# Patient Record
Sex: MALE | Race: BLACK OR AFRICAN AMERICAN | NOT HISPANIC OR LATINO | Employment: OTHER | URBAN - METROPOLITAN AREA
[De-identification: names, ages, dates, MRNs, and addresses within clinical notes are randomized per-mention and may not be internally consistent; named-entity substitution may affect disease eponyms.]

---

## 2023-10-04 ENCOUNTER — TREATMENT (OUTPATIENT)
Dept: PHYSICAL THERAPY | Facility: CLINIC | Age: 53
End: 2023-10-04
Payer: COMMERCIAL

## 2023-10-04 DIAGNOSIS — R26.2 DIFFICULTY WALKING: ICD-10-CM

## 2023-10-04 DIAGNOSIS — M48.062 NEUROGENIC CLAUDICATION DUE TO LUMBAR SPINAL STENOSIS: ICD-10-CM

## 2023-10-04 DIAGNOSIS — D49.2 LUMBAR SPINE TUMOR: Primary | ICD-10-CM

## 2023-10-04 DIAGNOSIS — R53.1 WEAKNESS: ICD-10-CM

## 2023-10-04 PROCEDURE — 97112 NEUROMUSCULAR REEDUCATION: CPT | Mod: GP | Performed by: PHYSICAL THERAPIST

## 2023-10-04 PROCEDURE — 97110 THERAPEUTIC EXERCISES: CPT | Mod: GP | Performed by: PHYSICAL THERAPIST

## 2023-10-04 NOTE — PROGRESS NOTES
Physical Therapy Daily Treatment Note    Patient Name: Altaf Parsons  MRN Number: [id number]  Initial PT Examination Date: 8/10/23  Referring Clinician: Dr. Rodriguez    Today's Date: 10/4/2023       Insurance  Visit Number: 7  Approved Number of Visits: 30/ year  Certification Period: N/A    Precautions  History of spinal cancer  Moderate fall risk    Problem List  1. Lumbar spine tumor        2. Neurogenic claudication due to lumbar spinal stenosis        3. Weakness        4. Difficulty walking          Subjective  He has been having less shocks in his left leg since he started therapy. He felt ok after his last PT session.    Objective  Therapeutic Exercise    - bike warm up, 5 minutes, L3   - hamstring curls, 24 pounds, 3x10   - leg press, 65 pounds, 3x10  Neuromuscular Reeducation    - modified tandem 2x1 min each side   - romberg eyes closed, weight shifting/ turning 2x1 min each side   - slow march x1 min    Assessment  Ability to increase repetitions on leg press and hamstring curls without increase in pain shows continued improvements in lower body strength and endurance.     Plan  Continue progressing standing balance. Update home exercise program.    Home Program  Access Code: [email or web address]  URL: [email or web address]  Date: 10/04/2023  Prepared by: Geovani Dominguez    Exercises  - Hooklying Single Knee to Chest Stretch  - 1 x daily - 7 x weekly - 10 reps  - Supine Lower Trunk Rotation with PLB  - 1 x daily - 7 x weekly - 10 reps  - Supine Piriformis Stretch with Foot on Ground  - 1 x daily - 7 x weekly - 3 reps - 30 sec hold  - Seated Hamstring Stretch  - 1 x daily - 7 x weekly - 3 reps - 20 sec hold  - Sit to Stand Without Arm Support  - 1 x daily - 7 x weekly - 2 sets - 10 reps  - Standing Near Stance in Corner with Eyes Closed  - 1 x daily - 7 x weekly - 3 reps - 1 min hold  - Prone Quadriceps Stretch with Strap  - 1 x daily - 7 x weekly - 3 reps - 30 sec hold    Care Plan  Encounter  Problems       Encounter Problems (Active)       PT Problem       PT Goal 1       Expected End:  11/29/23       1) Modified return to prior level of function to allow continued home independent capability.  2) Reduce back pain levels 4-5/10 maximum in the last week for improvements in quality of life and ability to sleep.   3) Increase bilateral hip strength testing greater than or equal to 4/5 for improvements in low back/ pelvic stability when standing and walking for extended periods of time.   4) Patient to demonstrates capability of walking for 10 minutes with assistive device for integration into the community.  5) Increase LEFS outcome measure to greater than or equal to 60 for meaningful and clinical improvements in patients condition.

## 2023-10-06 PROBLEM — R40.0 DAYTIME SOMNOLENCE: Status: ACTIVE | Noted: 2023-10-06

## 2023-10-06 PROBLEM — M48.062 LUMBAR STENOSIS WITH NEUROGENIC CLAUDICATION: Status: ACTIVE | Noted: 2023-10-06

## 2023-10-06 PROBLEM — E66.01 MORBID OBESITY (MULTI): Status: ACTIVE | Noted: 2023-10-06

## 2023-10-06 PROBLEM — M53.2X9 SPINAL INSTABILITY, ACQUIRED: Status: ACTIVE | Noted: 2023-10-06

## 2023-10-06 PROBLEM — R91.1 LUNG NODULE: Status: ACTIVE | Noted: 2023-10-06

## 2023-10-06 PROBLEM — Z98.1 STATUS POST LAMINECTOMY WITH SPINAL FUSION: Status: ACTIVE | Noted: 2023-10-06

## 2023-10-06 PROBLEM — E03.9 HYPOTHYROIDISM: Status: ACTIVE | Noted: 2023-10-06

## 2023-10-06 PROBLEM — M62.838 MUSCLE SPASM: Status: ACTIVE | Noted: 2023-10-06

## 2023-10-06 PROBLEM — J98.4 RESTRICTIVE LUNG DISEASE: Status: ACTIVE | Noted: 2023-10-06

## 2023-10-06 PROBLEM — D49.2 THORACIC SPINE TUMOR: Status: ACTIVE | Noted: 2023-10-06

## 2023-10-06 PROBLEM — R59.1 LYMPHADENOPATHY: Status: ACTIVE | Noted: 2023-10-06

## 2023-10-06 PROBLEM — S89.90XA KNEE INJURY: Status: ACTIVE | Noted: 2023-10-06

## 2023-10-06 PROBLEM — D49.89: Status: ACTIVE | Noted: 2023-10-06

## 2023-10-06 PROBLEM — J32.9 CHRONIC SINUSITIS: Status: ACTIVE | Noted: 2023-10-06

## 2023-10-06 PROBLEM — R22.2 LOCALIZED SWELLING, MASS AND LUMP, TRUNK: Status: ACTIVE | Noted: 2023-10-06

## 2023-10-06 PROBLEM — M48.061 SPINAL STENOSIS OF LUMBAR REGION: Status: ACTIVE | Noted: 2023-10-06

## 2023-10-06 PROBLEM — T66.XXXA RADIATION EFFECT: Status: ACTIVE | Noted: 2023-10-06

## 2023-10-06 PROBLEM — R63.4 WEIGHT LOSS: Status: ACTIVE | Noted: 2023-10-06

## 2023-10-06 PROBLEM — R22.2 PARASPINAL MASS: Status: ACTIVE | Noted: 2018-05-16

## 2023-10-06 PROBLEM — G95.20 SPINAL CORD COMPRESSION (MULTI): Status: ACTIVE | Noted: 2023-10-06

## 2023-10-06 PROBLEM — N52.9 ED (ERECTILE DYSFUNCTION): Status: ACTIVE | Noted: 2023-10-06

## 2023-10-06 PROBLEM — J98.59 OTHER DISEASES OF MEDIASTINUM, NOT ELSEWHERE CLASSIFIED: Status: ACTIVE | Noted: 2023-10-06

## 2023-10-06 PROBLEM — M89.9 BONE LESION: Status: ACTIVE | Noted: 2023-10-06

## 2023-10-06 PROBLEM — R43.2 DYSGEUSIA: Status: ACTIVE | Noted: 2023-10-06

## 2023-10-06 PROBLEM — C37: Status: ACTIVE | Noted: 2023-10-06

## 2023-10-06 PROBLEM — D49.2 NEOPLASM OF SPINE: Status: ACTIVE | Noted: 2023-10-06

## 2023-10-06 PROBLEM — G99.2 MYELOPATHY CONCURRENT WITH AND DUE TO SPINAL STENOSIS OF THORACIC REGION (MULTI): Status: ACTIVE | Noted: 2023-10-06

## 2023-10-06 PROBLEM — I10 BENIGN ESSENTIAL HYPERTENSION: Status: ACTIVE | Noted: 2023-10-06

## 2023-10-06 PROBLEM — M48.04 MYELOPATHY CONCURRENT WITH AND DUE TO SPINAL STENOSIS OF THORACIC REGION (MULTI): Status: ACTIVE | Noted: 2023-10-06

## 2023-10-06 PROBLEM — E78.5 HYPERLIPIDEMIA: Status: ACTIVE | Noted: 2023-10-06

## 2023-10-06 PROBLEM — M79.89 SOFT TISSUE MASS: Status: ACTIVE | Noted: 2023-10-06

## 2023-10-06 PROBLEM — G47.33 OBSTRUCTIVE SLEEP APNEA OF ADULT: Status: ACTIVE | Noted: 2023-10-06

## 2023-10-06 PROBLEM — M79.2 NEUROPATHIC PAIN: Status: ACTIVE | Noted: 2023-10-06

## 2023-10-06 PROBLEM — R06.83 SNORING: Status: ACTIVE | Noted: 2023-10-06

## 2023-10-06 PROBLEM — R07.9 CHEST PAIN: Status: ACTIVE | Noted: 2023-10-06

## 2023-10-06 PROBLEM — E29.1 HYPOGONADISM MALE: Status: ACTIVE | Noted: 2023-10-06

## 2023-10-06 PROBLEM — G47.33 OSA ON CPAP: Status: ACTIVE | Noted: 2023-10-06

## 2023-10-06 PROBLEM — C79.51 METASTATIC CANCER TO SPINE (MULTI): Status: ACTIVE | Noted: 2023-10-06

## 2023-10-06 PROBLEM — J98.4 PNEUMATOCELE OF LUNG: Status: ACTIVE | Noted: 2023-10-06

## 2023-10-06 RX ORDER — FOLIC ACID 1 MG/1
1 TABLET ORAL DAILY
COMMUNITY
Start: 2022-01-04 | End: 2023-11-22 | Stop reason: WASHOUT

## 2023-10-06 RX ORDER — KETOROLAC TROMETHAMINE 10 MG/1
1 TABLET, FILM COATED ORAL EVERY 6 HOURS PRN
COMMUNITY
Start: 2023-09-18 | End: 2023-11-28 | Stop reason: SDUPTHER

## 2023-10-06 RX ORDER — NALOXONE HYDROCHLORIDE 4 MG/.1ML
SPRAY NASAL
COMMUNITY
Start: 2023-09-18 | End: 2024-05-12 | Stop reason: WASHOUT

## 2023-10-06 RX ORDER — GABAPENTIN 300 MG/1
300 CAPSULE ORAL 3 TIMES DAILY
COMMUNITY
Start: 2023-09-25 | End: 2024-02-29

## 2023-10-06 RX ORDER — OMEPRAZOLE 20 MG/1
20 CAPSULE, DELAYED RELEASE ORAL DAILY
COMMUNITY
Start: 2022-12-24 | End: 2024-04-27 | Stop reason: WASHOUT

## 2023-10-06 RX ORDER — GABAPENTIN 600 MG/1
600 TABLET ORAL 3 TIMES DAILY
COMMUNITY
Start: 2023-05-17 | End: 2024-01-16 | Stop reason: SDUPTHER

## 2023-10-06 RX ORDER — TOPIRAMATE 100 MG/1
100 TABLET, FILM COATED ORAL 2 TIMES DAILY
COMMUNITY
End: 2024-04-27 | Stop reason: ALTCHOICE

## 2023-10-06 RX ORDER — IBUPROFEN 600 MG/1
600 TABLET ORAL 3 TIMES DAILY
COMMUNITY
End: 2024-01-16 | Stop reason: ALTCHOICE

## 2023-10-06 RX ORDER — OXYCODONE HYDROCHLORIDE 5 MG/1
5 TABLET ORAL
COMMUNITY
Start: 2023-09-18 | End: 2023-10-20 | Stop reason: SDUPTHER

## 2023-10-06 RX ORDER — MELOXICAM 15 MG/1
15 TABLET ORAL DAILY
COMMUNITY
Start: 2023-09-25 | End: 2024-04-28 | Stop reason: HOSPADM

## 2023-10-06 RX ORDER — GABAPENTIN 300 MG/1
3 CAPSULE ORAL 3 TIMES DAILY
COMMUNITY
Start: 2021-04-15 | End: 2024-01-16 | Stop reason: SDUPTHER

## 2023-10-06 RX ORDER — IBUPROFEN 600 MG/1
600 TABLET ORAL EVERY 6 HOURS PRN
COMMUNITY
Start: 2023-03-06 | End: 2024-01-16 | Stop reason: ALTCHOICE

## 2023-10-06 RX ORDER — CAPECITABINE 500 MG/1
TABLET, FILM COATED ORAL
COMMUNITY
Start: 2023-05-02 | End: 2023-11-22 | Stop reason: WASHOUT

## 2023-10-06 RX ORDER — TRAMADOL HYDROCHLORIDE 50 MG/1
50 TABLET ORAL 4 TIMES DAILY PRN
COMMUNITY
Start: 2022-11-29 | End: 2024-04-27 | Stop reason: ALTCHOICE

## 2023-10-06 RX ORDER — LISINOPRIL AND HYDROCHLOROTHIAZIDE 20; 25 MG/1; MG/1
1 TABLET ORAL DAILY
COMMUNITY
End: 2024-01-04

## 2023-10-09 DIAGNOSIS — D49.89 THYMOMA: Primary | ICD-10-CM

## 2023-10-11 ENCOUNTER — APPOINTMENT (OUTPATIENT)
Dept: LAB | Facility: HOSPITAL | Age: 53
End: 2023-10-11
Payer: COMMERCIAL

## 2023-10-11 ENCOUNTER — LAB (OUTPATIENT)
Dept: LAB | Facility: HOSPITAL | Age: 53
End: 2023-10-11
Payer: COMMERCIAL

## 2023-10-11 ENCOUNTER — OFFICE VISIT (OUTPATIENT)
Dept: HEMATOLOGY/ONCOLOGY | Facility: HOSPITAL | Age: 53
End: 2023-10-11
Payer: COMMERCIAL

## 2023-10-11 VITALS
OXYGEN SATURATION: 100 % | WEIGHT: 214.73 LBS | HEART RATE: 92 BPM | SYSTOLIC BLOOD PRESSURE: 110 MMHG | HEIGHT: 69 IN | TEMPERATURE: 98.6 F | DIASTOLIC BLOOD PRESSURE: 74 MMHG | BODY MASS INDEX: 31.8 KG/M2 | RESPIRATION RATE: 18 BRPM

## 2023-10-11 DIAGNOSIS — D49.89 THYMOMA: ICD-10-CM

## 2023-10-11 DIAGNOSIS — C37 THYMUS CANCER (MULTI): Primary | ICD-10-CM

## 2023-10-11 LAB
ALBUMIN SERPL BCP-MCNC: 4 G/DL (ref 3.4–5)
ALP SERPL-CCNC: 52 U/L (ref 33–120)
ALT SERPL W P-5'-P-CCNC: 13 U/L (ref 10–52)
ANION GAP SERPL CALC-SCNC: 12 MMOL/L (ref 10–20)
AST SERPL W P-5'-P-CCNC: 15 U/L (ref 9–39)
BASOPHILS # BLD AUTO: 0.02 X10*3/UL (ref 0–0.1)
BASOPHILS NFR BLD AUTO: 0.6 %
BILIRUB SERPL-MCNC: 0.5 MG/DL (ref 0–1.2)
BUN SERPL-MCNC: 17 MG/DL (ref 6–23)
CALCIUM SERPL-MCNC: 9.5 MG/DL (ref 8.6–10.3)
CHLORIDE SERPL-SCNC: 99 MMOL/L (ref 98–107)
CO2 SERPL-SCNC: 29 MMOL/L (ref 21–32)
CREAT SERPL-MCNC: 0.96 MG/DL (ref 0.5–1.3)
EOSINOPHIL # BLD AUTO: 0.52 X10*3/UL (ref 0–0.7)
EOSINOPHIL NFR BLD AUTO: 16.6 %
ERYTHROCYTE [DISTWIDTH] IN BLOOD BY AUTOMATED COUNT: 15.4 % (ref 11.5–14.5)
GFR SERPL CREATININE-BSD FRML MDRD: >90 ML/MIN/1.73M*2
GLUCOSE SERPL-MCNC: 76 MG/DL (ref 74–99)
HCT VFR BLD AUTO: 38.6 % (ref 41–52)
HGB BLD-MCNC: 12.7 G/DL (ref 13.5–17.5)
IMM GRANULOCYTES # BLD AUTO: 0 X10*3/UL (ref 0–0.7)
IMM GRANULOCYTES NFR BLD AUTO: 0 % (ref 0–0.9)
LYMPHOCYTES # BLD AUTO: 0.88 X10*3/UL (ref 1.2–4.8)
LYMPHOCYTES NFR BLD AUTO: 28.1 %
MCH RBC QN AUTO: 27 PG (ref 26–34)
MCHC RBC AUTO-ENTMCNC: 32.9 G/DL (ref 32–36)
MCV RBC AUTO: 82 FL (ref 80–100)
MONOCYTES # BLD AUTO: 0.35 X10*3/UL (ref 0.1–1)
MONOCYTES NFR BLD AUTO: 11.2 %
NEUTROPHILS # BLD AUTO: 1.36 X10*3/UL (ref 1.2–7.7)
NEUTROPHILS NFR BLD AUTO: 43.5 %
NRBC BLD-RTO: 0 /100 WBCS (ref 0–0)
PLATELET # BLD AUTO: 225 X10*3/UL (ref 150–450)
PMV BLD AUTO: 8.8 FL (ref 7.5–11.5)
POTASSIUM SERPL-SCNC: 3.8 MMOL/L (ref 3.5–5.3)
PROT SERPL-MCNC: 7.6 G/DL (ref 6.4–8.2)
RBC # BLD AUTO: 4.71 X10*6/UL (ref 4.5–5.9)
SODIUM SERPL-SCNC: 136 MMOL/L (ref 136–145)
WBC # BLD AUTO: 3.1 X10*3/UL (ref 4.4–11.3)

## 2023-10-11 PROCEDURE — 99214 OFFICE O/P EST MOD 30 MIN: CPT | Performed by: CLINICAL NURSE SPECIALIST

## 2023-10-11 PROCEDURE — 84075 ASSAY ALKALINE PHOSPHATASE: CPT

## 2023-10-11 PROCEDURE — 1036F TOBACCO NON-USER: CPT | Performed by: CLINICAL NURSE SPECIALIST

## 2023-10-11 PROCEDURE — 85025 COMPLETE CBC W/AUTO DIFF WBC: CPT

## 2023-10-11 PROCEDURE — 3078F DIAST BP <80 MM HG: CPT | Performed by: CLINICAL NURSE SPECIALIST

## 2023-10-11 PROCEDURE — 3074F SYST BP LT 130 MM HG: CPT | Performed by: CLINICAL NURSE SPECIALIST

## 2023-10-11 PROCEDURE — 36415 COLL VENOUS BLD VENIPUNCTURE: CPT

## 2023-10-11 ASSESSMENT — PATIENT HEALTH QUESTIONNAIRE - PHQ9
SUM OF ALL RESPONSES TO PHQ9 QUESTIONS 1 AND 2: 0
2. FEELING DOWN, DEPRESSED OR HOPELESS: NOT AT ALL
1. LITTLE INTEREST OR PLEASURE IN DOING THINGS: NOT AT ALL

## 2023-10-11 ASSESSMENT — ENCOUNTER SYMPTOMS
DEPRESSION: 0
OCCASIONAL FEELINGS OF UNSTEADINESS: 0
LOSS OF SENSATION IN FEET: 0

## 2023-10-11 ASSESSMENT — PAIN SCALES - GENERAL: PAINLEVEL: 4

## 2023-10-12 ENCOUNTER — TREATMENT (OUTPATIENT)
Dept: PHYSICAL THERAPY | Facility: CLINIC | Age: 53
End: 2023-10-12
Payer: COMMERCIAL

## 2023-10-12 DIAGNOSIS — D49.2 LUMBAR SPINE TUMOR: ICD-10-CM

## 2023-10-12 DIAGNOSIS — M48.062 NEUROGENIC CLAUDICATION DUE TO LUMBAR SPINAL STENOSIS: ICD-10-CM

## 2023-10-12 DIAGNOSIS — R53.1 WEAKNESS: ICD-10-CM

## 2023-10-12 DIAGNOSIS — R26.2 DIFFICULTY WALKING: Primary | ICD-10-CM

## 2023-10-12 PROCEDURE — 97110 THERAPEUTIC EXERCISES: CPT | Mod: GP | Performed by: PHYSICAL THERAPIST

## 2023-10-12 PROCEDURE — 97112 NEUROMUSCULAR REEDUCATION: CPT | Mod: GP | Performed by: PHYSICAL THERAPIST

## 2023-10-12 NOTE — PROGRESS NOTES
Physical Therapy Daily Treatment Note    Patient Name: Altaf Parsons  MRN Number: [id number]  Initial PT Examination Date: 8/10/23  Referring Clinician: Dr. Rodriguez    Today's Date: 10/12/2023  Time Calculation  Start Time: 0932    Insurance  Visit Number: 8  Approved Number of Visits: 30/ year  Certification Period: N/A    Precautions  History of spinal cancer  Moderate fall risk    Problem List  1. Difficulty walking        2. Lumbar spine tumor        3. Neurogenic claudication due to lumbar spinal stenosis        4. Weakness            Subjective  Did 15 minutes on his bike recently. He has added standing bicep curls into his home program. He has been doing some of the balance exercises done in therapy.     Objective  Therapeutic Exercise    - bike warm up, 5 minutes, L3   - leg press, 70 pounds, 3x10  Neuromuscular Reeducation    - modified tandem 2x1 min each side   - romberg eyes closed, weight shifting/ turning 2x1 min each side   - slow march 2x1 min   - forward/ backward stepping over obstacle x1 min on each side   - updated/ printed HEP   - discussed appropriate frequency of balance exercises and safety set up at home    Assessment  Continue improvements in balance, slower march and participating in higher level balance. Unsteadiness and weakness in the left leg when stepping over hurdles.    Plan  Continue balance exercises.    Home Program  Access Code: [email or web address]  URL: [email or web address]  Date: 10/04/2023  Prepared by: Geovani Dominguez    Exercises  - Hooklying Single Knee to Chest Stretch  - 1 x daily - 7 x weekly - 10 reps  - Supine Lower Trunk Rotation with PLB  - 1 x daily - 7 x weekly - 10 reps  - Supine Piriformis Stretch with Foot on Ground  - 1 x daily - 7 x weekly - 3 reps - 30 sec hold  - Seated Hamstring Stretch  - 1 x daily - 7 x weekly - 3 reps - 20 sec hold  - Sit to Stand Without Arm Support  - 1 x daily - 7 x weekly - 2 sets - 10 reps  - Standing Near Stance in  Corner with Eyes Closed  - 1 x daily - 7 x weekly - 3 reps - 1 min hold  - Prone Quadriceps Stretch with Strap  - 1 x daily - 7 x weekly - 3 reps - 30 sec hold    Access Code: [email or web address]  URL: [email or web address]  Date: 10/12/2023  Prepared by: Geovani Dominguez    Exercises  - Hooklying Single Knee to Chest Stretch  - 1 x daily - 7 x weekly - 10 reps  - Supine Lower Trunk Rotation with PLB  - 1 x daily - 7 x weekly - 10 reps  - Supine Piriformis Stretch with Foot on Ground  - 1 x daily - 7 x weekly - 3 reps - 30 sec hold  - Seated Hamstring Stretch  - 1 x daily - 7 x weekly - 3 reps - 20 sec hold  - Prone Quadriceps Stretch with Strap  - 2 x weekly - 3 reps - 30 sec hold  - Sit to Stand Without Arm Support  - 2 x weekly - 2 sets - 10 reps  - Standing Near Stance in Corner with Eyes Closed  - 2 x weekly - 2 reps - 1 min hold  - Tandem Stance in Corner  - 2 x weekly - 2 sets - 1 min hold  - Corner Balance Feet Together: Eyes Closed With Head Turns  - 2 x weekly - 2 sets - 1 min hold  - Standing March with Counter Support  - 2 x weekly - 2 sets - 1 min hold    Care Plan  Active       PT Problem       PT Goal 1       Expected End:  11/29/23       1) Modified return to prior level of function to allow continued home independent capability.  2) Reduce back pain levels 4-5/10 maximum in the last week for improvements in quality of life and ability to sleep.   3) Increase bilateral hip strength testing greater than or equal to 4/5 for improvements in low back/ pelvic stability when standing and walking for extended periods of time.   4) Patient to demonstrates capability of walking for 10 minutes with assistive device for integration into the community.  5) Increase LEFS outcome measure to greater than or equal to 60 for meaningful and clinical improvements in patients condition.

## 2023-10-19 ENCOUNTER — PHARMACY VISIT (OUTPATIENT)
Dept: PHARMACY | Facility: CLINIC | Age: 53
End: 2023-10-19
Payer: COMMERCIAL

## 2023-10-19 ENCOUNTER — SPECIALTY PHARMACY (OUTPATIENT)
Dept: PHARMACY | Facility: CLINIC | Age: 53
End: 2023-10-19

## 2023-10-19 PROCEDURE — RXMED WILLOW AMBULATORY MEDICATION CHARGE

## 2023-10-20 ENCOUNTER — TELEPHONE (OUTPATIENT)
Dept: ADMISSION | Facility: HOSPITAL | Age: 53
End: 2023-10-20

## 2023-10-20 ENCOUNTER — LAB (OUTPATIENT)
Dept: LAB | Facility: LAB | Age: 53
End: 2023-10-20
Payer: COMMERCIAL

## 2023-10-20 DIAGNOSIS — C37 THYMUS CANCER (MULTI): Primary | ICD-10-CM

## 2023-10-20 DIAGNOSIS — C37 THYMUS CANCER (MULTI): ICD-10-CM

## 2023-10-20 LAB
BASOPHILS # BLD AUTO: 0.03 X10*3/UL (ref 0–0.1)
BASOPHILS NFR BLD AUTO: 0.8 %
EOSINOPHIL # BLD AUTO: 0.75 X10*3/UL (ref 0–0.7)
EOSINOPHIL NFR BLD AUTO: 21.2 %
ERYTHROCYTE [DISTWIDTH] IN BLOOD BY AUTOMATED COUNT: 15.5 % (ref 11.5–14.5)
HCT VFR BLD AUTO: 39.2 % (ref 41–52)
HGB BLD-MCNC: 12.2 G/DL (ref 13.5–17.5)
IMM GRANULOCYTES # BLD AUTO: 0.01 X10*3/UL (ref 0–0.7)
IMM GRANULOCYTES NFR BLD AUTO: 0.3 % (ref 0–0.9)
LYMPHOCYTES # BLD AUTO: 0.71 X10*3/UL (ref 1.2–4.8)
LYMPHOCYTES NFR BLD AUTO: 20.1 %
MCH RBC QN AUTO: 26.5 PG (ref 26–34)
MCHC RBC AUTO-ENTMCNC: 31.1 G/DL (ref 32–36)
MCV RBC AUTO: 85 FL (ref 80–100)
MONOCYTES # BLD AUTO: 0.41 X10*3/UL (ref 0.1–1)
MONOCYTES NFR BLD AUTO: 11.6 %
NEUTROPHILS # BLD AUTO: 1.62 X10*3/UL (ref 1.2–7.7)
NEUTROPHILS NFR BLD AUTO: 46 %
NRBC BLD-RTO: 0 /100 WBCS (ref 0–0)
PLATELET # BLD AUTO: 317 X10*3/UL (ref 150–450)
PMV BLD AUTO: 9.6 FL (ref 7.5–11.5)
RBC # BLD AUTO: 4.6 X10*6/UL (ref 4.5–5.9)
WBC # BLD AUTO: 3.5 X10*3/UL (ref 4.4–11.3)

## 2023-10-20 PROCEDURE — 85025 COMPLETE CBC W/AUTO DIFF WBC: CPT

## 2023-10-20 PROCEDURE — 36415 COLL VENOUS BLD VENIPUNCTURE: CPT

## 2023-10-20 RX ORDER — OXYCODONE HYDROCHLORIDE 5 MG/1
5 TABLET ORAL EVERY 4 HOURS PRN
Qty: 60 TABLET | Refills: 0 | Status: SHIPPED | OUTPATIENT
Start: 2023-10-20 | End: 2023-12-22 | Stop reason: SDUPTHER

## 2023-10-20 NOTE — TELEPHONE ENCOUNTER
"Per Felicia Meza the patient notified the medication for the Oxycodone has been sent to the patient's \"Preferred Pharmacy\", still awaiting results from the CBC/D drawn today in [place].  100% understanding.        Patient called to informed this nurse he had his CBC/Diff drawn this AM at [place], told the patient results are still pending, however, this nurse will inform Felicia Meza, also the patient is requesting refill for his Oxycodone 5 mg PO , last sent on 9/18/23, patient stated \" felicia may have forgotten.  [facility]    Secure Chat message sent to Felicia Meza CNP.  "

## 2023-10-23 ENCOUNTER — SPECIALTY PHARMACY (OUTPATIENT)
Dept: PHARMACY | Facility: CLINIC | Age: 53
End: 2023-10-23

## 2023-10-25 ENCOUNTER — OFFICE VISIT (OUTPATIENT)
Dept: HEMATOLOGY/ONCOLOGY | Facility: HOSPITAL | Age: 53
End: 2023-10-25
Payer: COMMERCIAL

## 2023-10-25 ENCOUNTER — LAB (OUTPATIENT)
Dept: LAB | Facility: HOSPITAL | Age: 53
End: 2023-10-25
Payer: COMMERCIAL

## 2023-10-25 ENCOUNTER — APPOINTMENT (OUTPATIENT)
Dept: PHYSICAL THERAPY | Facility: CLINIC | Age: 53
End: 2023-10-25
Payer: COMMERCIAL

## 2023-10-25 VITALS
OXYGEN SATURATION: 98 % | TEMPERATURE: 97.7 F | RESPIRATION RATE: 18 BRPM | SYSTOLIC BLOOD PRESSURE: 111 MMHG | DIASTOLIC BLOOD PRESSURE: 74 MMHG | WEIGHT: 223.99 LBS | HEART RATE: 92 BPM | BODY MASS INDEX: 32.99 KG/M2

## 2023-10-25 DIAGNOSIS — C37 THYMUS CANCER (MULTI): ICD-10-CM

## 2023-10-25 LAB
ALBUMIN SERPL BCP-MCNC: 4.1 G/DL (ref 3.4–5)
ALP SERPL-CCNC: 57 U/L (ref 33–120)
ALT SERPL W P-5'-P-CCNC: 13 U/L (ref 10–52)
ANION GAP SERPL CALC-SCNC: 12 MMOL/L (ref 10–20)
AST SERPL W P-5'-P-CCNC: 16 U/L (ref 9–39)
BASOPHILS # BLD AUTO: 0.03 X10*3/UL (ref 0–0.1)
BASOPHILS NFR BLD AUTO: 0.9 %
BILIRUB SERPL-MCNC: 0.5 MG/DL (ref 0–1.2)
BUN SERPL-MCNC: 14 MG/DL (ref 6–23)
CALCIUM SERPL-MCNC: 9.4 MG/DL (ref 8.6–10.3)
CHLORIDE SERPL-SCNC: 99 MMOL/L (ref 98–107)
CO2 SERPL-SCNC: 30 MMOL/L (ref 21–32)
CREAT SERPL-MCNC: 1.03 MG/DL (ref 0.5–1.3)
EOSINOPHIL # BLD AUTO: 0.59 X10*3/UL (ref 0–0.7)
EOSINOPHIL NFR BLD AUTO: 17.2 %
ERYTHROCYTE [DISTWIDTH] IN BLOOD BY AUTOMATED COUNT: 15.4 % (ref 11.5–14.5)
GFR SERPL CREATININE-BSD FRML MDRD: 86 ML/MIN/1.73M*2
GLUCOSE SERPL-MCNC: 87 MG/DL (ref 74–99)
HCT VFR BLD AUTO: 35.8 % (ref 41–52)
HGB BLD-MCNC: 11.7 G/DL (ref 13.5–17.5)
IMM GRANULOCYTES # BLD AUTO: 0.01 X10*3/UL (ref 0–0.7)
IMM GRANULOCYTES NFR BLD AUTO: 0.3 % (ref 0–0.9)
LYMPHOCYTES # BLD AUTO: 0.71 X10*3/UL (ref 1.2–4.8)
LYMPHOCYTES NFR BLD AUTO: 20.7 %
MCH RBC QN AUTO: 27 PG (ref 26–34)
MCHC RBC AUTO-ENTMCNC: 32.7 G/DL (ref 32–36)
MCV RBC AUTO: 83 FL (ref 80–100)
MONOCYTES # BLD AUTO: 0.49 X10*3/UL (ref 0.1–1)
MONOCYTES NFR BLD AUTO: 14.3 %
NEUTROPHILS # BLD AUTO: 1.6 X10*3/UL (ref 1.2–7.7)
NEUTROPHILS NFR BLD AUTO: 46.6 %
NRBC BLD-RTO: 0 /100 WBCS (ref 0–0)
PLATELET # BLD AUTO: 249 X10*3/UL (ref 150–450)
PMV BLD AUTO: 8.6 FL (ref 7.5–11.5)
POTASSIUM SERPL-SCNC: 3.9 MMOL/L (ref 3.5–5.3)
PROT SERPL-MCNC: 7.3 G/DL (ref 6.4–8.2)
RBC # BLD AUTO: 4.33 X10*6/UL (ref 4.5–5.9)
SODIUM SERPL-SCNC: 137 MMOL/L (ref 136–145)
WBC # BLD AUTO: 3.4 X10*3/UL (ref 4.4–11.3)

## 2023-10-25 PROCEDURE — 1036F TOBACCO NON-USER: CPT | Performed by: CLINICAL NURSE SPECIALIST

## 2023-10-25 PROCEDURE — 3078F DIAST BP <80 MM HG: CPT | Performed by: CLINICAL NURSE SPECIALIST

## 2023-10-25 PROCEDURE — 3074F SYST BP LT 130 MM HG: CPT | Performed by: CLINICAL NURSE SPECIALIST

## 2023-10-25 PROCEDURE — 85025 COMPLETE CBC W/AUTO DIFF WBC: CPT

## 2023-10-25 PROCEDURE — 36415 COLL VENOUS BLD VENIPUNCTURE: CPT

## 2023-10-25 PROCEDURE — 99214 OFFICE O/P EST MOD 30 MIN: CPT | Performed by: CLINICAL NURSE SPECIALIST

## 2023-10-25 PROCEDURE — 84075 ASSAY ALKALINE PHOSPHATASE: CPT

## 2023-10-25 ASSESSMENT — PAIN SCALES - GENERAL: PAINLEVEL: 0-NO PAIN

## 2023-10-25 NOTE — PROGRESS NOTES
Patient ID: Altaf Parsons is a 53 y.o. male.  Visit Type: Follow Up Visit      Cancer History:   Treatment Synopsis:          DIAGNOSIS     Malignant thymoma. Type B2 (predominant lymphocytic population with scattered epithelial cells). Date of diagnosis is March 4, 2016 from a left lower lobe of the lung wedge resection and mediastinal fat biopsy.A left pleural nodule was biopsied in May  2017 showing thymoma. A left serratus anterior biopsy in November 2019 showed thymoma. A biopsy from a T12-L1 paraspinal mass on August 19, 2020 shows again thymoma. A spine tumor biopsy in January 2021 again shows thymoma.        STAGING     T2 N0 M1 (metastases to the lungs bilaterally)        CURRENT SITES OF DISEASE     Mediastinum, lung, parapinal region T, L, S spine, left pleura, T12-L1 paraspinal region, left lateral aspect of the spinal canal spanning from at least T11-S1. Findings likely  represent epidural tumoral extension as seen on MRI thoracic and lumbar spine 06/26/2021. Interval worsening/development of widespread osseous metastasis noted throughout the thoracic and lumbar spine with ventral epidural tumoral extension from the T6  level through the L4 level. Canal stenosis secondary to epidural tumor appears to be more severe at the T8 level through the T10 level. At the T8-9 level there appears to be mild flattening of the contour of the cord. Recent MRI T/L spine (11/11/22) demonstrates  increased/new tumor extension across multiple spinal levels, particularly at T9-T11 and L1-L2 with marked stenosis at L1-L2.        MOLECULAR GENOMICS     PD-L1 22C3 FDA (KEYTRUDA) for Gastric/GEA: CPS>/=1 (PD-L1 EXPRESSION) Combined Positive Score: 80   TEST: Solid Focus Tumor DNA Panel SPECIMEN: FFPE, Left Serratus Anterior, Biopsy, [id number] A DISEASE DIAGNOSIS: Thyoma Estimated Tumor Content: 30% COLLECTION DATE: 11/13/2019 RECEIVED DATE: 08/11/2020 REPORT DATE: 08/14/2020 MICROSATELLITE STATUS: Microsatellite  Stable (CHERYL)  DISEASE ASSOCIATED GENOMIC FINDINGS: None         PRIOR THERAPY     1-left video-assisted thoracic surgery extensive lysis of adhesions and total pulmonary decortication, lower lobe wedge, exploration of the mediastinum converted to  thoracotomy, resection of anterior mediastinal mass and thymus, upper lobe wedge. Per surgery there is residual disease.  2-external beam radiation therapy August 1, 2016- Sept 17, 2016  5940 CGyE using 180 CGyE daily to surgical site   3- cyclophosphamide, adriamycin, cisplatin on 8/14/17 x 3 cycles      4- 3/19/18-4/17/18: recurrent left paraspinal T12-L1, 50 CGE/20 fx PROTONS Had progression but declined systemic therapy.      5- 12/10/19- 12/31/19: SINDI mass and left serratus anterior mass, 45 CGE/15 fx, PROTONS      6- Stenosis from T11-L1 secondary to large intraspinal extradural mass at the T12-L1 level/neoplasm. OPERATION/PROCEDURE: 1. Posterior spinal fusion T9-L1 with use of bilateral  pedicle screw instrumentation, allograft, and demineralized bone matrix fiber. 2. T12 laminectomy with laminotomy of L1 and T11 to decompress T11-L1 levels and remove the intraspinal extradural neoplasm at the T12-L1 segment.      7- On 8/4/21, he saw ortho spine surgery (Dr. Delgado) who did not recommend further surgery.      8- palliative radiation to the T-L/S1 spine: 30 Gy in 10 daily fractions, completed 9/27/21.     9- Single agents alimta per NCCN guideliens on January 10, 2022.   Received 2 cycles in JanuaryJanuary 2022.  Progressive disease based on MRI in February 2022     10- Radiation therapy to 30 Gy in 10 fractions, from T4-T6 and L1-L3 completed in mid April 2022 11- 1- Dec 2022 - single agent capecitabine 500 mg tab, 4 tabs BID. Cycle - 21 days (two weeks on with one week holiday).  Patient initially underdosed himself with cycle #1 despite clear instructions.  He took cycle 2 at full dose on January 26, 2023.   Delay in C3 due to insurance issues with plan to resume again in  April 2023.  Cycle 3 4/4/23 - 4/17 with one week off ending 4/24.  Cycle 4 starts 4/25/23.   He  started (C4) on 4/25/23.  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 He underwent LEFT VATS DRAINAGE OF MASSIVE HEMOTHORAX, PLEURAL BIOPSY, AND CORE NEEDLE  BIOPSY OF LINGULAR MASS AND DECORTICATION on July 18, 2007. Biopsies were all negative. He was also seen on CT scan to have an approximately 7 cm lesion within the mediastinum with a calcified rim. He was followed serially with CT scans of the chest through  2009. He was seen later in follow-up in 2016. MRI of the chest in Feb 2016 showed a new enhancing soft tissue mass immediately superior to the previously described calcified cystic structure. Also a new enhancing left pericardial lymph node and left basilar  pulmonary nodule were seen. CT scan of the chest also showed a right lower lobe nodule. He was taken to the operating room on March 4, 2016 where the necrotic calcified mass was removed showing only necrotic debris. Pathology favored a necrotic tumor  although no viable cancer cells were seen. The separate lesion within the mediastinum was shown to be a malignant thymoma. Wedge resection of the left sided pulmonary lesion also showed malignant thymoma. Underwent gross  resection of the anterior mediastinal  mass at that time, Proton beam radiation to his surgical bed.  He had recurrence in 2017 and received 3 cycles of systemic therapy complicated by nausea and vomiting.He was last seen by medical oncology in early 2018 and since then has not wanted any  systemic therapy and is followed with radiation oncology only. During this time he has had recurrent bronchospasm, left anterior serratus muscle, multiple recurrences of his T12-L1 region requiring 2 separate courses of radiation therapy as well as a  surgical decompression laminectomy and fusion and most recently has had further progression and is agreed to come back to the medical oncology.         PAST MEDICAL HISTORY     1-hypertension  2-spontaneous hemothorax on the left side in 2007  3-GERD  4-Achilles tendon repair  5-chronic back pain from work related back injury        SOCIAL HISTORY      Patient lives in [place]. Has 2 children age 6 and 13. 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 He has never smoked. Does not drink alcohol. He has been on disability now for a couple of months.  He has been off work since August 27, 2021.        CURRENT MEDS     per list        ALLERGIES     Denies any drug allergies        FAMILY HISTORY     No family history of cancer.            Subjective    HPI:    Today Altaf is here for a safety check on his evrolimus.  I saw him 2 weeks ago, when he had just received and started the drug. Today he reports good tolerance of the drug, with no known side effects, and safety labs show no toxicity.  He reports that he is feeling good overall, and has gained 7 lbs back since he started the drug- recall he had an acute musculoskeletal exacerbation of his pain about a month ago, and we started oxycodone and toradol.  He has been able to wean himself off the oxycodone and takes medication for pain very infrequently- pain seems under better control.  PT is helping, and he feels he has made good progress  on mobility- unfortunately he is getting to the end of the allotted sessions by insurance.  He started his second monthly script of evorlimus yesterday.  He is optimistic and pleased with how things are going on this treatment.             Objective    BSA: 2.23 M2 Weight 102 kg.      Physical Exam  Vitals reviewed.   Constitutional:       Appearance: Normal appearance.   HENT:      Head: Normocephalic.   Eyes:      Pupils: Pupils are equal, round, and reactive to light.   Cardiovascular:      Rate and Rhythm: Normal rate.   Pulmonary:      Effort: Pulmonary effort is normal.      Breath sounds: Normal breath sounds.   Musculoskeletal:      Cervical back: Normal range of motion.   Skin:     General: Skin is warm.   Neurological:      Mental Status: He is alert and oriented to person, place, and time.      Motor: Weakness present.      Gait: Gait abnormal.   Psychiatric:         Mood and Affect: Mood normal.         Performance Status:  Symptomatic; fully ambulatory      Assessment/Plan :    Mr. Parsons is doing well on the evrolimus.  He feels better and feels optimistic that it is working to treat his cancer.  Safety labs are good- show no toxicity.  He has started his second monthly cycle yesterday.  He has gained weight, and pain seems to be under better control.     He will return in 1 month with an MRI of his spine and visit.  We home to learn at that time if this is working to treat his cancer.                           RAFI Hernández-CNS

## 2023-11-01 ENCOUNTER — APPOINTMENT (OUTPATIENT)
Dept: PHYSICAL THERAPY | Facility: CLINIC | Age: 53
End: 2023-11-01
Payer: COMMERCIAL

## 2023-11-09 ENCOUNTER — TREATMENT (OUTPATIENT)
Dept: PHYSICAL THERAPY | Facility: CLINIC | Age: 53
End: 2023-11-09
Payer: COMMERCIAL

## 2023-11-09 DIAGNOSIS — R26.2 DIFFICULTY WALKING: Primary | ICD-10-CM

## 2023-11-09 DIAGNOSIS — M48.062 NEUROGENIC CLAUDICATION DUE TO LUMBAR SPINAL STENOSIS: ICD-10-CM

## 2023-11-09 DIAGNOSIS — R53.1 WEAKNESS: ICD-10-CM

## 2023-11-09 DIAGNOSIS — D49.2 LUMBAR SPINE TUMOR: ICD-10-CM

## 2023-11-09 PROCEDURE — 97110 THERAPEUTIC EXERCISES: CPT | Mod: GP | Performed by: PHYSICAL THERAPIST

## 2023-11-09 NOTE — PROGRESS NOTES
Physical Therapy Daily Treatment Note    Patient Name: Altaf Parsons  MRN Number: [id number]  Initial PT Examination Date: 8/10/23  Referring Clinician: Dr. Rodriguez    Today's Date: 11/9/2023  Time Calculation  Start Time: 1153  Stop Time: 1235  Time Calculation (min): 42 min    Insurance  Visit Number: 9  Approved Number of Visits: 30/ year  Certification Period: N/A    Precautions  History of spinal cancer  Moderate fall risk    Problem List  1. Difficulty walking        2. Lumbar spine tumor        3. Neurogenic claudication due to lumbar spinal stenosis        4. Weakness            Subjective  Has not had the shocking down his left leg in a while. Only one time that had significant back pain, overall it has been pretty good though. He has been doing the exercises that were issued at home. His machines are used not that much. Has not access to a leg press at home either, so has not been doing that. He has not fallen recently. He does not feel unstable with every day activity. His leg still feel weak. He can get through the grocery store, does not do the walking through the store.     Objective  Therapeutic Exercise    - bike warm up, 5 minutes, L6   - discussed increasing the resistance on bike at home to provide more challenge to build strength   - leg press, 70 pounds, x20   - single leg press, 40 pounds 2x8 each side   - seated hamstring curls 36 pounds x20   - single seated hamstring curl 2x10 each LE, 16 pounds   - supine bridges x10   - single leg bridge, opposite LE straight on mat 2x10 each side   - clamshells x20 each side   - seated forward trunk flexion    Assessment  Significant discrepancy in left lower extremity strength when compared to the right lower extremity.     Plan  Update home exercise program with more strengthening exercises. Add forward trunk flexion.     Home Program  Access Code: [email or web address]  URL: [email or web address]  Date: 10/04/2023  Prepared by: Geovani  Alberto    Exercises  - Hooklying Single Knee to Chest Stretch  - 1 x daily - 7 x weekly - 10 reps  - Supine Lower Trunk Rotation with PLB  - 1 x daily - 7 x weekly - 10 reps  - Supine Piriformis Stretch with Foot on Ground  - 1 x daily - 7 x weekly - 3 reps - 30 sec hold  - Seated Hamstring Stretch  - 1 x daily - 7 x weekly - 3 reps - 20 sec hold  - Sit to Stand Without Arm Support  - 1 x daily - 7 x weekly - 2 sets - 10 reps  - Standing Near Stance in Corner with Eyes Closed  - 1 x daily - 7 x weekly - 3 reps - 1 min hold  - Prone Quadriceps Stretch with Strap  - 1 x daily - 7 x weekly - 3 reps - 30 sec hold    Access Code: [email or web address]  URL: [email or web address]  Date: 10/12/2023  Prepared by: Geovani Dominguez    Exercises  - Hooklying Single Knee to Chest Stretch  - 1 x daily - 7 x weekly - 10 reps  - Supine Lower Trunk Rotation with PLB  - 1 x daily - 7 x weekly - 10 reps  - Supine Piriformis Stretch with Foot on Ground  - 1 x daily - 7 x weekly - 3 reps - 30 sec hold  - Seated Hamstring Stretch  - 1 x daily - 7 x weekly - 3 reps - 20 sec hold  - Prone Quadriceps Stretch with Strap  - 2 x weekly - 3 reps - 30 sec hold  - Sit to Stand Without Arm Support  - 2 x weekly - 2 sets - 10 reps  - Standing Near Stance in Corner with Eyes Closed  - 2 x weekly - 2 reps - 1 min hold  - Tandem Stance in Corner  - 2 x weekly - 2 sets - 1 min hold  - Corner Balance Feet Together: Eyes Closed With Head Turns  - 2 x weekly - 2 sets - 1 min hold  - Standing March with Counter Support  - 2 x weekly - 2 sets - 1 min hold    Care Plan  Active       PT Problem       PT Goal 1       Expected End:  11/29/23       1) Modified return to prior level of function to allow continued home independent capability.  2) Reduce back pain levels 4-5/10 maximum in the last week for improvements in quality of life and ability to sleep.   3) Increase bilateral hip strength testing greater than or equal to 4/5 for improvements in low  back/ pelvic stability when standing and walking for extended periods of time.   4) Patient to demonstrates capability of walking for 10 minutes with assistive device for integration into the community.  5) Increase LEFS outcome measure to greater than or equal to 60 for meaningful and clinical improvements in patients condition.

## 2023-11-15 ENCOUNTER — ANCILLARY PROCEDURE (OUTPATIENT)
Dept: RADIOLOGY | Facility: CLINIC | Age: 53
End: 2023-11-15
Payer: COMMERCIAL

## 2023-11-15 DIAGNOSIS — C37 THYMUS CANCER (MULTI): ICD-10-CM

## 2023-11-15 PROCEDURE — 2550000001 HC RX 255 CONTRASTS: Performed by: CLINICAL NURSE SPECIALIST

## 2023-11-15 PROCEDURE — A9575 INJ GADOTERATE MEGLUMI 0.1ML: HCPCS | Performed by: CLINICAL NURSE SPECIALIST

## 2023-11-15 PROCEDURE — 72158 MRI LUMBAR SPINE W/O & W/DYE: CPT

## 2023-11-15 PROCEDURE — 72158 MRI LUMBAR SPINE W/O & W/DYE: CPT | Performed by: STUDENT IN AN ORGANIZED HEALTH CARE EDUCATION/TRAINING PROGRAM

## 2023-11-15 RX ORDER — GADOTERATE MEGLUMINE 376.9 MG/ML
20 INJECTION INTRAVENOUS
Status: COMPLETED | OUTPATIENT
Start: 2023-11-15 | End: 2023-11-15

## 2023-11-15 RX ADMIN — GADOTERATE MEGLUMINE 20 ML: 376.9 INJECTION INTRAVENOUS at 13:01

## 2023-11-16 ENCOUNTER — PHARMACY VISIT (OUTPATIENT)
Dept: PHARMACY | Facility: CLINIC | Age: 53
End: 2023-11-16
Payer: COMMERCIAL

## 2023-11-16 ENCOUNTER — ANCILLARY PROCEDURE (OUTPATIENT)
Dept: RADIOLOGY | Facility: CLINIC | Age: 53
End: 2023-11-16
Payer: COMMERCIAL

## 2023-11-16 DIAGNOSIS — C37 THYMUS CANCER (MULTI): Primary | ICD-10-CM

## 2023-11-16 PROCEDURE — A9575 INJ GADOTERATE MEGLUMI 0.1ML: HCPCS | Performed by: CLINICAL NURSE SPECIALIST

## 2023-11-16 PROCEDURE — 72157 MRI CHEST SPINE W/O & W/DYE: CPT | Performed by: RADIOLOGY

## 2023-11-16 PROCEDURE — RXMED WILLOW AMBULATORY MEDICATION CHARGE

## 2023-11-16 PROCEDURE — 72157 MRI CHEST SPINE W/O & W/DYE: CPT

## 2023-11-16 PROCEDURE — 2550000001 HC RX 255 CONTRASTS: Performed by: CLINICAL NURSE SPECIALIST

## 2023-11-16 RX ORDER — GADOTERATE MEGLUMINE 376.9 MG/ML
20 INJECTION INTRAVENOUS
Status: COMPLETED | OUTPATIENT
Start: 2023-11-16 | End: 2023-11-16

## 2023-11-16 RX ADMIN — GADOTERATE MEGLUMINE 20 ML: 376.9 INJECTION INTRAVENOUS at 11:37

## 2023-11-22 ENCOUNTER — OFFICE VISIT (OUTPATIENT)
Dept: HEMATOLOGY/ONCOLOGY | Facility: HOSPITAL | Age: 53
End: 2023-11-22
Payer: COMMERCIAL

## 2023-11-22 ENCOUNTER — LAB (OUTPATIENT)
Dept: LAB | Facility: HOSPITAL | Age: 53
End: 2023-11-22
Payer: COMMERCIAL

## 2023-11-22 VITALS
WEIGHT: 222.3 LBS | HEART RATE: 103 BPM | RESPIRATION RATE: 18 BRPM | DIASTOLIC BLOOD PRESSURE: 71 MMHG | TEMPERATURE: 97.9 F | HEIGHT: 69 IN | BODY MASS INDEX: 32.92 KG/M2 | SYSTOLIC BLOOD PRESSURE: 116 MMHG | OXYGEN SATURATION: 96 %

## 2023-11-22 DIAGNOSIS — C37 THYMUS CANCER (MULTI): ICD-10-CM

## 2023-11-22 LAB
ALBUMIN SERPL BCP-MCNC: 4.4 G/DL (ref 3.4–5)
ALP SERPL-CCNC: 50 U/L (ref 33–120)
ALT SERPL W P-5'-P-CCNC: 14 U/L (ref 10–52)
ANION GAP SERPL CALC-SCNC: 13 MMOL/L (ref 10–20)
AST SERPL W P-5'-P-CCNC: 18 U/L (ref 9–39)
BASOPHILS # BLD AUTO: 0.03 X10*3/UL (ref 0–0.1)
BASOPHILS NFR BLD AUTO: 0.8 %
BILIRUB SERPL-MCNC: 0.5 MG/DL (ref 0–1.2)
BUN SERPL-MCNC: 19 MG/DL (ref 6–23)
CALCIUM SERPL-MCNC: 9.6 MG/DL (ref 8.6–10.3)
CHLORIDE SERPL-SCNC: 100 MMOL/L (ref 98–107)
CO2 SERPL-SCNC: 27 MMOL/L (ref 21–32)
CREAT SERPL-MCNC: 1.1 MG/DL (ref 0.5–1.3)
EOSINOPHIL # BLD AUTO: 0.9 X10*3/UL (ref 0–0.7)
EOSINOPHIL NFR BLD AUTO: 22.6 %
ERYTHROCYTE [DISTWIDTH] IN BLOOD BY AUTOMATED COUNT: 15 % (ref 11.5–14.5)
GFR SERPL CREATININE-BSD FRML MDRD: 79 ML/MIN/1.73M*2
GLUCOSE SERPL-MCNC: 95 MG/DL (ref 74–99)
HCT VFR BLD AUTO: 37.8 % (ref 41–52)
HGB BLD-MCNC: 12.4 G/DL (ref 13.5–17.5)
IMM GRANULOCYTES # BLD AUTO: 0.01 X10*3/UL (ref 0–0.7)
IMM GRANULOCYTES NFR BLD AUTO: 0.3 % (ref 0–0.9)
LYMPHOCYTES # BLD AUTO: 0.74 X10*3/UL (ref 1.2–4.8)
LYMPHOCYTES NFR BLD AUTO: 18.6 %
MCH RBC QN AUTO: 26.5 PG (ref 26–34)
MCHC RBC AUTO-ENTMCNC: 32.8 G/DL (ref 32–36)
MCV RBC AUTO: 81 FL (ref 80–100)
MONOCYTES # BLD AUTO: 0.43 X10*3/UL (ref 0.1–1)
MONOCYTES NFR BLD AUTO: 10.8 %
NEUTROPHILS # BLD AUTO: 1.87 X10*3/UL (ref 1.2–7.7)
NEUTROPHILS NFR BLD AUTO: 46.9 %
NRBC BLD-RTO: 0 /100 WBCS (ref 0–0)
PLATELET # BLD AUTO: 305 X10*3/UL (ref 150–450)
POTASSIUM SERPL-SCNC: 3.8 MMOL/L (ref 3.5–5.3)
PROT SERPL-MCNC: 7.3 G/DL (ref 6.4–8.2)
RBC # BLD AUTO: 4.68 X10*6/UL (ref 4.5–5.9)
SODIUM SERPL-SCNC: 136 MMOL/L (ref 136–145)
WBC # BLD AUTO: 4 X10*3/UL (ref 4.4–11.3)

## 2023-11-22 PROCEDURE — 82247 BILIRUBIN TOTAL: CPT

## 2023-11-22 PROCEDURE — 3078F DIAST BP <80 MM HG: CPT | Performed by: INTERNAL MEDICINE

## 2023-11-22 PROCEDURE — 1036F TOBACCO NON-USER: CPT | Performed by: INTERNAL MEDICINE

## 2023-11-22 PROCEDURE — 3074F SYST BP LT 130 MM HG: CPT | Performed by: INTERNAL MEDICINE

## 2023-11-22 PROCEDURE — 84075 ASSAY ALKALINE PHOSPHATASE: CPT

## 2023-11-22 PROCEDURE — 36415 COLL VENOUS BLD VENIPUNCTURE: CPT

## 2023-11-22 PROCEDURE — 80053 COMPREHEN METABOLIC PANEL: CPT

## 2023-11-22 PROCEDURE — 85025 COMPLETE CBC W/AUTO DIFF WBC: CPT

## 2023-11-22 PROCEDURE — 99215 OFFICE O/P EST HI 40 MIN: CPT | Performed by: INTERNAL MEDICINE

## 2023-11-22 ASSESSMENT — ENCOUNTER SYMPTOMS
MYALGIAS: 0
NECK PAIN: 0
OCCASIONAL FEELINGS OF UNSTEADINESS: 0
ARTHRALGIAS: 0
VISUAL CHANGE: 0
DIAPHORESIS: 0
ANOREXIA: 0
CHANGE IN BOWEL HABIT: 0
ABDOMINAL PAIN: 0
COUGH: 0
HEADACHES: 0
NUMBNESS: 0
SWOLLEN GLANDS: 0
SORE THROAT: 0
VERTIGO: 0
LOSS OF SENSATION IN FEET: 0
DEPRESSION: 0
FATIGUE: 0
NAUSEA: 0
WEAKNESS: 0
CHILLS: 0
JOINT SWELLING: 0
VOMITING: 0
FEVER: 0

## 2023-11-22 ASSESSMENT — PATIENT HEALTH QUESTIONNAIRE - PHQ9
SUM OF ALL RESPONSES TO PHQ9 QUESTIONS 1 AND 2: 0
1. LITTLE INTEREST OR PLEASURE IN DOING THINGS: NOT AT ALL
2. FEELING DOWN, DEPRESSED OR HOPELESS: NOT AT ALL

## 2023-11-22 ASSESSMENT — COLUMBIA-SUICIDE SEVERITY RATING SCALE - C-SSRS
6. HAVE YOU EVER DONE ANYTHING, STARTED TO DO ANYTHING, OR PREPARED TO DO ANYTHING TO END YOUR LIFE?: NO
1. IN THE PAST MONTH, HAVE YOU WISHED YOU WERE DEAD OR WISHED YOU COULD GO TO SLEEP AND NOT WAKE UP?: NO
2. HAVE YOU ACTUALLY HAD ANY THOUGHTS OF KILLING YOURSELF?: NO

## 2023-11-22 ASSESSMENT — PAIN SCALES - GENERAL: PAINLEVEL: 3

## 2023-11-22 NOTE — PROGRESS NOTES
Patient ID: Altaf Parsons is a 53 y.o. male.    DIAGNOSIS     Malignant thymoma. Type B2 (predominant lymphocytic population with scattered epithelial cells). Date of diagnosis is March 4, 2016 from a left lower lobe of the lung wedge resection and mediastinal fat biopsy.A left pleural nodule was biopsied in May  2017 showing thymoma. A left serratus anterior biopsy in November 2019 showed thymoma. A biopsy from a T12-L1 paraspinal mass on August 19, 2020 shows again thymoma. A spine tumor biopsy in January 2021 again shows thymoma.        STAGING     T2 N0 M1 (metastases to the lungs bilaterally)        CURRENT SITES OF DISEASE     Mediastinum, lung, parapinal region T, L, S spine, left pleura, T12-L1 paraspinal region, left lateral aspect of the spinal canal spanning from at least T11-S1. Findings likely  represent epidural tumoral extension as seen on MRI thoracic and lumbar spine 06/26/2021. Interval worsening/development of widespread osseous metastasis noted throughout the thoracic and lumbar spine with ventral epidural tumoral extension from the T6  level through the L4 level. Canal stenosis secondary to epidural tumor appears to be more severe at the T8 level through the T10 level. At the T8-9 level there appears to be mild flattening of the contour of the   cord. Recent MRI T/L spine (11/11/22) demonstrates  increased/new tumor extension across multiple spinal levels, particularly at T9-T11 and L1-L2 with marked stenosis at L1-L2.        MOLECULAR GENOMICS     PD-L1 22C3 FDA (KEYTRUDA) for Gastric/GEA: CPS>/=1 (PD-L1 EXPRESSION) Combined Positive Score: 80   TEST: Solid Focus Tumor DNA Panel SPECIMEN: FFPE, Left Serratus Anterior, Biopsy, [id number] A DISEASE DIAGNOSIS: Thyoma Estimated Tumor Content: 30% COLLECTION DATE: 11/13/2019 RECEIVED DATE: 08/11/2020 REPORT DATE: 08/14/2020 MICROSATELLITE STATUS: Microsatellite  Stable (CHERYL) DISEASE ASSOCIATED GENOMIC FINDINGS: None         PRIOR THERAPY     1-left  video-assisted thoracic surgery extensive lysis of adhesions and total pulmonary decortication, lower lobe wedge, exploration of the mediastinum converted to  thoracotomy, resection of anterior mediastinal mass and thymus, upper lobe wedge. Per surgery there is residual disease.  2-external beam radiation therapy August 1, 2016- Sept 17, 2016  5940 CGyE using 180 CGyE daily to surgical site   3- cyclophosphamide, adriamycin, cisplatin on 8/14/17 x 3 cycles      4- 3/19/18-4/17/18: recurrent left paraspinal T12-L1, 50 CGE/20 fx PROTONS Had progression but declined systemic therapy.      5- 12/10/19- 12/31/19: SINDI mass and left serratus anterior mass, 45 CGE/15 fx, PROTONS      6- Stenosis from T11-L1 secondary to large intraspinal extradural mass at the T12-L1 level/neoplasm. OPERATION/PROCEDURE: 1. Posterior spinal fusion T9-L1 with use of bilateral  pedicle screw instrumentation, allograft, and demineralized bone matrix fiber. 2. T12 laminectomy with laminotomy of L1 and T11 to decompress T11-L1 levels and remove the intraspinal extradural neoplasm at the T12-L1 segment.      7- On 8/4/21, he saw ortho spine surgery (Dr. Delgado) who did not recommend further surgery.      8- palliative radiation to the T-L/S1 spine: 30 Gy in 10 daily fractions, completed 9/27/21.     9- Single agents alimta per NCCN guideliens on January 10, 2022.   Received 2 cycles in JanuaryJanuary 2022.  Progressive disease based on MRI in February 2022     10- Radiation therapy to 30 Gy in 10 fractions, from T4-T6 and L1-L3 completed in mid April 2022 11- 1- Dec 2022 - single agent capecitabine 500 mg tab, 4 tabs BID. Cycle - 21 days (two weeks on with one week holiday).  Patient initially underdosed himself with cycle #1 despite clear instructions.  He took cycle 2 at full dose on January 26, 2023.   Delay in C3 due to insurance issues with plan to resume again in April 2023.  Cycle 3 4/4/23 - 4/17 with one week off ending 4/24.  Cycle 4  starts 4/25/23.   He  started (C4) on 4/25/23.  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 He underwent LEFT VATS DRAINAGE OF MASSIVE HEMOTHORAX, PLEURAL BIOPSY, AND CORE NEEDLE  BIOPSY OF LINGULAR MASS AND DECORTICATION on July 18, 2007. Biopsies were all negative. He was also seen on CT scan to have an approximately 7 cm lesion within the mediastinum with a calcified rim. He was followed serially with CT scans of the chest through  2009. He was seen later in follow-up in 2016. MRI of the chest in Feb 2016 showed a new enhancing soft tissue mass immediately superior to the previously described calcified cystic structure. Also a new enhancing left pericardial lymph node and left basilar  pulmonary nodule were seen. CT scan of the chest also showed a right lower lobe nodule. He was taken to the operating room on March 4, 2016 where the necrotic calcified mass was removed showing only necrotic debris. Pathology favored a necrotic tumor  although no viable cancer cells were seen. The separate lesion within the mediastinum was shown to be a malignant thymoma. Wedge resection of the left sided pulmonary lesion also showed malignant thymoma. Underwent gross resection of the anterior mediastinal  mass at that time, Proton beam radiation to his surgical bed.  He had recurrence in 2017 and received 3  cycles of systemic therapy complicated by nausea and vomiting.He was last seen by medical oncology in early 2018 and since then has not wanted any  systemic therapy and is followed with radiation oncology only. During this time he has had recurrent bronchospasm, left anterior serratus muscle, multiple recurrences of his T12-L1 region requiring 2 separate courses of radiation therapy as well as a  surgical decompression laminectomy and fusion and most recently has had further progression and is agreed to come back to the medical oncology.         PAST MEDICAL HISTORY     1-hypertension  2-spontaneous hemothorax on the left side in 2007  3-GERD  4-Achilles tendon repair  5-chronic back pain from work related back injury        SOCIAL HISTORY      Patient lives in [place]. Has 2 children age 6 and 13. 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 He has never smoked. Does not drink alcohol. He has been on disability now for a couple of months.  He has been off work since August 27, 2021.        CURRENT MEDS     per list        ALLERGIES     Denies any drug allergies        FAMILY HISTORY     No family history of cancer.      Subjective    Cancer  Pertinent negatives include no abdominal pain, anorexia, arthralgias, change in bowel habit, chest pain, chills, congestion, coughing, diaphoresis, fatigue, fever, headaches, joint swelling, myalgias, nausea, neck pain, numbness, rash, sore throat, swollen glands, urinary symptoms, vertigo, visual change, vomiting or weakness.       Patient notes no treatment related side effects.  No mucositis or diarrhea, no nausea no vomiting.  No shortness of breath cough or hemoptysis, no rash.  His back pain is overall slightly better.  He is getting physical therapy.      Objective    BSA: There is no height or weight on file to calculate BSA.  There were no vitals taken for this visit.     Physical Exam  Constitutional:       Appearance: Normal appearance. He is normal weight.   HENT:       Mouth/Throat:      Mouth: Mucous membranes are moist.      Pharynx: Oropharynx is clear. No oropharyngeal exudate or posterior oropharyngeal erythema.   Eyes:      General: No scleral icterus.     Conjunctiva/sclera: Conjunctivae normal.      Pupils: Pupils are equal, round, and reactive to light.   Cardiovascular:      Rate and Rhythm: Normal rate and regular rhythm.      Pulses: Normal pulses.      Heart sounds: Normal heart sounds. No murmur heard.     No friction rub. No gallop.   Pulmonary:      Effort: Pulmonary effort is normal. No respiratory distress.      Breath sounds: Normal breath sounds. No wheezing, rhonchi or rales.   Abdominal:      General: Abdomen is flat. Bowel sounds are normal. There is no distension.      Palpations: Abdomen is soft. There is no mass.      Tenderness: There is no abdominal tenderness. There is no guarding or rebound.   Musculoskeletal:         General: Deformity present.      Cervical back: Normal range of motion. No rigidity.      Comments: Left lower extremity internal rotation, weakness, stable baseline   Lymphadenopathy:      Cervical: No cervical adenopathy.   Skin:     General: Skin is warm.      Coloration: Skin is not jaundiced or pale.      Findings: No bruising or erythema.   Neurological:      Mental Status: He is alert and oriented to person, place, and time. Mental status is at baseline.      Motor: Weakness present.      Comments: Left lower extremity, stable, baseline   Psychiatric:         Mood and Affect: Mood normal.         Behavior: Behavior normal.       Performance Status:  Symptomatic; fully ambulatory       Latest Reference Range & Units 11/22/23 09:22   GLUCOSE 74 - 99 mg/dL 95   SODIUM 136 - 145 mmol/L 136   POTASSIUM 3.5 - 5.3 mmol/L 3.8   CHLORIDE 98 - 107 mmol/L 100   Bicarbonate 21 - 32 mmol/L 27   Anion Gap 10 - 20 mmol/L 13   Blood Urea Nitrogen 6 - 23 mg/dL 19   Creatinine 0.50 - 1.30 mg/dL 1.10   EGFR >60 mL/min/1.73m*2 79   Calcium 8.6 - 10.3  mg/dL 9.6   Albumin 3.4 - 5.0 g/dL 4.4   Alkaline Phosphatase 33 - 120 U/L 50   ALT 10 - 52 U/L 14   AST 9 - 39 U/L 18   Bilirubin Total 0.0 - 1.2 mg/dL 0.5   Total Protein 6.4 - 8.2 g/dL 7.3   WBC 4.4 - 11.3 x10*3/uL 4.0 (L)   nRBC 0.0 - 0.0 /100 WBCs 0.0   RBC 4.50 - 5.90 x10*6/uL 4.68   HEMOGLOBIN 13.5 - 17.5 g/dL 12.4 (L)   HEMATOCRIT 41.0 - 52.0 % 37.8 (L)   MCV 80 - 100 fL 81   MCH 26.0 - 34.0 pg 26.5   MCHC 32.0 - 36.0 g/dL 32.8   RED CELL DISTRIBUTION WIDTH 11.5 - 14.5 % 15.0 (H)   Platelets 150 - 450 x10*3/uL 305   Neutrophils % 40.0 - 80.0 % 46.9   Immature Granulocytes %, Automated 0.0 - 0.9 % 0.3   Lymphocytes % 13.0 - 44.0 % 18.6   Monocytes % 2.0 - 10.0 % 10.8   Eosinophils % 0.0 - 6.0 % 22.6   Basophils % 0.0 - 2.0 % 0.8   Neutrophils Absolute 1.20 - 7.70 x10*3/uL 1.87   Immature Granulocytes Absolute, Automated 0.00 - 0.70 x10*3/uL 0.01   Lymphocytes Absolute 1.20 - 4.80 x10*3/uL 0.74 (L)   Monocytes Absolute 0.10 - 1.00 x10*3/uL 0.43   Eosinophils Absolute 0.00 - 0.70 x10*3/uL 0.90 (H)   Basophils Absolute 0.00 - 0.10 x10*3/uL 0.03   (L): Data is abnormally low  (H): Data is abnormally high    MRI 11/16/2023  IMPRESSION:  No significant interval change compared to the prior exam 09/01/2023.  Bone marrow replacing lesions throughout the thoracic spine and tumor  within the epidural space and neural foramina is not significantly  changed.      Assessment/Plan     This is a gentleman with malignant thymoma diagnosed in March 2016 now approximately 7-1/2 years out since his diagnosis.  He has received multiple lines of therapy, the course is described above.  His most recent treatment has been with single agent everolimus 10 mg daily.  He has now been on this for about 2 months.  He had repeat imaging studies that show no evidence of disease progression, actually in an area there is some suggestion of improvement where there had been some involvement of the epidural space.  Neurologically he is not any  worse.  Furthermore he is tolerated treatment quite well with no adverse events at this time.  Based on this we will continue him on this current agent, see him back in January 2023 with blood work and lipid panel as well.    Cancer Staging   No matching staging information was found for the patient.    Oncology History    No history exists.                 Justo Rodriguez MD

## 2023-11-24 ENCOUNTER — TELEPHONE (OUTPATIENT)
Dept: HEMATOLOGY/ONCOLOGY | Facility: HOSPITAL | Age: 53
End: 2023-11-24

## 2023-11-28 DIAGNOSIS — C37 THYMUS CANCER (MULTI): Primary | ICD-10-CM

## 2023-11-28 RX ORDER — KETOROLAC TROMETHAMINE 10 MG/1
10 TABLET, FILM COATED ORAL EVERY 6 HOURS PRN
Qty: 20 TABLET | Refills: 3 | Status: SHIPPED | OUTPATIENT
Start: 2023-11-28 | End: 2024-04-27 | Stop reason: WASHOUT

## 2023-11-28 NOTE — PROGRESS NOTES
Visit Type: Follow Up Visit      Cancer History:   Treatment Synopsis:          DIAGNOSIS     Malignant thymoma. Type B2 (predominant lymphocytic population with scattered epithelial cells). Date of diagnosis is March 4, 2016 from a left lower lobe of the lung wedge resection and mediastinal fat biopsy.A left pleural nodule was biopsied in May  2017 showing thymoma. A left serratus anterior biopsy in November 2019 showed thymoma. A biopsy from a T12-L1 paraspinal mass on August 19, 2020 shows again thymoma. A spine tumor biopsy in January 2021 again shows thymoma.        STAGING     T2 N0 M1 (metastases to the lungs bilaterally)        CURRENT SITES OF DISEASE     Mediastinum, lung, parapinal region T, L, S spine, left pleura, T12-L1 paraspinal region, left lateral aspect of the spinal canal spanning from at least T11-S1. Findings likely  represent epidural tumoral extension as seen on MRI thoracic and lumbar spine 06/26/2021. Interval worsening/development of widespread osseous metastasis noted throughout the thoracic and lumbar spine with ventral epidural tumoral extension from the T6  level through the L4 level. Canal stenosis secondary to epidural tumor appears to be more severe at the T8 level through the T10 level. At the T8-9 level there appears to be mild flattening of the contour of the cord. Recent MRI T/L spine (11/11/22) demonstrates  increased/new tumor extension across multiple spinal levels, particularly at T9-T11 and L1-L2 with marked stenosis at L1-L2.        MOLECULAR GENOMICS     PD-L1 22C3 FDA (KEYTRUDA) for Gastric/GEA: CPS>/=1 (PD-L1 EXPRESSION) Combined Positive Score: 80   TEST: Solid Focus Tumor DNA Panel SPECIMEN: FFPE, Left Serratus Anterior, Biopsy, [id number] A DISEASE DIAGNOSIS: Thyoma Estimated Tumor Content: 30% COLLECTION DATE: 11/13/2019 RECEIVED DATE: 08/11/2020 REPORT DATE: 08/14/2020 MICROSATELLITE STATUS: Microsatellite  Stable (CHERYL) DISEASE ASSOCIATED GENOMIC FINDINGS: None          PRIOR THERAPY     1-left video-assisted thoracic surgery extensive lysis of adhesions and total pulmonary decortication, lower lobe wedge, exploration of the mediastinum converted to  thoracotomy, resection of anterior mediastinal mass and thymus, upper lobe wedge. Per surgery there is residual disease.  2-external beam radiation therapy August 1, 2016- Sept 17, 2016  5940 CGyE using 180 CGyE daily to surgical site   3- cyclophosphamide, adriamycin, cisplatin on 8/14/17 x 3 cycles      4- 3/19/18-4/17/18: recurrent left paraspinal T12-L1, 50 CGE/20 fx PROTONS Had progression but declined systemic therapy.      5- 12/10/19- 12/31/19: SINDI mass and left serratus anterior mass, 45 CGE/15 fx, PROTONS      6- Stenosis from T11-L1 secondary to large intraspinal extradural mass at the T12-L1 level/neoplasm. OPERATION/PROCEDURE: 1. Posterior spinal fusion T9-L1 with use of bilateral  pedicle screw instrumentation, allograft, and demineralized bone matrix fiber. 2. T12 laminectomy with laminotomy of L1 and T11 to decompress T11-L1 levels and remove the intraspinal extradural neoplasm at the T12-L1 segment.      7- On 8/4/21, he saw ortho spine surgery (Dr. Delgado) who did not recommend further surgery.      8- palliative radiation to the T-L/S1 spine: 30 Gy in 10 daily fractions, completed 9/27/21.     9- Single agents alimta per NCCN guideliens on January 10, 2022.   Received 2 cycles in JanuaryJanuary 2022.  Progressive disease based on MRI in February 2022     10- Radiation therapy to 30 Gy in 10 fractions, from T4-T6 and L1-L3 completed in mid April 2022 11- 1- Dec 2022 - single agent capecitabine 500 mg tab, 4 tabs BID. Cycle - 21 days (two weeks on with one week holiday).  Patient initially underdosed himself with cycle #1 despite clear instructions.  He took cycle 2 at full dose on January 26, 2023.   Delay in C3 due to insurance issues with plan to resume again in April 2023.  Cycle 3 4/4/23 - 4/17 with one week  off ending 4/24.  Cycle 4 starts 4/25/23.   He  started (C4) on 4/25/23.  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 He underwent LEFT VATS DRAINAGE OF MASSIVE HEMOTHORAX, PLEURAL BIOPSY, AND CORE NEEDLE  BIOPSY OF LINGULAR MASS AND DECORTICATION on July 18, 2007. Biopsies were all negative. He was also seen on CT scan to have an approximately 7 cm lesion within the mediastinum with a calcified rim. He was followed serially with CT scans of the chest through  2009. He was seen later in follow-up in 2016. MRI of the chest in Feb 2016 showed a new enhancing soft tissue mass immediately superior to the previously described calcified cystic structure. Also a new enhancing left pericardial lymph node and left basilar  pulmonary nodule were seen. CT scan of the chest also showed a right lower lobe nodule. He was taken to the operating room on March 4, 2016 where the necrotic calcified mass was removed showing only necrotic debris. Pathology favored a necrotic tumor  although no viable cancer cells were seen. The separate lesion within the mediastinum was shown to be a malignant thymoma. Wedge resection of the left sided pulmonary lesion also showed malignant thymoma. Underwent gross resection of the anterior mediastinal  mass at that time, Proton beam radiation to his surgical bed.  He had recurrence in 2017 and received 3 cycles of  systemic therapy complicated by nausea and vomiting.He was last seen by medical oncology in early 2018 and since then has not wanted any  systemic therapy and is followed with radiation oncology only. During this time he has had recurrent bronchospasm, left anterior serratus muscle, multiple recurrences of his T12-L1 region requiring 2 separate courses of radiation therapy as well as a  surgical decompression laminectomy and fusion and most recently has had further progression and is agreed to come back to the medical oncology.         PAST MEDICAL HISTORY     1-hypertension  2-spontaneous hemothorax on the left side in 2007  3-GERD  4-Achilles tendon repair  5-chronic back pain from work related back injury        SOCIAL HISTORY      Patient lives in [place]. Has 2 children age 6 and 13. 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 He has never smoked. Does not drink alcohol. He has been on disability now for a couple of months.  He has been off work since August 27, 2021.        CURRENT MEDS     per list        ALLERGIES     Denies any drug allergies        FAMILY HISTORY     No family history of cancer.        History of Present Illness:      ID Statement:    mariposa is a () day old null        Interval History:       Today I am seeing Altaf Parsons after starting evorlimus  for his progressive thymus cancer seen on his last scan.  He has had some radiation to his spine, and has, on occasion, struggled with pain- today his pain is under good control.  He started the evorlimus on 9/28, and he reports no side effects for the first 2 weeks of the treatment and blood work is good.  He is pleased with how things are going overall.       Review of Systems:   · Constitutional NEGATIVE: Fever, Chills, Anorexia, Weight Loss, Malaise      · Eyes NEGATIVE: Blurry Vision, Drainage, Diploplia, Redness, Vision Loss/ Change      · ENMT NEGATIVE: Nasal Discharge, Nasal Congestion, Ear Pain, Mouth Pain, Throat Pain      · Respiratory  NEGATIVE: Dry Cough, Productive Cough, Hemoptysis, Wheezing, Shortness of Breath      · Cardiology NEGATIVE: Chest Pain, Dyspnea on Exertion, Orthopnea, Palpitations, Syncope      · Gastrointestinal NEGATIVE: Abdominal Pain, Constipation, Diarrhea, Nausea, Vomiting      · Genitourinary NEGATIVE: Discharge, Dysuria, Flank Pain, Frequency, Hematuria      ·  Musculoskeletal POSITIVE: Pain, Weakness     NEGATIVE: Decreased ROM, Swelling, Stiffness      · Neurological NEGATIVE: Dizziness, Confusion, Headache, Seizures, Syncope      · Psychiatric NEGATIVE: Mood Changes, Anxiety, Hallucinations, Sleep Changes, Suicidal Ideas      · Skin NEGATIVE: Mass, Pain, Pruritus, Rash, Ulcer      · Hematologic/Lymph NEGATIVE: Anemia, Bruising, Easy Bleeding, Night Sweats, Petechiae      · Allergic/Immunologic NEGATIVE: Anaphylaxis, Itchy/ Teary Eyes, Itching, Sneezing, Swelling            Allergies and Intolerances:       Allergies:         No Known Allergies: Active     Outpatient Medication Profile:  * Patient Currently Takes Medications as of 06-Sep-2023 10:17 documented in Structured Notes         traMADol 50 mg oral tablet : Last Dose Taken:  , 1 tab(s) orally 4 times a day, As Needed for pain                  DX: G89.3, Start Date: 29-Nov-2022         Multiple Vitamins oral tablet: Last Dose Taken:  , 1 tab(s) oral once  a day         Fish Oil: Last Dose Taken:  , 600 mg oral 2 times a day         tumeric: Last Dose Taken:  , 1 dose(s) orally 2 times a day         Vitamin D3: Last Dose Taken:  , 1  orally once a day     Social History:   Social Substance History:  ·  Smoking Status never smoker (1)   ·  Additional History     Lives with wife and young daughter.  Wife is a nurse anesthesia student.     Patient lives in [place]. Has 2 children age 6 and 13. 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 He has never smoked. Does not drink alcohol.  He has been on disability now for a couple of months.  He has been off work since August  "27, 2021.     FAMILY HISTORY  No family history of cancer.  (1)           Performance:   ECOG Performance Status: 1- Restricted from physically  stenuous work          Vitals and Measurements:   Last 3 Weights & Heights: Date:                           Weight/Scale Type:                    Height:   06-Sep-2023 10:13                100.6  kg                     177  cm  05-Jul-2023 13:05                106  kg                     177  cm  02-Jun-2023 10:20                111.4  kg                     177  cm         Physical Exam:      Constitutional: Well developed, awake/alert/oriented  x3, no distress, alert and cooperative   Eyes: PERRL, EOMI, clear sclera   ENMT: mucous membranes moist, no apparent injury,  no lesions seen   Head/Neck: Neck supple, no apparent injury, thyroid  without mass or tenderness, No JVD, trachea midline, no bruits   Respiratory/Thorax: Patent airways, CTAB, normal  breath sounds with good chest expansion, thorax symmetric   Cardiovascular: Regular, rate and rhythm, no murmurs,  2+ equal pulses of the extremities, normal S 1and S 2   Gastrointestinal: Nondistended, soft, non-tender,  no rebound tenderness or guarding, no masses palpable, no organomegaly, +BS, no bruits   Musculoskeletal: ROM intact, no joint swelling, normal  strength   Extremities: normal extremities, no cyanosis edema,  contusions or wounds, no clubbing   Neurological: Decreased movements in left leg   Lymphatic: No significant lymphadenopathy   Psychological: Appropriate mood and behavior      Staging   Cancer Staging   Neoplasm of thymus  Staging form: Thymus, AJCC 8th Edition  - Clinical stage from 3/4/2016: Stage SHONDA (cT2, cN0, cM1a) - Signed by Justo Rodriguez MD on 11/22/2023          BSA: 2.18 meters squared  /74 (BP Location: Left arm, Patient Position: Sitting, BP Cuff Size: Large adult)   Pulse 92   Temp 37 °C (98.6 °F) (Temporal)   Resp 18   Ht (S) 1.755 m (5' 9.09\")   Wt 97.4 kg (214 lb 11.7 oz)  "  SpO2 100%   BMI 31.62 kg/m²          Performance Status:  Symptomatic; fully ambulatory      Assessment/Plan    We will continue the evorlimus and see him back at end of first month, with the plan to check safety labs, and plan a scan of his spine after completing 2 cycles of treatment.  He is in agreement- we did review how he is taking the evorlimus.      Cancer Staging   Neoplasm of thymus  Staging form: Thymus, AJCC 8th Edition  - Clinical stage from 3/4/2016: Stage SHONDA (cT2, cN0, cM1a) - Signed by Justo Rodriguez MD on 11/22/2023      Oncology History   Neoplasm of thymus   3/4/2016 Cancer Staged    Staging form: Thymus, AJCC 8th Edition, Clinical stage from 3/4/2016: Stage SHONDA (cT2, cN0, cM1a) - Signed by Justo Rodriguez MD on 11/22/2023     10/6/2023 Initial Diagnosis    Neoplasm of thymus               RAFI Hernández-CNS

## 2023-11-30 ENCOUNTER — TREATMENT (OUTPATIENT)
Dept: PHYSICAL THERAPY | Facility: CLINIC | Age: 53
End: 2023-11-30
Payer: COMMERCIAL

## 2023-11-30 DIAGNOSIS — R26.2 DIFFICULTY WALKING: Primary | ICD-10-CM

## 2023-11-30 DIAGNOSIS — D49.2 LUMBAR SPINE TUMOR: ICD-10-CM

## 2023-11-30 DIAGNOSIS — M48.062 NEUROGENIC CLAUDICATION DUE TO LUMBAR SPINAL STENOSIS: ICD-10-CM

## 2023-11-30 DIAGNOSIS — R53.1 WEAKNESS: ICD-10-CM

## 2023-11-30 PROCEDURE — 97110 THERAPEUTIC EXERCISES: CPT | Mod: GP | Performed by: PHYSICAL THERAPIST

## 2023-11-30 NOTE — PROGRESS NOTES
Physical Therapy Daily Treatment Note    Patient Name: Altaf Parsons  MRN Number: [id number]  Initial PT Examination Date: 8/10/23  Referring Clinician: Dr. Rodriguez    Today's Date: 11/30/2023  Time Calculation  Start Time: 0922    Insurance  Visit Number: 10  Approved Number of Visits: 30/ year  Certification Period: N/A    Precautions  History of spinal cancer  Moderate fall risk    Problem List  1. Difficulty walking        2. Lumbar spine tumor        3. Neurogenic claudication due to lumbar spinal stenosis        4. Weakness          Subjective  He continues to feel like he is getting better. Pain in his back has been pretty good. He has not had the shocking feeling in his leg easier. He does feel stronger and more stable. He still has nerve pain but nothing like it used to be. He maintains that therapy has helped him tremendously. He was seen by his ocologist last week and some of the cancer spots are gone on his spine and some of the tumors have also shrunk. Therapy is really helping to keep him going. He needs to continue to strengthen his legs.    Objective  Therapeutic Exercise    - bike warm up, 10 minutes, L6   - leg press, 70 pounds, x20   - single leg press, 45 pounds x10 each side   - single leg press, 65 pounds 2x8 each side   - single seated hamstring curl 2x10 each LE, 16 pounds   - single knee extension 2x10 each side, 12 pounds   - clamshells 3x10, red   - single leg bridge 2x10 each side   - bird dog x4 each side   - updated/ printed HEP and verbally reviewed with patient   - issued red band    Assessment  Continued improvements in lower body strength, evident by increase in weight during single leg press from 45 pounds last session, 65 pounds on this session.     Plan  Update home exercise program with more strengthening exercises. Add forward trunk flexion.     Home Program  Access Code: [email or web address]  URL: [email or web address]  Date: 10/04/2023  Prepared by: Geovani  Alberto    Exercises  - Hooklying Single Knee to Chest Stretch  - 1 x daily - 7 x weekly - 10 reps  - Supine Lower Trunk Rotation with PLB  - 1 x daily - 7 x weekly - 10 reps  - Supine Piriformis Stretch with Foot on Ground  - 1 x daily - 7 x weekly - 3 reps - 30 sec hold  - Seated Hamstring Stretch  - 1 x daily - 7 x weekly - 3 reps - 20 sec hold  - Sit to Stand Without Arm Support  - 1 x daily - 7 x weekly - 2 sets - 10 reps  - Standing Near Stance in Corner with Eyes Closed  - 1 x daily - 7 x weekly - 3 reps - 1 min hold  - Prone Quadriceps Stretch with Strap  - 1 x daily - 7 x weekly - 3 reps - 30 sec hold  -----------------------------------------------------------------------------------------------------------------  Access Code: [email or web address]  URL: [email or web address]  Date: 10/12/2023  Prepared by: Geovani Dominguez    Exercises  - Hooklying Single Knee to Chest Stretch  - 1 x daily - 7 x weekly - 10 reps  - Supine Lower Trunk Rotation with PLB  - 1 x daily - 7 x weekly - 10 reps  - Supine Piriformis Stretch with Foot on Ground  - 1 x daily - 7 x weekly - 3 reps - 30 sec hold  - Seated Hamstring Stretch  - 1 x daily - 7 x weekly - 3 reps - 20 sec hold  - Prone Quadriceps Stretch with Strap  - 2 x weekly - 3 reps - 30 sec hold  - Sit to Stand Without Arm Support  - 2 x weekly - 2 sets - 10 reps  - Standing Near Stance in Corner with Eyes Closed  - 2 x weekly - 2 reps - 1 min hold  - Tandem Stance in Corner  - 2 x weekly - 2 sets - 1 min hold  - Corner Balance Feet Together: Eyes Closed With Head Turns  - 2 x weekly - 2 sets - 1 min hold  - Standing March with Counter Support  - 2 x weekly - 2 sets - 1 min hold  ------------------------------------------------------------------------------------------------------------------  Access Code: [email or web address]  URL: [email or web address]  Date: 11/30/2023  Prepared by: Geovani Dominguez    Exercises  - Forward Fold with Feet Apart and  Bent Legs  - 1 x daily - 3 x weekly - 10 reps  - Hooklying Single Knee to Chest Stretch  - 1 x daily - 7 x weekly - 10 reps  - Supine Lower Trunk Rotation with PLB  - 1 x daily - 7 x weekly - 10 reps  - Supine Piriformis Stretch with Foot on Ground  - 1 x daily - 7 x weekly - 3 reps - 30 sec hold  - Seated Hamstring Stretch  - 1 x daily - 7 x weekly - 3 reps - 20 sec hold  - Prone Quadriceps Stretch with Strap  - 7 x weekly - 3 reps - 30 sec hold  - Clamshell with Resistance  - 3 x weekly - 2-3 sets - 10 reps  - Single Leg Bridge  - 3 x weekly - 2-3 sets - 10 reps  - Bird Dog  - 3 x weekly - 2-3 sets - 10 reps    Care Plan  Active       PT Problem       PT Goal 1       Expected End:  11/29/23       1) Modified return to prior level of function to allow continued home independent capability.  2) Reduce back pain levels 4-5/10 maximum in the last week for improvements in quality of life and ability to sleep.   3) Increase bilateral hip strength testing greater than or equal to 4/5 for improvements in low back/ pelvic stability when standing and walking for extended periods of time.   4) Patient to demonstrates capability of walking for 10 minutes with assistive device for integration into the community.  5) Increase LEFS outcome measure to greater than or equal to 60 for meaningful and clinical improvements in patients condition.

## 2023-12-15 ENCOUNTER — SPECIALTY PHARMACY (OUTPATIENT)
Dept: PHARMACY | Facility: CLINIC | Age: 53
End: 2023-12-15

## 2023-12-15 ENCOUNTER — TREATMENT (OUTPATIENT)
Dept: PHYSICAL THERAPY | Facility: CLINIC | Age: 53
End: 2023-12-15
Payer: COMMERCIAL

## 2023-12-15 DIAGNOSIS — R26.2 DIFFICULTY WALKING: ICD-10-CM

## 2023-12-15 DIAGNOSIS — D49.2 LUMBAR SPINE TUMOR: Primary | ICD-10-CM

## 2023-12-15 DIAGNOSIS — R53.1 WEAKNESS: ICD-10-CM

## 2023-12-15 DIAGNOSIS — M48.062 NEUROGENIC CLAUDICATION DUE TO LUMBAR SPINAL STENOSIS: ICD-10-CM

## 2023-12-15 DIAGNOSIS — C37 MALIGNANT THYMOMA (MULTI): Primary | ICD-10-CM

## 2023-12-15 PROCEDURE — 97110 THERAPEUTIC EXERCISES: CPT | Mod: GP | Performed by: PHYSICAL THERAPIST

## 2023-12-15 RX ORDER — EVEROLIMUS 10 MG/1
10 TABLET ORAL DAILY
Qty: 28 TABLET | Refills: 2 | Status: ON HOLD | OUTPATIENT
Start: 2023-12-15 | End: 2024-04-28 | Stop reason: WASHOUT

## 2023-12-15 RX ORDER — EVEROLIMUS 10 MG/1
10 TABLET ORAL DAILY
Qty: 28 TABLET | Refills: 2 | Status: CANCELLED | OUTPATIENT
Start: 2023-12-15 | End: 2024-12-14

## 2023-12-15 NOTE — PROGRESS NOTES
Physical Therapy Daily Treatment Note    Patient Name: Altaf Parsons  MRN Number: [id number]  Initial PT Examination Date: 8/10/23  Referring Clinician: Dr. Rodriguez    Today's Date: 12/15/2023  Time Calculation  Start Time: 0921  Stop Time: 1003  Time Calculation (min): 42 min    Insurance  Visit Number: 11  Approved Number of Visits: 30/ year  Certification Period: N/A    Precautions  History of spinal cancer  Moderate fall risk    Problem List  1. Lumbar spine tumor        2. Neurogenic claudication due to lumbar spinal stenosis        3. Weakness        4. Difficulty walking            Subjective  Was able to join the gym, he was working on some of the strengthening exercises on his own, does get quite a bit of fatigue when doing them. Having expected soreness in his back from exercises. He does have muscular soreness in his left leg too. Fatigue from exercises when doing them, does not last though.    Objective  Therapeutic Exercise    - bike warm up, 5 minutes, L6   - clamshells 3x10, red   - side stepping 2x,15 each direction   - single leg bridge 2x10 each side   - bird dog 2x4 each side   - single leg stance 2x1 min, balance pole    Assessment  Single leg stability deficits, evident during exercise.    Plan  Update home exercise program with more strengthening exercises. Add forward trunk flexion.     Home Program  Access Code: [email or web address]  URL: [email or web address]  Date: 10/04/2023  Prepared by: Geovani Dominguez    Exercises  - Hooklying Single Knee to Chest Stretch  - 1 x daily - 7 x weekly - 10 reps  - Supine Lower Trunk Rotation with PLB  - 1 x daily - 7 x weekly - 10 reps  - Supine Piriformis Stretch with Foot on Ground  - 1 x daily - 7 x weekly - 3 reps - 30 sec hold  - Seated Hamstring Stretch  - 1 x daily - 7 x weekly - 3 reps - 20 sec hold  - Sit to Stand Without Arm Support  - 1 x daily - 7 x weekly - 2 sets - 10 reps  - Standing Near Stance in Corner with Eyes Closed  - 1 x daily  - 7 x weekly - 3 reps - 1 min hold  - Prone Quadriceps Stretch with Strap  - 1 x daily - 7 x weekly - 3 reps - 30 sec hold  -----------------------------------------------------------------------------------------------------------------  Access Code: [email or web address]  URL: [email or web address]  Date: 10/12/2023  Prepared by: Geovani Dominguez    Exercises  - Hooklying Single Knee to Chest Stretch  - 1 x daily - 7 x weekly - 10 reps  - Supine Lower Trunk Rotation with PLB  - 1 x daily - 7 x weekly - 10 reps  - Supine Piriformis Stretch with Foot on Ground  - 1 x daily - 7 x weekly - 3 reps - 30 sec hold  - Seated Hamstring Stretch  - 1 x daily - 7 x weekly - 3 reps - 20 sec hold  - Prone Quadriceps Stretch with Strap  - 2 x weekly - 3 reps - 30 sec hold  - Sit to Stand Without Arm Support  - 2 x weekly - 2 sets - 10 reps  - Standing Near Stance in Corner with Eyes Closed  - 2 x weekly - 2 reps - 1 min hold  - Tandem Stance in Corner  - 2 x weekly - 2 sets - 1 min hold  - Corner Balance Feet Together: Eyes Closed With Head Turns  - 2 x weekly - 2 sets - 1 min hold  - Standing March with Counter Support  - 2 x weekly - 2 sets - 1 min hold  ------------------------------------------------------------------------------------------------------------------  Access Code: [email or web address]  URL: [email or web address]  Date: 11/30/2023  Prepared by: Geovani Dominguez    Exercises  - Forward Fold with Feet Apart and Bent Legs  - 1 x daily - 3 x weekly - 10 reps  - Hooklying Single Knee to Chest Stretch  - 1 x daily - 7 x weekly - 10 reps  - Supine Lower Trunk Rotation with PLB  - 1 x daily - 7 x weekly - 10 reps  - Supine Piriformis Stretch with Foot on Ground  - 1 x daily - 7 x weekly - 3 reps - 30 sec hold  - Seated Hamstring Stretch  - 1 x daily - 7 x weekly - 3 reps - 20 sec hold  - Prone Quadriceps Stretch with Strap  - 7 x weekly - 3 reps - 30 sec hold  - Clamshell with Resistance  - 3 x weekly -  2-3 sets - 10 reps  - Single Leg Bridge  - 3 x weekly - 2-3 sets - 10 reps  - Bird Dog  - 3 x weekly - 2-3 sets - 10 reps    Care Plan  Active       PT Problem       PT Goal 1       Expected End:  11/29/23       1) Modified return to prior level of function to allow continued home independent capability.  2) Reduce back pain levels 4-5/10 maximum in the last week for improvements in quality of life and ability to sleep.   3) Increase bilateral hip strength testing greater than or equal to 4/5 for improvements in low back/ pelvic stability when standing and walking for extended periods of time.   4) Patient to demonstrates capability of walking for 10 minutes with assistive device for integration into the community.  5) Increase LEFS outcome measure to greater than or equal to 60 for meaningful and clinical improvements in patients condition.

## 2023-12-18 ENCOUNTER — PHARMACY VISIT (OUTPATIENT)
Dept: PHARMACY | Facility: CLINIC | Age: 53
End: 2023-12-18
Payer: COMMERCIAL

## 2023-12-18 PROCEDURE — RXMED WILLOW AMBULATORY MEDICATION CHARGE

## 2023-12-19 ENCOUNTER — APPOINTMENT (OUTPATIENT)
Dept: PHYSICAL THERAPY | Facility: CLINIC | Age: 53
End: 2023-12-19
Payer: COMMERCIAL

## 2023-12-21 ENCOUNTER — APPOINTMENT (OUTPATIENT)
Dept: PHYSICAL THERAPY | Facility: CLINIC | Age: 53
End: 2023-12-21
Payer: COMMERCIAL

## 2023-12-22 ENCOUNTER — SPECIALTY PHARMACY (OUTPATIENT)
Dept: HEMATOLOGY/ONCOLOGY | Facility: HOSPITAL | Age: 53
End: 2023-12-22
Payer: COMMERCIAL

## 2023-12-22 DIAGNOSIS — C37 THYMUS CANCER (MULTI): ICD-10-CM

## 2023-12-22 RX ORDER — OXYCODONE HYDROCHLORIDE 5 MG/1
5 TABLET ORAL EVERY 4 HOURS PRN
Qty: 60 TABLET | Refills: 0 | Status: SHIPPED | OUTPATIENT
Start: 2023-12-22 | End: 2024-01-19 | Stop reason: SDUPTHER

## 2023-12-22 NOTE — PROGRESS NOTES
[facility] Clinical Note    Altaf Parsons is a 53 y.o. male, who is on the specialty pharmacy service for management of: Oncology Core with status of: (Enrolled)     Altaf was contacted on 12/22/2023.    Refer to the encounter summary report for documentation details about patient counseling and education.      Medication Adherence  The importance of adherence was discussed with the patient and they were advised to take the medication as prescribed by their provider. Altaf was encouraged to call his physician's office if they have a question regarding a missed dose.     Overall tolerating medication well, denies side effects. Endorses one missed dose when waiting for refill delivery, otherwise adherent.      Patient advised to contact the pharmacy if there are any changes to her medication list, including prescriptions, OTC medications, herbal products, or supplements. Patient was advised of [facility]’s dispensing process, refill timeline, contact information ([phone]), and patient management follow up. Patient confirmed understanding of education conducted during assessment. All patient questions and concerns were addressed to the best of my ability. Patient was encouraged to contact the specialty pharmacy with any questions or concerns.    Confirmed follow-up outreaches are properly scheduled. Reviewed goals of therapy in the program targets.    Consuelo Long RPh

## 2023-12-26 ENCOUNTER — APPOINTMENT (OUTPATIENT)
Dept: RADIOLOGY | Facility: CLINIC | Age: 53
End: 2023-12-26
Payer: COMMERCIAL

## 2023-12-27 RX ORDER — OXYCODONE HYDROCHLORIDE 5 MG/1
5 TABLET ORAL EVERY 4 HOURS PRN
Qty: 60 TABLET | Refills: 0 | OUTPATIENT
Start: 2023-12-27

## 2024-01-02 DIAGNOSIS — I10 HYPERTENSION, UNSPECIFIED TYPE: Primary | ICD-10-CM

## 2024-01-03 ENCOUNTER — OFFICE VISIT (OUTPATIENT)
Dept: HEMATOLOGY/ONCOLOGY | Facility: HOSPITAL | Age: 54
End: 2024-01-03
Payer: COMMERCIAL

## 2024-01-03 ENCOUNTER — LAB (OUTPATIENT)
Dept: LAB | Facility: HOSPITAL | Age: 54
End: 2024-01-03
Payer: COMMERCIAL

## 2024-01-03 VITALS
WEIGHT: 223.77 LBS | TEMPERATURE: 97.7 F | HEART RATE: 102 BPM | BODY MASS INDEX: 32.95 KG/M2 | RESPIRATION RATE: 18 BRPM | DIASTOLIC BLOOD PRESSURE: 84 MMHG | OXYGEN SATURATION: 100 % | SYSTOLIC BLOOD PRESSURE: 126 MMHG

## 2024-01-03 DIAGNOSIS — C37 THYMUS CANCER (MULTI): ICD-10-CM

## 2024-01-03 LAB
ALBUMIN SERPL BCP-MCNC: 4.2 G/DL (ref 3.4–5)
ALP SERPL-CCNC: 45 U/L (ref 33–120)
ALT SERPL W P-5'-P-CCNC: 17 U/L (ref 10–52)
ANION GAP SERPL CALC-SCNC: 12 MMOL/L (ref 10–20)
AST SERPL W P-5'-P-CCNC: 22 U/L (ref 9–39)
BASOPHILS # BLD AUTO: 0.02 X10*3/UL (ref 0–0.1)
BASOPHILS NFR BLD AUTO: 0.6 %
BILIRUB SERPL-MCNC: 0.5 MG/DL (ref 0–1.2)
BUN SERPL-MCNC: 15 MG/DL (ref 6–23)
CALCIUM SERPL-MCNC: 9.3 MG/DL (ref 8.6–10.3)
CHLORIDE SERPL-SCNC: 97 MMOL/L (ref 98–107)
CHOLEST SERPL-MCNC: 227 MG/DL (ref 0–199)
CHOLESTEROL/HDL RATIO: 5.2
CO2 SERPL-SCNC: 30 MMOL/L (ref 21–32)
CREAT SERPL-MCNC: 1.14 MG/DL (ref 0.5–1.3)
EOSINOPHIL # BLD AUTO: 0.61 X10*3/UL (ref 0–0.7)
EOSINOPHIL NFR BLD AUTO: 16.9 %
ERYTHROCYTE [DISTWIDTH] IN BLOOD BY AUTOMATED COUNT: 15 % (ref 11.5–14.5)
GFR SERPL CREATININE-BSD FRML MDRD: 76 ML/MIN/1.73M*2
GLUCOSE SERPL-MCNC: 92 MG/DL (ref 74–99)
HCT VFR BLD AUTO: 38.2 % (ref 41–52)
HDLC SERPL-MCNC: 44 MG/DL
HGB BLD-MCNC: 12.6 G/DL (ref 13.5–17.5)
IMM GRANULOCYTES # BLD AUTO: 0.01 X10*3/UL (ref 0–0.7)
IMM GRANULOCYTES NFR BLD AUTO: 0.3 % (ref 0–0.9)
LYMPHOCYTES # BLD AUTO: 0.92 X10*3/UL (ref 1.2–4.8)
LYMPHOCYTES NFR BLD AUTO: 25.4 %
MCH RBC QN AUTO: 26.2 PG (ref 26–34)
MCHC RBC AUTO-ENTMCNC: 33 G/DL (ref 32–36)
MCV RBC AUTO: 79 FL (ref 80–100)
MONOCYTES # BLD AUTO: 0.45 X10*3/UL (ref 0.1–1)
MONOCYTES NFR BLD AUTO: 12.4 %
NEUTROPHILS # BLD AUTO: 1.61 X10*3/UL (ref 1.2–7.7)
NEUTROPHILS NFR BLD AUTO: 44.4 %
NON-HDL CHOLESTEROL: 183 MG/DL (ref 0–149)
NRBC BLD-RTO: 0 /100 WBCS (ref 0–0)
PLATELET # BLD AUTO: 293 X10*3/UL (ref 150–450)
POTASSIUM SERPL-SCNC: 4 MMOL/L (ref 3.5–5.3)
PROT SERPL-MCNC: 7.5 G/DL (ref 6.4–8.2)
RBC # BLD AUTO: 4.81 X10*6/UL (ref 4.5–5.9)
SODIUM SERPL-SCNC: 135 MMOL/L (ref 136–145)
WBC # BLD AUTO: 3.6 X10*3/UL (ref 4.4–11.3)

## 2024-01-03 PROCEDURE — 36415 COLL VENOUS BLD VENIPUNCTURE: CPT

## 2024-01-03 PROCEDURE — 3074F SYST BP LT 130 MM HG: CPT | Performed by: INTERNAL MEDICINE

## 2024-01-03 PROCEDURE — 1036F TOBACCO NON-USER: CPT | Performed by: INTERNAL MEDICINE

## 2024-01-03 PROCEDURE — 80053 COMPREHEN METABOLIC PANEL: CPT

## 2024-01-03 PROCEDURE — 85025 COMPLETE CBC W/AUTO DIFF WBC: CPT

## 2024-01-03 PROCEDURE — 3079F DIAST BP 80-89 MM HG: CPT | Performed by: INTERNAL MEDICINE

## 2024-01-03 PROCEDURE — 99215 OFFICE O/P EST HI 40 MIN: CPT | Performed by: INTERNAL MEDICINE

## 2024-01-03 PROCEDURE — 83718 ASSAY OF LIPOPROTEIN: CPT

## 2024-01-03 ASSESSMENT — ENCOUNTER SYMPTOMS
ABDOMINAL PAIN: 0
FATIGUE: 0
CHANGE IN BOWEL HABIT: 0
OCCASIONAL FEELINGS OF UNSTEADINESS: 0
MYALGIAS: 0
ANOREXIA: 0
ARTHRALGIAS: 0
DIAPHORESIS: 0
NECK PAIN: 0
FEVER: 0
DEPRESSION: 0
NUMBNESS: 0
CHILLS: 0
VISUAL CHANGE: 0
VERTIGO: 0
HEADACHES: 0
LOSS OF SENSATION IN FEET: 0
SORE THROAT: 0
NAUSEA: 0
COUGH: 0
VOMITING: 0
JOINT SWELLING: 0
SWOLLEN GLANDS: 0
WEAKNESS: 0

## 2024-01-03 ASSESSMENT — PAIN SCALES - GENERAL: PAINLEVEL: 4

## 2024-01-03 NOTE — TELEPHONE ENCOUNTER
Lisinopril-Hydrochlorothiazide 25mg 1 tablet taken once a day. Sent to [facility] in [place] on [address]

## 2024-01-03 NOTE — PROGRESS NOTES
Patient ID: Altaf Parsons is a 53 y.o. male.    DIAGNOSIS     Malignant thymoma. Type B2 (predominant lymphocytic population with scattered epithelial cells). Date of diagnosis is March 4, 2016 from a left lower lobe of the lung wedge resection and mediastinal fat biopsy.A left pleural nodule was biopsied in May  2017 showing thymoma. A left serratus anterior biopsy in November 2019 showed thymoma. A biopsy from a T12-L1 paraspinal mass on August 19, 2020 shows again thymoma. A spine tumor biopsy in January 2021 again shows thymoma.        STAGING     T2 N0 M1 (metastases to the lungs bilaterally)        CURRENT SITES OF DISEASE     Mediastinum, lung, parapinal region T, L, S spine, left pleura, T12-L1 paraspinal region, left lateral aspect of the spinal canal spanning from at least T11-S1. Findings likely  represent epidural tumoral extension as seen on MRI thoracic and lumbar spine 06/26/2021. Interval worsening/development of widespread osseous metastasis noted throughout the thoracic and lumbar spine with ventral epidural tumoral extension from the T6  level through the L4 level. Canal stenosis secondary to epidural tumor appears to be more severe at the T8 level through the T10 level. At the T8-9 level there appears to be mild flattening of the contour of the   cord. Recent MRI T/L spine (11/11/22) demonstrates  increased/new tumor extension across multiple spinal levels, particularly at T9-T11 and L1-L2 with marked stenosis at L1-L2.        MOLECULAR GENOMICS     PD-L1 22C3 FDA (KEYTRUDA) for Gastric/GEA: CPS>/=1 (PD-L1 EXPRESSION) Combined Positive Score: 80   TEST: Solid Focus Tumor DNA Panel SPECIMEN: FFPE, Left Serratus Anterior, Biopsy, [id number] DISEASE DIAGNOSIS: Thyoma Estimated Tumor Content: 30% COLLECTION DATE: 11/13/2019 RECEIVED DATE: 08/11/2020 REPORT DATE: 08/14/2020 MICROSATELLITE STATUS: Microsatellite  Stable (CHERYL) DISEASE ASSOCIATED GENOMIC FINDINGS: None         PRIOR THERAPY     1-left  video-assisted thoracic surgery extensive lysis of adhesions and total pulmonary decortication, lower lobe wedge, exploration of the mediastinum converted to  thoracotomy, resection of anterior mediastinal mass and thymus, upper lobe wedge. Per surgery there is residual disease.  2-external beam radiation therapy August 1, 2016- Sept 17, 2016  5940 CGyE using 180 CGyE daily to surgical site   3- cyclophosphamide, adriamycin, cisplatin on 8/14/17 x 3 cycles      4- 3/19/18-4/17/18: recurrent left paraspinal T12-L1, 50 CGE/20 fx PROTONS Had progression but declined systemic therapy.      5- 12/10/19- 12/31/19: SINDI mass and left serratus anterior mass, 45 CGE/15 fx, PROTONS      6- Stenosis from T11-L1 secondary to large intraspinal extradural mass at the T12-L1 level/neoplasm. OPERATION/PROCEDURE: 1. Posterior spinal fusion T9-L1 with use of bilateral  pedicle screw instrumentation, allograft, and demineralized bone matrix fiber. 2. T12 laminectomy with laminotomy of L1 and T11 to decompress T11-L1 levels and remove the intraspinal extradural neoplasm at the T12-L1 segment.      7- On 8/4/21, he saw ortho spine surgery (Dr. Delgado) who did not recommend further surgery.      8- palliative radiation to the T-L/S1 spine: 30 Gy in 10 daily fractions, completed 9/27/21.     9- Single agents alimta per NCCN guideliens on January 10, 2022.   Received 2 cycles in JanuaryJanuary 2022.  Progressive disease based on MRI in February 2022     10- Radiation therapy to 30 Gy in 10 fractions, from T4-T6 and L1-L3 completed in mid April 2022 11- 1- Dec 2022 - single agent capecitabine 500 mg tab, 4 tabs BID. Cycle - 21 days (two weeks on with one week holiday).  Patient initially underdosed himself with cycle #1 despite clear instructions.  He took cycle 2 at full dose on January 26, 2023.   Delay in C3 due to insurance issues with plan to resume again in April 2023.  Cycle 3 4/4/23 - 4/17 with one week off ending 4/24.  Cycle 4  starts 4/25/23.   He  started (C4) on 4/25/23.  Stable disease as best response        CURRENT THERAPY     1-Single agent Afinitor per NCCN guidelines, started 9/28/2023, documented stable disease on imaging end of November 2023     2- supportive oncology/pain management     3- PET/NET scan to assess for somatostatin receptor status and consideration of octreotide based therapy in the future. 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 He underwent LEFT VATS DRAINAGE OF MASSIVE HEMOTHORAX, PLEURAL BIOPSY, AND CORE NEEDLE  BIOPSY OF LINGULAR MASS AND DECORTICATION on July 18, 2007. Biopsies were all negative. He was also seen on CT scan to have an approximately 7 cm lesion within the mediastinum with a calcified rim. He was followed serially with CT scans of the chest through  2009. He was seen later in follow-up in 2016. MRI of the chest in Feb 2016 showed a new enhancing soft tissue mass immediately superior to the previously described calcified cystic structure. Also a new enhancing left pericardial lymph node and left basilar  pulmonary nodule were seen. CT scan of the chest also showed a right lower lobe nodule. He was taken to the operating room on March 4, 2016 where the necrotic calcified mass was removed showing only necrotic debris. Pathology favored a necrotic tumor  although no viable cancer cells were seen. The separate lesion within the mediastinum was shown to be a malignant thymoma. Wedge resection of the left sided pulmonary lesion also showed malignant thymoma. Underwent gross resection of the anterior mediastinal  mass at that time, Proton beam radiation to his surgical  bed.  He had recurrence in 2017 and received 3 cycles of systemic therapy complicated by nausea and vomiting.He was last seen by medical oncology in early 2018 and since then has not wanted any  systemic therapy and is followed with radiation oncology only. During this time he has had recurrent bronchospasm, left anterior serratus muscle, multiple recurrences of his T12-L1 region requiring 2 separate courses of radiation therapy as well as a  surgical decompression laminectomy and fusion and most recently has had further progression and is agreed to come back to the medical oncology.         PAST MEDICAL HISTORY     1-hypertension  2-spontaneous hemothorax on the left side in 2007  3-GERD  4-Achilles tendon repair  5-chronic back pain from work related back injury        SOCIAL HISTORY      Patient lives in [place]. Has 2 children age 6 and 13. 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 He has never smoked. Does not drink alcohol. He has been on disability now for a couple of months.  He has been off work since August 27, 2021.        CURRENT MEDS     per list        ALLERGIES     Denies any drug allergies        FAMILY HISTORY     No family history of cancer.    Subjective    Cancer  Pertinent negatives include no abdominal pain, anorexia, arthralgias, change in bowel habit, chest pain, chills, congestion, coughing, diaphoresis, fatigue, fever, headaches, joint swelling, myalgias, nausea, neck pain, numbness, rash, sore throat, swollen glands, urinary symptoms, vertigo, visual change, vomiting or weakness.     Patient is doing well tolerating his Afinitorwithout any side effects    Objective    BSA: 2.22 meters squared  /84 (BP Location: Left arm, Patient Position: Sitting, BP Cuff Size: Large adult)   Pulse 102   Temp 36.5 °C (97.7 °F) (Temporal)   Resp 18   Wt 101 kg (223 lb 12.3 oz)   SpO2 100%   BMI 32.95 kg/m²      Physical Exam  Constitutional:       General: He is not in acute distress.      Appearance: He is not toxic-appearing.   HENT:      Mouth/Throat:      Mouth: Mucous membranes are moist.      Pharynx: Oropharynx is clear.   Eyes:      General: No scleral icterus.     Conjunctiva/sclera: Conjunctivae normal.   Cardiovascular:      Rate and Rhythm: Normal rate and regular rhythm.      Pulses: Normal pulses.      Heart sounds: Normal heart sounds. No murmur heard.     No friction rub. No gallop.   Pulmonary:      Effort: Pulmonary effort is normal. No respiratory distress.      Breath sounds: Normal breath sounds. No stridor. No wheezing, rhonchi or rales.   Abdominal:      General: Abdomen is flat. Bowel sounds are normal. There is no distension.      Palpations: There is no mass.      Tenderness: There is no abdominal tenderness. There is no guarding or rebound.   Musculoskeletal:         General: Deformity present.      Cervical back: No rigidity or tenderness.      Right lower leg: No edema.      Left lower leg: Edema present.      Comments: Rotation of left foot   Skin:     General: Skin is warm.      Coloration: Skin is not jaundiced or pale.      Findings: No bruising or erythema.   Neurological:      Mental Status: Mental status is at baseline.   Psychiatric:         Mood and Affect: Mood normal.         Behavior: Behavior normal.         Performance Status:  Symptomatic; fully ambulatory     Latest Reference Range & Units 01/03/24 09:49   GLUCOSE 74 - 99 mg/dL 92   SODIUM 136 - 145 mmol/L 135 (L)   POTASSIUM 3.5 - 5.3 mmol/L 4.0   CHLORIDE 98 - 107 mmol/L 97 (L)   Bicarbonate 21 - 32 mmol/L 30   Anion Gap 10 - 20 mmol/L 12   Blood Urea Nitrogen 6 - 23 mg/dL 15   Creatinine 0.50 - 1.30 mg/dL 1.14   EGFR >60 mL/min/1.73m*2 76   Calcium 8.6 - 10.3 mg/dL 9.3   Albumin 3.4 - 5.0 g/dL 4.2   Alkaline Phosphatase 33 - 120 U/L 45   ALT 10 - 52 U/L 17   AST 9 - 39 U/L 22   Bilirubin Total 0.0 - 1.2 mg/dL 0.5   Total Protein 6.4 - 8.2 g/dL 7.5   WBC 4.4 - 11.3 x10*3/uL 3.6 (L)   nRBC 0.0 - 0.0 /100  WBCs 0.0   RBC 4.50 - 5.90 x10*6/uL 4.81   HEMOGLOBIN 13.5 - 17.5 g/dL 12.6 (L)   HEMATOCRIT 41.0 - 52.0 % 38.2 (L)   MCV 80 - 100 fL 79 (L)   MCH 26.0 - 34.0 pg 26.2   MCHC 32.0 - 36.0 g/dL 33.0   RED CELL DISTRIBUTION WIDTH 11.5 - 14.5 % 15.0 (H)   Platelets 150 - 450 x10*3/uL 293   Neutrophils % 40.0 - 80.0 % 44.4   Immature Granulocytes %, Automated 0.0 - 0.9 % 0.3   Lymphocytes % 13.0 - 44.0 % 25.4   Monocytes % 2.0 - 10.0 % 12.4   Eosinophils % 0.0 - 6.0 % 16.9   Basophils % 0.0 - 2.0 % 0.6   Neutrophils Absolute 1.20 - 7.70 x10*3/uL 1.61   Immature Granulocytes Absolute, Automated 0.00 - 0.70 x10*3/uL 0.01   Lymphocytes Absolute 1.20 - 4.80 x10*3/uL 0.92 (L)   Monocytes Absolute 0.10 - 1.00 x10*3/uL 0.45   Eosinophils Absolute 0.00 - 0.70 x10*3/uL 0.61   Basophils Absolute 0.00 - 0.10 x10*3/uL 0.02   (L): Data is abnormally low  (H): Data is abnormally high    PET NET: 9/18/2023  IMPRESSION:  Postoperative changes status post T9 through T11 posterior  instrumented fusion with patchy areas of increased somatostatin  receptor expression most notably involving T9 vertebral body and T12  is consistent with residual somatostatin receptor expressing disease.  There is no evidence for Cu-64 dotatate avid disease elsewhere.    Assessment/Plan     This is a 53-year-old gentleman with malignant thymoma type B2 metastatic to the lumbar and thoracic spine that was started on single agent everolimus as treatment for his metastatic cancer.  He has had stable disease to a minor response based on assessment after 2 months.  He started treatment on September 28, 2023.  He has had excellent tolerance to this treatment.  Tells me that he is taking his treatment daily.  We will see him back in 2 months time, check a lipid panel on him but also repeat MRIs of the thoracic and lumbar spine for disease response evaluation.    Cancer Staging   Neoplasm of thymus  Staging form: Thymus, AJCC 8th Edition  - Clinical stage from  3/4/2016: Stage SHONDA (cT2, cN0, cM1a) - Signed by Justo Rodriguez MD on 11/22/2023      Oncology History   Neoplasm of thymus   3/4/2016 Cancer Staged    Staging form: Thymus, AJCC 8th Edition, Clinical stage from 3/4/2016: Stage SHONDA (cT2, cN0, cM1a) - Signed by Justo Rodriguez MD on 11/22/2023     10/6/2023 Initial Diagnosis    Neoplasm of thymus     Malignant thymoma (CMS/HCC)   9/28/2023 -  Chemotherapy    Everolimus, 28 Day Cycles      10/6/2023 Initial Diagnosis    Malignant thymoma (CMS/HCC)                   Justo Rodriguez MD

## 2024-01-04 RX ORDER — LISINOPRIL AND HYDROCHLOROTHIAZIDE 20; 25 MG/1; MG/1
1 TABLET ORAL DAILY
Qty: 90 TABLET | Refills: 3 | Status: SHIPPED | OUTPATIENT
Start: 2024-01-04 | End: 2024-05-09 | Stop reason: DRUGHIGH

## 2024-01-08 ENCOUNTER — APPOINTMENT (OUTPATIENT)
Dept: RADIATION ONCOLOGY | Facility: HOSPITAL | Age: 54
End: 2024-01-08
Payer: COMMERCIAL

## 2024-01-09 ENCOUNTER — PHARMACY VISIT (OUTPATIENT)
Dept: PHARMACY | Facility: CLINIC | Age: 54
End: 2024-01-09
Payer: COMMERCIAL

## 2024-01-09 PROCEDURE — RXMED WILLOW AMBULATORY MEDICATION CHARGE

## 2024-01-16 ENCOUNTER — OFFICE VISIT (OUTPATIENT)
Dept: PRIMARY CARE | Facility: CLINIC | Age: 54
End: 2024-01-16
Payer: COMMERCIAL

## 2024-01-16 ENCOUNTER — SPECIALTY PHARMACY (OUTPATIENT)
Dept: PHARMACY | Facility: CLINIC | Age: 54
End: 2024-01-16

## 2024-01-16 VITALS
OXYGEN SATURATION: 98 % | WEIGHT: 221 LBS | HEART RATE: 107 BPM | DIASTOLIC BLOOD PRESSURE: 85 MMHG | SYSTOLIC BLOOD PRESSURE: 122 MMHG | BODY MASS INDEX: 32.55 KG/M2 | TEMPERATURE: 98.1 F

## 2024-01-16 DIAGNOSIS — Z00.00 HEALTHCARE MAINTENANCE: ICD-10-CM

## 2024-01-16 DIAGNOSIS — Z12.11 SCREENING FOR MALIGNANT NEOPLASM OF COLON: ICD-10-CM

## 2024-01-16 DIAGNOSIS — R53.83 FATIGUE, UNSPECIFIED TYPE: ICD-10-CM

## 2024-01-16 DIAGNOSIS — R26.2 DIFFICULTY IN WALKING: ICD-10-CM

## 2024-01-16 DIAGNOSIS — Z12.5 SCREENING PSA (PROSTATE SPECIFIC ANTIGEN): Primary | ICD-10-CM

## 2024-01-16 PROCEDURE — 3074F SYST BP LT 130 MM HG: CPT | Performed by: INTERNAL MEDICINE

## 2024-01-16 PROCEDURE — 1036F TOBACCO NON-USER: CPT | Performed by: INTERNAL MEDICINE

## 2024-01-16 PROCEDURE — 3079F DIAST BP 80-89 MM HG: CPT | Performed by: INTERNAL MEDICINE

## 2024-01-16 PROCEDURE — 84153 ASSAY OF PSA TOTAL: CPT | Performed by: INTERNAL MEDICINE

## 2024-01-16 PROCEDURE — 99396 PREV VISIT EST AGE 40-64: CPT | Performed by: INTERNAL MEDICINE

## 2024-01-16 PROCEDURE — 84443 ASSAY THYROID STIM HORMONE: CPT | Performed by: INTERNAL MEDICINE

## 2024-01-16 ASSESSMENT — ENCOUNTER SYMPTOMS
OCCASIONAL FEELINGS OF UNSTEADINESS: 0
DEPRESSION: 0
LOSS OF SENSATION IN FEET: 0

## 2024-01-16 ASSESSMENT — PATIENT HEALTH QUESTIONNAIRE - PHQ9
2. FEELING DOWN, DEPRESSED OR HOPELESS: NOT AT ALL
1. LITTLE INTEREST OR PLEASURE IN DOING THINGS: NOT AT ALL
SUM OF ALL RESPONSES TO PHQ9 QUESTIONS 1 AND 2: 0

## 2024-01-16 NOTE — PROGRESS NOTES
Subjective   Patient ID: Altaf Parsons is a 53 y.o. male who presents for Follow-up.    HPI   53 years old male comes in to see me today for an age related wellness exam.  Has a history of tyloma with metastases to the spine.  He is feeling well and he thinks he is mourning the gomez.  He is due for repeat CAT scan on his spine on 2024.  He is under the care of Dr. Rodriguez.  Here today because he needs the disability placard since he is having trouble problem with ambulation.  Tired and weak if he does for long distance and he also need his referral for a GI colonoscopy at [facility].  Disability was done for 5 years and referral to GI also printed.  He lives in [place] with his wife and has 1 son and 1 daughter.  His son is going in the [employer].  No known allergies.  Never smoker and no alcohol intake.  Retired on disability because of his medical condition.  Father  at age 80 he had COPD smoking and diabetes mellitus type 2.  Mother is alive and doing well.  8 half-brothers and 12 half-sisters  Medications reviewed and reconciled he is on vitamin D, Neurontin 300 mg to take it 3 times a day, Toradol 10 mg tablets every 6 hours if needed, lisinopril hydrochlorothiazide 20/25 mg a day, oxycodone provided or prescribed by hematology oncology.  Topamax 100 mg twice a day and tramadol 50 mg 4 times a day if needed.  Review of Systems  12 system reviewed 12 systems are negative.  He is feeling well but he tires and got weak with pain in his lower extremities when he stands or walks for long distance.  Objective   /85 (BP Location: Right arm, Patient Position: Sitting, BP Cuff Size: Large adult)   Pulse 107   Temp 36.7 °C (98.1 °F) (Temporal)   Wt 100 kg (221 lb)   SpO2 98%   BMI 32.55 kg/m²     Physical Exam  Alert oriented in no acute distress pleasant cooperative.  Nonicteric sclera no jaundice.  Face symmetrical cranial nerves intact.  Couple pounds his weight is down  to 221.  Blood pressure is very well-controlled.  Neck supple no masses no lymph node thyromegaly or jugular venous distention.  Lungs clear no rales wheezing or crackles but diminished.  Heart normal S1 and S2 regular rhythm.  Abdomen benign nontender no masses no organomegaly.  Neurologically intact  Moving his upper and lower extremities and equal strength with normal speech.    Disability placard provided.  Referral to see GI also provided.  Alternatives would be considered Cologuard  Assessment/Plan   Diagnoses and all orders for this visit:  Screening PSA (prostate specific antigen)  Fatigue, unspecified type  Healthcare maintenance  -     Thyroid Stimulating Hormone  -     Prostate Specific Antigen, Screen  Difficulty in walking  -     Disability Placard  Screening for malignant neoplasm of colon  -     Referral to Gastroenterology; Future

## 2024-01-18 ENCOUNTER — TELEPHONE (OUTPATIENT)
Dept: HEMATOLOGY/ONCOLOGY | Facility: HOSPITAL | Age: 54
End: 2024-01-18
Payer: COMMERCIAL

## 2024-01-19 ENCOUNTER — TELEPHONE (OUTPATIENT)
Dept: HEMATOLOGY/ONCOLOGY | Facility: HOSPITAL | Age: 54
End: 2024-01-19
Payer: COMMERCIAL

## 2024-01-19 DIAGNOSIS — C37 THYMUS CANCER (MULTI): ICD-10-CM

## 2024-01-19 RX ORDER — OXYCODONE HYDROCHLORIDE 5 MG/1
5 TABLET ORAL EVERY 4 HOURS PRN
Qty: 60 TABLET | Refills: 0 | Status: SHIPPED | OUTPATIENT
Start: 2024-01-19 | End: 2024-02-15 | Stop reason: SDUPTHER

## 2024-01-19 NOTE — TELEPHONE ENCOUNTER
Refill request received for Oxycodone 5mg.  [facility] [address] [facility] in [place].  Message sent to team.

## 2024-01-22 ENCOUNTER — TELEPHONE (OUTPATIENT)
Dept: PRIMARY CARE | Facility: CLINIC | Age: 54
End: 2024-01-22
Payer: COMMERCIAL

## 2024-01-22 NOTE — TELEPHONE ENCOUNTER
----- Message from Darius Meza MD sent at 1/20/2024  3:19 PM EST -----  Regarding: R  NORMAL PSA AND THYROID TEST

## 2024-01-24 ENCOUNTER — TELEPHONE (OUTPATIENT)
Dept: HEMATOLOGY/ONCOLOGY | Facility: HOSPITAL | Age: 54
End: 2024-01-24
Payer: COMMERCIAL

## 2024-01-24 NOTE — TELEPHONE ENCOUNTER
RN spoke with patient about disability paperwork. Patient confirmed that message from spouse was relayed to him per RN request. Patient confirmed that paperwork was received by [employer].  Dr. Rodriguez's office will retain paperwork in case changes need to be made. Patient verbalized understanding of this and expressed his appreciation. No new questions or issues at this time.

## 2024-01-24 NOTE — TELEPHONE ENCOUNTER
RN called patient about status of disability forms given in December 2023 to RN for Dr. Rodriguez to complete. RN called patient to inform him that this paperwork has been completed and faxed to the company per his request. Patient was unaviable but RN spoke with the patient's wife about these forms. Spouse verbalized an understanding of this and will relay message to her spouse. RN to call this afternoon to confirm that the patient himself has received this message. No other issues at this time.

## 2024-02-02 ENCOUNTER — PHARMACY VISIT (OUTPATIENT)
Dept: PHARMACY | Facility: CLINIC | Age: 54
End: 2024-02-02
Payer: COMMERCIAL

## 2024-02-02 ENCOUNTER — SPECIALTY PHARMACY (OUTPATIENT)
Dept: PHARMACY | Facility: CLINIC | Age: 54
End: 2024-02-02

## 2024-02-02 PROCEDURE — RXMED WILLOW AMBULATORY MEDICATION CHARGE

## 2024-02-14 ENCOUNTER — TELEPHONE (OUTPATIENT)
Dept: ADMISSION | Facility: HOSPITAL | Age: 54
End: 2024-02-14
Payer: COMMERCIAL

## 2024-02-14 NOTE — TELEPHONE ENCOUNTER
Pt called requesting a refill on his Oxycodone 5mg to be sent to [facility] on file. He only has 1 tablet left. Message sent to the team.

## 2024-02-15 DIAGNOSIS — C37 THYMUS CANCER (MULTI): ICD-10-CM

## 2024-02-15 RX ORDER — OXYCODONE HYDROCHLORIDE 5 MG/1
5 TABLET ORAL EVERY 4 HOURS PRN
Qty: 60 TABLET | Refills: 0 | Status: SHIPPED | OUTPATIENT
Start: 2024-02-15 | End: 2024-03-06 | Stop reason: SDUPTHER

## 2024-02-15 NOTE — TELEPHONE ENCOUNTER
Per MD, he will send it soon.   Problem: Safety - Adult  Goal: Free from fall injury  6/23/2022 1945 by Monet Bob RN  Outcome: Progressing     Problem: ABCDS Injury Assessment  Goal: Absence of physical injury  6/23/2022 1945 by Monet Bob RN  Outcome: Progressing

## 2024-02-16 ENCOUNTER — TREATMENT (OUTPATIENT)
Dept: PHYSICAL THERAPY | Facility: CLINIC | Age: 54
End: 2024-02-16
Payer: COMMERCIAL

## 2024-02-16 DIAGNOSIS — R53.1 WEAKNESS: ICD-10-CM

## 2024-02-16 DIAGNOSIS — M48.062 NEUROGENIC CLAUDICATION DUE TO LUMBAR SPINAL STENOSIS: ICD-10-CM

## 2024-02-16 DIAGNOSIS — D49.2 ODONTOGENIC TUMOR: ICD-10-CM

## 2024-02-16 DIAGNOSIS — R26.2 DIFFICULTY IN WALKING INVOLVING LOWER LEG JOINT: ICD-10-CM

## 2024-02-16 DIAGNOSIS — D49.2 LUMBAR SPINE TUMOR: Primary | ICD-10-CM

## 2024-02-16 DIAGNOSIS — R26.2 DIFFICULTY WALKING: ICD-10-CM

## 2024-02-16 PROCEDURE — 97110 THERAPEUTIC EXERCISES: CPT | Mod: GP | Performed by: PHYSICAL THERAPIST

## 2024-02-16 NOTE — PROGRESS NOTES
Physical Therapy Daily Treatment Note    Patient Name: Altaf Parsons  MRN Number: [id number]  Initial PT Examination Date: 8/10/23  Referring Clinician: Dr. Rodriguez    Today's Date: 2/27/2024  Time Calculation  Start Time: 0910  Stop Time: 0955  Time Calculation (min): 45 min    Insurance  Visit Number: 12  Approved Number of Visits: 30/ year  Certification Period: N/A    Precautions  History of spinal cancer  Moderate fall risk    Problem List  1. Lumbar spine tumor        2. Neurogenic claudication due to lumbar spinal stenosis  Follow Up In Physical Therapy      3. Weakness        4. Difficulty walking        5. Difficulty in walking involving lower leg joint  Follow Up In Physical Therapy      6. Odontogenic tumor  Follow Up In Physical Therapy        Subjective  Has been doing some exercising on his own. His back still bothers him at times but his leg has not bothered him at all. He is getting better. Balance and stability has been much better. Its a matter of building muscles now. Concentrates on the laying down therapy exercises at home. Gets another scan on 2/29/24.    Objective  Hip Strength Testing  Left  Right  Comments  Flexion   3+/5  4-/5  Abduction  3+/5  4-/5    Treatment  Therapeutic Exercise    - bike warm up, 5 minutes, L6   - measured bilateral hip strength   - CKC hip abduction, yellow loop 3x10   - side stepping 3x10 each direction, yellow loop   - walking around clinic for     Assessment  Significant weakness is the gluteals captured with hip abduction strengthening.     Plan  Visit patient requested HIIT exercises.     Update home exercise program with more strengthening exercises. Add forward trunk flexion.     Home Program  Access Code: [email or web address]  URL: [email or web address]  Date: 10/04/2023  Prepared by: Geovani Dominguez    Exercises  - Hooklying Single Knee to Chest Stretch  - 1 x daily - 7 x weekly - 10 reps  - Supine Lower Trunk Rotation with PLB  - 1 x daily - 7 x  weekly - 10 reps  - Supine Piriformis Stretch with Foot on Ground  - 1 x daily - 7 x weekly - 3 reps - 30 sec hold  - Seated Hamstring Stretch  - 1 x daily - 7 x weekly - 3 reps - 20 sec hold  - Sit to Stand Without Arm Support  - 1 x daily - 7 x weekly - 2 sets - 10 reps  - Standing Near Stance in Corner with Eyes Closed  - 1 x daily - 7 x weekly - 3 reps - 1 min hold  - Prone Quadriceps Stretch with Strap  - 1 x daily - 7 x weekly - 3 reps - 30 sec hold  -----------------------------------------------------------------------------------------------------------------  Access Code: [email or web address]  URL: [email or web address]  Date: 10/12/2023  Prepared by: Geovani Dominguez    Exercises  - Hooklying Single Knee to Chest Stretch  - 1 x daily - 7 x weekly - 10 reps  - Supine Lower Trunk Rotation with PLB  - 1 x daily - 7 x weekly - 10 reps  - Supine Piriformis Stretch with Foot on Ground  - 1 x daily - 7 x weekly - 3 reps - 30 sec hold  - Seated Hamstring Stretch  - 1 x daily - 7 x weekly - 3 reps - 20 sec hold  - Prone Quadriceps Stretch with Strap  - 2 x weekly - 3 reps - 30 sec hold  - Sit to Stand Without Arm Support  - 2 x weekly - 2 sets - 10 reps  - Standing Near Stance in Corner with Eyes Closed  - 2 x weekly - 2 reps - 1 min hold  - Tandem Stance in Corner  - 2 x weekly - 2 sets - 1 min hold  - Corner Balance Feet Together: Eyes Closed With Head Turns  - 2 x weekly - 2 sets - 1 min hold  - Standing March with Counter Support  - 2 x weekly - 2 sets - 1 min hold  ------------------------------------------------------------------------------------------------------------------  Access Code: [email or web address]  URL: [email or web address]  Date: 11/30/2023  Prepared by: Geovani Dominguez    Exercises  - Forward Fold with Feet Apart and Bent Legs  - 1 x daily - 3 x weekly - 10 reps  - Hooklying Single Knee to Chest Stretch  - 1 x daily - 7 x weekly - 10 reps  - Supine Lower Trunk Rotation with  PLB  - 1 x daily - 7 x weekly - 10 reps  - Supine Piriformis Stretch with Foot on Ground  - 1 x daily - 7 x weekly - 3 reps - 30 sec hold  - Seated Hamstring Stretch  - 1 x daily - 7 x weekly - 3 reps - 20 sec hold  - Prone Quadriceps Stretch with Strap  - 7 x weekly - 3 reps - 30 sec hold  - Clamshell with Resistance  - 3 x weekly - 2-3 sets - 10 reps  - Single Leg Bridge  - 3 x weekly - 2-3 sets - 10 reps  - Bird Dog  - 3 x weekly - 2-3 sets - 10 reps  -------------------------------------------------------------------------------------------------------------------------  Access Code: [email or web address]  URL: [email or web address]  Date: 02/16/2024  Prepared by: Geovani Dominguez    Exercises  - Forward Fold with Feet Apart and Bent Legs  - 1 x daily - 3 x weekly - 10 reps  - Hooklying Single Knee to Chest Stretch  - 1 x daily - 7 x weekly - 10 reps  - Supine Lower Trunk Rotation with PLB  - 1 x daily - 7 x weekly - 10 reps  - Supine Piriformis Stretch with Foot on Ground  - 1 x daily - 7 x weekly - 3 reps - 30 sec hold  - Seated Hamstring Stretch  - 1 x daily - 7 x weekly - 3 reps - 20 sec hold  - Prone Quadriceps Stretch with Strap  - 7 x weekly - 3 reps - 30 sec hold  - Clamshell with Resistance  - 3 x weekly - 2-3 sets - 10 reps  - Single Leg Bridge  - 3 x weekly - 2-3 sets - 10 reps  - Bird Dog  - 3 x weekly - 2-3 sets - 10 reps  - Side Stepping with Resistance at Thighs  - 3 x weekly - 3 sets - 10 reps  - Standing Hip Abduction with Resistance at Thighs  - 3 x weekly - 3 sets - 10 reps    Care Plan  Active       PT Problem       PT Goal 1       Expected End:  02/09/24       1) Modified return to prior level of function to allow continued home independent capability.  2) Reduce back pain levels 4-5/10 maximum in the last week for improvements in quality of life and ability to sleep.   3) Increase bilateral hip strength testing greater than or equal to 4/5 for improvements in low back/ pelvic  stability when standing and walking for extended periods of time.   4) Patient to demonstrates capability of walking for 10 minutes with assistive device for integration into the community.  5) Increase LEFS outcome measure to greater than or equal to 60 for meaningful and clinical improvements in patients condition.

## 2024-02-23 ENCOUNTER — APPOINTMENT (OUTPATIENT)
Dept: PHYSICAL THERAPY | Facility: CLINIC | Age: 54
End: 2024-02-23
Payer: COMMERCIAL

## 2024-02-28 ENCOUNTER — TREATMENT (OUTPATIENT)
Dept: PHYSICAL THERAPY | Facility: CLINIC | Age: 54
End: 2024-02-28
Payer: COMMERCIAL

## 2024-02-28 DIAGNOSIS — D49.2 ODONTOGENIC TUMOR: ICD-10-CM

## 2024-02-28 DIAGNOSIS — R26.2 DIFFICULTY IN WALKING INVOLVING LOWER LEG JOINT: ICD-10-CM

## 2024-02-28 DIAGNOSIS — M48.062 NEUROGENIC CLAUDICATION DUE TO LUMBAR SPINAL STENOSIS: ICD-10-CM

## 2024-02-28 DIAGNOSIS — Z12.11 COLON CANCER SCREENING: ICD-10-CM

## 2024-02-28 PROCEDURE — 97110 THERAPEUTIC EXERCISES: CPT | Mod: GP | Performed by: PHYSICAL THERAPIST

## 2024-02-28 RX ORDER — POLYETHYLENE GLYCOL 3350, SODIUM SULFATE ANHYDROUS, SODIUM BICARBONATE, SODIUM CHLORIDE, POTASSIUM CHLORIDE 236; 22.74; 6.74; 5.86; 2.97 G/4L; G/4L; G/4L; G/4L; G/4L
4000 POWDER, FOR SOLUTION ORAL ONCE
Qty: 4000 ML | Refills: 0 | Status: SHIPPED | OUTPATIENT
Start: 2024-02-28 | End: 2024-02-28

## 2024-02-28 NOTE — PROGRESS NOTES
Physical Therapy Daily Treatment Note    Patient Name: Altaf Parsons  MRN Number: [id number]  Initial PT Examination Date: 8/10/23  Referring Clinician: Dr. Rodriguez    Today's Date: 2/28/2024  Time Calculation  Start Time: 0914  Stop Time: 1003  Time Calculation (min): 49 min    Insurance  Visit Number: 13  Approved Number of Visits: 30/ year  Certification Period: N/A    Precautions  History of spinal cancer  Moderate fall risk    Problem List  1. Difficulty in walking involving lower leg joint  Follow Up In Physical Therapy      2. Odontogenic tumor  Follow Up In Physical Therapy      3. Neurogenic claudication due to lumbar spinal stenosis  Follow Up In Physical Therapy        Subjective  Was sick last week, got COVID. He did not go anywhere in the last week. After getting over his sickness. He had some socking feeling back into his leg since he was sick. His balance is a little bit off to since he got sick. He did get a change to use the yellow loop for his exercises. Shocking feeling in his left leg gets better as he exercises more in therapy. He would like to do the machines today, has not been back to do them in the gym since his set back.    Treatment  Therapeutic Exercise    - bike warm up, 8 minutes, L6   - hamstring curls, 35 pounds x12   - hamstring curls, single leg, 12 pounds, x12 each side   - leg press, 90 pounds, x12   - leg press, single leg, 40 pounds, 2x12 each side   - walking around clinic for assessment of stability    - more unstable than previously noted   - CKC hip abduction, yellow loop 2x10   - side stepping 2x10 each direction, yellow loop      Assessment  Set back occurred since the last 2 weeks, results of getting sick, slight reduction in standing stability, however increase in activity does help to reduce pain.    Plan  Visit patient requested HIIT exercises. Adjust program based on response/ recover to set back.    Update home exercise program with more strengthening exercises. Add  forward trunk flexion.     Home Program  Access Code: [email or web address]  URL: [email or web address]/  Date: 10/04/2023  Prepared by: Geovani Dominguez    Exercises  - Hooklying Single Knee to Chest Stretch  - 1 x daily - 7 x weekly - 10 reps  - Supine Lower Trunk Rotation with PLB  - 1 x daily - 7 x weekly - 10 reps  - Supine Piriformis Stretch with Foot on Ground  - 1 x daily - 7 x weekly - 3 reps - 30 sec hold  - Seated Hamstring Stretch  - 1 x daily - 7 x weekly - 3 reps - 20 sec hold  - Sit to Stand Without Arm Support  - 1 x daily - 7 x weekly - 2 sets - 10 reps  - Standing Near Stance in Corner with Eyes Closed  - 1 x daily - 7 x weekly - 3 reps - 1 min hold  - Prone Quadriceps Stretch with Strap  - 1 x daily - 7 x weekly - 3 reps - 30 sec hold  -----------------------------------------------------------------------------------------------------------------  Access Code: [email or web address]  URL: [email or web address]/  Date: 10/12/2023  Prepared by: Geovani Dominguez    Exercises  - Hooklying Single Knee to Chest Stretch  - 1 x daily - 7 x weekly - 10 reps  - Supine Lower Trunk Rotation with PLB  - 1 x daily - 7 x weekly - 10 reps  - Supine Piriformis Stretch with Foot on Ground  - 1 x daily - 7 x weekly - 3 reps - 30 sec hold  - Seated Hamstring Stretch  - 1 x daily - 7 x weekly - 3 reps - 20 sec hold  - Prone Quadriceps Stretch with Strap  - 2 x weekly - 3 reps - 30 sec hold  - Sit to Stand Without Arm Support  - 2 x weekly - 2 sets - 10 reps  - Standing Near Stance in Corner with Eyes Closed  - 2 x weekly - 2 reps - 1 min hold  - Tandem Stance in Corner  - 2 x weekly - 2 sets - 1 min hold  - Corner Balance Feet Together: Eyes Closed With Head Turns  - 2 x weekly - 2 sets - 1 min hold  - Standing March with Counter Support  - 2 x weekly - 2 sets - 1 min hold  ------------------------------------------------------------------------------------------------------------------  Access Code:  [email or web address]  URL: [email or web address]/  Date: 11/30/2023  Prepared by: Geovani Dominguez    Exercises  - Forward Fold with Feet Apart and Bent Legs  - 1 x daily - 3 x weekly - 10 reps  - Hooklying Single Knee to Chest Stretch  - 1 x daily - 7 x weekly - 10 reps  - Supine Lower Trunk Rotation with PLB  - 1 x daily - 7 x weekly - 10 reps  - Supine Piriformis Stretch with Foot on Ground  - 1 x daily - 7 x weekly - 3 reps - 30 sec hold  - Seated Hamstring Stretch  - 1 x daily - 7 x weekly - 3 reps - 20 sec hold  - Prone Quadriceps Stretch with Strap  - 7 x weekly - 3 reps - 30 sec hold  - Clamshell with Resistance  - 3 x weekly - 2-3 sets - 10 reps  - Single Leg Bridge  - 3 x weekly - 2-3 sets - 10 reps  - Bird Dog  - 3 x weekly - 2-3 sets - 10 reps  -------------------------------------------------------------------------------------------------------------------------  Access Code: [email or web address]  URL: [email or web address]  Date: 02/16/2024  Prepared by: Geovani Dominguez    Exercises  - Forward Fold with Feet Apart and Bent Legs  - 1 x daily - 3 x weekly - 10 reps  - Hooklying Single Knee to Chest Stretch  - 1 x daily - 7 x weekly - 10 reps  - Supine Lower Trunk Rotation with PLB  - 1 x daily - 7 x weekly - 10 reps  - Supine Piriformis Stretch with Foot on Ground  - 1 x daily - 7 x weekly - 3 reps - 30 sec hold  - Seated Hamstring Stretch  - 1 x daily - 7 x weekly - 3 reps - 20 sec hold  - Prone Quadriceps Stretch with Strap  - 7 x weekly - 3 reps - 30 sec hold  - Clamshell with Resistance  - 3 x weekly - 2-3 sets - 10 reps  - Single Leg Bridge  - 3 x weekly - 2-3 sets - 10 reps  - Bird Dog  - 3 x weekly - 2-3 sets - 10 reps  - Side Stepping with Resistance at Thighs  - 3 x weekly - 3 sets - 10 reps  - Standing Hip Abduction with Resistance at Thighs  - 3 x weekly - 3 sets - 10 reps    Care Plan  Active       PT Problem       PT Goal 1       Expected End:  02/09/24       1) Modified  return to prior level of function to allow continued home independent capability.  2) Reduce back pain levels 4-5/10 maximum in the last week for improvements in quality of life and ability to sleep.   3) Increase bilateral hip strength testing greater than or equal to 4/5 for improvements in low back/ pelvic stability when standing and walking for extended periods of time.   4) Patient to demonstrates capability of walking for 10 minutes with assistive device for integration into the community.  5) Increase LEFS outcome measure to greater than or equal to 60 for meaningful and clinical improvements in patients condition.

## 2024-02-29 ENCOUNTER — HOSPITAL ENCOUNTER (OUTPATIENT)
Dept: RADIOLOGY | Facility: CLINIC | Age: 54
Discharge: HOME | End: 2024-02-29
Payer: COMMERCIAL

## 2024-02-29 DIAGNOSIS — M48.062 NEUROGENIC CLAUDICATION DUE TO LUMBAR SPINAL STENOSIS: ICD-10-CM

## 2024-02-29 DIAGNOSIS — C37 THYMUS CANCER (MULTI): ICD-10-CM

## 2024-02-29 PROCEDURE — A9575 INJ GADOTERATE MEGLUMI 0.1ML: HCPCS | Performed by: INTERNAL MEDICINE

## 2024-02-29 PROCEDURE — 72158 MRI LUMBAR SPINE W/O & W/DYE: CPT

## 2024-02-29 PROCEDURE — 72148 MRI LUMBAR SPINE W/O DYE: CPT | Performed by: RADIOLOGY

## 2024-02-29 PROCEDURE — 2550000001 HC RX 255 CONTRASTS: Performed by: INTERNAL MEDICINE

## 2024-02-29 PROCEDURE — 72157 MRI CHEST SPINE W/O & W/DYE: CPT

## 2024-02-29 PROCEDURE — 72157 MRI CHEST SPINE W/O & W/DYE: CPT | Performed by: RADIOLOGY

## 2024-02-29 RX ORDER — GADOTERATE MEGLUMINE 376.9 MG/ML
20 INJECTION INTRAVENOUS
Status: COMPLETED | OUTPATIENT
Start: 2024-02-29 | End: 2024-02-29

## 2024-02-29 RX ORDER — GABAPENTIN 300 MG/1
300 CAPSULE ORAL 3 TIMES DAILY
Qty: 90 CAPSULE | Refills: 0 | Status: SHIPPED | OUTPATIENT
Start: 2024-02-29 | End: 2024-04-02 | Stop reason: SDUPTHER

## 2024-02-29 RX ADMIN — GADOTERATE MEGLUMINE 20 ML: 376.9 INJECTION INTRAVENOUS at 11:26

## 2024-02-29 ASSESSMENT — ENCOUNTER SYMPTOMS
DEPRESSION: 0
OCCASIONAL FEELINGS OF UNSTEADINESS: 0
LOSS OF SENSATION IN FEET: 0

## 2024-03-04 ENCOUNTER — ALLIED HEALTH (OUTPATIENT)
Dept: INTEGRATIVE MEDICINE | Facility: CLINIC | Age: 54
End: 2024-03-04

## 2024-03-04 PROCEDURE — CYTAX SALES TAX CUYAHOGA COUNT

## 2024-03-04 PROCEDURE — MASSG60 MASSAGE 60 MINUTES

## 2024-03-04 NOTE — PROGRESS NOTES
FULL BODY MASSAGE WITH FOCUS ON BACK, L HIP, HAMSTRING STIFFNESS . PATIENT WAS PRONE.  SEVERE HYPERTONICITY PARASPINALS R>L, SACRAL ATTACHMENTS, R HAMSTRING SUPERIOR ATTACHMENT. MODERATE TENDERNESS NOTED . DO PRESSURE SCALE  3-5 USED. TREATMENT INCLUDED, EFFLEURAGE, KNEADING, PETRISSAGE, CROSS FIBER FRICTION, PIN AND STRETCH, SILICONE CUPPING TO THORACIC AND LUMBAR 4/5.  TISSUE RESPONSE WAS SLOW  BUT FAVORABLE.  BEFORE MASSAGE THERAPY BEGAN, I EXPLAINED TO THE PATIENT TO COMMUNICATE, AT ANY TIME DURING THE MASSAGE, IF THE PRESSURE WAS CAUSING DISCOMFORT. DURING THE TREATMENT, THE PATIENT STATED THAT THE PRESSURE WAS GOOD AND THE TENDERNESS THEY WERE EXPERIENCING WAS ACCEPTABLE.     KRISTI IS HERE TODAY ON THE ADVICE OF HIS CURRENT PT.  HE ARRIVED WITH ASSISTANCE OF HIS CANE. HE HAS A HISTORY OF SPINAL SURGERY ALONG WITH L ACHILLES SURGERY. HIS COMPLAINT IS MORE STIFFNESS THAN PAIN. HE IS WORKING WITH PT TO GAIN STRENGTH.  DISCUSSED SCHEDULING 3 MORE APPOINTMENTS AND EVALUATE IMPROVEMENT.      Discussed with patient the after effects of massage/body work. Instructed the patient to increase water consumption to reduce soreness.Explained that there is a possibility of aches and soreness afterwards, but should feel relief within 1-2 days.  Asked patient to call with any questions or concerns

## 2024-03-06 ENCOUNTER — LAB (OUTPATIENT)
Dept: LAB | Facility: HOSPITAL | Age: 54
End: 2024-03-06
Payer: COMMERCIAL

## 2024-03-06 ENCOUNTER — OFFICE VISIT (OUTPATIENT)
Dept: HEMATOLOGY/ONCOLOGY | Facility: HOSPITAL | Age: 54
End: 2024-03-06
Payer: COMMERCIAL

## 2024-03-06 ENCOUNTER — SPECIALTY PHARMACY (OUTPATIENT)
Dept: PHARMACY | Facility: CLINIC | Age: 54
End: 2024-03-06

## 2024-03-06 VITALS
BODY MASS INDEX: 30.56 KG/M2 | SYSTOLIC BLOOD PRESSURE: 131 MMHG | DIASTOLIC BLOOD PRESSURE: 89 MMHG | WEIGHT: 225.3 LBS | HEART RATE: 105 BPM | OXYGEN SATURATION: 100 % | RESPIRATION RATE: 17 BRPM | TEMPERATURE: 97.9 F

## 2024-03-06 DIAGNOSIS — C37 MALIGNANT THYMOMA (MULTI): ICD-10-CM

## 2024-03-06 DIAGNOSIS — C37 THYMUS CANCER (MULTI): ICD-10-CM

## 2024-03-06 DIAGNOSIS — C37 MALIGNANT THYMOMA (MULTI): Primary | ICD-10-CM

## 2024-03-06 LAB
ALBUMIN SERPL BCP-MCNC: 4.4 G/DL (ref 3.4–5)
ALP SERPL-CCNC: 46 U/L (ref 33–120)
ALT SERPL W P-5'-P-CCNC: 38 U/L (ref 10–52)
ANION GAP SERPL CALC-SCNC: 13 MMOL/L (ref 10–20)
AST SERPL W P-5'-P-CCNC: 76 U/L (ref 9–39)
BASOPHILS # BLD AUTO: 0.02 X10*3/UL (ref 0–0.1)
BASOPHILS NFR BLD AUTO: 0.6 %
BILIRUB SERPL-MCNC: 0.5 MG/DL (ref 0–1.2)
BUN SERPL-MCNC: 12 MG/DL (ref 6–23)
CALCIUM SERPL-MCNC: 9.2 MG/DL (ref 8.6–10.3)
CHLORIDE SERPL-SCNC: 98 MMOL/L (ref 98–107)
CHOLEST SERPL-MCNC: 230 MG/DL (ref 0–199)
CHOLESTEROL/HDL RATIO: 5.1
CO2 SERPL-SCNC: 30 MMOL/L (ref 21–32)
CREAT SERPL-MCNC: 0.89 MG/DL (ref 0.5–1.3)
EGFRCR SERPLBLD CKD-EPI 2021: >90 ML/MIN/1.73M*2
EOSINOPHIL # BLD AUTO: 0.52 X10*3/UL (ref 0–0.7)
EOSINOPHIL NFR BLD AUTO: 16.7 %
ERYTHROCYTE [DISTWIDTH] IN BLOOD BY AUTOMATED COUNT: 16.5 % (ref 11.5–14.5)
GLUCOSE SERPL-MCNC: 88 MG/DL (ref 74–99)
HCT VFR BLD AUTO: 40.7 % (ref 41–52)
HDLC SERPL-MCNC: 45.4 MG/DL
HGB BLD-MCNC: 13.2 G/DL (ref 13.5–17.5)
IMM GRANULOCYTES # BLD AUTO: 0.01 X10*3/UL (ref 0–0.7)
IMM GRANULOCYTES NFR BLD AUTO: 0.3 % (ref 0–0.9)
LDH SERPL L TO P-CCNC: 237 U/L (ref 84–246)
LDLC SERPL CALC-MCNC: 159 MG/DL
LYMPHOCYTES # BLD AUTO: 0.83 X10*3/UL (ref 1.2–4.8)
LYMPHOCYTES NFR BLD AUTO: 26.6 %
MCH RBC QN AUTO: 25.4 PG (ref 26–34)
MCHC RBC AUTO-ENTMCNC: 32.4 G/DL (ref 32–36)
MCV RBC AUTO: 78 FL (ref 80–100)
MONOCYTES # BLD AUTO: 0.26 X10*3/UL (ref 0.1–1)
MONOCYTES NFR BLD AUTO: 8.3 %
NEUTROPHILS # BLD AUTO: 1.48 X10*3/UL (ref 1.2–7.7)
NEUTROPHILS NFR BLD AUTO: 47.5 %
NON HDL CHOLESTEROL: 185 MG/DL (ref 0–149)
NRBC BLD-RTO: 0 /100 WBCS (ref 0–0)
PLATELET # BLD AUTO: 389 X10*3/UL (ref 150–450)
POTASSIUM SERPL-SCNC: 3.9 MMOL/L (ref 3.5–5.3)
PROT SERPL-MCNC: 7.8 G/DL (ref 6.4–8.2)
RBC # BLD AUTO: 5.2 X10*6/UL (ref 4.5–5.9)
SODIUM SERPL-SCNC: 137 MMOL/L (ref 136–145)
TRIGL SERPL-MCNC: 127 MG/DL (ref 0–149)
VLDL: 25 MG/DL (ref 0–40)
WBC # BLD AUTO: 3.1 X10*3/UL (ref 4.4–11.3)

## 2024-03-06 PROCEDURE — 99215 OFFICE O/P EST HI 40 MIN: CPT | Performed by: INTERNAL MEDICINE

## 2024-03-06 PROCEDURE — 36415 COLL VENOUS BLD VENIPUNCTURE: CPT

## 2024-03-06 PROCEDURE — 3079F DIAST BP 80-89 MM HG: CPT | Performed by: INTERNAL MEDICINE

## 2024-03-06 PROCEDURE — 80053 COMPREHEN METABOLIC PANEL: CPT

## 2024-03-06 PROCEDURE — 3075F SYST BP GE 130 - 139MM HG: CPT | Performed by: INTERNAL MEDICINE

## 2024-03-06 PROCEDURE — 83615 LACTATE (LD) (LDH) ENZYME: CPT

## 2024-03-06 PROCEDURE — 80061 LIPID PANEL: CPT

## 2024-03-06 PROCEDURE — 85025 COMPLETE CBC W/AUTO DIFF WBC: CPT

## 2024-03-06 PROCEDURE — 1036F TOBACCO NON-USER: CPT | Performed by: INTERNAL MEDICINE

## 2024-03-06 RX ORDER — OXYCODONE HYDROCHLORIDE 5 MG/1
5 TABLET ORAL EVERY 4 HOURS PRN
Qty: 60 TABLET | Refills: 0 | Status: CANCELLED | OUTPATIENT
Start: 2024-03-06

## 2024-03-06 RX ORDER — EVEROLIMUS 10 MG/1
10 TABLET ORAL DAILY
Qty: 28 TABLET | Refills: 3 | Status: SHIPPED | OUTPATIENT
Start: 2024-03-06 | End: 2024-06-04 | Stop reason: HOSPADM

## 2024-03-06 RX ORDER — OXYCODONE HYDROCHLORIDE 5 MG/1
5 TABLET ORAL EVERY 4 HOURS PRN
Qty: 60 TABLET | Refills: 0 | Status: SHIPPED | OUTPATIENT
Start: 2024-03-06 | End: 2024-04-02 | Stop reason: SDUPTHER

## 2024-03-06 RX ORDER — EVEROLIMUS 10 MG/1
10 TABLET ORAL DAILY
Qty: 28 TABLET | Refills: 2 | Status: CANCELLED | OUTPATIENT
Start: 2024-03-06

## 2024-03-06 ASSESSMENT — ENCOUNTER SYMPTOMS
VISUAL CHANGE: 0
NECK PAIN: 0
JOINT SWELLING: 0
HEADACHES: 0
SWOLLEN GLANDS: 0
MYALGIAS: 0
FATIGUE: 0
NUMBNESS: 0
CHANGE IN BOWEL HABIT: 0
VOMITING: 0
CHILLS: 0
WEAKNESS: 1
DIAPHORESIS: 0
VERTIGO: 0
ABDOMINAL PAIN: 0
ARTHRALGIAS: 0
SORE THROAT: 0
FEVER: 0
COUGH: 0
ANOREXIA: 0
NAUSEA: 0

## 2024-03-06 ASSESSMENT — PAIN SCALES - GENERAL: PAINLEVEL: 2

## 2024-03-06 NOTE — PROGRESS NOTES
Patient ID: Altaf Parsons is a 53 y.o. male.    DIAGNOSIS     Malignant thymoma. Type B2 (predominant lymphocytic population with scattered epithelial cells). Date of diagnosis is March 4, 2016 from a left lower lobe of the lung wedge resection and mediastinal fat biopsy.A left pleural nodule was biopsied in May  2017 showing thymoma. A left serratus anterior biopsy in November 2019 showed thymoma. A biopsy from a T12-L1 paraspinal mass on August 19, 2020 shows again thymoma. A spine tumor biopsy in January 2021 again shows thymoma.        STAGING     T2 N0 M1 (metastases to the lungs bilaterally)        CURRENT SITES OF DISEASE     Mediastinum, lung, parapinal region T, L, S spine, left pleura, T12-L1 paraspinal region, left lateral aspect of the spinal canal spanning from at least T11-S1. Findings likely  represent epidural tumoral extension as seen on MRI thoracic and lumbar spine 06/26/2021. Interval worsening/development of widespread osseous metastasis noted throughout the thoracic and lumbar spine with ventral epidural tumoral extension from the T6  level through the L4 level. Canal stenosis secondary to epidural tumor appears to be more severe at the T8 level through the T10 level. At the T8-9 level there appears to be mild flattening of the contour of the   cord. Recent MRI T/L spine (11/11/22) demonstrates  increased/new tumor extension across multiple spinal levels, particularly at T9-T11 and L1-L2 with marked stenosis at L1-L2.        MOLECULAR GENOMICS     PD-L1 22C3 FDA (KEYTRUDA) for Gastric/GEA: CPS>/=1 (PD-L1 EXPRESSION) Combined Positive Score: 80   TEST: Solid Focus Tumor DNA Panel SPECIMEN: FFPE, Left Serratus Anterior, Biopsy, [id number] A DISEASE DIAGNOSIS: Thyoma Estimated Tumor Content: 30% COLLECTION DATE: 11/13/2019 RECEIVED DATE: 08/11/2020 REPORT DATE: 08/14/2020 MICROSATELLITE STATUS: Microsatellite  Stable (CHERYL) DISEASE ASSOCIATED GENOMIC FINDINGS: None         PRIOR THERAPY     1-left  video-assisted thoracic surgery extensive lysis of adhesions and total pulmonary decortication, lower lobe wedge, exploration of the mediastinum converted to  thoracotomy, resection of anterior mediastinal mass and thymus, upper lobe wedge. Per surgery there is residual disease.  2-external beam radiation therapy August 1, 2016- Sept 17, 2016  5940 CGyE using 180 CGyE daily to surgical site   3- cyclophosphamide, adriamycin, cisplatin on 8/14/17 x 3 cycles      4- 3/19/18-4/17/18: recurrent left paraspinal T12-L1, 50 CGE/20 fx PROTONS Had progression but declined systemic therapy.      5- 12/10/19- 12/31/19: SINDI mass and left serratus anterior mass, 45 CGE/15 fx, PROTONS      6- Stenosis from T11-L1 secondary to large intraspinal extradural mass at the T12-L1 level/neoplasm. OPERATION/PROCEDURE: 1. Posterior spinal fusion T9-L1 with use of bilateral  pedicle screw instrumentation, allograft, and demineralized bone matrix fiber. 2. T12 laminectomy with laminotomy of L1 and T11 to decompress T11-L1 levels and remove the intraspinal extradural neoplasm at the T12-L1 segment.      7- On 8/4/21, he saw ortho spine surgery (Dr. Delgado) who did not recommend further surgery.      8- palliative radiation to the T-L/S1 spine: 30 Gy in 10 daily fractions, completed 9/27/21.     9- Single agents alimta per NCCN guideliens on January 10, 2022.   Received 2 cycles in JanuaryJanuary 2022.  Progressive disease based on MRI in February 2022     10- Radiation therapy to 30 Gy in 10 fractions, from T4-T6 and L1-L3 completed in mid April 2022 11- 1- Dec 2022 - single agent capecitabine 500 mg tab, 4 tabs BID. Cycle - 21 days (two weeks on with one week holiday).  Patient initially underdosed himself with cycle #1 despite clear instructions.  He took cycle 2 at full dose on January 26, 2023.   Delay in C3 due to insurance issues with plan to resume again in April 2023.  Cycle 3 4/4/23 - 4/17 with one week off ending 4/24.  Cycle 4  starts 4/25/23.   He  started (C4) on 4/25/23.  Stable disease as best response        CURRENT THERAPY     1-Single agent Afinitor per NCCN guidelines, started 9/28/2023, documented stable disease on imaging end of November 2023     2- supportive oncology/pain management     3- PET/NET scan to assess for somatostatin receptor status and consideration of octreotide based therapy in the future. 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 He underwent LEFT VATS DRAINAGE OF MASSIVE HEMOTHORAX, PLEURAL BIOPSY, AND CORE NEEDLE  BIOPSY OF LINGULAR MASS AND DECORTICATION on July 18, 2007. Biopsies were all negative. He was also seen on CT scan to have an approximately 7 cm lesion within the mediastinum with a calcified rim. He was followed serially with CT scans of the chest through  2009. He was seen later in follow-up in 2016. MRI of the chest in Feb 2016 showed a new enhancing soft tissue mass immediately superior to the previously described calcified cystic structure. Also a new enhancing left pericardial lymph node and left basilar  pulmonary nodule were seen. CT scan of the chest also showed a right lower lobe nodule. He was taken to the operating room on March 4, 2016 where the necrotic calcified mass was removed showing only necrotic debris. Pathology favored a necrotic tumor  although no viable cancer cells were seen. The separate lesion within the mediastinum was shown to be a malignant thymoma. Wedge resection of the left sided pulmonary lesion also showed malignant thymoma. Underwent gross resection of the anterior mediastinal  mass at that time, Proton beam radiation to his surgical  bed.  He had recurrence in 2017 and received 3 cycles of systemic therapy complicated by nausea and vomiting.He was last seen by medical oncology in early 2018 and since then has not wanted any  systemic therapy and is followed with radiation oncology only. During this time he has had recurrent bronchospasm, left anterior serratus muscle, multiple recurrences of his T12-L1 region requiring 2 separate courses of radiation therapy as well as a  surgical decompression laminectomy and fusion and most recently has had further progression and is agreed to come back to the medical oncology.         PAST MEDICAL HISTORY     1-hypertension  2-spontaneous hemothorax on the left side in 2007  3-GERD  4-Achilles tendon repair  5-chronic back pain from work related back injury        SOCIAL HISTORY      Patient lives in [place]. Has 2 children age 6 and 13. 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 He has never smoked. Does not drink alcohol. He has been on disability now for a couple of months.  He has been off work since August 27, 2021.        CURRENT MEDS     per list        ALLERGIES     Denies any drug allergies        FAMILY HISTORY     No family history of cancer.        Subjective    Cancer  Associated symptoms include weakness. Pertinent negatives include no abdominal pain, anorexia, arthralgias, change in bowel habit, chest pain, chills, congestion, coughing, diaphoresis, fatigue, fever, headaches, joint swelling, myalgias, nausea, neck pain, numbness, rash, sore throat, swollen glands, urinary symptoms, vertigo, visual change or vomiting.       Patient has no new changes over the past few months.  He still does his physical therapy has a gym membership and goes to , still has his walking difficulties and uses a cane related to his left lower extremity spasticity and weakness.  No side effects of drug.      Objective    BSA: 2.28 meters squared  /89   Pulse 105   Temp 36.6 °C (97.9 °F) (Skin)   Resp 17   Wt  102 kg (225 lb 4.8 oz)   SpO2 100%   BMI 30.56 kg/m²      Physical Exam  Constitutional:       General: He is not in acute distress.     Appearance: He is not ill-appearing, toxic-appearing or diaphoretic.   HENT:      Mouth/Throat:      Pharynx: Oropharynx is clear. No oropharyngeal exudate or posterior oropharyngeal erythema.   Eyes:      General: No scleral icterus.     Conjunctiva/sclera: Conjunctivae normal.   Cardiovascular:      Rate and Rhythm: Normal rate and regular rhythm.      Pulses: Normal pulses.      Heart sounds: Normal heart sounds. No murmur heard.     No friction rub. No gallop.   Pulmonary:      Effort: Pulmonary effort is normal. No respiratory distress.      Breath sounds: Normal breath sounds. No stridor. No wheezing, rhonchi or rales.   Chest:      Chest wall: No tenderness.   Abdominal:      General: Abdomen is flat. Bowel sounds are normal. There is no distension.      Palpations: Abdomen is soft. There is no mass.      Tenderness: There is no abdominal tenderness. There is no guarding or rebound.   Musculoskeletal:         General: Deformity present. No swelling, tenderness or signs of injury.      Cervical back: No tenderness.      Right lower leg: No edema.      Left lower leg: No edema.   Lymphadenopathy:      Cervical: No cervical adenopathy.   Skin:     General: Skin is warm.      Coloration: Skin is not jaundiced or pale.      Findings: No bruising or erythema.   Neurological:      Mental Status: Mental status is at baseline.      Motor: Weakness present.      Comments: Left lower extremity weakness   Psychiatric:         Mood and Affect: Mood normal.         Behavior: Behavior normal.         Performance Status:  Symptomatic; fully ambulatory     Latest Reference Range & Units 03/06/24 09:39   GLUCOSE 74 - 99 mg/dL 88   SODIUM 136 - 145 mmol/L 137   POTASSIUM 3.5 - 5.3 mmol/L 3.9   CHLORIDE 98 - 107 mmol/L 98   Bicarbonate 21 - 32 mmol/L 30   Anion Gap 10 - 20 mmol/L 13   Blood  Urea Nitrogen 6 - 23 mg/dL 12   Creatinine 0.50 - 1.30 mg/dL 0.89   EGFR >60 mL/min/1.73m*2 >90   Calcium 8.6 - 10.3 mg/dL 9.2   Albumin 3.4 - 5.0 g/dL 4.4   Alkaline Phosphatase 33 - 120 U/L 46   ALT 10 - 52 U/L 38   AST 9 - 39 U/L 76 (H)   Bilirubin Total 0.0 - 1.2 mg/dL 0.5   Total Protein 6.4 - 8.2 g/dL 7.8   LDH 84 - 246 U/L 237   WBC 4.4 - 11.3 x10*3/uL 3.1 (L)   nRBC 0.0 - 0.0 /100 WBCs 0.0   RBC 4.50 - 5.90 x10*6/uL 5.20   HEMOGLOBIN 13.5 - 17.5 g/dL 13.2 (L)   HEMATOCRIT 41.0 - 52.0 % 40.7 (L)   MCV 80 - 100 fL 78 (L)   MCH 26.0 - 34.0 pg 25.4 (L)   MCHC 32.0 - 36.0 g/dL 32.4   RED CELL DISTRIBUTION WIDTH 11.5 - 14.5 % 16.5 (H)   Platelets 150 - 450 x10*3/uL 389   Neutrophils % 40.0 - 80.0 % 47.5   Immature Granulocytes %, Automated 0.0 - 0.9 % 0.3   Lymphocytes % 13.0 - 44.0 % 26.6   Monocytes % 2.0 - 10.0 % 8.3   Eosinophils % 0.0 - 6.0 % 16.7   Basophils % 0.0 - 2.0 % 0.6   Neutrophils Absolute 1.20 - 7.70 x10*3/uL 1.48   Immature Granulocytes Absolute, Automated 0.00 - 0.70 x10*3/uL 0.01   Lymphocytes Absolute 1.20 - 4.80 x10*3/uL 0.83 (L)   Monocytes Absolute 0.10 - 1.00 x10*3/uL 0.26   Eosinophils Absolute 0.00 - 0.70 x10*3/uL 0.52   Basophils Absolute 0.00 - 0.10 x10*3/uL 0.02   (H): Data is abnormally high  (L): Data is abnormally low    MRI Thoracic/lumbar spine 2/29/2024  IMPRESSION:  New loss of vertebral body height centrally at T8 with 25-50% loss of  vertebral body height and no osseous retropulsion into the spinal  canal. Otherwise no significant interval change from the prior exam  11/16/2023. Bone marrow replacing lesions throughout the thoracic  spine and tumor within the epidural space and neural foramen are not  significantly changed.  IMPRESSION:  Epidural tumor within the right neural foramen at L1-L2 appears  slightly increased compared to the prior exam 11/15/2023. Otherwise,  no significant interval change in the marrow replacing lesions and  epidural tumor compared to the prior  exam.      Assessment/Plan     This is a very nice 53-year-old gentleman with malignant thymoma type B2 diagnosed now 8 years ago in March 2016, metastatic to the thoracic lumbar and sacral spine and epidural tumor extension with chronic left lower extremity weakness.  He has been on single agent Afinitor for since September 2023 with stable disease.  He has now been on this agent for about 6 months, there is no treatment related adverse events.  From a response standpoint he has had stable disease.  Indeed his recent MRI of the thoracic and lumbar spine that I personally reviewed shows no evidence of disease progression.  Given his good tolerance and stability of disease we will continue him on this same agent.  Will see him back in 2 months for a clinical and laboratory check.  Plan for repeat MRIs in 4 months.      Cancer Staging   Neoplasm of thymus  Staging form: Thymus, AJCC 8th Edition  - Clinical stage from 3/4/2016: Stage SHONDA (cT2, cN0, cM1a) - Signed by Justo Rodriguez MD on 11/22/2023      Oncology History   Neoplasm of thymus   3/4/2016 Cancer Staged    Staging form: Thymus, AJCC 8th Edition, Clinical stage from 3/4/2016: Stage SHONDA (cT2, cN0, cM1a) - Signed by Justo Rodriguez MD on 11/22/2023     10/6/2023 Initial Diagnosis    Neoplasm of thymus     Malignant thymoma (CMS/HCC)   9/28/2023 -  Chemotherapy    Everolimus, 28 Day Cycles      10/6/2023 Initial Diagnosis    Malignant thymoma (CMS/HCC)         Justo Rodriguez MD  Professor of Medicine and Oncology  [facility]  Vandana Hewitt Chair in Lung Cancer  Thoracic Oncology  [facility]

## 2024-03-07 ENCOUNTER — PHARMACY VISIT (OUTPATIENT)
Dept: PHARMACY | Facility: CLINIC | Age: 54
End: 2024-03-07
Payer: COMMERCIAL

## 2024-03-07 ENCOUNTER — SPECIALTY PHARMACY (OUTPATIENT)
Dept: PHARMACY | Facility: CLINIC | Age: 54
End: 2024-03-07

## 2024-03-07 PROCEDURE — RXMED WILLOW AMBULATORY MEDICATION CHARGE

## 2024-03-18 ENCOUNTER — APPOINTMENT (OUTPATIENT)
Dept: INTEGRATIVE MEDICINE | Facility: CLINIC | Age: 54
End: 2024-03-18
Payer: COMMERCIAL

## 2024-03-21 ENCOUNTER — TREATMENT (OUTPATIENT)
Dept: PHYSICAL THERAPY | Facility: CLINIC | Age: 54
End: 2024-03-21
Payer: COMMERCIAL

## 2024-03-21 DIAGNOSIS — R53.1 WEAKNESS: ICD-10-CM

## 2024-03-21 DIAGNOSIS — D49.2 ODONTOGENIC TUMOR: ICD-10-CM

## 2024-03-21 DIAGNOSIS — D49.2 LUMBAR SPINE TUMOR: Primary | ICD-10-CM

## 2024-03-21 DIAGNOSIS — M48.062 NEUROGENIC CLAUDICATION DUE TO LUMBAR SPINAL STENOSIS: ICD-10-CM

## 2024-03-21 DIAGNOSIS — R26.2 DIFFICULTY IN WALKING INVOLVING LOWER LEG JOINT: ICD-10-CM

## 2024-03-21 DIAGNOSIS — R26.2 DIFFICULTY WALKING: ICD-10-CM

## 2024-03-21 PROCEDURE — 97110 THERAPEUTIC EXERCISES: CPT | Mod: GP | Performed by: PHYSICAL THERAPIST

## 2024-03-21 PROCEDURE — 97112 NEUROMUSCULAR REEDUCATION: CPT | Mod: GP | Performed by: PHYSICAL THERAPIST

## 2024-03-21 NOTE — PROGRESS NOTES
Physical Therapy Daily Treatment Note    Patient Name: Altaf Parsons  MRN Number: [id number]  Initial PT Examination Date: 8/10/23  Referring Clinician: Dr. Rodriguez    Today's Date: 3/21/2024  Time Calculation  Start Time: 0949  Stop Time: 1032  Time Calculation (min): 43 min    Insurance  Visit Number: [id number]  Approved Number of Visits: 30/ year  Certification Period: N/A    Precautions  History of spinal cancer  Moderate fall risk    Problem List  1. Lumbar spine tumor        2. Difficulty in walking involving lower leg joint  Follow Up In Physical Therapy      3. Odontogenic tumor  Follow Up In Physical Therapy      4. Neurogenic claudication due to lumbar spinal stenosis  Follow Up In Physical Therapy      5. Weakness        6. Difficulty walking          Subjective  A few days ago he was squatting down to lift something, his left leg gave way and he fell to the floor. He would like to work on his ability to squat down to get things. He does still feel weaker since he had the set back with COVID. A couple days ago his left leg also collapsed at the top step.    Treatment  Therapeutic Exercise    - bike warm up, 8 minutes, L6.5   - adjusted cane height   - single leg stance    - instructed for HEP   - mini squats    - Instructed for HEP    Assessment  Significant instability with single leg stance, left knee locked into extension shows instability and weakness of the LLE.     Plan  Add mini squats and single leg stance.     Home Program  Access Code: [email or web address]  URL: [email or web address]  Date: 10/04/2023  Prepared by: Geovani Dominguez    Exercises  - Hooklying Single Knee to Chest Stretch  - 1 x daily - 7 x weekly - 10 reps  - Supine Lower Trunk Rotation with PLB  - 1 x daily - 7 x weekly - 10 reps  - Supine Piriformis Stretch with Foot on Ground  - 1 x daily - 7 x weekly - 3 reps - 30 sec hold  - Seated Hamstring Stretch  - 1 x daily - 7 x weekly - 3 reps - 20 sec hold  - Sit to Stand Without Arm  Support  - 1 x daily - 7 x weekly - 2 sets - 10 reps  - Standing Near Stance in Corner with Eyes Closed  - 1 x daily - 7 x weekly - 3 reps - 1 min hold  - Prone Quadriceps Stretch with Strap  - 1 x daily - 7 x weekly - 3 reps - 30 sec hold  -----------------------------------------------------------------------------------------------------------------  Access Code: [email or web address]  URL: [email or web address]  Date: 10/12/2023  Prepared by: Geovani Dominguez    Exercises  - Hooklying Single Knee to Chest Stretch  - 1 x daily - 7 x weekly - 10 reps  - Supine Lower Trunk Rotation with PLB  - 1 x daily - 7 x weekly - 10 reps  - Supine Piriformis Stretch with Foot on Ground  - 1 x daily - 7 x weekly - 3 reps - 30 sec hold  - Seated Hamstring Stretch  - 1 x daily - 7 x weekly - 3 reps - 20 sec hold  - Prone Quadriceps Stretch with Strap  - 2 x weekly - 3 reps - 30 sec hold  - Sit to Stand Without Arm Support  - 2 x weekly - 2 sets - 10 reps  - Standing Near Stance in Corner with Eyes Closed  - 2 x weekly - 2 reps - 1 min hold  - Tandem Stance in Corner  - 2 x weekly - 2 sets - 1 min hold  - Corner Balance Feet Together: Eyes Closed With Head Turns  - 2 x weekly - 2 sets - 1 min hold  - Standing March with Counter Support  - 2 x weekly - 2 sets - 1 min hold  ------------------------------------------------------------------------------------------------------------------  Access Code: [email or web address]  URL: [email or web address]  Date: 11/30/2023  Prepared by: Geovani Dominguez    Exercises  - Forward Fold with Feet Apart and Bent Legs  - 1 x daily - 3 x weekly - 10 reps  - Hooklying Single Knee to Chest Stretch  - 1 x daily - 7 x weekly - 10 reps  - Supine Lower Trunk Rotation with PLB  - 1 x daily - 7 x weekly - 10 reps  - Supine Piriformis Stretch with Foot on Ground  - 1 x daily - 7 x weekly - 3 reps - 30 sec hold  - Seated Hamstring Stretch  - 1 x daily - 7 x weekly - 3 reps - 20 sec hold  -  Prone Quadriceps Stretch with Strap  - 7 x weekly - 3 reps - 30 sec hold  - Clamshell with Resistance  - 3 x weekly - 2-3 sets - 10 reps  - Single Leg Bridge  - 3 x weekly - 2-3 sets - 10 reps  - Bird Dog  - 3 x weekly - 2-3 sets - 10 reps  -------------------------------------------------------------------------------------------------------------------------  Access Code: [email or web address]  URL: [email or web address]  Date: 02/16/2024  Prepared by: Geovani Dominguez    Exercises  - Forward Fold with Feet Apart and Bent Legs  - 1 x daily - 3 x weekly - 10 reps  - Hooklying Single Knee to Chest Stretch  - 1 x daily - 7 x weekly - 10 reps  - Supine Lower Trunk Rotation with PLB  - 1 x daily - 7 x weekly - 10 reps  - Supine Piriformis Stretch with Foot on Ground  - 1 x daily - 7 x weekly - 3 reps - 30 sec hold  - Seated Hamstring Stretch  - 1 x daily - 7 x weekly - 3 reps - 20 sec hold  - Prone Quadriceps Stretch with Strap  - 7 x weekly - 3 reps - 30 sec hold  - Clamshell with Resistance  - 3 x weekly - 2-3 sets - 10 reps  - Single Leg Bridge  - 3 x weekly - 2-3 sets - 10 reps  - Bird Dog  - 3 x weekly - 2-3 sets - 10 reps  - Side Stepping with Resistance at Thighs  - 3 x weekly - 3 sets - 10 reps  - Standing Hip Abduction with Resistance at Thighs  - 3 x weekly - 3 sets - 10 reps    Care Plan  Active       PT Problem       PT Goal 1       Expected End:  02/09/24       1) Modified return to prior level of function to allow continued home independent capability.  2) Reduce back pain levels 4-5/10 maximum in the last week for improvements in quality of life and ability to sleep.   3) Increase bilateral hip strength testing greater than or equal to 4/5 for improvements in low back/ pelvic stability when standing and walking for extended periods of time.   4) Patient to demonstrates capability of walking for 10 minutes with assistive device for integration into the community.  5) Increase LEFS outcome measure  to greater than or equal to 60 for meaningful and clinical improvements in patients condition.

## 2024-03-27 ENCOUNTER — APPOINTMENT (OUTPATIENT)
Dept: PHYSICAL THERAPY | Facility: CLINIC | Age: 54
End: 2024-03-27
Payer: COMMERCIAL

## 2024-03-29 ENCOUNTER — APPOINTMENT (OUTPATIENT)
Dept: PHYSICAL THERAPY | Facility: CLINIC | Age: 54
End: 2024-03-29
Payer: COMMERCIAL

## 2024-04-01 ENCOUNTER — TELEPHONE (OUTPATIENT)
Dept: ADMISSION | Facility: HOSPITAL | Age: 54
End: 2024-04-01
Payer: COMMERCIAL

## 2024-04-01 NOTE — TELEPHONE ENCOUNTER
Patient requesting medication refills via My Chart. Patient is requesting oxycodone 5 mg and gabapentin 300 mg.   Next MD FUV is 05/08/24.

## 2024-04-02 ENCOUNTER — TELEPHONE (OUTPATIENT)
Dept: ADMISSION | Facility: HOSPITAL | Age: 54
End: 2024-04-02

## 2024-04-02 ENCOUNTER — APPOINTMENT (OUTPATIENT)
Dept: INTEGRATIVE MEDICINE | Facility: CLINIC | Age: 54
End: 2024-04-02
Payer: COMMERCIAL

## 2024-04-02 DIAGNOSIS — C37 THYMUS CANCER (MULTI): ICD-10-CM

## 2024-04-02 DIAGNOSIS — M48.062 NEUROGENIC CLAUDICATION DUE TO LUMBAR SPINAL STENOSIS: ICD-10-CM

## 2024-04-02 RX ORDER — OXYCODONE HYDROCHLORIDE 5 MG/1
5 TABLET ORAL EVERY 4 HOURS PRN
Qty: 60 TABLET | Refills: 0 | Status: SHIPPED | OUTPATIENT
Start: 2024-04-02 | End: 2024-04-26 | Stop reason: SDUPTHER

## 2024-04-02 RX ORDER — GABAPENTIN 300 MG/1
300 CAPSULE ORAL 3 TIMES DAILY
Qty: 90 CAPSULE | Refills: 0 | Status: SHIPPED | OUTPATIENT
Start: 2024-04-02 | End: 2024-05-08 | Stop reason: SDUPTHER

## 2024-04-02 NOTE — TELEPHONE ENCOUNTER
Pt called back regarding refills on his Oxycodone 5mg and Gabapentin 300mg to be sent to [facility] on file. He said he is out of both meds. Message sent to the team.

## 2024-04-03 ENCOUNTER — SPECIALTY PHARMACY (OUTPATIENT)
Dept: PHARMACY | Facility: CLINIC | Age: 54
End: 2024-04-03

## 2024-04-03 ENCOUNTER — TREATMENT (OUTPATIENT)
Dept: PHYSICAL THERAPY | Facility: CLINIC | Age: 54
End: 2024-04-03
Payer: COMMERCIAL

## 2024-04-03 DIAGNOSIS — D49.2 LUMBAR SPINE TUMOR: Primary | ICD-10-CM

## 2024-04-03 DIAGNOSIS — R26.2 DIFFICULTY WALKING: ICD-10-CM

## 2024-04-03 DIAGNOSIS — D49.2 ODONTOGENIC TUMOR: ICD-10-CM

## 2024-04-03 DIAGNOSIS — R53.1 WEAKNESS: ICD-10-CM

## 2024-04-03 DIAGNOSIS — R26.2 DIFFICULTY IN WALKING INVOLVING LOWER LEG JOINT: ICD-10-CM

## 2024-04-03 DIAGNOSIS — M48.062 NEUROGENIC CLAUDICATION DUE TO LUMBAR SPINAL STENOSIS: ICD-10-CM

## 2024-04-03 PROCEDURE — 97110 THERAPEUTIC EXERCISES: CPT | Mod: GP | Performed by: PHYSICAL THERAPIST

## 2024-04-03 PROCEDURE — RXMED WILLOW AMBULATORY MEDICATION CHARGE

## 2024-04-03 NOTE — PROGRESS NOTES
Physical Therapy Daily Treatment Note    Patient Name: Altaf Parsons  MRN Number: [id number]  Initial PT Examination Date: 8/10/23  Referring Clinician: Dr. Rodriguez    Today's Date: 4/3/2024  Time Calculation  Start Time: 1002  Stop Time: 1044  Time Calculation (min): 42 min    Insurance  Visit Number: 15  Approved Number of Visits: 30/ year  Certification Period: N/A    Precautions  History of spinal cancer  Moderate fall risk    Problem List  1. Lumbar spine tumor        2. Difficulty in walking involving lower leg joint  Follow Up In Physical Therapy      3. Odontogenic tumor  Follow Up In Physical Therapy      4. Neurogenic claudication due to lumbar spinal stenosis  Follow Up In Physical Therapy      5. Weakness        6. Difficulty walking          Subjective  Last Friday and then Saturday he fell. The first time he fell going up the stairs, went backwards, legs underneath him, he was carrying some food. He slipped on the second fall, his legs went underneath him again. Right knee is doing pretty good, pretty well healed. His     Treatment  Therapeutic Exercise    - knee inspection    - palpation, left lateral hamstring tenderness    - denies tenderness with palpation to the medial and lateral joint space bilaterally    - active left hamstring stretch causes concordant posterior knee/ thigh soreness   - hamstring set 5x30 sec, 30 second rest   - bike warm up, 8 minutes, L6.5   - single leg stance    - education on slight knee bend    - mini squats   - standing hip flexor stretch    - some back soreness/ pain   - supine hip flexor stretch off edge of table 3x1 min   - utilize brace until knee feels better   - avoid wearing sandals at home    Assessment  Patient sustained a slight setback as a result of several falls over the last week, which seem to be preventable based on patient decisions and environment.  Significant fatigue noted during single-leg stance, especially with knee bent.    Plan  Adjust home  Speech therapy at bedside.   program in next few sessions.    Home Program  Access Code: [email or web address]  URL: [email or web address]  Date: 10/04/2023  Prepared by: Geovani Dominguez    Exercises  - Hooklying Single Knee to Chest Stretch  - 1 x daily - 7 x weekly - 10 reps  - Supine Lower Trunk Rotation with PLB  - 1 x daily - 7 x weekly - 10 reps  - Supine Piriformis Stretch with Foot on Ground  - 1 x daily - 7 x weekly - 3 reps - 30 sec hold  - Seated Hamstring Stretch  - 1 x daily - 7 x weekly - 3 reps - 20 sec hold  - Sit to Stand Without Arm Support  - 1 x daily - 7 x weekly - 2 sets - 10 reps  - Standing Near Stance in Corner with Eyes Closed  - 1 x daily - 7 x weekly - 3 reps - 1 min hold  - Prone Quadriceps Stretch with Strap  - 1 x daily - 7 x weekly - 3 reps - 30 sec hold  -----------------------------------------------------------------------------------------------------------------  Access Code: [email or web address]  URL: [email or web address]  Date: 10/12/2023  Prepared by: Geovani Dominguez    Exercises  - Hooklying Single Knee to Chest Stretch  - 1 x daily - 7 x weekly - 10 reps  - Supine Lower Trunk Rotation with PLB  - 1 x daily - 7 x weekly - 10 reps  - Supine Piriformis Stretch with Foot on Ground  - 1 x daily - 7 x weekly - 3 reps - 30 sec hold  - Seated Hamstring Stretch  - 1 x daily - 7 x weekly - 3 reps - 20 sec hold  - Prone Quadriceps Stretch with Strap  - 2 x weekly - 3 reps - 30 sec hold  - Sit to Stand Without Arm Support  - 2 x weekly - 2 sets - 10 reps  - Standing Near Stance in Corner with Eyes Closed  - 2 x weekly - 2 reps - 1 min hold  - Tandem Stance in Corner  - 2 x weekly - 2 sets - 1 min hold  - Corner Balance Feet Together: Eyes Closed With Head Turns  - 2 x weekly - 2 sets - 1 min hold  - Standing March with Counter Support  - 2 x weekly - 2 sets - 1 min hold  ------------------------------------------------------------------------------------------------------------------  Access Code:  [email or web address]  URL: [email or web address]  Date: 11/30/2023  Prepared by: Geovani Dominguez    Exercises  - Forward Fold with Feet Apart and Bent Legs  - 1 x daily - 3 x weekly - 10 reps  - Hooklying Single Knee to Chest Stretch  - 1 x daily - 7 x weekly - 10 reps  - Supine Lower Trunk Rotation with PLB  - 1 x daily - 7 x weekly - 10 reps  - Supine Piriformis Stretch with Foot on Ground  - 1 x daily - 7 x weekly - 3 reps - 30 sec hold  - Seated Hamstring Stretch  - 1 x daily - 7 x weekly - 3 reps - 20 sec hold  - Prone Quadriceps Stretch with Strap  - 7 x weekly - 3 reps - 30 sec hold  - Clamshell with Resistance  - 3 x weekly - 2-3 sets - 10 reps  - Single Leg Bridge  - 3 x weekly - 2-3 sets - 10 reps  - Bird Dog  - 3 x weekly - 2-3 sets - 10 reps  -------------------------------------------------------------------------------------------------------------------------  Access Code: [email or web address]  URL: [email or web address]  Date: 02/16/2024  Prepared by: Geovani Dominguez    Exercises  - Forward Fold with Feet Apart and Bent Legs  - 1 x daily - 3 x weekly - 10 reps  - Hooklying Single Knee to Chest Stretch  - 1 x daily - 7 x weekly - 10 reps  - Supine Lower Trunk Rotation with PLB  - 1 x daily - 7 x weekly - 10 reps  - Supine Piriformis Stretch with Foot on Ground  - 1 x daily - 7 x weekly - 3 reps - 30 sec hold  - Seated Hamstring Stretch  - 1 x daily - 7 x weekly - 3 reps - 20 sec hold  - Prone Quadriceps Stretch with Strap  - 7 x weekly - 3 reps - 30 sec hold  - Clamshell with Resistance  - 3 x weekly - 2-3 sets - 10 reps  - Single Leg Bridge  - 3 x weekly - 2-3 sets - 10 reps  - Bird Dog  - 3 x weekly - 2-3 sets - 10 reps  - Side Stepping with Resistance at Thighs  - 3 x weekly - 3 sets - 10 reps  - Standing Hip Abduction with Resistance at Thighs  - 3 x weekly - 3 sets - 10 reps    Care Plan  Active       PT Problem       PT Goal 1       Expected End:  02/09/24       1) Modified  return to prior level of function to allow continued home independent capability.  2) Reduce back pain levels 4-5/10 maximum in the last week for improvements in quality of life and ability to sleep.   3) Increase bilateral hip strength testing greater than or equal to 4/5 for improvements in low back/ pelvic stability when standing and walking for extended periods of time.   4) Patient to demonstrates capability of walking for 10 minutes with assistive device for integration into the community.  5) Increase LEFS outcome measure to greater than or equal to 60 for meaningful and clinical improvements in patients condition.

## 2024-04-04 ENCOUNTER — PHARMACY VISIT (OUTPATIENT)
Dept: PHARMACY | Facility: CLINIC | Age: 54
End: 2024-04-04
Payer: COMMERCIAL

## 2024-04-10 ENCOUNTER — TREATMENT (OUTPATIENT)
Dept: PHYSICAL THERAPY | Facility: CLINIC | Age: 54
End: 2024-04-10
Payer: COMMERCIAL

## 2024-04-10 DIAGNOSIS — R53.1 WEAKNESS: ICD-10-CM

## 2024-04-10 DIAGNOSIS — R26.2 DIFFICULTY IN WALKING INVOLVING LOWER LEG JOINT: ICD-10-CM

## 2024-04-10 DIAGNOSIS — R26.2 DIFFICULTY WALKING: ICD-10-CM

## 2024-04-10 DIAGNOSIS — D49.2 LUMBAR SPINE TUMOR: Primary | ICD-10-CM

## 2024-04-10 DIAGNOSIS — M48.062 NEUROGENIC CLAUDICATION DUE TO LUMBAR SPINAL STENOSIS: ICD-10-CM

## 2024-04-10 DIAGNOSIS — D49.2 ODONTOGENIC TUMOR: ICD-10-CM

## 2024-04-10 PROCEDURE — 97110 THERAPEUTIC EXERCISES: CPT | Mod: GP | Performed by: PHYSICAL THERAPIST

## 2024-04-10 NOTE — PROGRESS NOTES
Physical Therapy Daily Treatment Note    Patient Name: Altaf Parsons  MRN Number: [id number]  Initial PT Examination Date: 8/10/23  Referring Clinician: Dr. Rodriguez    Today's Date: 4/10/2024  Time Calculation  Start Time: 0902  Stop Time: 0949  Time Calculation (min): 47 min    Insurance  Visit Number: 15  Approved Number of Visits: 30/ year  Certification Period: N/A    Precautions  History of spinal cancer  Moderate fall risk    Problem List  1. Lumbar spine tumor        2. Difficulty in walking involving lower leg joint  Follow Up In Physical Therapy      3. Odontogenic tumor  Follow Up In Physical Therapy      4. Neurogenic claudication due to lumbar spinal stenosis  Follow Up In Physical Therapy      5. Weakness        6. Difficulty walking          Subjective  Knee still feels like it is giving out on him. He left knee almost gave out on him when getting off the elevator. It is the back of his leg that gives out on him. He is not really having pain anymore, it is mostly the weakness. All of a sudden he lost strength in his left leg. Feels like the posterior part of his left leg is better, not really having pain. Exercises at home are going well. Doing the bike at home 15-20 minutes. Did stop doing the leg machines in the gym recently, after the falls that he had.     Treatment  Therapeutic Exercise    - bike, 6 min   - hamstring curls, single LE 12 pounds, 3x10   - knee extension, 12 pounds 3x10 on RLE, 3x (7,7,8) on LLE   - checked hip flexor strength, >3/5   - supine hip flexor stretch off edge of table 3x1 min   - physioball single knee to chest, support from PT for leg stability    - 2x7   - verbally discussed HEP    Assessment  Slow deliberate walk and slight unsteadiness after significant fatigue on knee extension machine. Severe hip flexor weakness evident during double knee to chest.     Plan  Adjust home program in next few sessions.    Home Program  Access Code: [email or web address]  URL:  [email or web address]  Date: 10/04/2023  Prepared by: Geovani Dominguez    Exercises  - Hooklying Single Knee to Chest Stretch  - 1 x daily - 7 x weekly - 10 reps  - Supine Lower Trunk Rotation with PLB  - 1 x daily - 7 x weekly - 10 reps  - Supine Piriformis Stretch with Foot on Ground  - 1 x daily - 7 x weekly - 3 reps - 30 sec hold  - Seated Hamstring Stretch  - 1 x daily - 7 x weekly - 3 reps - 20 sec hold  - Sit to Stand Without Arm Support  - 1 x daily - 7 x weekly - 2 sets - 10 reps  - Standing Near Stance in Corner with Eyes Closed  - 1 x daily - 7 x weekly - 3 reps - 1 min hold  - Prone Quadriceps Stretch with Strap  - 1 x daily - 7 x weekly - 3 reps - 30 sec hold  -----------------------------------------------------------------------------------------------------------------  Access Code: [email or web address]  URL: [email or web address]  Date: 10/12/2023  Prepared by: Geovani Dominguez    Exercises  - Hooklying Single Knee to Chest Stretch  - 1 x daily - 7 x weekly - 10 reps  - Supine Lower Trunk Rotation with PLB  - 1 x daily - 7 x weekly - 10 reps  - Supine Piriformis Stretch with Foot on Ground  - 1 x daily - 7 x weekly - 3 reps - 30 sec hold  - Seated Hamstring Stretch  - 1 x daily - 7 x weekly - 3 reps - 20 sec hold  - Prone Quadriceps Stretch with Strap  - 2 x weekly - 3 reps - 30 sec hold  - Sit to Stand Without Arm Support  - 2 x weekly - 2 sets - 10 reps  - Standing Near Stance in Corner with Eyes Closed  - 2 x weekly - 2 reps - 1 min hold  - Tandem Stance in Corner  - 2 x weekly - 2 sets - 1 min hold  - Corner Balance Feet Together: Eyes Closed With Head Turns  - 2 x weekly - 2 sets - 1 min hold  - Standing March with Counter Support  - 2 x weekly - 2 sets - 1 min hold  ------------------------------------------------------------------------------------------------------------------  Access Code: [email or web address]  URL: [email or web address]  Date:  11/30/2023  Prepared by: Geovani Dominguez    Exercises  - Forward Fold with Feet Apart and Bent Legs  - 1 x daily - 3 x weekly - 10 reps  - Hooklying Single Knee to Chest Stretch  - 1 x daily - 7 x weekly - 10 reps  - Supine Lower Trunk Rotation with PLB  - 1 x daily - 7 x weekly - 10 reps  - Supine Piriformis Stretch with Foot on Ground  - 1 x daily - 7 x weekly - 3 reps - 30 sec hold  - Seated Hamstring Stretch  - 1 x daily - 7 x weekly - 3 reps - 20 sec hold  - Prone Quadriceps Stretch with Strap  - 7 x weekly - 3 reps - 30 sec hold  - Clamshell with Resistance  - 3 x weekly - 2-3 sets - 10 reps  - Single Leg Bridge  - 3 x weekly - 2-3 sets - 10 reps  - Bird Dog  - 3 x weekly - 2-3 sets - 10 reps  -------------------------------------------------------------------------------------------------------------------------  Access Code: [email or web address]  URL: [email or web address]  Date: 02/16/2024  Prepared by: Geovani Dominguez    Exercises  - Forward Fold with Feet Apart and Bent Legs  - 1 x daily - 3 x weekly - 10 reps  - Hooklying Single Knee to Chest Stretch  - 1 x daily - 7 x weekly - 10 reps  - Supine Lower Trunk Rotation with PLB  - 1 x daily - 7 x weekly - 10 reps  - Supine Piriformis Stretch with Foot on Ground  - 1 x daily - 7 x weekly - 3 reps - 30 sec hold  - Seated Hamstring Stretch  - 1 x daily - 7 x weekly - 3 reps - 20 sec hold  - Prone Quadriceps Stretch with Strap  - 7 x weekly - 3 reps - 30 sec hold  - Clamshell with Resistance  - 3 x weekly - 2-3 sets - 10 reps  - Single Leg Bridge  - 3 x weekly - 2-3 sets - 10 reps  - Bird Dog  - 3 x weekly - 2-3 sets - 10 reps  - Side Stepping with Resistance at Thighs  - 3 x weekly - 3 sets - 10 reps  - Standing Hip Abduction with Resistance at Thighs  - 3 x weekly - 3 sets - 10 reps    Care Plan  Active       PT Problem       PT Goal 1       Expected End:  02/09/24       1) Modified return to prior level of function to allow continued home independent  capability.  2) Reduce back pain levels 4-5/10 maximum in the last week for improvements in quality of life and ability to sleep.   3) Increase bilateral hip strength testing greater than or equal to 4/5 for improvements in low back/ pelvic stability when standing and walking for extended periods of time.   4) Patient to demonstrates capability of walking for 10 minutes with assistive device for integration into the community.  5) Increase LEFS outcome measure to greater than or equal to 60 for meaningful and clinical improvements in patients condition.

## 2024-04-18 ENCOUNTER — OFFICE VISIT (OUTPATIENT)
Dept: GASTROENTEROLOGY | Facility: EXTERNAL LOCATION | Age: 54
End: 2024-04-18
Payer: COMMERCIAL

## 2024-04-18 DIAGNOSIS — Z12.11 ENCOUNTER FOR SCREENING FOR MALIGNANT NEOPLASM OF COLON: ICD-10-CM

## 2024-04-18 DIAGNOSIS — K64.8 INTERNAL HEMORRHOIDS: Primary | ICD-10-CM

## 2024-04-18 DIAGNOSIS — K57.30 DIVERTICULOSIS OF LARGE INTESTINE WITHOUT DIVERTICULITIS: ICD-10-CM

## 2024-04-18 PROCEDURE — 45378 DIAGNOSTIC COLONOSCOPY: CPT | Performed by: INTERNAL MEDICINE

## 2024-04-25 ENCOUNTER — TELEPHONE (OUTPATIENT)
Dept: ADMISSION | Facility: HOSPITAL | Age: 54
End: 2024-04-25
Payer: COMMERCIAL

## 2024-04-25 NOTE — TELEPHONE ENCOUNTER
Patient is calling in to request a refill of oxycodone 5 mg to be sent in to the [facility] pharmacy on file.   Next MD FUV is 05/08/24.

## 2024-04-26 ENCOUNTER — HOSPITAL ENCOUNTER (OUTPATIENT)
Facility: HOSPITAL | Age: 54
Setting detail: OBSERVATION
Discharge: HOME | DRG: 544 | End: 2024-04-28
Attending: STUDENT IN AN ORGANIZED HEALTH CARE EDUCATION/TRAINING PROGRAM | Admitting: STUDENT IN AN ORGANIZED HEALTH CARE EDUCATION/TRAINING PROGRAM
Payer: COMMERCIAL

## 2024-04-26 ENCOUNTER — APPOINTMENT (OUTPATIENT)
Dept: RADIOLOGY | Facility: HOSPITAL | Age: 54
DRG: 544 | End: 2024-04-26
Payer: COMMERCIAL

## 2024-04-26 DIAGNOSIS — M48.062 LUMBAR STENOSIS WITH NEUROGENIC CLAUDICATION: ICD-10-CM

## 2024-04-26 DIAGNOSIS — C37 THYMOMA, MALIGNANT (MULTI): Primary | ICD-10-CM

## 2024-04-26 DIAGNOSIS — G99.2 MYELOPATHY CONCURRENT WITH AND DUE TO SPINAL STENOSIS OF THORACIC REGION (MULTI): ICD-10-CM

## 2024-04-26 DIAGNOSIS — M48.04 MYELOPATHY CONCURRENT WITH AND DUE TO SPINAL STENOSIS OF THORACIC REGION (MULTI): ICD-10-CM

## 2024-04-26 DIAGNOSIS — C37 THYMUS CANCER (MULTI): ICD-10-CM

## 2024-04-26 DIAGNOSIS — C37 MALIGNANT THYMOMA (MULTI): ICD-10-CM

## 2024-04-26 DIAGNOSIS — D49.2 THORACIC SPINE TUMOR: ICD-10-CM

## 2024-04-26 LAB
ABO GROUP (TYPE) IN BLOOD: NORMAL
ALBUMIN SERPL BCP-MCNC: 4.6 G/DL (ref 3.4–5)
ALP SERPL-CCNC: 54 U/L (ref 33–120)
ALT SERPL W P-5'-P-CCNC: 23 U/L (ref 10–52)
ANION GAP SERPL CALC-SCNC: 14 MMOL/L (ref 10–20)
ANTIBODY SCREEN: NORMAL
APTT PPP: 56 SECONDS (ref 27–38)
AST SERPL W P-5'-P-CCNC: 33 U/L (ref 9–39)
BASOPHILS # BLD AUTO: 0.03 X10*3/UL (ref 0–0.1)
BASOPHILS NFR BLD AUTO: 0.7 %
BILIRUB SERPL-MCNC: 0.4 MG/DL (ref 0–1.2)
BUN SERPL-MCNC: 12 MG/DL (ref 6–23)
CALCIUM SERPL-MCNC: 9.9 MG/DL (ref 8.6–10.6)
CHLORIDE SERPL-SCNC: 100 MMOL/L (ref 98–107)
CO2 SERPL-SCNC: 27 MMOL/L (ref 21–32)
CREAT SERPL-MCNC: 0.93 MG/DL (ref 0.5–1.3)
EGFRCR SERPLBLD CKD-EPI 2021: >90 ML/MIN/1.73M*2
EOSINOPHIL # BLD AUTO: 0.65 X10*3/UL (ref 0–0.7)
EOSINOPHIL NFR BLD AUTO: 15 %
ERYTHROCYTE [DISTWIDTH] IN BLOOD BY AUTOMATED COUNT: 15.8 % (ref 11.5–14.5)
GLUCOSE SERPL-MCNC: 71 MG/DL (ref 74–99)
HCT VFR BLD AUTO: 42.4 % (ref 41–52)
HGB BLD-MCNC: 14.2 G/DL (ref 13.5–17.5)
IMM GRANULOCYTES # BLD AUTO: 0.02 X10*3/UL (ref 0–0.7)
IMM GRANULOCYTES NFR BLD AUTO: 0.5 % (ref 0–0.9)
INR PPP: 1 (ref 0.9–1.1)
LYMPHOCYTES # BLD AUTO: 1.17 X10*3/UL (ref 1.2–4.8)
LYMPHOCYTES NFR BLD AUTO: 27 %
MCH RBC QN AUTO: 25.1 PG (ref 26–34)
MCHC RBC AUTO-ENTMCNC: 33.5 G/DL (ref 32–36)
MCV RBC AUTO: 75 FL (ref 80–100)
MONOCYTES # BLD AUTO: 0.49 X10*3/UL (ref 0.1–1)
MONOCYTES NFR BLD AUTO: 11.3 %
NEUTROPHILS # BLD AUTO: 1.97 X10*3/UL (ref 1.2–7.7)
NEUTROPHILS NFR BLD AUTO: 45.5 %
NRBC BLD-RTO: 0 /100 WBCS (ref 0–0)
PLATELET # BLD AUTO: 320 X10*3/UL (ref 150–450)
POTASSIUM SERPL-SCNC: 3.6 MMOL/L (ref 3.5–5.3)
PROT SERPL-MCNC: 8.3 G/DL (ref 6.4–8.2)
PROTHROMBIN TIME: 11.6 SECONDS (ref 9.8–12.8)
RBC # BLD AUTO: 5.65 X10*6/UL (ref 4.5–5.9)
RH FACTOR (ANTIGEN D): NORMAL
SODIUM SERPL-SCNC: 137 MMOL/L (ref 136–145)
WBC # BLD AUTO: 4.3 X10*3/UL (ref 4.4–11.3)

## 2024-04-26 PROCEDURE — 72158 MRI LUMBAR SPINE W/O & W/DYE: CPT | Mod: 52

## 2024-04-26 PROCEDURE — 85610 PROTHROMBIN TIME: CPT | Performed by: STUDENT IN AN ORGANIZED HEALTH CARE EDUCATION/TRAINING PROGRAM

## 2024-04-26 PROCEDURE — 36415 COLL VENOUS BLD VENIPUNCTURE: CPT | Performed by: STUDENT IN AN ORGANIZED HEALTH CARE EDUCATION/TRAINING PROGRAM

## 2024-04-26 PROCEDURE — 85025 COMPLETE CBC W/AUTO DIFF WBC: CPT | Performed by: STUDENT IN AN ORGANIZED HEALTH CARE EDUCATION/TRAINING PROGRAM

## 2024-04-26 PROCEDURE — 72157 MRI CHEST SPINE W/O & W/DYE: CPT

## 2024-04-26 PROCEDURE — 96375 TX/PRO/DX INJ NEW DRUG ADDON: CPT

## 2024-04-26 PROCEDURE — 96374 THER/PROPH/DIAG INJ IV PUSH: CPT

## 2024-04-26 PROCEDURE — 99285 EMERGENCY DEPT VISIT HI MDM: CPT | Performed by: STUDENT IN AN ORGANIZED HEALTH CARE EDUCATION/TRAINING PROGRAM

## 2024-04-26 PROCEDURE — 72156 MRI NECK SPINE W/O & W/DYE: CPT

## 2024-04-26 PROCEDURE — 86901 BLOOD TYPING SEROLOGIC RH(D): CPT | Performed by: STUDENT IN AN ORGANIZED HEALTH CARE EDUCATION/TRAINING PROGRAM

## 2024-04-26 PROCEDURE — 2550000001 HC RX 255 CONTRASTS: Performed by: STUDENT IN AN ORGANIZED HEALTH CARE EDUCATION/TRAINING PROGRAM

## 2024-04-26 PROCEDURE — 99285 EMERGENCY DEPT VISIT HI MDM: CPT | Mod: 25

## 2024-04-26 PROCEDURE — 80053 COMPREHEN METABOLIC PANEL: CPT | Performed by: STUDENT IN AN ORGANIZED HEALTH CARE EDUCATION/TRAINING PROGRAM

## 2024-04-26 PROCEDURE — 2500000004 HC RX 250 GENERAL PHARMACY W/ HCPCS (ALT 636 FOR OP/ED)

## 2024-04-26 PROCEDURE — A9575 INJ GADOTERATE MEGLUMI 0.1ML: HCPCS | Performed by: STUDENT IN AN ORGANIZED HEALTH CARE EDUCATION/TRAINING PROGRAM

## 2024-04-26 RX ORDER — HYDROMORPHONE HYDROCHLORIDE 1 MG/ML
0.5 INJECTION, SOLUTION INTRAMUSCULAR; INTRAVENOUS; SUBCUTANEOUS ONCE
Status: COMPLETED | OUTPATIENT
Start: 2024-04-26 | End: 2024-04-26

## 2024-04-26 RX ORDER — OXYCODONE HYDROCHLORIDE 5 MG/1
5 TABLET ORAL EVERY 4 HOURS PRN
Qty: 90 TABLET | Refills: 0 | Status: SHIPPED | OUTPATIENT
Start: 2024-04-26 | End: 2024-04-28 | Stop reason: HOSPADM

## 2024-04-26 RX ORDER — MIDAZOLAM HYDROCHLORIDE 1 MG/ML
2 INJECTION INTRAMUSCULAR; INTRAVENOUS AS NEEDED
Status: COMPLETED | OUTPATIENT
Start: 2024-04-26 | End: 2024-04-26

## 2024-04-26 RX ORDER — GADOTERATE MEGLUMINE 376.9 MG/ML
20 INJECTION INTRAVENOUS
Status: COMPLETED | OUTPATIENT
Start: 2024-04-26 | End: 2024-04-26

## 2024-04-26 RX ADMIN — MIDAZOLAM HYDROCHLORIDE 2 MG: 1 INJECTION, SOLUTION INTRAMUSCULAR; INTRAVENOUS at 21:55

## 2024-04-26 RX ADMIN — HYDROMORPHONE HYDROCHLORIDE 0.5 MG: 1 INJECTION, SOLUTION INTRAMUSCULAR; INTRAVENOUS; SUBCUTANEOUS at 22:50

## 2024-04-26 RX ADMIN — GADOTERATE MEGLUMINE 20 ML: 376.9 INJECTION INTRAVENOUS at 23:56

## 2024-04-26 ASSESSMENT — PAIN - FUNCTIONAL ASSESSMENT
PAIN_FUNCTIONAL_ASSESSMENT: 0-10
PAIN_FUNCTIONAL_ASSESSMENT: 0-10

## 2024-04-26 ASSESSMENT — COLUMBIA-SUICIDE SEVERITY RATING SCALE - C-SSRS
1. IN THE PAST MONTH, HAVE YOU WISHED YOU WERE DEAD OR WISHED YOU COULD GO TO SLEEP AND NOT WAKE UP?: NO
6. HAVE YOU EVER DONE ANYTHING, STARTED TO DO ANYTHING, OR PREPARED TO DO ANYTHING TO END YOUR LIFE?: NO
2. HAVE YOU ACTUALLY HAD ANY THOUGHTS OF KILLING YOURSELF?: NO

## 2024-04-26 ASSESSMENT — PAIN SCALES - GENERAL
PAINLEVEL_OUTOF10: 5 - MODERATE PAIN
PAINLEVEL_OUTOF10: 5 - MODERATE PAIN

## 2024-04-27 PROBLEM — C37: Status: ACTIVE | Noted: 2024-04-27

## 2024-04-27 LAB
GLUCOSE BLD MANUAL STRIP-MCNC: 85 MG/DL (ref 74–99)
SARS-COV-2 RNA RESP QL NAA+PROBE: NOT DETECTED

## 2024-04-27 PROCEDURE — G0378 HOSPITAL OBSERVATION PER HR: HCPCS

## 2024-04-27 PROCEDURE — 72158 MRI LUMBAR SPINE W/O & W/DYE: CPT | Mod: REDUCED SERVICES | Performed by: RADIOLOGY

## 2024-04-27 PROCEDURE — 72157 MRI CHEST SPINE W/O & W/DYE: CPT | Performed by: RADIOLOGY

## 2024-04-27 PROCEDURE — 2500000006 HC RX 250 W HCPCS SELF ADMINISTERED DRUGS (ALT 637 FOR ALL PAYERS)

## 2024-04-27 PROCEDURE — 72156 MRI NECK SPINE W/O & W/DYE: CPT | Performed by: RADIOLOGY

## 2024-04-27 PROCEDURE — 2500000001 HC RX 250 WO HCPCS SELF ADMINISTERED DRUGS (ALT 637 FOR MEDICARE OP)

## 2024-04-27 PROCEDURE — 1170000001 HC PRIVATE ONCOLOGY ROOM DAILY

## 2024-04-27 PROCEDURE — 87635 SARS-COV-2 COVID-19 AMP PRB: CPT

## 2024-04-27 PROCEDURE — 82947 ASSAY GLUCOSE BLOOD QUANT: CPT

## 2024-04-27 PROCEDURE — 2500000004 HC RX 250 GENERAL PHARMACY W/ HCPCS (ALT 636 FOR OP/ED)

## 2024-04-27 PROCEDURE — 99222 1ST HOSP IP/OBS MODERATE 55: CPT

## 2024-04-27 RX ORDER — OXYCODONE HYDROCHLORIDE 5 MG/1
5 TABLET ORAL EVERY 4 HOURS PRN
Status: DISCONTINUED | OUTPATIENT
Start: 2024-04-27 | End: 2024-04-28 | Stop reason: HOSPADM

## 2024-04-27 RX ORDER — POLYETHYLENE GLYCOL 3350 17 G/17G
17 POWDER, FOR SOLUTION ORAL DAILY PRN
Status: DISCONTINUED | OUTPATIENT
Start: 2024-04-27 | End: 2024-04-28 | Stop reason: HOSPADM

## 2024-04-27 RX ORDER — GABAPENTIN 300 MG/1
300 CAPSULE ORAL 3 TIMES DAILY
Status: DISCONTINUED | OUTPATIENT
Start: 2024-04-27 | End: 2024-04-28 | Stop reason: HOSPADM

## 2024-04-27 RX ORDER — TALC
3 POWDER (GRAM) TOPICAL NIGHTLY PRN
Status: DISCONTINUED | OUTPATIENT
Start: 2024-04-27 | End: 2024-04-28 | Stop reason: HOSPADM

## 2024-04-27 RX ORDER — EVEROLIMUS 5 MG/1
10 TABLET ORAL DAILY
Status: DISCONTINUED | OUTPATIENT
Start: 2024-04-27 | End: 2024-04-28 | Stop reason: HOSPADM

## 2024-04-27 RX ORDER — CHOLECALCIFEROL (VITAMIN D3) 25 MCG
1000 TABLET ORAL DAILY
Status: DISCONTINUED | OUTPATIENT
Start: 2024-04-27 | End: 2024-04-28 | Stop reason: HOSPADM

## 2024-04-27 RX ORDER — METHOCARBAMOL 500 MG/1
500 TABLET, FILM COATED ORAL EVERY 6 HOURS SCHEDULED
Status: DISCONTINUED | OUTPATIENT
Start: 2024-04-27 | End: 2024-04-28 | Stop reason: HOSPADM

## 2024-04-27 RX ORDER — MULTIVIT-MIN/IRON FUM/FOLIC AC 7.5 MG-4
1 TABLET ORAL DAILY
Status: DISCONTINUED | OUTPATIENT
Start: 2024-04-27 | End: 2024-04-28 | Stop reason: HOSPADM

## 2024-04-27 RX ORDER — DIPHENHYDRAMINE HCL 25 MG
25 CAPSULE ORAL ONCE
Status: COMPLETED | OUTPATIENT
Start: 2024-04-27 | End: 2024-04-27

## 2024-04-27 RX ORDER — POTASSIUM CHLORIDE 20 MEQ/1
20 TABLET, EXTENDED RELEASE ORAL ONCE
Status: COMPLETED | OUTPATIENT
Start: 2024-04-27 | End: 2024-04-27

## 2024-04-27 RX ORDER — ENOXAPARIN SODIUM 100 MG/ML
40 INJECTION SUBCUTANEOUS EVERY 24 HOURS
Status: DISCONTINUED | OUTPATIENT
Start: 2024-04-27 | End: 2024-04-28 | Stop reason: HOSPADM

## 2024-04-27 RX ORDER — DEXAMETHASONE 4 MG/1
4 TABLET ORAL DAILY
Status: DISCONTINUED | OUTPATIENT
Start: 2024-04-27 | End: 2024-04-28 | Stop reason: HOSPADM

## 2024-04-27 RX ADMIN — METHOCARBAMOL 500 MG: 500 TABLET ORAL at 23:33

## 2024-04-27 RX ADMIN — GABAPENTIN 300 MG: 300 CAPSULE ORAL at 09:42

## 2024-04-27 RX ADMIN — GABAPENTIN 300 MG: 300 CAPSULE ORAL at 14:56

## 2024-04-27 RX ADMIN — POTASSIUM CHLORIDE 20 MEQ: 1500 TABLET, EXTENDED RELEASE ORAL at 04:29

## 2024-04-27 RX ADMIN — GABAPENTIN 300 MG: 300 CAPSULE ORAL at 21:23

## 2024-04-27 RX ADMIN — Medication 1000 UNITS: at 09:42

## 2024-04-27 RX ADMIN — EVEROLIMUS 10 MG: 5 TABLET ORAL at 09:42

## 2024-04-27 RX ADMIN — DEXAMETHASONE 4 MG: 4 TABLET ORAL at 14:56

## 2024-04-27 RX ADMIN — Medication 1 TABLET: at 09:42

## 2024-04-27 RX ADMIN — METHOCARBAMOL 500 MG: 500 TABLET ORAL at 11:38

## 2024-04-27 RX ADMIN — OXYCODONE HYDROCHLORIDE 5 MG: 5 TABLET ORAL at 22:02

## 2024-04-27 RX ADMIN — METHOCARBAMOL 500 MG: 500 TABLET ORAL at 18:02

## 2024-04-27 RX ADMIN — HYDROCHLOROTHIAZIDE: 25 TABLET ORAL at 09:47

## 2024-04-27 RX ADMIN — DIPHENHYDRAMINE HYDROCHLORIDE 25 MG: 25 CAPSULE ORAL at 22:19

## 2024-04-27 RX ADMIN — ENOXAPARIN SODIUM 40 MG: 100 INJECTION SUBCUTANEOUS at 09:42

## 2024-04-27 SDOH — SOCIAL STABILITY: SOCIAL INSECURITY: DOES ANYONE TRY TO KEEP YOU FROM HAVING/CONTACTING OTHER FRIENDS OR DOING THINGS OUTSIDE YOUR HOME?: NO

## 2024-04-27 SDOH — SOCIAL STABILITY: SOCIAL INSECURITY: HAVE YOU HAD ANY THOUGHTS OF HARMING ANYONE ELSE?: NO

## 2024-04-27 SDOH — SOCIAL STABILITY: SOCIAL INSECURITY: DO YOU FEEL ANYONE HAS EXPLOITED OR TAKEN ADVANTAGE OF YOU FINANCIALLY OR OF YOUR PERSONAL PROPERTY?: NO

## 2024-04-27 SDOH — SOCIAL STABILITY: SOCIAL INSECURITY: ABUSE: ADULT

## 2024-04-27 SDOH — SOCIAL STABILITY: SOCIAL INSECURITY: HAVE YOU HAD THOUGHTS OF HARMING ANYONE ELSE?: NO

## 2024-04-27 SDOH — SOCIAL STABILITY: SOCIAL INSECURITY: WERE YOU ABLE TO COMPLETE ALL THE BEHAVIORAL HEALTH SCREENINGS?: YES

## 2024-04-27 SDOH — SOCIAL STABILITY: SOCIAL INSECURITY: HAS ANYONE EVER THREATENED TO HURT YOUR FAMILY OR YOUR PETS?: NO

## 2024-04-27 SDOH — SOCIAL STABILITY: SOCIAL INSECURITY: DO YOU FEEL UNSAFE GOING BACK TO THE PLACE WHERE YOU ARE LIVING?: NO

## 2024-04-27 SDOH — SOCIAL STABILITY: SOCIAL INSECURITY: ARE THERE ANY APPARENT SIGNS OF INJURIES/BEHAVIORS THAT COULD BE RELATED TO ABUSE/NEGLECT?: NO

## 2024-04-27 SDOH — SOCIAL STABILITY: SOCIAL INSECURITY: ARE YOU OR HAVE YOU BEEN THREATENED OR ABUSED PHYSICALLY, EMOTIONALLY, OR SEXUALLY BY ANYONE?: NO

## 2024-04-27 ASSESSMENT — ACTIVITIES OF DAILY LIVING (ADL)
JUDGMENT_ADEQUATE_SAFELY_COMPLETE_DAILY_ACTIVITIES: YES
DRESSING YOURSELF: NEEDS ASSISTANCE
FEEDING YOURSELF: INDEPENDENT
JUDGMENT_ADEQUATE_SAFELY_COMPLETE_DAILY_ACTIVITIES: YES
TOILETING: NEEDS ASSISTANCE
GROOMING: NEEDS ASSISTANCE
ASSISTIVE_DEVICE: CANE;WALKER
ASSISTIVE_DEVICE: CANE;COMMODE;WALKER
ADEQUATE_TO_COMPLETE_ADL: YES
HEARING - RIGHT EAR: FUNCTIONAL
BATHING: NEEDS ASSISTANCE
WALKS IN HOME: NEEDS ASSISTANCE
WALKS IN HOME: NEEDS ASSISTANCE
HEARING - LEFT EAR: FUNCTIONAL
GROOMING: INDEPENDENT
BATHING: NEEDS ASSISTANCE
PATIENT'S MEMORY ADEQUATE TO SAFELY COMPLETE DAILY ACTIVITIES?: YES
FEEDING YOURSELF: INDEPENDENT
PATIENT'S MEMORY ADEQUATE TO SAFELY COMPLETE DAILY ACTIVITIES?: YES
DRESSING YOURSELF: INDEPENDENT
LACK_OF_TRANSPORTATION: PATIENT DECLINED
ADEQUATE_TO_COMPLETE_ADL: YES
HEARING - RIGHT EAR: FUNCTIONAL
TOILETING: NEEDS ASSISTANCE
HEARING - LEFT EAR: FUNCTIONAL

## 2024-04-27 ASSESSMENT — PAIN DESCRIPTION - ORIENTATION: ORIENTATION: LEFT

## 2024-04-27 ASSESSMENT — PAIN DESCRIPTION - LOCATION: LOCATION: LEG

## 2024-04-27 ASSESSMENT — COGNITIVE AND FUNCTIONAL STATUS - GENERAL
CLIMB 3 TO 5 STEPS WITH RAILING: A LITTLE
STANDING UP FROM CHAIR USING ARMS: A LITTLE
PATIENT BASELINE BEDBOUND: YES
WALKING IN HOSPITAL ROOM: A LITTLE
WALKING IN HOSPITAL ROOM: A LITTLE
TURNING FROM BACK TO SIDE WHILE IN FLAT BAD: A LITTLE
MOBILITY SCORE: 19
CLIMB 3 TO 5 STEPS WITH RAILING: A LITTLE
DAILY ACTIVITIY SCORE: 23
STANDING UP FROM CHAIR USING ARMS: A LITTLE
TOILETING: A LITTLE
MOBILITY SCORE: 19
TOILETING: A LITTLE
TURNING FROM BACK TO SIDE WHILE IN FLAT BAD: A LITTLE
MOVING TO AND FROM BED TO CHAIR: A LITTLE
DAILY ACTIVITIY SCORE: 23
MOVING TO AND FROM BED TO CHAIR: A LITTLE

## 2024-04-27 ASSESSMENT — LIFESTYLE VARIABLES
HOW OFTEN DO YOU HAVE 6 OR MORE DRINKS ON ONE OCCASION: NEVER
EVER HAD A DRINK FIRST THING IN THE MORNING TO STEADY YOUR NERVES TO GET RID OF A HANGOVER: NO
AUDIT-C TOTAL SCORE: 0
HAVE YOU EVER FELT YOU SHOULD CUT DOWN ON YOUR DRINKING: NO
SKIP TO QUESTIONS 9-10: 1
HOW OFTEN DO YOU HAVE A DRINK CONTAINING ALCOHOL: NEVER
AUDIT-C TOTAL SCORE: 0
TOTAL SCORE: 0
HOW MANY STANDARD DRINKS CONTAINING ALCOHOL DO YOU HAVE ON A TYPICAL DAY: PATIENT DOES NOT DRINK
HAVE PEOPLE ANNOYED YOU BY CRITICIZING YOUR DRINKING: NO
EVER FELT BAD OR GUILTY ABOUT YOUR DRINKING: NO

## 2024-04-27 ASSESSMENT — PAIN SCALES - GENERAL
PAINLEVEL_OUTOF10: 5 - MODERATE PAIN
PAINLEVEL_OUTOF10: 4

## 2024-04-27 ASSESSMENT — PATIENT HEALTH QUESTIONNAIRE - PHQ9
2. FEELING DOWN, DEPRESSED OR HOPELESS: NOT AT ALL
SUM OF ALL RESPONSES TO PHQ9 QUESTIONS 1 & 2: 0
1. LITTLE INTEREST OR PLEASURE IN DOING THINGS: NOT AT ALL

## 2024-04-27 ASSESSMENT — PAIN DESCRIPTION - DESCRIPTORS: DESCRIPTORS: SHARP

## 2024-04-27 ASSESSMENT — PAIN DESCRIPTION - ONSET: ONSET: ONGOING

## 2024-04-27 ASSESSMENT — PAIN DESCRIPTION - PAIN TYPE: TYPE: ACUTE PAIN

## 2024-04-27 ASSESSMENT — PAIN - FUNCTIONAL ASSESSMENT: PAIN_FUNCTIONAL_ASSESSMENT: 0-10

## 2024-04-27 NOTE — CARE PLAN
The patient's goals for the shift include      The clinical goals for the shift include tolerable pain    Problem: Skin  Goal: Decreased wound size/increased tissue granulation at next dressing change  Outcome: Progressing  Goal: Participates in plan/prevention/treatment measures  Outcome: Progressing  Goal: Prevent/manage excess moisture  Outcome: Progressing  Goal: Prevent/minimize sheer/friction injuries  Outcome: Progressing  Goal: Promote/optimize nutrition  Outcome: Progressing  Goal: Promote skin healing  Outcome: Progressing     Problem: Fall/Injury  Goal: Not fall by end of shift  Outcome: Progressing  Goal: Be free from injury by end of the shift  Outcome: Progressing  Goal: Verbalize understanding of personal risk factors for fall in the hospital  Outcome: Progressing  Goal: Verbalize understanding of risk factor reduction measures to prevent injury from fall in the home  Outcome: Progressing  Goal: Use assistive devices by end of the shift  Outcome: Progressing  Goal: Pace activities to prevent fatigue by end of the shift  Outcome: Progressing     Problem: Pain  Goal: Takes deep breaths with improved pain control throughout the shift  Outcome: Progressing  Goal: Turns in bed with improved pain control throughout the shift  Outcome: Progressing  Goal: Walks with improved pain control throughout the shift  Outcome: Progressing  Goal: Performs ADL's with improved pain control throughout shift  Outcome: Progressing  Goal: Participates in PT with improved pain control throughout the shift  Outcome: Progressing  Goal: Free from opioid side effects throughout the shift  Outcome: Progressing  Goal: Free from acute confusion related to pain meds throughout the shift  Outcome: Progressing     Problem: Pain - Adult  Goal: Verbalizes/displays adequate comfort level or baseline comfort level  Outcome: Progressing     Problem: Safety - Adult  Goal: Free from fall injury  Outcome: Progressing     Problem: Discharge  Planning  Goal: Discharge to home or other facility with appropriate resources  Outcome: Progressing     Problem: Chronic Conditions and Co-morbidities  Goal: Patient's chronic conditions and co-morbidity symptoms are monitored and maintained or improved  Outcome: Progressing

## 2024-04-27 NOTE — CONSULTS
Inpatient consult to Neurosurgery  Consult performed by: Iman Bragg MD  Consult ordered by: Edouard Veras MD        Reason For Consult  L hip flexion weakness, h/o spinal mets    History Of Present Illness  Altaf Parsons is a 53 y.o. male with h/o HTN, HLD, hypoT, restrictive lung disease, DALLIN on CPAP, GERD, malignant thymoma (dx 2016) with mets to the lungs s/p total pulmonary decortication, resection of anterior metastatic mass in the thymus, LLL & SINDI wedge resections, chemoradiation ([id number]) mets to the serratus anterior muscle (2019, s/p proton therapy), T12-L1 spine mets (s/p T9-L1 lami & fusion for decompression in 2021 and also s/p proton therapy, with progression, pt declined systemic therapy), s/p T-L spine palliative radiation 09/2021, s/p radiation to T4-6 & L1-L3 in 03/2022, currently on chemo, 4/26 p/w L hip flexion weakness and inability to walk of 3 wks duration, MRI CTL with some new cauda equina enhancement c/f leptomeningeal spread, slight interval increase in L1-L2 lesion, o/w no significant change to last MRI    MRI TL reviewed: Patient  2/8/22 interval worsening of T6, L4-5 mets  6/17/22 stable mets  11/11/22 marked spinal canal stenosis at T9-11 and L1-L2 from tumor extension with new spine mets  3/14/23  interval progression with  severe canal stenosis and T9-10 ventral epidural tumor,   5/3/23 stable extensive mets.  9/1/23 Interval progression  11/15/23 Mixed response to therapy with slightly worsened abnormal marrow signal but mild improvement in the ventral epidural extension.  2/29/23 Epidural tumor within the right neural foramen at L1-L2 appears slightly increased    Last seen by ortho spine 5/10/23 (Al Price), discussed the need for surgery vs continuing with chemotherapy.   Has appointment with Dr Melendez in May 2024.    Takes Toradol and meloxicam as needed for pain.  Otherwise not on any other AC/AP.    Denies change in bowel/bladder dysfunction, radiculopathy, saddle  anesthesia, fever, chills, nausea, vomiting, headache, or change in vision.    Past Medical History  He has a past medical history of Abnormal weight gain (12/08/2014), Hemothorax, Personal history of other diseases of the digestive system (09/10/2013), Personal history of other diseases of the digestive system (03/19/2015), Personal history of other diseases of the nervous system and sense organs (11/24/2020), Personal history of other endocrine, nutritional and metabolic disease, Personal history of other endocrine, nutritional and metabolic disease, Personal history of other specified conditions (03/14/2014), and Strain of unspecified muscle, fascia and tendon at shoulder and upper arm level, right arm, initial encounter (05/20/2014).    Surgical History  He has a past surgical history that includes Knee surgery (09/10/2013); Other surgical history (09/10/2013); MR angio chest w IV contrast (2/9/2016); CT guided percutaneous biopsy muscle (11/13/2019); CT guided percutaneous biopsy muscle (8/13/2020); and CT guided pleura percutaneous biopsy (5/10/2017).     Social History  He reports that he has never smoked. He has never used smokeless tobacco. He reports that he does not currently use alcohol. He reports that he does not currently use drugs.    Family History  No family history on file.     Allergies  Patient has no known allergies.    Review of Systems     Review of systems was reviewed and otherwise negative other than what was listed in the HPI.    Physical Exam  Aox3  RUE D5 / B5 / T5 / HG 5/ IO 5  LUE D5 / B5 / T5 / HG 5/ IO 5  Negative pope    RLE HF5 / KE 5/ DF 5/ PF 5  LLE HF4 / KE 5/ DF 5/ PF 5  L clonus    Last Recorded Vitals  Blood pressure 114/79, pulse 74, temperature 36.8 °C (98.2 °F), temperature source Temporal, resp. rate 16, height 1.829 m (6'), weight 102 kg (225 lb), SpO2 100%.    Relevant Results  Results for orders placed or performed during the hospital encounter of 04/26/24 (from  the past 24 hour(s))   CBC and Auto Differential   Result Value Ref Range    WBC 4.3 (L) 4.4 - 11.3 x10*3/uL    nRBC 0.0 0.0 - 0.0 /100 WBCs    RBC 5.65 4.50 - 5.90 x10*6/uL    Hemoglobin 14.2 13.5 - 17.5 g/dL    Hematocrit 42.4 41.0 - 52.0 %    MCV 75 (L) 80 - 100 fL    MCH 25.1 (L) 26.0 - 34.0 pg    MCHC 33.5 32.0 - 36.0 g/dL    RDW 15.8 (H) 11.5 - 14.5 %    Platelets 320 150 - 450 x10*3/uL    Neutrophils % 45.5 40.0 - 80.0 %    Immature Granulocytes %, Automated 0.5 0.0 - 0.9 %    Lymphocytes % 27.0 13.0 - 44.0 %    Monocytes % 11.3 2.0 - 10.0 %    Eosinophils % 15.0 0.0 - 6.0 %    Basophils % 0.7 0.0 - 2.0 %    Neutrophils Absolute 1.97 1.20 - 7.70 x10*3/uL    Immature Granulocytes Absolute, Automated 0.02 0.00 - 0.70 x10*3/uL    Lymphocytes Absolute 1.17 (L) 1.20 - 4.80 x10*3/uL    Monocytes Absolute 0.49 0.10 - 1.00 x10*3/uL    Eosinophils Absolute 0.65 0.00 - 0.70 x10*3/uL    Basophils Absolute 0.03 0.00 - 0.10 x10*3/uL   Comprehensive metabolic panel   Result Value Ref Range    Glucose 71 (L) 74 - 99 mg/dL    Sodium 137 136 - 145 mmol/L    Potassium 3.6 3.5 - 5.3 mmol/L    Chloride 100 98 - 107 mmol/L    Bicarbonate 27 21 - 32 mmol/L    Anion Gap 14 10 - 20 mmol/L    Urea Nitrogen 12 6 - 23 mg/dL    Creatinine 0.93 0.50 - 1.30 mg/dL    eGFR >90 >60 mL/min/1.73m*2    Calcium 9.9 8.6 - 10.6 mg/dL    Albumin 4.6 3.4 - 5.0 g/dL    Alkaline Phosphatase 54 33 - 120 U/L    Total Protein 8.3 (H) 6.4 - 8.2 g/dL    AST 33 9 - 39 U/L    Bilirubin, Total 0.4 0.0 - 1.2 mg/dL    ALT 23 10 - 52 U/L   Type and Screen   Result Value Ref Range    ABO TYPE O     Rh TYPE POS     ANTIBODY SCREEN NEG    Coagulation Screen   Result Value Ref Range    Protime 11.6 9.8 - 12.8 seconds    INR 1.0 0.9 - 1.1    aPTT 56 (H) 27 - 38 seconds          Assessment/Plan     h/o HTN, HLD, hypoT, restrictive lung disease, DALLIN on CPAP, GERD, malignant thymoma (dx 2016) with mets to the lungs s/p total pulmonary decortication, resection of  anterior metastatic mass in the thymus, LLL & SINDI wedge resections, chemoradiation ([id number]) mets to the serratus anterior muscle (2019, s/p proton therapy), T12-L1 spine mets (s/p T9-L1 lami & fusion for decompression in 2021 and also s/p proton therapy, with progression, pt declined systemic therapy), seen by ortho spine (Dr. Delgado) 8/4/21 and surgery was not recommended; s/p T-L spine palliative radiation 09/2021, s/p radiation to T4-6 & L1-L3 in 03/2022, currently on chemo, 4/26 p/w L hip flexion weakness and inability to walk of 3 wks duration, MRI CTL with some new cauda equina enhancement c/f leptomeningeal spread, slight interval increase in L1-L2 lesion, o/w no significant change to last MRI    Spine exam is only notable for left hip flexion weakness otherwise no features of myelopathy or acute cord compression.  MRI C/T/L from today shows fairly stable epidural mets with stable canal/foraminal stenosis.  Patient was with known to orthopedics spine surgery service as they did his last posterior spinal decompression and fusion in 2021.  Last saw Dr. Jara in May 2023 and has appointment coming up.    Patient's presentation is not acute in nature, therefore, no acute/urgent neurosurgical intervention is needed.      Recs  - No acute/urgent neurosurgical interventions needed. No severe epidural compression. MRI with progression.  - Patient primarily follows with ortho spine, has follow up with Dr. Melendez in May. Can consider reaching out to ortho spine.   - Recommend radiation oncology c/s   - Patient can follow up with ortho spine.    - Thank you for allowing us to participate in the care of this patient.   - Will sign off at this time. Please page [phone] with any questions or concerns.    Note is not final until signed by attending physician.    Iman Bragg MD

## 2024-04-27 NOTE — H&P
History Of Present Illness  Altaf Parsons is a 53 y.o. male presenting with LE weakness.    53-year-old male with malignant thymoma with known spinal mets who presented with worsening left lower extremity weakness. Patient is unable to flex at hip to gravity which is a new finding over the last 3 weeks. This weakness came on somewhat suddenly, has been worsening, and leading him to have falls; he ambulates by dragging his leg. His pain has worsened as well, he also has change in sensation on his anterior and lateral thigh along with muscle spasms. Patient is an orthopedic surgery patient (follows with Dr. Chaves, they did his surgery in 2021, plans to follow up in end of May).    Denies changes in bowel/bladder function, saddle anesthesia, radiculopathy, fever, chills, nausea, vomiting, headache, changes in vision    In the ED/Upon arrival to the floor/unit:  MRI cord compression protocol of whole spine was ordered prior to my shift. Neurosurgery consulted, evaluated patient at bedside, reviewed MRI and state disease is stable at this time, no acute intervention by their team.   Elected for med onc admission as patient has progression of weakness + difficulty with ambulation. Patient follows with Dr. Rodriguez of oncology.     - Vitals:   T 36.8, HR 74, /79, RR 16, SpO2 100 on RA  - Labs:   CBC: WBC 4.3, Hgb 14.2, plt 320   BMP: unremarkable   LFT: unremarkable     - Imaging:  MRI 4/27/2024  1.  Compared to prior MRI on 02/29/2024, there is new enhancement of   the cauda equina nerve roots and the conus, concerning for   leptomeningeal spread of the malignancy.   2. Slight interval increase in the epidural expression of the tumor   at L1-2. Otherwise, no significant interval change in the marrow   replacing lesions and epidural tumor compared to the prior exam.      Recent MRIs:  11/15/23 Mixed response to therapy with slightly worsened abnormal marrow signal but mild improvement in the ventral epidural  extension.  2/29/23 Epidural tumor within the right neural foramen at L1-L2 appears slightly increased      Infectious/Oncologic Timeline:  Diagnosis:  -Diagnosed 3/2016 from lung resection, multiple lung/pleural nodules  -Spinal mass biopsied 8/2020 consistent with thymoma, again spinal bx in Jan 2021    Staging:  T2N0M1    Current Mets + procedures:  Mediastinum, lung, paraspinal region T, L, S spine, left pleura, T12-L1 paraspinal region, left lateral aspect of the spinal canal spanning from at least T11-S1.   Mets to the lungs s/p total pulmonary decortication, resection of anterior metastatic mass in the thymus, LLL & SINDI wedge resections, chemoradiation ([id number]), mets to the serratus anterior muscle (2019, s/p proton therapy), T12-L1 spine mets (s/p T9-L1 lami & fusion for decompression in 2021 and also s/p proton therapy, with progression, pt declined systemic therapy), s/p T-L spine palliative radiation 09/2021, s/p radiation to T4-6 & L1-L3 in 03/2022, currently on chemo     Prior Therapy:  Radiation Aug 2016 with cyclophosphamide, adriamycin, cisplatin on 8/14/17 x 3 cycles. Charged particle therapy 3/2018, 12/2019  Laminectomy + spinal fusion by ortho  Palliative radiation to spine completed 9/2021  Single agents alimta per NCCN guideliens on January 10, 2022.   Received 2 cycles in JanuaryJanuary 2022.  Progressive disease based on MRI in February 2022  10- Radiation therapy to 30 Gy in 10 fractions, from T4-T6 and L1-L3 completed in mid April 2022  11- 1- Dec 2022 - single agent capecitabine 500 mg tab, 4 tabs BID. Cycle - 21 days (two weeks on with one week holiday). He  started (C4) on 4/25/23.    Current therapy (Dr. Rodriguez 3/6/2024)  Single agent Afinitor per NCCN guidelines, started 9/28/2023, documented stable disease on imaging end of November 2023    - Echocardiography:   8/2017:  CONCLUSIONS:   1. The left ventricular systolic function is normal with a 60-65% estimated ejection fraction.    2. Spectral Doppler shows an impaired relaxation pattern of left ventricular diastolic filling.   3. Mild concentric LVH.   4. Slightly enlarged left atrium.    PMH: HTN, GERD, chronic back pain, HLD, hypothyroidism, DALLIN with CPAP  SurgHx: knee surgery (09/10/2013); Other surgical history (09/10/2013); MR angio chest w IV contrast (2/9/2016); CT guided percutaneous biopsy muscle (11/13/2019); CT guided percutaneous biopsy muscle (8/13/2020); and CT guided pleura percutaneous biopsy (5/10/2017).  Allergies: NKDA  SocHx: Patient with two young kids and adult child. Lives at home with wife and two of their kids.   FamHx: no famhx of cancer    Home Medications @ date:  Vit D, fish oil  Everolimus 10mg  Gabapentin  Liniopril-hctz  MVI  Oxy 5mg PRN        Physical Exam   Constitutional: in NAD, awake, alert, easily conversant  HEENT: sclerae anicteric, EOM grossly intact  CV: RRR, no murmurs noted  Pulm: CTAB, no increased WOB  GI: abd soft, NT, ND  Skin: warm and dry  Ext: bilateral LE without pitting edema   Neuro: Unable to flex (passive or active) LLE, able to flex and extend knee, able to flex/extend ankle. strength 5/5 in RLE and upper extremities  Psych: affect appropriate    Last Recorded Vitals  Blood pressure 113/71, pulse (!) 108, temperature 36.8 °C (98.2 °F), temperature source Temporal, resp. rate 16, height 1.829 m (6'), weight 102 kg (225 lb), SpO2 96%.    Relevant Results  Results for orders placed or performed during the hospital encounter of 04/26/24 (from the past 24 hour(s))   CBC and Auto Differential   Result Value Ref Range    WBC 4.3 (L) 4.4 - 11.3 x10*3/uL    nRBC 0.0 0.0 - 0.0 /100 WBCs    RBC 5.65 4.50 - 5.90 x10*6/uL    Hemoglobin 14.2 13.5 - 17.5 g/dL    Hematocrit 42.4 41.0 - 52.0 %    MCV 75 (L) 80 - 100 fL    MCH 25.1 (L) 26.0 - 34.0 pg    MCHC 33.5 32.0 - 36.0 g/dL    RDW 15.8 (H) 11.5 - 14.5 %    Platelets 320 150 - 450 x10*3/uL    Neutrophils % 45.5 40.0 - 80.0 %    Immature  Granulocytes %, Automated 0.5 0.0 - 0.9 %    Lymphocytes % 27.0 13.0 - 44.0 %    Monocytes % 11.3 2.0 - 10.0 %    Eosinophils % 15.0 0.0 - 6.0 %    Basophils % 0.7 0.0 - 2.0 %    Neutrophils Absolute 1.97 1.20 - 7.70 x10*3/uL    Immature Granulocytes Absolute, Automated 0.02 0.00 - 0.70 x10*3/uL    Lymphocytes Absolute 1.17 (L) 1.20 - 4.80 x10*3/uL    Monocytes Absolute 0.49 0.10 - 1.00 x10*3/uL    Eosinophils Absolute 0.65 0.00 - 0.70 x10*3/uL    Basophils Absolute 0.03 0.00 - 0.10 x10*3/uL   Comprehensive metabolic panel   Result Value Ref Range    Glucose 71 (L) 74 - 99 mg/dL    Sodium 137 136 - 145 mmol/L    Potassium 3.6 3.5 - 5.3 mmol/L    Chloride 100 98 - 107 mmol/L    Bicarbonate 27 21 - 32 mmol/L    Anion Gap 14 10 - 20 mmol/L    Urea Nitrogen 12 6 - 23 mg/dL    Creatinine 0.93 0.50 - 1.30 mg/dL    eGFR >90 >60 mL/min/1.73m*2    Calcium 9.9 8.6 - 10.6 mg/dL    Albumin 4.6 3.4 - 5.0 g/dL    Alkaline Phosphatase 54 33 - 120 U/L    Total Protein 8.3 (H) 6.4 - 8.2 g/dL    AST 33 9 - 39 U/L    Bilirubin, Total 0.4 0.0 - 1.2 mg/dL    ALT 23 10 - 52 U/L   Type and Screen   Result Value Ref Range    ABO TYPE O     Rh TYPE POS     ANTIBODY SCREEN NEG    Coagulation Screen   Result Value Ref Range    Protime 11.6 9.8 - 12.8 seconds    INR 1.0 0.9 - 1.1    aPTT 56 (H) 27 - 38 seconds   POCT GLUCOSE   Result Value Ref Range    POCT Glucose 85 74 - 99 mg/dL          Assessment/Plan   Principal Problem:    Chest pain  Active Problems:    Thymoma, malignant (Multi)    52 yo male with HTN and malignant thymoma presenting with subacute weakness and loss of L hip flexion. Cord compression has been ruled out. It is possible this is from nerve impingement as there is increase in tumor expression at L1-L2; it is unclear if the new enhancement of cauda equine nerve roots could contribute to his presentation. Will evaluate patient with PT/OT, consult ortho for any non-urgent surgical options, and work on optimizing pain and  symptom (spasms) control.     #pain 2/2 malignancy  -continue home oxy 5mg PRN   -continue home gabapentin 300mg TID  -START methocarbimol 500mg Q6h  -Previously on Toradol, meloxicam, Topiramate, tramadol but has not been using these    #malignant thymoma  -cont everolimus 10mg qd    #weakness on hip flexion  ::No acute surgical intervention per NSGY  ::MRI showing new enhancement of cauda equine nerve roots, concern for leptomeningeal spread  -New weakness possibly related to this leptomeningeal spread of disease  [ ] Consider ortho consult in the AM given they have done prior procedures  [ ] PT/OT for dispo, patient has outpatient PT/OT    #HTN  -Cont home Lisinopril-HCTZ    Disposition: Pending PT/OT evaluation  Follow-ups: PCP, oncologist Dr. Rodriguez, ortho Dr. Chaves    N: reg diet  A: pIV  DVT ppx: lovenox  GI ppx: none    Code Status: Full Code (confirmed on 4/27)  Surrogate Medical Decision-maker: WifeVicki [phone]       Patient will be seen and discussed with attending Dr. Crawford in the morning    Kacie Subramanian MD  Internal Medicine and Pediatrics, PGY2

## 2024-04-27 NOTE — PROGRESS NOTES
I received this patient during signout.  Please see previous provider's note for detailed H&P, labs and imaging.    Briefly, this is a 53-year-old male with history of malignant thymoma with known spinal mets who presented with worsening left lower extremity weakness.  Patient is unable to flex against gravity which is a new finding over the last 3 weeks.  MRI cord compression protocol of whole spine was ordered prior to my shift.  Neurosurgery consulted prior to my shift.      Plan at the time of signout was follow-up imaging results, specialty recommendations and final disposition.    Under my care, patient reassessed and remains clinically stable.  Neurosurgery evaluated patient at bedside, patient will be  admitted to medical oncology team on admission for PT, OT, placement given progression of weakness from difficulty with ambulation.  I discussed with admission coordinator.  MRI results reviewed as stated in ED course. Patient follows with Dr. Rodriguez of oncology.    @  ED Course as of 04/27/24 0516   Fri Apr 26, 2024   2230 Patient seen in conjunction with assigned resident, agree with management.  53-year-old male with history of malignant thymoma with spinal involvement presenting with 3 weeks of progressive left lower extremity weakness.  He is unable to flex left hip antigravity.  Concerning for critical cord compression.  Neurosurgery consulted, obtaining MRIs of the CT and L-spine to evaluate further. [BK]   Sat Apr 27, 2024   0223 Reviewed by neurosurgery who state disease is stable at this time, recommend orthopedic surgery consult in the morning and med onc admission [SA]   0516 Prelim read reviewed as below:  IMPRESSION:  1.  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  2. Slight interval increase in the epidural expression of the tumor  at L1-2. Otherwise, no significant  interval change in the marrow  replacing lesions and epidural tumor compared to the prior exam.   [SA]      ED Course User Index  [BK] Barbara Jules MD  [SA] Guerline Lauren DO         Diagnoses as of 04/27/24 0516   Thymoma, malignant (Multi)   @    Disposition:  Admitted      Patient seen and staffed with attending physician.     Guerline Lauren DO   EM PGY2

## 2024-04-27 NOTE — ED PROVIDER NOTES
HPI   Chief Complaint   Patient presents with    Extremity Weakness       Pt is a 52 yo M with a PMH of malignant thymoma with spinal mets presenting to the ED with worsening L hip flexion and inability to walk. He noticed about 3 weeks ago that he struggled lifting his L leg when walking. Before this, he could ambulate by himself pretty well with one cane. He sees PT and thought strengthening his leg would help, but his weakness progressed over the past month to needing two canes to walk to now being unable to walk. He struggles moving his L leg against gravity. He endorses periodic leg pain that is not brought on by anything, he can flex and extend trunk without pain. He also reports new muscle spasms in the L quad and hamstring that sometimes radiates to his back. Sometimes the gabapentin and oxycodone help with his pain. Sensation is intact bilaterally. Pt denies any saddle paresthesia, loss of bowel/urine. Pt denies other weakness/tingling in other extremities, vision changes, headaches.                          Jamila Coma Scale Score: 15                     Patient History   Past Medical History:   Diagnosis Date    Abnormal weight gain 12/08/2014    Abnormal weight gain    Hemothorax     Hemothorax    Personal history of other diseases of the digestive system 09/10/2013    History of gastritis    Personal history of other diseases of the digestive system 03/19/2015    History of esophageal reflux    Personal history of other diseases of the nervous system and sense organs 11/24/2020    History of sleep apnea    Personal history of other endocrine, nutritional and metabolic disease     History of hypercholesterolemia    Personal history of other endocrine, nutritional and metabolic disease     History of hypothyroidism    Personal history of other specified conditions 03/14/2014    History of chest pain    Strain of unspecified muscle, fascia and tendon at shoulder and upper arm level, right arm, initial  encounter 05/20/2014    Right shoulder strain     Past Surgical History:   Procedure Laterality Date    CT GUIDED PERCUTANEOUS BIOPSY MUSCLE  11/13/2019    CT GUIDED PERCUTANEOUS BIOPSY MUSCLE 11/13/2019 [facility]    CT GUIDED PERCUTANEOUS BIOPSY MUSCLE  8/13/2020    CT GUIDED PERCUTANEOUS BIOPSY MUSCLE 8/13/2020 [facility]    CT GUIDED PLEURA PERCUTANEOUS BIOPSY  5/10/2017    CT GUIDED PLEURA PERCUTANEOUS BIOPSY 5/10/2017 [facility]    KNEE SURGERY  09/10/2013    Knee Surgery    MR CHEST ANGIO W IV CONTRAST  2/9/2016    MR CHEST ANGIO W IV CONTRAST 2/9/2016 [facility]    OTHER SURGICAL HISTORY  09/10/2013    History Of Prior Surgery     No family history on file.  Social History     Tobacco Use    Smoking status: Never    Smokeless tobacco: Never   Substance Use Topics    Alcohol use: Not Currently    Drug use: Not Currently       Physical Exam   ED Triage Vitals [04/26/24 1515]   Temperature Heart Rate Respirations BP   36.8 °C (98.2 °F) 74 16 114/79      Pulse Ox Temp Source Heart Rate Source Patient Position   100 % Temporal Monitor Sitting      BP Location FiO2 (%)     Right arm --       Physical Exam  Vitals and nursing note reviewed.   Constitutional:       General: He is not in acute distress.     Appearance: Normal appearance. He is normal weight. He is not toxic-appearing or diaphoretic.   HENT:      Head: Normocephalic and atraumatic.      Nose: Nose normal. No congestion or rhinorrhea.      Mouth/Throat:      Mouth: Mucous membranes are moist.      Pharynx: Oropharynx is clear. No oropharyngeal exudate or posterior oropharyngeal erythema.   Eyes:      General: No scleral icterus.     Extraocular Movements: Extraocular movements intact.      Pupils: Pupils are equal, round, and reactive to light.   Cardiovascular:      Rate and Rhythm: Normal rate and regular rhythm.      Pulses: Normal pulses.      Heart sounds: No murmur heard.     No gallop.      Comments: 2+ radial, DP pulses  bilaterally  Pulmonary:      Effort: Pulmonary effort is normal.      Breath sounds: Normal breath sounds. No wheezing, rhonchi or rales.   Abdominal:      Palpations: Abdomen is soft.      Tenderness: There is no abdominal tenderness. There is no guarding or rebound.   Musculoskeletal:      Cervical back: Normal range of motion and neck supple. No rigidity or tenderness.      Comments: L hip flexion no effort against gravity, fasciculations of quad visible, 5/5 strength in knee flexion, dorsiflexion/plantar flexion bilaterally, R hip flexion 4+/5. 5/5 strength hand , elbow flexion/extension bilaterally   Skin:     Capillary Refill: Capillary refill takes less than 2 seconds.      Findings: No bruising, lesion or rash.   Neurological:      General: No focal deficit present.      Mental Status: He is alert and oriented to person, place, and time.      Cranial Nerves: No cranial nerve deficit.   Psychiatric:         Mood and Affect: Mood normal.         Behavior: Behavior normal.         ED Course & MDM   ED Course as of 04/29/24 0546   Fri Apr 26, 2024   2230 Patient seen in conjunction with assigned resident, agree with management.  53-year-old male with history of malignant thymoma with spinal involvement presenting with 3 weeks of progressive left lower extremity weakness.  He is unable to flex left hip antigravity.  Concerning for critical cord compression.  Neurosurgery consulted, obtaining MRIs of the CT and L-spine to evaluate further. [BK]   Sat Apr 27, 2024   0223 Reviewed by neurosurgery who state disease is stable at this time, recommend orthopedic surgery consult in the morning and med onc admission [SA]   0516 Prelim read reviewed as below:  IMPRESSION:  1.  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  2. Slight interval increase in the epidural expression of the tumor  at L1-2.  Otherwise, no significant interval change in the marrow  replacing lesions and epidural tumor compared to the prior exam.   [SA]      ED Course User Index  [BK] Barbara Jules MD  [SA] Guerline Lauren DO         Diagnoses as of 04/29/24 0546   Thymoma, malignant (Multi)       Medical Decision Making  53-year-old male past medical history of malignant thymoma with known spinal mets who presents today for worsening left lower extremity weakness.  Patient is unable to flex at the hip against gravity, which is new over the last 3 weeks.  Given that this is somewhat subacute in nature, and is new since his most recent MRI, my concern would be that he has evolving metastasis that could be causing spinal cord compression.  Given this we will get MRI spinal cord compression protocol of the whole spine.  Will also get preoperative labs giving the patient previously followed with Dr. Melendez in anticipation that he may require debulking surgery or other possible interventions.  I did discuss with the patient that he will likely require imaging and admission, which he was comfortable with.  Patient will be signed out to the oncoming team pending MRI as well as neurosurgery consultation        Procedure  Procedures     Gurdeep Redd MD  Resident  04/29/24 0549

## 2024-04-27 NOTE — SIGNIFICANT EVENT
Patient feels well this morning. His pain is well controlled. He is endorsing left lower extremity weakness. He denies saddle anesthesia, bladder and bowel dysfunction.     Vitals:    04/27/24 1100   BP: 110/77   Pulse: (!) 110   Resp: 16   Temp:    SpO2: 99%      No new labs or imaging since H&P.    No changes to physical exam since H&P.    Updated Assessment & Plan  Altaf Parsons is a 52 yo male with HTN and malignant thymoma w/ mets to bone and lung presenting with subacute weakness and loss of L hip flexion. MRI spine ruled out cord compression but demonstrated c/f leptomeningeal spread of malignancy and slight increase in epidural expression of tumor at L1-2. Neurosurgery consulted - no acute neurosurgical interventions needed. Patient admitted for further management and PT/OT.       #Weakness of hip flexion  #Spinal metastases  :: Hx of T12-L1 spine mets (s/p T9-L1 laminectomy & fusion in 2021 and s/p proton therapy), s/p T-L spine palliative radiation 9/2021, & s/p radiation to T4-6 & L1-L3 3/2022  :: Follows with Dr. Melendez (ortho-spine) as outpatient  :: 3 wk history of L hip flexion weakness, inability to walk; no saddle anesthesia, b/b dysfunction  :: MRI spine (4/26) r/o cord compression but demonstrated cauda equina enhancement c/f leptomeningeal spread of malignancy, inc in tumor expression at L1-2  Plan:   - Neurosurgery consulted and signed off - no acute surgical intervention  - Patient to follow-up with ortho spine (appt scheduled for 5/22)  - Consult radiation oncology   - MRI brain given c/f leptomeningeal spread  - Dexamethasone 4mg daily   - Will consider LP (by IR) to potentially guide therapy if patient would like to pursue intra-thecal chemotherapy  - PT/OT consult    #Malignant thymoma  :: Primary oncologist: Dr. Rodriguez   :: Dx 3/2016, mets to mediastinum, spine, lungs s/p total pulm decortication, LLL & SINDI wedge resections  :: Current treatment: everolimus (started 9/2023)  Plan:   -  Outpatient oncology appointment 5/8/24  - Continue everolimus for now, may consider discontinuing given progression of disease  - MRI brain    #Pain management  - Continue home oxycodone 5mg PRN  - Continue home gabapentin 300mg TID  - Continue Robaxin 500mg q6h    #HTN  - Continue home lisinopril-hydrochlorothiazide    F: prn   E: prn   N: regular diet  A: pIV  DVT ppx: lovenox  GI ppx: not indicated  Code status: Full code   Surrogate decision maker: Vicki (wife) - [phone]

## 2024-04-27 NOTE — HOSPITAL COURSE
Altaf Parsons is a 52 yo male with HTN and malignant thymoma w/ mets to bone and lung presenting with subacute weakness and loss of L hip flexion. MRI spine ruled out cord compression but demonstrated c/f leptomeningeal spread of malignancy and slight increase in epidural expression of tumor at L1-2. Neurosurgery consulted - no acute neurosurgical interventions needed. Rad onc was consulted, given previous history of radiation therapy especially to the lumbar spine, delivery of a clinically meaningful dose may not be feasible and re radiation may put at an increased risk for spinal cord myelitis or radionecrosis, additionally may not help him regain function. He was started on dexamethasone 4mg daily with mild improvement of symptoms. PT/OT was also consulted. He was discharged in stable condition, on a steroid taper, with follow ups scheduled for PT, his primary oncologist, his ortho spine doctor, and recommended to follow up with PCP, receive MRI brain as an outpatient and potentially an LP to guide therapy if pt was interested in intra thecal chemo.    To follow up:   - MRI brain ordered to r/o mets, to be done as outpatient   - May want to do LP to potentially guide therapy if patient would like to pursue intra-thecal chemo   - Scheduled for F/u with Dr. Melendez (ortho spine) 5/22  - Scheduled for F/u with outpatient oncologist 5/8

## 2024-04-28 VITALS
BODY MASS INDEX: 30.07 KG/M2 | DIASTOLIC BLOOD PRESSURE: 78 MMHG | SYSTOLIC BLOOD PRESSURE: 119 MMHG | TEMPERATURE: 98.2 F | RESPIRATION RATE: 18 BRPM | HEIGHT: 72 IN | HEART RATE: 102 BPM | WEIGHT: 222 LBS | OXYGEN SATURATION: 97 %

## 2024-04-28 LAB
ALBUMIN SERPL BCP-MCNC: 4.3 G/DL (ref 3.4–5)
ANION GAP SERPL CALC-SCNC: 14 MMOL/L (ref 10–20)
BASOPHILS # BLD AUTO: 0.01 X10*3/UL (ref 0–0.1)
BASOPHILS NFR BLD AUTO: 0.2 %
BUN SERPL-MCNC: 11 MG/DL (ref 6–23)
CALCIUM SERPL-MCNC: 9.2 MG/DL (ref 8.6–10.6)
CHLORIDE SERPL-SCNC: 98 MMOL/L (ref 98–107)
CO2 SERPL-SCNC: 28 MMOL/L (ref 21–32)
CREAT SERPL-MCNC: 1.03 MG/DL (ref 0.5–1.3)
EGFRCR SERPLBLD CKD-EPI 2021: 86 ML/MIN/1.73M*2
EOSINOPHIL # BLD AUTO: 0.08 X10*3/UL (ref 0–0.7)
EOSINOPHIL NFR BLD AUTO: 1.6 %
ERYTHROCYTE [DISTWIDTH] IN BLOOD BY AUTOMATED COUNT: 15.7 % (ref 11.5–14.5)
GLUCOSE SERPL-MCNC: 81 MG/DL (ref 74–99)
HCT VFR BLD AUTO: 42.4 % (ref 41–52)
HGB BLD-MCNC: 13 G/DL (ref 13.5–17.5)
IMM GRANULOCYTES # BLD AUTO: 0.02 X10*3/UL (ref 0–0.7)
IMM GRANULOCYTES NFR BLD AUTO: 0.4 % (ref 0–0.9)
LYMPHOCYTES # BLD AUTO: 0.65 X10*3/UL (ref 1.2–4.8)
LYMPHOCYTES NFR BLD AUTO: 13.3 %
MAGNESIUM SERPL-MCNC: 1.95 MG/DL (ref 1.6–2.4)
MCH RBC QN AUTO: 24.2 PG (ref 26–34)
MCHC RBC AUTO-ENTMCNC: 30.7 G/DL (ref 32–36)
MCV RBC AUTO: 79 FL (ref 80–100)
MONOCYTES # BLD AUTO: 0.41 X10*3/UL (ref 0.1–1)
MONOCYTES NFR BLD AUTO: 8.4 %
NEUTROPHILS # BLD AUTO: 3.72 X10*3/UL (ref 1.2–7.7)
NEUTROPHILS NFR BLD AUTO: 76.1 %
NRBC BLD-RTO: 0 /100 WBCS (ref 0–0)
PHOSPHATE SERPL-MCNC: 2.7 MG/DL (ref 2.5–4.9)
PLATELET # BLD AUTO: 350 X10*3/UL (ref 150–450)
POTASSIUM SERPL-SCNC: 4 MMOL/L (ref 3.5–5.3)
RBC # BLD AUTO: 5.38 X10*6/UL (ref 4.5–5.9)
SODIUM SERPL-SCNC: 136 MMOL/L (ref 136–145)
WBC # BLD AUTO: 4.9 X10*3/UL (ref 4.4–11.3)

## 2024-04-28 PROCEDURE — 36415 COLL VENOUS BLD VENIPUNCTURE: CPT

## 2024-04-28 PROCEDURE — 80069 RENAL FUNCTION PANEL: CPT

## 2024-04-28 PROCEDURE — 83735 ASSAY OF MAGNESIUM: CPT

## 2024-04-28 PROCEDURE — 99239 HOSP IP/OBS DSCHRG MGMT >30: CPT

## 2024-04-28 PROCEDURE — 2500000004 HC RX 250 GENERAL PHARMACY W/ HCPCS (ALT 636 FOR OP/ED)

## 2024-04-28 PROCEDURE — 2500000001 HC RX 250 WO HCPCS SELF ADMINISTERED DRUGS (ALT 637 FOR MEDICARE OP)

## 2024-04-28 PROCEDURE — G0378 HOSPITAL OBSERVATION PER HR: HCPCS

## 2024-04-28 PROCEDURE — 85025 COMPLETE CBC W/AUTO DIFF WBC: CPT

## 2024-04-28 RX ORDER — DEXAMETHASONE 4 MG/1
4 TABLET ORAL DAILY
Qty: 10 TABLET | Refills: 0 | Status: SHIPPED | OUTPATIENT
Start: 2024-04-29 | End: 2024-05-08 | Stop reason: SDUPTHER

## 2024-04-28 RX ORDER — OXYCODONE HYDROCHLORIDE 5 MG/1
5 TABLET ORAL EVERY 4 HOURS PRN
Qty: 15 TABLET | Refills: 0 | Status: SHIPPED | OUTPATIENT
Start: 2024-04-28 | End: 2024-04-28 | Stop reason: HOSPADM

## 2024-04-28 RX ORDER — DEXAMETHASONE 4 MG/1
4 TABLET ORAL DAILY
Qty: 7 TABLET | Refills: 0 | Status: SHIPPED | OUTPATIENT
Start: 2024-04-29 | End: 2024-04-28

## 2024-04-28 RX ORDER — PANTOPRAZOLE SODIUM 40 MG/1
40 TABLET, DELAYED RELEASE ORAL
Qty: 10 TABLET | Refills: 0 | Status: SHIPPED | OUTPATIENT
Start: 2024-04-28 | End: 2024-05-08

## 2024-04-28 RX ORDER — METHOCARBAMOL 500 MG/1
500 TABLET, FILM COATED ORAL EVERY 6 HOURS SCHEDULED
Qty: 120 TABLET | Refills: 0 | Status: SHIPPED | OUTPATIENT
Start: 2024-04-28 | End: 2024-04-28

## 2024-04-28 RX ORDER — METHOCARBAMOL 500 MG/1
500 TABLET, FILM COATED ORAL EVERY 6 HOURS SCHEDULED
Qty: 120 TABLET | Refills: 0 | Status: SHIPPED | OUTPATIENT
Start: 2024-04-28 | End: 2024-06-04 | Stop reason: HOSPADM

## 2024-04-28 RX ADMIN — GABAPENTIN 300 MG: 300 CAPSULE ORAL at 09:46

## 2024-04-28 RX ADMIN — EVEROLIMUS 10 MG: 5 TABLET ORAL at 09:47

## 2024-04-28 RX ADMIN — Medication 1 TABLET: at 09:46

## 2024-04-28 RX ADMIN — Medication 1000 UNITS: at 09:46

## 2024-04-28 RX ADMIN — DEXAMETHASONE 4 MG: 4 TABLET ORAL at 09:46

## 2024-04-28 RX ADMIN — METHOCARBAMOL 500 MG: 500 TABLET ORAL at 06:26

## 2024-04-28 RX ADMIN — HYDROCHLOROTHIAZIDE: 25 TABLET ORAL at 09:46

## 2024-04-28 RX ADMIN — METHOCARBAMOL 500 MG: 500 TABLET ORAL at 11:56

## 2024-04-28 RX ADMIN — OXYCODONE HYDROCHLORIDE 5 MG: 5 TABLET ORAL at 06:32

## 2024-04-28 RX ADMIN — ENOXAPARIN SODIUM 40 MG: 100 INJECTION SUBCUTANEOUS at 06:26

## 2024-04-28 ASSESSMENT — COGNITIVE AND FUNCTIONAL STATUS - GENERAL
DRESSING REGULAR LOWER BODY CLOTHING: A LITTLE
PERSONAL GROOMING: A LITTLE
TURNING FROM BACK TO SIDE WHILE IN FLAT BAD: A LITTLE
CLIMB 3 TO 5 STEPS WITH RAILING: A LITTLE
MOBILITY SCORE: 19
DAILY ACTIVITIY SCORE: 19
STANDING UP FROM CHAIR USING ARMS: A LITTLE
DRESSING REGULAR UPPER BODY CLOTHING: A LITTLE
WALKING IN HOSPITAL ROOM: A LITTLE
TOILETING: A LITTLE
MOVING TO AND FROM BED TO CHAIR: A LITTLE
HELP NEEDED FOR BATHING: A LITTLE

## 2024-04-28 ASSESSMENT — PAIN SCALES - GENERAL
PAINLEVEL_OUTOF10: 8
PAINLEVEL_OUTOF10: 0 - NO PAIN

## 2024-04-28 NOTE — DISCHARGE SUMMARY
Discharge Diagnosis  Chest pain    Issues Requiring Follow-Up  - MRI brain ordered to r/o mets, to be done as outpatient   - May want to do LP to potentially guide therapy if patient would like to pursue intra-thecal chemo   - Scheduled for F/u with Dr. Melendez (ortho spine) 5/22  - Scheduled for F/u with outpatient oncologist 5/8    Test Results Pending At Discharge  Pending Labs       No current pending labs.            Hospital Course  Altaf Parsons is a 52 yo male with HTN and malignant thymoma w/ mets to bone and lung presenting with subacute weakness and loss of L hip flexion. MRI spine ruled out cord compression but demonstrated c/f leptomeningeal spread of malignancy and slight increase in epidural expression of tumor at L1-2. Neurosurgery consulted - no acute neurosurgical interventions needed. Rad onc was consulted, given previous history of radiation therapy especially to the lumbar spine, delivery of a clinically meaningful dose may not be feasible and re radiation may put at an increased risk for spinal cord myelitis or radionecrosis, additionally may not help him regain function. He was started on dexamethasone 4mg daily with mild improvement of symptoms. PT/OT was also consulted. He was discharged in stable condition, on a steroid taper, with follow ups scheduled for PT, his primary oncologist, his ortho spine doctor, and recommended to follow up with PCP, receive MRI brain as an outpatient and potentially an LP to guide therapy if pt was interested in intra thecal chemo.    Pertinent Physical Exam At Time of Discharge   Constitutional: in NAD, awake, alert, easily conversant  HEENT: sclerae anicteric, EOM grossly intact  CV: RRR, no murmurs noted  Pulm: CTAB, no increased WOB  GI: abd soft, NT, ND  Skin: warm and dry  Ext: bilateral LE without pitting edema   Neuro: Unable to flex (passive or active) LLE, able to flex and extend knee, able to flex/extend ankle. strength 5/5 in RLE and upper  extremities  Psych: affect appropriate    Home Medications     Medication List      START taking these medications     dexAMETHasone 4 mg tablet; Commonly known as: Decadron; Take 1 tablet (4   mg) by mouth once daily for 10 days. Do not fill before April 29, 2024.;   Start taking on: April 29, 2024   methocarbamol 500 mg tablet; Commonly known as: Robaxin; Take 1 tablet   (500 mg) by mouth every 6 hours.   pantoprazole 40 mg EC tablet; Commonly known as: ProtoNix; Take 1 tablet   (40 mg) by mouth once daily in the morning. Take before meals for 10 days.   Do not crush, chew, or split.  For stomach protection while taking   steroids.     CONTINUE taking these medications     everolimus 10 mg tablet; Commonly known as: Afinitor; Take 1 tablet (10   mg total) by mouth once daily.  Swallow whole with a glass of water. Do   not take any tablet that is broken or crushed. Take at the same time each   day.   FISH OIL ORAL   gabapentin 300 mg capsule; Commonly known as: Neurontin; Take 1 capsule   (300 mg) by mouth 3 times a day.   lisinopriL-hydrochlorothiazide 20-25 mg tablet; TAKE 1 TABLET BY MOUTH   ONCE DAILY   MULTIPLE VITAMINS ORAL   naloxone 4 mg/0.1 mL nasal spray; Commonly known as: Narcan   NON FORMULARY   NON FORMULARY   VITAMIN D3 ORAL     STOP taking these medications     meloxicam 15 mg tablet; Commonly known as: Mobic   oxyCODONE 5 mg immediate release tablet; Commonly known as: Roxicodone       Outpatient Follow-Up  Future Appointments   Date Time Provider Department [place]   5/1/2024 11:15 AM Geovani Dominguez PT [id number] [facility]   5/8/2024  9:30 AM RAFI Hernández-CNS [id number] [facility]   5/15/2024  9:45 AM Geovani Dominguez PT [id number] [facility]   5/22/2024  9:20 AM Kory Melendez MD [id number] [facility]   5/29/2024  9:45 AM Geovani Dominguez PT [id number] [facility]       Fabiola Koo MD

## 2024-04-28 NOTE — CONSULTS
Radiation Oncology Inpatient Consult    Patient Name:  Altaf Parsons  MRN:  [id number]  :  1970    Referring Provider: No ref. provider found  Primary Care Provider: Darius Meza MD  Care Team: Patient Care Team:  Darius Meza MD as PCP - General (Internal Medicine)  RAFI Hernández-CNS as PCP - [place] [facility] P[facility]  Darius Meza MD as PCP - [facility] PCP  Justo Rodriguez MD as Consulting Physician (Hematology and Oncology)  Kacie Subramanian MD as Resident (Internal Medicine/Pediatrics)  Rachelle Crawford MD as Consulting Physician (Hematology and Oncology)    Date of Service: 2024     SUBJECTIVE  History of Present Illness:  Altaf Parsons is a 53 y.o. male who was referred by No ref. provider found, for a consultation to the [facility] Department of Radiation Oncology.  He is presenting for evaluation and management of metastatic malignant thymoma.     53M with h/o HTN, HLD, hypoT, restrictive lung disease, DALLIN on CPAP, GERD, malignant thymoma (dx ) with mets to the lungs s/p total pulmonary decortication, resection of anterior metastatic mass in the thymus, LLL & SINDI wedge resections, chemoradiation ([id number]) mets to the serratus anterior muscle (, s/p proton therapy), T12-L1 spine mets (s/p T9-L1 lami & fusion for decompression in  and also s/p proton therapy, with progression, pt declined systemic therapy), s/p T-L spine palliative radiation 2021, s/p radiation to T4-6 & L1-L3 in 2022, currently on single agent everolimus (Afinitor) started 23.    Presented to ED 24 with worsening Left hip flexion and inability to walk for 3 weeks, previously ambulatory with cane, working with PT but weakness progressing over past month.    24 MRI CTL spine w/wo contrast: IMPRESSION:  Degenerative changes of the cervical spine with marked multilevel foraminal and moderate central canal narrowing as detailed above.  No evidence of metastatic disease  identified in the cervical spine.  Extensive osseous metastatic disease again noted throughout the thoracic spine with epidural extension and cord compression as previously demonstrated and described.  Thickening and enhancement of epidural tumor extension at L4, L5 and S1 asymmetrical to the left with compression of the thecal sac on the left side. There is extension to the left S1 and S2 nerve roots within the thecal sac.  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  Slight interval increase in the epidural expression of the tumor at L1-2. Otherwise, no significant interval change in the marrow replacing lesions and epidural tumor compared to the prior exam.    Patient denies any pain. He does report spasms radiating around his lower abdomen to his lower back. He denies any changes in sensation to his lower extremities. Denies any urinary or rectal incontinence.     Prior Radiotherapy:  yes  8/1/16 - 9/17/16 Mediastinum, protons 59.4Gy/33fx  3/19/18 - 4/17/18 Left paraspinal mass, protons 50Gy/20fx  12/10/19 - 12/31/19 SINDI nodule, Serratus lesion, both protons 40Gy/15fx  9/14/21 - 9/17/21 T8 and L3, VMAT 30Gy/10fx  3/28/22 -4/8/22 T4-T6, L4-L5, both VMAT 30Gy/10FX    Current Systemic Treatment:  Yes, describe: everolimus     Presence of Pacemaker or ICD:  No    Past Medical History:    Past Medical History:   Diagnosis Date    Abnormal weight gain 12/08/2014    Abnormal weight gain    Hemothorax     Hemothorax    Personal history of other diseases of the digestive system 09/10/2013    History of gastritis    Personal history of other diseases of the digestive system 03/19/2015    History of esophageal reflux    Personal history of other diseases of the nervous system and sense organs 11/24/2020    History of sleep apnea    Personal history of other endocrine, nutritional and metabolic disease      History of hypercholesterolemia    Personal history of other endocrine, nutritional and metabolic disease     History of hypothyroidism    Personal history of other specified conditions 03/14/2014    History of chest pain    Strain of unspecified muscle, fascia and tendon at shoulder and upper arm level, right arm, initial encounter 05/20/2014    Right shoulder strain        Past Surgical History:    Past Surgical History:   Procedure Laterality Date    CT GUIDED PERCUTANEOUS BIOPSY MUSCLE  11/13/2019    CT GUIDED PERCUTANEOUS BIOPSY MUSCLE 11/13/2019 [facility]    CT GUIDED PERCUTANEOUS BIOPSY MUSCLE  8/13/2020    CT GUIDED PERCUTANEOUS BIOPSY MUSCLE 8/13/2020 [facility] LEGAC[facility]    CT GUIDED PLEURA PERCUTANEOUS BIOPSY  5/10/2017    CT GUIDED PLEURA PERCUTANEOUS BIOPSY 5/10/2017 [facility]    KNEE SURGERY  09/10/2013    Knee Surgery    MR CHEST ANGIO W IV CONTRAST  2/9/2016    MR CHEST ANGIO W IV CONTRAST 2/9/2016 [facility] ANCILLARY LEGACY    OTHER SURGICAL HISTORY  09/10/2013    History Of Prior Surgery        Family History:  Cancer-related family history is not on file.    Social History:    Social History     Tobacco Use    Smoking status: Never    Smokeless tobacco: Never   Substance Use Topics    Alcohol use: Not Currently    Drug use: Not Currently       Allergies:  No Known Allergies     Medications:    Current Facility-Administered Medications:     cholecalciferol (Vitamin D-3) tablet 1,000 Units, 1,000 Units, oral, Daily, Kacie Subramanian MD, 1,000 Units at 04/27/24 0942    dexAMETHasone (Decadron) tablet 4 mg, 4 mg, oral, Daily, Kianna Hui MD, 4 mg at 04/27/24 1456    enoxaparin (Lovenox) syringe 40 mg, 40 mg, subcutaneous, q24h, Kacie Subramanian MD, 40 mg at 04/27/24 0942    everolimus (Afinitor) tablet 10 mg, 10 mg, oral, Daily, Kacie Subramanian MD, 10 mg at 04/27/24 0942    gabapentin (Neurontin) capsule 300 mg, 300 mg, oral, TID, Kacie Subramanian MD, 300 mg at 04/27/24 1456    lisinopril 20  mg, hydroCHLOROthiazide 25 mg for Zestoretic/Prinizide, , oral, Daily, Kacie Subramanian MD, Given at 04/27/24 0947    melatonin tablet 3 mg, 3 mg, oral, Nightly PRN, Kacie Subramanian MD    methocarbamol (Robaxin) tablet 500 mg, 500 mg, oral, q6h CHERI, Kacie Subramanian MD, 500 mg at 04/27/24 1802    multivitamin with minerals 1 tablet, 1 tablet, oral, Daily, Kacie Subramanian MD, 1 tablet at 04/27/24 0942    oxyCODONE (Roxicodone) immediate release tablet 5 mg, 5 mg, oral, q4h PRN, Kacie Subramanian MD    polyethylene glycol (Glycolax, Miralax) packet 17 g, 17 g, oral, Daily PRN, Kacie Subramanian MD      Review of Systems:    The patient's current pain level was assessed.  They report currently having a pain of 0 out of 10.     Performance Status:  The Karnofsky performance scale today is 40, Disabled; requires special care and assistance (ECOG equivalent 3).        OBJECTIVE  Physical Exam:  /72 (BP Location: Right arm, Patient Position: Lying)   Pulse 110   Temp 36.5 °C (97.7 °F) (Temporal)   Resp 16   Ht 1.829 m (6')   Wt 102 kg (225 lb)   SpO2 98%   BMI 30.52 kg/m²    General: No acute distress, engaged in conversation, answers questions appropriately.  HEENT: Normocephalic, atraumatic. Extraocular movements are intact.   Pulmonary: Breathing comfortably on room air, no respiratory distress.  Cardiovascular: Regular rate, no cyanosis, grossly well-perfused.  Abdomen: Soft, nontender, nondistended.   Extremities: Ambulatory without assistance, moves all extremities spontaneously.  Musculoskeletal: LLE strength 3/5, RLE strength 5/5, BUE strength 5/5, sensation grossly in tact, no point tenderness along spine  Lymph: No lower extremity edema.  Neurologic: Alert and oriented x3.  Psych: Appropriate mood and behavior.       Laboratory Review:  There are no laboratory contraindications to radiation therapy.    The pertinent lab results were reviewed and discussed with the patient.    Pathology Review:  The  pertinent pathology results were reviewed and discussed with the patient.     Imaging:  The pertinent imaging results were reviewed and discussed with the patient.        ASSESSMENT:  53M with metastatic malignant thymoma (dx 2016) s/p several resections including T9-L1 spinal laminectomy and fusion (2021) as well as multiple courses of radiation treatments as detailed above including to the thoracic and lumbar spine, presents with three weeks of Left lower extremity weakness, imaging re-demonstrating extensive osseous metastatic disease, slight interval increase in epidural disease at L1-L2, epidural tumor extension at L4-S1 with Left compression of thecal sac and extension to Left S1-2 nerve roots, and new enhancement of cauda equina nerve roots and conus concerning for leptomeningeal spread.    Given previous history of radiation therapy especially to the lumbar spine, delivery of a clinically meaningful dose may not be feasible. In the setting of re-irradiation, he is at an increased risk for spinal cord myelitis or radionecrosis. Given he has been symptomatic for three weeks, it is unlikely that radiation would help him regain previous functioning of his extremity. Ultimately, the risks of re-irradiation may not outweigh the benefits in his case, and therefore we do not recommend emergent radiation therapy at this time.    Agree with dexamethasone and PPI prophylaxis. Recommend multidisciplinary discussion with oncology teams, will reach out to CNS attending Dr. Garcia, who was also involved with his care recently.    The above plan was discussed with the patient and his family. He demonstrated understanding and all questions were answered to satisfaction.    Patient seen and discussed with Dr. Taylor.

## 2024-04-28 NOTE — DISCHARGE INSTRUCTIONS
Dear Mr. Parsons,    You were admitted for weakness and loss function of your lower extremities. Imaging did not show your spinal cord was compressed, however there was a progression of tumor in your spine. We started you on a steroid that should help improve your symptoms. We have also referred you to physical therapy. Below are things to follow up on:    -Please plan to get an MRI of your brain, this can be done as an outpatient, an order has been placed for this  -Meds: we have added a steroid medication to your regimen. Please take as prescribed in the meds section of this packet  -Follow ups: please follow up with your PCP within a week, you also have appointments scheduled for PT, your onolcogist, and ortho spine doctor, this is detailed in the follow up section of this packet.    Best,    [facility]

## 2024-04-28 NOTE — PROGRESS NOTES
04/28/24 1615   Discharge Planning   Home or Post Acute Services None   Patient expects to be discharged to: home   Does the patient need discharge transport arranged? No     Patient will discharge to home today. Front wheeled walker delivered to patient bedside. Spouse transported patient home.

## 2024-04-28 NOTE — CARE PLAN
Problem: Skin  Goal: Decreased wound size/increased tissue granulation at next dressing change  Outcome: Progressing  Goal: Participates in plan/prevention/treatment measures  Outcome: Progressing  Goal: Prevent/manage excess moisture  Outcome: Progressing  Goal: Prevent/minimize sheer/friction injuries  Outcome: Progressing  Goal: Promote/optimize nutrition  Outcome: Progressing  Goal: Promote skin healing  Outcome: Progressing     Problem: Fall/Injury  Goal: Not fall by end of shift  Outcome: Progressing  Goal: Be free from injury by end of the shift  Outcome: Progressing  Goal: Verbalize understanding of personal risk factors for fall in the hospital  Outcome: Progressing  Goal: Verbalize understanding of risk factor reduction measures to prevent injury from fall in the home  Outcome: Progressing  Goal: Use assistive devices by end of the shift  Outcome: Progressing  Goal: Pace activities to prevent fatigue by end of the shift  Outcome: Progressing     Problem: Pain  Goal: Takes deep breaths with improved pain control throughout the shift  Outcome: Progressing  Goal: Turns in bed with improved pain control throughout the shift  Outcome: Progressing  Goal: Walks with improved pain control throughout the shift  Outcome: Progressing  Goal: Performs ADL's with improved pain control throughout shift  Outcome: Progressing  Goal: Participates in PT with improved pain control throughout the shift  Outcome: Progressing  Goal: Free from opioid side effects throughout the shift  Outcome: Progressing  Goal: Free from acute confusion related to pain meds throughout the shift  Outcome: Progressing     Problem: Pain - Adult  Goal: Verbalizes/displays adequate comfort level or baseline comfort level  Outcome: Progressing     Problem: Safety - Adult  Goal: Free from fall injury  Outcome: Progressing     Problem: Discharge Planning  Goal: Discharge to home or other facility with appropriate resources  Outcome: Progressing      Problem: Chronic Conditions and Co-morbidities  Goal: Patient's chronic conditions and co-morbidity symptoms are monitored and maintained or improved  Outcome: Progressing       The clinical goals for the shift include tolerable pain

## 2024-04-30 ENCOUNTER — PATIENT OUTREACH (OUTPATIENT)
Dept: CARE COORDINATION | Facility: CLINIC | Age: 54
End: 2024-04-30
Payer: COMMERCIAL

## 2024-04-30 PROCEDURE — RXMED WILLOW AMBULATORY MEDICATION CHARGE

## 2024-04-30 NOTE — PROGRESS NOTES
Chart review conducted post hospital DC (4.26 to 4.28.24).  Spoke w Mr. Parsons today and introduced self.  Reports he is glad to be home and feeling somewhat better.   Rates pain as a 4; using analgesics as prescribed and is helpful w relieving pain.  Meds reviewed; confirms adherence.  Using walker for ambulation.  Denies falls since return home.  Scheduled a PCP follow up visit for 5.9 and reviewed other pending appts with him as well.  Completed post-DC assessment and enrolled in [email or web address] for additional monitoring and support.  Will plan on additional outreach approximately 2 and 4 weeks post-DC; agreeable.  Encouraged to call for issues/problems/concerns that may arise in the interim.

## 2024-05-01 ENCOUNTER — TREATMENT (OUTPATIENT)
Dept: PHYSICAL THERAPY | Facility: CLINIC | Age: 54
End: 2024-05-01
Payer: COMMERCIAL

## 2024-05-01 DIAGNOSIS — R26.2 DIFFICULTY IN WALKING INVOLVING LOWER LEG JOINT: ICD-10-CM

## 2024-05-01 DIAGNOSIS — D49.2 LUMBAR SPINE TUMOR: Primary | ICD-10-CM

## 2024-05-01 DIAGNOSIS — M48.062 NEUROGENIC CLAUDICATION DUE TO LUMBAR SPINAL STENOSIS: ICD-10-CM

## 2024-05-01 DIAGNOSIS — R26.2 DIFFICULTY WALKING: ICD-10-CM

## 2024-05-01 DIAGNOSIS — D49.2 ODONTOGENIC TUMOR: ICD-10-CM

## 2024-05-01 DIAGNOSIS — R53.1 WEAKNESS: ICD-10-CM

## 2024-05-01 PROCEDURE — 97116 GAIT TRAINING THERAPY: CPT | Mod: GP | Performed by: PHYSICAL THERAPIST

## 2024-05-01 PROCEDURE — 97164 PT RE-EVAL EST PLAN CARE: CPT | Mod: GP | Performed by: PHYSICAL THERAPIST

## 2024-05-01 NOTE — PROGRESS NOTES
Physical Therapy Re-Examination Treatment Note    Patient Name: Altaf Parsons  MRN Number: [id number]  Initial PT Examination Date: 8/10/23  Referring Clinician: Dr. Rodriguez  Time Calculation  Start Time: 1122  Stop Time: 1159  Time Calculation (min): 37 min    Insurance  Visit Number: 17  Approved Number of Visits: 30/ year  Certification Period: N/A    Precautions  History of spinal cancer; spinal fusion 1/5/21  Moderate fall risk    Problem List  1. Difficulty in walking involving lower leg joint  Follow Up In Physical Therapy      2. Odontogenic tumor  Follow Up In Physical Therapy      3. Neurogenic claudication due to lumbar spinal stenosis  Follow Up In Physical Therapy        Subjective  He fell 3 times since last seen in PT. 4/26/24 he went to the emergency room because of the falls. MRI was done on his spine, tumor is now pressing on the sciatic nerve going down his left leg. Tumor was stable on last visit with oncologist. They are not thinking that surgery is a good option, as least from emergency stand point. He is going to see Dr. Richards on 5/22/24 for another opinion. Straining his right knee from the way he is walking. He cannot get up off the ground anymore. Walking with walker 100% of the time. Cannot wear heavier shoes or heavier brace because he cannot lift his leg. He is taking pain medication. Not having much pain down the leg just weakness. Stairs are harder. Showering and bathing on his own. Now using a shower chair. He was instructed to continue to do therapy. Denies any bowel or bladder issues.     Pain Scale: Left Leg  10/10 maximum in the last week    Pain Scale: Low Back  5/10 maximum in the last week     Observation  Inspection: He arrives with his wife, he is drastically weaker, slower moving since last seen in PT.  Gait: Arrives with FWW, knees are in bilateral knee bent position and heavy dependence on UE to walk, CGA, 40 feet.  Sit to stand: Heavy dependence on UE, CGA  Sit to supine:  CGA - SBA    Objective  Hip Strength Testing  Left  Right  Comments  Flexion   <3-/5  <3-/5  Cannot lift either leg from the table for strength testing into flexion    Treatment  Gait Training   - walker set up   - education on positives and negatives of using an AFO   - safety with walking and fatigue   - to be using walker only for ambulation, no cane    Assessment  Reexamination performed on this date to assess patient's condition after a significant change in his medical status.  In previous therapy sessions he began complaining of increase in left lower extremity weakness, this resulted in several falls since last seen in therapy.  Emergent imaging was performed and spinal tumor is placing pressure on lower extremity nerves.  Since his recent falls patient has had a severe decline in his physical capability, now dependent on a walker to ambulate for very short distances.  Significant lower body weakness is also captured.  Patient has several other medical appointments scheduled to investigate the appropriate course of action for her spinal condition.  He was instructed to continue therapy for now.    Plan  Adjust home program in next few sessions.    Home Program  Access Code: [email or web address]  URL: [email or web address]/  Date: 10/04/2023  Prepared by: Geovani Dominguez    Exercises  - Hooklying Single Knee to Chest Stretch  - 1 x daily - 7 x weekly - 10 reps  - Supine Lower Trunk Rotation with PLB  - 1 x daily - 7 x weekly - 10 reps  - Supine Piriformis Stretch with Foot on Ground  - 1 x daily - 7 x weekly - 3 reps - 30 sec hold  - Seated Hamstring Stretch  - 1 x daily - 7 x weekly - 3 reps - 20 sec hold  - Sit to Stand Without Arm Support  - 1 x daily - 7 x weekly - 2 sets - 10 reps  - Standing Near Stance in Corner with Eyes Closed  - 1 x daily - 7 x weekly - 3 reps - 1 min hold  - Prone Quadriceps Stretch with Strap  - 1 x daily - 7 x weekly - 3 reps - 30 sec  hold  -----------------------------------------------------------------------------------------------------------------  Access Code: [email or web address]  URL: [email or web address]://[email or web address]/  Date: 10/12/2023  Prepared by: Geovani Dominguez    Exercises  - Hooklying Single Knee to Chest Stretch  - 1 x daily - 7 x weekly - 10 reps  - Supine Lower Trunk Rotation with PLB  - 1 x daily - 7 x weekly - 10 reps  - Supine Piriformis Stretch with Foot on Ground  - 1 x daily - 7 x weekly - 3 reps - 30 sec hold  - Seated Hamstring Stretch  - 1 x daily - 7 x weekly - 3 reps - 20 sec hold  - Prone Quadriceps Stretch with Strap  - 2 x weekly - 3 reps - 30 sec hold  - Sit to Stand Without Arm Support  - 2 x weekly - 2 sets - 10 reps  - Standing Near Stance in Corner with Eyes Closed  - 2 x weekly - 2 reps - 1 min hold  - Tandem Stance in Corner  - 2 x weekly - 2 sets - 1 min hold  - Corner Balance Feet Together: Eyes Closed With Head Turns  - 2 x weekly - 2 sets - 1 min hold  - Standing March with Counter Support  - 2 x weekly - 2 sets - 1 min hold  ------------------------------------------------------------------------------------------------------------------  Access Code: [email or web address]  URL: [email or web address]/  Date: 11/30/2023  Prepared by: Geovani Dominguez    Exercises  - Forward Fold with Feet Apart and Bent Legs  - 1 x daily - 3 x weekly - 10 reps  - Hooklying Single Knee to Chest Stretch  - 1 x daily - 7 x weekly - 10 reps  - Supine Lower Trunk Rotation with PLB  - 1 x daily - 7 x weekly - 10 reps  - Supine Piriformis Stretch with Foot on Ground  - 1 x daily - 7 x weekly - 3 reps - 30 sec hold  - Seated Hamstring Stretch  - 1 x daily - 7 x weekly - 3 reps - 20 sec hold  - Prone Quadriceps Stretch with Strap  - 7 x weekly - 3 reps - 30 sec hold  - Clamshell with Resistance  - 3 x weekly - 2-3 sets - 10 reps  - Single Leg Bridge  - 3 x weekly - 2-3 sets - 10 reps  - Bird Dog  - 3 x weekly - 2-3 sets - 10  reps  -------------------------------------------------------------------------------------------------------------------------  Access Code: [email or web address]  URL: [email or web address]  Date: 02/16/2024  Prepared by: Geovani Dominguez    Exercises  - Forward Fold with Feet Apart and Bent Legs  - 1 x daily - 3 x weekly - 10 reps  - Hooklying Single Knee to Chest Stretch  - 1 x daily - 7 x weekly - 10 reps  - Supine Lower Trunk Rotation with PLB  - 1 x daily - 7 x weekly - 10 reps  - Supine Piriformis Stretch with Foot on Ground  - 1 x daily - 7 x weekly - 3 reps - 30 sec hold  - Seated Hamstring Stretch  - 1 x daily - 7 x weekly - 3 reps - 20 sec hold  - Prone Quadriceps Stretch with Strap  - 7 x weekly - 3 reps - 30 sec hold  - Clamshell with Resistance  - 3 x weekly - 2-3 sets - 10 reps  - Single Leg Bridge  - 3 x weekly - 2-3 sets - 10 reps  - Bird Dog  - 3 x weekly - 2-3 sets - 10 reps  - Side Stepping with Resistance at Thighs  - 3 x weekly - 3 sets - 10 reps  - Standing Hip Abduction with Resistance at Thighs  - 3 x weekly - 3 sets - 10 reps    Care Plan  1) Modified return to prior level of function to allow continued home independent capability.  2) Reduce back pain levels 4-5/10 maximum in the last week for improvements in quality of life and ability to sleep.   3) Increase bilateral hip strength testing greater than or equal to 4/5 for improvements in low back/ pelvic stability when standing and walking for extended periods of time.   4) Patient to demonstrates capability of walking for 10 minutes with cane for integration into the community.  5) Increase LEFS outcome measure to greater than or equal to 60 for meaningful and clinical improvements in patients condition.   6) - Perform sit to stand transfer from 19 inch chair x1 without use of UE for improved function and independence.

## 2024-05-03 ENCOUNTER — TREATMENT (OUTPATIENT)
Dept: PHYSICAL THERAPY | Facility: CLINIC | Age: 54
End: 2024-05-03
Payer: COMMERCIAL

## 2024-05-03 ENCOUNTER — APPOINTMENT (OUTPATIENT)
Dept: PRIMARY CARE | Facility: CLINIC | Age: 54
End: 2024-05-03
Payer: COMMERCIAL

## 2024-05-03 DIAGNOSIS — R53.1 WEAKNESS: ICD-10-CM

## 2024-05-03 DIAGNOSIS — D49.2 LUMBAR SPINE TUMOR: Primary | ICD-10-CM

## 2024-05-03 DIAGNOSIS — R26.2 DIFFICULTY IN WALKING INVOLVING LOWER LEG JOINT: ICD-10-CM

## 2024-05-03 DIAGNOSIS — M48.062 NEUROGENIC CLAUDICATION DUE TO LUMBAR SPINAL STENOSIS: ICD-10-CM

## 2024-05-03 DIAGNOSIS — D49.2 ODONTOGENIC TUMOR: ICD-10-CM

## 2024-05-03 DIAGNOSIS — R26.2 DIFFICULTY WALKING: ICD-10-CM

## 2024-05-03 PROCEDURE — 97110 THERAPEUTIC EXERCISES: CPT | Mod: GP | Performed by: PHYSICAL THERAPIST

## 2024-05-03 NOTE — PROGRESS NOTES
Physical Therapy Re-Examination Treatment Note    Patient Name: Altaf Parsons  MRN Number: [id number]  Initial PT Examination Date: 8/10/23  Referring Clinician: Dr. Rodriguez    Time Calculation  Start Time: 1245  Stop Time: 1330  Time Calculation (min): 45 min    Insurance  Visit Number: 18  Approved Number of Visits: 30/ year  Certification Period: N/A    Precautions  History of spinal cancer; spinal fusion 1/5/21  Moderate fall risk    Problem List  1. Lumbar spine tumor        2. Difficulty in walking involving lower leg joint  Follow Up In Physical Therapy      3. Odontogenic tumor  Follow Up In Physical Therapy      4. Neurogenic claudication due to lumbar spinal stenosis  Follow Up In Physical Therapy      5. Weakness        6. Difficulty walking          Subjective  Getting up every hour to do some walking. Still feeling a lot of weakness in his legs. Doctors appointments next week.    Treatment  Therapeutic Exercise   - cannot perform supine bridge   - active assisted double knee to chest   - active assisted trunk rotation   - supine hip abduction against belt   - supine adduction against ball   - supine SAQ   - seated LAQ   - seated ankle dorsiflexion AROM   - seated hamstring/ belt stretch    Assessment  Slow deliberate walk and slight unsteadiness after significant fatigue on knee extension machine. Severe hip flexor weakness evident during double knee to chest.     Plan  Adjust home program in next few sessions.    Home Program  Access Code: [email or web address]  URL: [email or web address]  Date: 10/04/2023  Prepared by: Geovani Dominguez    Exercises  - Hooklying Single Knee to Chest Stretch  - 1 x daily - 7 x weekly - 10 reps  - Supine Lower Trunk Rotation with PLB  - 1 x daily - 7 x weekly - 10 reps  - Supine Piriformis Stretch with Foot on Ground  - 1 x daily - 7 x weekly - 3 reps - 30 sec hold  - Seated Hamstring Stretch  - 1 x daily - 7 x weekly - 3 reps - 20 sec hold  - Sit to Stand Without  Arm Support  - 1 x daily - 7 x weekly - 2 sets - 10 reps  - Standing Near Stance in Corner with Eyes Closed  - 1 x daily - 7 x weekly - 3 reps - 1 min hold  - Prone Quadriceps Stretch with Strap  - 1 x daily - 7 x weekly - 3 reps - 30 sec hold  -----------------------------------------------------------------------------------------------------------------  Access Code: [email or web address]  URL: [email or web address]  Date: 10/12/2023  Prepared by: Geovani Dominguez    Exercises  - Hooklying Single Knee to Chest Stretch  - 1 x daily - 7 x weekly - 10 reps  - Supine Lower Trunk Rotation with PLB  - 1 x daily - 7 x weekly - 10 reps  - Supine Piriformis Stretch with Foot on Ground  - 1 x daily - 7 x weekly - 3 reps - 30 sec hold  - Seated Hamstring Stretch  - 1 x daily - 7 x weekly - 3 reps - 20 sec hold  - Prone Quadriceps Stretch with Strap  - 2 x weekly - 3 reps - 30 sec hold  - Sit to Stand Without Arm Support  - 2 x weekly - 2 sets - 10 reps  - Standing Near Stance in Corner with Eyes Closed  - 2 x weekly - 2 reps - 1 min hold  - Tandem Stance in Corner  - 2 x weekly - 2 sets - 1 min hold  - Corner Balance Feet Together: Eyes Closed With Head Turns  - 2 x weekly - 2 sets - 1 min hold  - Standing March with Counter Support  - 2 x weekly - 2 sets - 1 min hold  ------------------------------------------------------------------------------------------------------------------  Access Code: [email or web address]  URL: [email or web address]  Date: 11/30/2023  Prepared by: Geovani Dominguez    Exercises  - Forward Fold with Feet Apart and Bent Legs  - 1 x daily - 3 x weekly - 10 reps  - Hooklying Single Knee to Chest Stretch  - 1 x daily - 7 x weekly - 10 reps  - Supine Lower Trunk Rotation with PLB  - 1 x daily - 7 x weekly - 10 reps  - Supine Piriformis Stretch with Foot on Ground  - 1 x daily - 7 x weekly - 3 reps - 30 sec hold  - Seated Hamstring Stretch  - 1 x daily - 7 x weekly - 3 reps - 20 sec  hold  - Prone Quadriceps Stretch with Strap  - 7 x weekly - 3 reps - 30 sec hold  - Clamshell with Resistance  - 3 x weekly - 2-3 sets - 10 reps  - Single Leg Bridge  - 3 x weekly - 2-3 sets - 10 reps  - Bird Dog  - 3 x weekly - 2-3 sets - 10 reps  -------------------------------------------------------------------------------------------------------------------------  Access Code: [email or web address]  URL: [email or web address]  Date: 02/16/2024  Prepared by: Geovani Dominguez    Exercises  - Forward Fold with Feet Apart and Bent Legs  - 1 x daily - 3 x weekly - 10 reps  - Hooklying Single Knee to Chest Stretch  - 1 x daily - 7 x weekly - 10 reps  - Supine Lower Trunk Rotation with PLB  - 1 x daily - 7 x weekly - 10 reps  - Supine Piriformis Stretch with Foot on Ground  - 1 x daily - 7 x weekly - 3 reps - 30 sec hold  - Seated Hamstring Stretch  - 1 x daily - 7 x weekly - 3 reps - 20 sec hold  - Prone Quadriceps Stretch with Strap  - 7 x weekly - 3 reps - 30 sec hold  - Clamshell with Resistance  - 3 x weekly - 2-3 sets - 10 reps  - Single Leg Bridge  - 3 x weekly - 2-3 sets - 10 reps  - Bird Dog  - 3 x weekly - 2-3 sets - 10 reps  - Side Stepping with Resistance at Thighs  - 3 x weekly - 3 sets - 10 reps  - Standing Hip Abduction with Resistance at Thighs  - 3 x weekly - 3 sets - 10 reps  -------------------------------------------------------------------------------------------------------------------------  Access Code: [email or web address]  URL: [email or web address]  Date: 05/03/2024  Prepared by: Geovani Dominguez    Exercises  - Supine Lower Trunk Rotation  - 2 x daily - 7 x weekly - 2 sets - 10 reps  - Seated Long Arc Quad  - 2 x daily - 7 x weekly - 2 sets - 10 reps  - Hooklying Isometric Hip Abduction with Belt  - 2 x daily - 7 x weekly - 2 sets - 10 reps  - Hooklying Gluteal Sets  - 2 x daily - 7 x weekly - 2 sets - 10 reps  - Seated Toe Raise  - 2 x daily - 7 x weekly - 2 sets - 10  reps  - Seated Calf Stretch with Strap  - 7 x weekly - 3 reps - 1 min hold    Care Plan  Active       PT Problem       PT Goal 1       Expected End:  02/09/24       1) Modified return to prior level of function to allow continued home independent capability.  2) Reduce back pain levels 4-5/10 maximum in the last week for improvements in quality of life and ability to sleep.   3) Increase bilateral hip strength testing greater than or equal to 4/5 for improvements in low back/ pelvic stability when standing and walking for extended periods of time.   4) Patient to demonstrates capability of walking for 10 minutes with assistive device for integration into the community.  5) Increase LEFS outcome measure to greater than or equal to 60 for meaningful and clinical improvements in patients condition.

## 2024-05-06 ENCOUNTER — SPECIALTY PHARMACY (OUTPATIENT)
Dept: PHARMACY | Facility: CLINIC | Age: 54
End: 2024-05-06

## 2024-05-07 ENCOUNTER — PHARMACY VISIT (OUTPATIENT)
Dept: PHARMACY | Facility: CLINIC | Age: 54
End: 2024-05-07
Payer: COMMERCIAL

## 2024-05-08 ENCOUNTER — SPECIALTY PHARMACY (OUTPATIENT)
Dept: HEMATOLOGY/ONCOLOGY | Facility: HOSPITAL | Age: 54
End: 2024-05-08
Payer: COMMERCIAL

## 2024-05-08 ENCOUNTER — APPOINTMENT (OUTPATIENT)
Dept: HEMATOLOGY/ONCOLOGY | Facility: HOSPITAL | Age: 54
End: 2024-05-08
Payer: COMMERCIAL

## 2024-05-08 ENCOUNTER — TREATMENT (OUTPATIENT)
Dept: PHYSICAL THERAPY | Facility: CLINIC | Age: 54
End: 2024-05-08
Payer: COMMERCIAL

## 2024-05-08 ENCOUNTER — LAB (OUTPATIENT)
Dept: LAB | Facility: HOSPITAL | Age: 54
End: 2024-05-08
Payer: COMMERCIAL

## 2024-05-08 ENCOUNTER — OFFICE VISIT (OUTPATIENT)
Dept: HEMATOLOGY/ONCOLOGY | Facility: HOSPITAL | Age: 54
End: 2024-05-08
Payer: COMMERCIAL

## 2024-05-08 VITALS
DIASTOLIC BLOOD PRESSURE: 73 MMHG | OXYGEN SATURATION: 99 % | RESPIRATION RATE: 20 BRPM | HEART RATE: 102 BPM | SYSTOLIC BLOOD PRESSURE: 114 MMHG | TEMPERATURE: 97.9 F

## 2024-05-08 DIAGNOSIS — R26.2 DIFFICULTY IN WALKING INVOLVING LOWER LEG JOINT: ICD-10-CM

## 2024-05-08 DIAGNOSIS — M48.062 LUMBAR STENOSIS WITH NEUROGENIC CLAUDICATION: ICD-10-CM

## 2024-05-08 DIAGNOSIS — C37 THYMUS CANCER (MULTI): ICD-10-CM

## 2024-05-08 DIAGNOSIS — C37 MALIGNANT THYMOMA (MULTI): ICD-10-CM

## 2024-05-08 DIAGNOSIS — R26.2 DIFFICULTY WALKING: ICD-10-CM

## 2024-05-08 DIAGNOSIS — M48.062 NEUROGENIC CLAUDICATION DUE TO LUMBAR SPINAL STENOSIS: ICD-10-CM

## 2024-05-08 DIAGNOSIS — D49.2 LUMBAR SPINE TUMOR: Primary | ICD-10-CM

## 2024-05-08 DIAGNOSIS — D49.2 ODONTOGENIC TUMOR: ICD-10-CM

## 2024-05-08 DIAGNOSIS — R53.1 WEAKNESS: ICD-10-CM

## 2024-05-08 LAB
ALBUMIN SERPL BCP-MCNC: 4.1 G/DL (ref 3.4–5)
ALP SERPL-CCNC: 43 U/L (ref 33–120)
ALT SERPL W P-5'-P-CCNC: 16 U/L (ref 10–52)
ANION GAP SERPL CALC-SCNC: 15 MMOL/L (ref 10–20)
AST SERPL W P-5'-P-CCNC: 32 U/L (ref 9–39)
BASOPHILS # BLD AUTO: 0.01 X10*3/UL (ref 0–0.1)
BASOPHILS NFR BLD AUTO: 0.1 %
BILIRUB SERPL-MCNC: 0.6 MG/DL (ref 0–1.2)
BUN SERPL-MCNC: 15 MG/DL (ref 6–23)
CALCIUM SERPL-MCNC: 9.3 MG/DL (ref 8.6–10.3)
CHLORIDE SERPL-SCNC: 97 MMOL/L (ref 98–107)
CO2 SERPL-SCNC: 30 MMOL/L (ref 21–32)
COTININE UR QL SCN: NEGATIVE
CREAT SERPL-MCNC: 0.92 MG/DL (ref 0.5–1.3)
EGFRCR SERPLBLD CKD-EPI 2021: >90 ML/MIN/1.73M*2
EOSINOPHIL # BLD AUTO: 0.05 X10*3/UL (ref 0–0.7)
EOSINOPHIL NFR BLD AUTO: 0.5 %
ERYTHROCYTE [DISTWIDTH] IN BLOOD BY AUTOMATED COUNT: 16.4 % (ref 11.5–14.5)
GLUCOSE SERPL-MCNC: 85 MG/DL (ref 74–99)
HCT VFR BLD AUTO: 38.2 % (ref 41–52)
HGB BLD-MCNC: 12.5 G/DL (ref 13.5–17.5)
IMM GRANULOCYTES # BLD AUTO: 0.04 X10*3/UL (ref 0–0.7)
IMM GRANULOCYTES NFR BLD AUTO: 0.4 % (ref 0–0.9)
LDH SERPL L TO P-CCNC: 163 U/L (ref 84–246)
LYMPHOCYTES # BLD AUTO: 0.51 X10*3/UL (ref 1.2–4.8)
LYMPHOCYTES NFR BLD AUTO: 5.1 %
MCH RBC QN AUTO: 25.3 PG (ref 26–34)
MCHC RBC AUTO-ENTMCNC: 32.7 G/DL (ref 32–36)
MCV RBC AUTO: 77 FL (ref 80–100)
MONOCYTES # BLD AUTO: 0.47 X10*3/UL (ref 0.1–1)
MONOCYTES NFR BLD AUTO: 4.7 %
NEUTROPHILS # BLD AUTO: 8.98 X10*3/UL (ref 1.2–7.7)
NEUTROPHILS NFR BLD AUTO: 89.2 %
NRBC BLD-RTO: 0 /100 WBCS (ref 0–0)
PLATELET # BLD AUTO: 333 X10*3/UL (ref 150–450)
POTASSIUM SERPL-SCNC: 3.7 MMOL/L (ref 3.5–5.3)
PROT SERPL-MCNC: 7.3 G/DL (ref 6.4–8.2)
RBC # BLD AUTO: 4.95 X10*6/UL (ref 4.5–5.9)
SODIUM SERPL-SCNC: 138 MMOL/L (ref 136–145)
WBC # BLD AUTO: 10.1 X10*3/UL (ref 4.4–11.3)

## 2024-05-08 PROCEDURE — 83615 LACTATE (LD) (LDH) ENZYME: CPT

## 2024-05-08 PROCEDURE — 3078F DIAST BP <80 MM HG: CPT | Performed by: CLINICAL NURSE SPECIALIST

## 2024-05-08 PROCEDURE — 80053 COMPREHEN METABOLIC PANEL: CPT

## 2024-05-08 PROCEDURE — 36415 COLL VENOUS BLD VENIPUNCTURE: CPT

## 2024-05-08 PROCEDURE — 85025 COMPLETE CBC W/AUTO DIFF WBC: CPT

## 2024-05-08 PROCEDURE — 97110 THERAPEUTIC EXERCISES: CPT | Mod: GP | Performed by: PHYSICAL THERAPIST

## 2024-05-08 PROCEDURE — 3074F SYST BP LT 130 MM HG: CPT | Performed by: CLINICAL NURSE SPECIALIST

## 2024-05-08 PROCEDURE — 99215 OFFICE O/P EST HI 40 MIN: CPT | Performed by: CLINICAL NURSE SPECIALIST

## 2024-05-08 RX ORDER — DEXAMETHASONE 4 MG/1
4 TABLET ORAL DAILY
Qty: 21 TABLET | Refills: 0 | Status: SHIPPED | OUTPATIENT
Start: 2024-05-08 | End: 2024-06-04 | Stop reason: HOSPADM

## 2024-05-08 RX ORDER — GABAPENTIN 300 MG/1
300 CAPSULE ORAL 3 TIMES DAILY
Qty: 90 CAPSULE | Refills: 3 | Status: SHIPPED | OUTPATIENT
Start: 2024-05-08 | End: 2024-06-04 | Stop reason: HOSPADM

## 2024-05-08 ASSESSMENT — ENCOUNTER SYMPTOMS
VERTIGO: 0
CHILLS: 0
ANOREXIA: 0
VOMITING: 0
SWOLLEN GLANDS: 0
FATIGUE: 0
FEVER: 0
DIAPHORESIS: 0
CHANGE IN BOWEL HABIT: 0
COUGH: 0
ARTHRALGIAS: 0
HEADACHES: 0
WEAKNESS: 1
VISUAL CHANGE: 0
SORE THROAT: 0
NUMBNESS: 0
ABDOMINAL PAIN: 0
NECK PAIN: 0
MYALGIAS: 0
NAUSEA: 0
JOINT SWELLING: 0

## 2024-05-08 ASSESSMENT — PAIN SCALES - GENERAL: PAINLEVEL: 0-NO PAIN

## 2024-05-08 NOTE — PROGRESS NOTES
Physical Therapy Re-Examination Treatment Note    Patient Name: Altaf Parsons  MRN Number: [id number]  Initial PT Examination Date: 8/10/23  Referring Clinician: Dr. Rodriguez  Time Calculation  Start Time: 1143  Stop Time: 1217  Time Calculation (min): 34 min    Insurance  Visit Number: 19  Approved Number of Visits: 30/ year  Certification Period: N/A    Precautions  History of spinal cancer; spinal fusion 1/5/21  Moderate fall risk    Problem List  1. Lumbar spine tumor        2. Neurogenic claudication due to lumbar spinal stenosis  Follow Up In Physical Therapy      3. Weakness        4. Difficulty walking        5. Difficulty in walking involving lower leg joint  Follow Up In Physical Therapy      6. Odontogenic tumor  Follow Up In Physical Therapy        Subjective  Saturday/ Sunday this past week he was able to get on to the bike, rode for about 10 minutes without resistance, has been doing the exercises at home.     Treatment  Therapeutic Exercise   - supine trunk rotation, active assisted, belt around knees x10   - abduction against belt 2x10    - supine gluteal sets   - discussed biking 1-2 times a day, 10 minutes or more and adding resistance as able   - seated ankle dorsiflexion x10   - seated theraband trunk rotation    - instructed to perform this way, rather than standing like HEP picture shows   - seated hip abduction against band   - seated calf/ hamstring stretch, foot on stool   - HEP printed/ issued/ discussed     Assessment  He continues to have difficult with transfers, severe weakness and fatigues quickly.    Plan  Continue strengthening.     Home Program  Access Code: [email or web address]  URL: [email or web address]  Date: 10/04/2023  Prepared by: Geovani Dominguez    Exercises  - Hooklying Single Knee to Chest Stretch  - 1 x daily - 7 x weekly - 10 reps  - Supine Lower Trunk Rotation with PLB  - 1 x daily - 7 x weekly - 10 reps  - Supine Piriformis Stretch with Foot on Ground  - 1 x  daily - 7 x weekly - 3 reps - 30 sec hold  - Seated Hamstring Stretch  - 1 x daily - 7 x weekly - 3 reps - 20 sec hold  - Sit to Stand Without Arm Support  - 1 x daily - 7 x weekly - 2 sets - 10 reps  - Standing Near Stance in Corner with Eyes Closed  - 1 x daily - 7 x weekly - 3 reps - 1 min hold  - Prone Quadriceps Stretch with Strap  - 1 x daily - 7 x weekly - 3 reps - 30 sec hold  -----------------------------------------------------------------------------------------------------------------  Access Code: [email or web address]  URL: [email or web address]/  Date: 10/12/2023  Prepared by: Geovani Dominguez    Exercises  - Hooklying Single Knee to Chest Stretch  - 1 x daily - 7 x weekly - 10 reps  - Supine Lower Trunk Rotation with PLB  - 1 x daily - 7 x weekly - 10 reps  - Supine Piriformis Stretch with Foot on Ground  - 1 x daily - 7 x weekly - 3 reps - 30 sec hold  - Seated Hamstring Stretch  - 1 x daily - 7 x weekly - 3 reps - 20 sec hold  - Prone Quadriceps Stretch with Strap  - 2 x weekly - 3 reps - 30 sec hold  - Sit to Stand Without Arm Support  - 2 x weekly - 2 sets - 10 reps  - Standing Near Stance in Corner with Eyes Closed  - 2 x weekly - 2 reps - 1 min hold  - Tandem Stance in Corner  - 2 x weekly - 2 sets - 1 min hold  - Corner Balance Feet Together: Eyes Closed With Head Turns  - 2 x weekly - 2 sets - 1 min hold  - Standing March with Counter Support  - 2 x weekly - 2 sets - 1 min hold  ------------------------------------------------------------------------------------------------------------------  Access Code: [email or web address]  URL: [email or web address]/  Date: 11/30/2023  Prepared by: Geovani Dominguez    Exercises  - Forward Fold with Feet Apart and Bent Legs  - 1 x daily - 3 x weekly - 10 reps  - Hooklying Single Knee to Chest Stretch  - 1 x daily - 7 x weekly - 10 reps  - Supine Lower Trunk Rotation with PLB  - 1 x daily - 7 x weekly - 10 reps  - Supine Piriformis Stretch with  Foot on Ground  - 1 x daily - 7 x weekly - 3 reps - 30 sec hold  - Seated Hamstring Stretch  - 1 x daily - 7 x weekly - 3 reps - 20 sec hold  - Prone Quadriceps Stretch with Strap  - 7 x weekly - 3 reps - 30 sec hold  - Clamshell with Resistance  - 3 x weekly - 2-3 sets - 10 reps  - Single Leg Bridge  - 3 x weekly - 2-3 sets - 10 reps  - Bird Dog  - 3 x weekly - 2-3 sets - 10 reps  -------------------------------------------------------------------------------------------------------------------------  Access Code: [email or web address]  URL: [email or web address].[employer]/  Date: 02/16/2024  Prepared by: Geovani Dominguez    Exercises  - Forward Fold with Feet Apart and Bent Legs  - 1 x daily - 3 x weekly - 10 reps  - Hooklying Single Knee to Chest Stretch  - 1 x daily - 7 x weekly - 10 reps  - Supine Lower Trunk Rotation with PLB  - 1 x daily - 7 x weekly - 10 reps  - Supine Piriformis Stretch with Foot on Ground  - 1 x daily - 7 x weekly - 3 reps - 30 sec hold  - Seated Hamstring Stretch  - 1 x daily - 7 x weekly - 3 reps - 20 sec hold  - Prone Quadriceps Stretch with Strap  - 7 x weekly - 3 reps - 30 sec hold  - Clamshell with Resistance  - 3 x weekly - 2-3 sets - 10 reps  - Single Leg Bridge  - 3 x weekly - 2-3 sets - 10 reps  - Bird Dog  - 3 x weekly - 2-3 sets - 10 reps  - Side Stepping with Resistance at Thighs  - 3 x weekly - 3 sets - 10 reps  - Standing Hip Abduction with Resistance at Thighs  - 3 x weekly - 3 sets - 10 reps  -------------------------------------------------------------------------------------------------------------------------  Access Code: [email or web address]  URL: [email or web address]  Date: 05/03/2024  Prepared by: Geovani Dominguez    Exercises  - Supine Lower Trunk Rotation  - 2 x daily - 7 x weekly - 2 sets - 10 reps  - Seated Long Arc Quad  - 2 x daily - 7 x weekly - 2 sets - 10 reps  - Hooklying Isometric Hip Abduction with Belt  - 2 x daily - 7 x weekly - 2 sets - 10  reps  - Hooklying Gluteal Sets  - 2 x daily - 7 x weekly - 2 sets - 10 reps  - Seated Toe Raise  - 2 x daily - 7 x weekly - 2 sets - 10 reps  - Seated Calf Stretch with Strap  - 7 x weekly - 3 reps - 1 min hold  -------------------------------------------------------------------------------------------------------------------------  Access Code: [email or web address]  URL: [email or web address]  Date: 05/08/2024  Prepared by: Geovani Dominguez    Exercises  - Supine Lower Trunk Rotation  - 2 x daily - 7 x weekly - 2 sets - 10 reps  - Seated Long Arc Quad  - 2 x daily - 7 x weekly - 2 sets - 10 reps  - Hooklying Isometric Hip Abduction with Belt  - 2 x daily - 7 x weekly - 2 sets - 10 reps  - Hooklying Gluteal Sets  - 2 x daily - 7 x weekly - 2 sets - 10 reps  - Seated Toe Raise  - 2 x daily - 7 x weekly - 2 sets - 10 reps  - Seated Calf Stretch with Strap  - 7 x weekly - 3 reps - 1 min hold  - Seated Hip Abduction with Resistance  - 7 x weekly - 2 sets - 10 reps  - Standing Trunk Rotation with Resistance  - 7 x weekly - 2 sets - 10 reps    Care Plan  1) Modified return to prior level of function to allow continued home independent capability.  2) Reduce back pain levels 4-5/10 maximum in the last week for improvements in quality of life and ability to sleep.   3) Increase bilateral hip strength testing greater than or equal to 4/5 for improvements in low back/ pelvic stability when standing and walking for extended periods of time.   4) Patient to demonstrates capability of walking for 10 minutes with cane for integration into the community.  5) Increase LEFS outcome measure to greater than or equal to 60 for meaningful and clinical improvements in patients condition.   6) - Perform sit to stand transfer from 19 inch chair x1 without use of UE for improved function and independence.

## 2024-05-08 NOTE — PROGRESS NOTES
Patient ID: Altaf Parsons is a 53 y.o. male.    DIAGNOSIS     Malignant thymoma. Type B2 (predominant lymphocytic population with scattered epithelial cells). Date of diagnosis is March 4, 2016 from a left lower lobe of the lung wedge resection and mediastinal fat biopsy.A left pleural nodule was biopsied in May  2017 showing thymoma. A left serratus anterior biopsy in November 2019 showed thymoma. A biopsy from a T12-L1 paraspinal mass on August 19, 2020 shows again thymoma. A spine tumor biopsy in January 2021 again shows thymoma.        STAGING     T2 N0 M1 (metastases to the lungs bilaterally)        CURRENT SITES OF DISEASE     Mediastinum, lung, parapinal region T, L, S spine, left pleura, T12-L1 paraspinal region, left lateral aspect of the spinal canal spanning from at least T11-S1. Findings likely  represent epidural tumoral extension as seen on MRI thoracic and lumbar spine 06/26/2021. Interval worsening/development of widespread osseous metastasis noted throughout the thoracic and lumbar spine with ventral epidural tumoral extension from the T6  level through the L4 level. Canal stenosis secondary to epidural tumor appears to be more severe at the T8 level through the T10 level. At the T8-9 level there appears to be mild flattening of the contour of the   cord. Recent MRI T/L spine (11/11/22) demonstrates  increased/new tumor extension across multiple spinal levels, particularly at T9-T11 and L1-L2 with marked stenosis at L1-L2.        MOLECULAR GENOMICS     PD-L1 22C3 FDA (KEYTRUDA) for Gastric/GEA: CPS>/=1 (PD-L1 EXPRESSION) Combined Positive Score: 80   TEST: Solid Focus Tumor DNA Panel SPECIMEN: FFPE, Left Serratus Anterior, Biopsy, [id number] A DISEASE DIAGNOSIS: Thyoma Estimated Tumor Content: 30% COLLECTION DATE: 11/13/2019 RECEIVED DATE: 08/11/2020 REPORT DATE: 08/14/2020 MICROSATELLITE STATUS: Microsatellite  Stable (CHERYL) DISEASE ASSOCIATED GENOMIC FINDINGS: None         PRIOR THERAPY     1-left  video-assisted thoracic surgery extensive lysis of adhesions and total pulmonary decortication, lower lobe wedge, exploration of the mediastinum converted to  thoracotomy, resection of anterior mediastinal mass and thymus, upper lobe wedge. Per surgery there is residual disease.  2-external beam radiation therapy August 1, 2016- Sept 17, 2016  5940 CGyE using 180 CGyE daily to surgical site   3- cyclophosphamide, adriamycin, cisplatin on 8/14/17 x 3 cycles      4- 3/19/18-4/17/18: recurrent left paraspinal T12-L1, 50 CGE/20 fx PROTONS Had progression but declined systemic therapy.      5- 12/10/19- 12/31/19: SINDI mass and left serratus anterior mass, 45 CGE/15 fx, PROTONS      6- Stenosis from T11-L1 secondary to large intraspinal extradural mass at the T12-L1 level/neoplasm. OPERATION/PROCEDURE: 1. Posterior spinal fusion T9-L1 with use of bilateral  pedicle screw instrumentation, allograft, and demineralized bone matrix fiber. 2. T12 laminectomy with laminotomy of L1 and T11 to decompress T11-L1 levels and remove the intraspinal extradural neoplasm at the T12-L1 segment.      7- On 8/4/21, he saw ortho spine surgery (Dr. Delgado) who did not recommend further surgery.      8- palliative radiation to the T-L/S1 spine: 30 Gy in 10 daily fractions, completed 9/27/21.     9- Single agents alimta per NCCN guideliens on January 10, 2022.   Received 2 cycles in JanuaryJanuary 2022.  Progressive disease based on MRI in February 2022     10- Radiation therapy to 30 Gy in 10 fractions, from T4-T6 and L1-L3 completed in mid April 2022 11- 1- Dec 2022 - single agent capecitabine 500 mg tab, 4 tabs BID. Cycle - 21 days (two weeks on with one week holiday).  Patient initially underdosed himself with cycle #1 despite clear instructions.  He took cycle 2 at full dose on January 26, 2023.   Delay in C3 due to insurance issues with plan to resume again in April 2023.  Cycle 3 4/4/23 - 4/17 with one week off ending 4/24.  Cycle 4  starts 4/25/23.   He  started (C4) on 4/25/23.  Stable disease as best response        CURRENT THERAPY     1-Single agent Afinitor per NCCN guidelines, started 9/28/2023, documented stable disease on imaging end of November 2023     2- supportive oncology/pain management     3- PET/NET scan to assess for somatostatin receptor status and consideration of octreotide based therapy in the future. 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 He underwent LEFT VATS DRAINAGE OF MASSIVE HEMOTHORAX, PLEURAL BIOPSY, AND CORE NEEDLE  BIOPSY OF LINGULAR MASS AND DECORTICATION on July 18, 2007. Biopsies were all negative. He was also seen on CT scan to have an approximately 7 cm lesion within the mediastinum with a calcified rim. He was followed serially with CT scans of the chest through  2009. He was seen later in follow-up in 2016. MRI of the chest in Feb 2016 showed a new enhancing soft tissue mass immediately superior to the previously described calcified cystic structure. Also a new enhancing left pericardial lymph node and left basilar  pulmonary nodule were seen. CT scan of the chest also showed a right lower lobe nodule. He was taken to the operating room on March 4, 2016 where the necrotic calcified mass was removed showing only necrotic debris. Pathology favored a necrotic tumor  although no viable cancer cells were seen. The separate lesion within the mediastinum was shown to be a malignant thymoma. Wedge resection of the left sided pulmonary lesion also showed malignant thymoma. Underwent gross resection of the anterior mediastinal  mass at that time, Proton beam radiation to his surgical  bed.  He had recurrence in 2017 and received 3 cycles of systemic therapy complicated by nausea and vomiting.He was last seen by medical oncology in early 2018 and since then has not wanted any  systemic therapy and is followed with radiation oncology only. During this time he has had recurrent bronchospasm, left anterior serratus muscle, multiple recurrences of his T12-L1 region requiring 2 separate courses of radiation therapy as well as a  surgical decompression laminectomy and fusion and most recently has had further progression and is agreed to come back to the medical oncology.         PAST MEDICAL HISTORY     1-hypertension  2-spontaneous hemothorax on the left side in 2007  3-GERD  4-Achilles tendon repair  5-chronic back pain from work related back injury        SOCIAL HISTORY      Patient lives in [place]. Has 2 children age 6 and 13. 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 He has never smoked. Does not drink alcohol. He has been on disability now for a couple of months.  He has been off work since August 27, 2021.        CURRENT MEDS     per list        ALLERGIES     Denies any drug allergies        FAMILY HISTORY     No family history of cancer.        Subjective    Today I am meeting with Altaf Parsons and his wife.  They report that about 5 weeks ago he noticed increased weakness in his leg- the weakness became progressively worse, he fell, and finally noted that his left leg was dragging.  He can no longer bear weight with that leg, and did go to the emergency room, and was admitted.  During admission he was seen by radiation oncology after MRI showed spread of cancer to cauda equina area, but radiation onc.  Does not feel they can do more radiation.  He is here today to discuss options going forward.  He has not noticed any improvement with the dexamethasone 4 mg every day since discharge.  Otherwise he is feeling well.  His wife reports that his 2 children and she are working in shifts to help him at  home.  They would like OT help at home to determine what is needed.  He continues to tolerate and take the everolimuswithout side effect.      Cancer  Associated symptoms include weakness. Pertinent negatives include no abdominal pain, anorexia, arthralgias, change in bowel habit, chest pain, chills, congestion, coughing, diaphoresis, fatigue, fever, headaches, joint swelling, myalgias, nausea, neck pain, numbness, rash, sore throat, swollen glands, urinary symptoms, vertigo, visual change or vomiting.             Objective    BSA: There is no height or weight on file to calculate BSA.  There were no vitals taken for this visit.     Physical Exam  Constitutional:       General: He is not in acute distress.     Appearance: He is not ill-appearing, toxic-appearing or diaphoretic.   HENT:      Mouth/Throat:      Pharynx: Oropharynx is clear. No oropharyngeal exudate or posterior oropharyngeal erythema.   Eyes:      General: No scleral icterus.     Conjunctiva/sclera: Conjunctivae normal.   Cardiovascular:      Rate and Rhythm: Normal rate and regular rhythm.      Pulses: Normal pulses.      Heart sounds: Normal heart sounds. No murmur heard.     No friction rub. No gallop.   Pulmonary:      Effort: Pulmonary effort is normal. No respiratory distress.      Breath sounds: Normal breath sounds. No stridor. No wheezing, rhonchi or rales.   Chest:      Chest wall: No tenderness.   Abdominal:      General: Abdomen is flat. Bowel sounds are normal. There is no distension.      Palpations: Abdomen is soft. There is no mass.      Tenderness: There is no abdominal tenderness. There is no guarding or rebound.   Musculoskeletal:         General: Deformity present. No swelling, tenderness or signs of injury.      Cervical back: No tenderness.      Right lower leg: No edema.      Left lower leg: No edema.      Comments: Unable to move left leg voluntarily, weak, unable to stand and bear weight on left leg, upper extremities equally  strong   Lymphadenopathy:      Cervical: No cervical adenopathy.   Skin:     General: Skin is warm.      Coloration: Skin is not jaundiced or pale.      Findings: No bruising or erythema.   Neurological:      Mental Status: Mental status is at baseline.      Motor: Weakness present.      Comments: Left lower extremity weakness   Psychiatric:         Mood and Affect: Mood normal.         Behavior: Behavior normal.         Performance Status:  Symptomatic; fully ambulatory     Latest Reference Range & Units 03/06/24 09:39   GLUCOSE 74 - 99 mg/dL 88   SODIUM 136 - 145 mmol/L 137   POTASSIUM 3.5 - 5.3 mmol/L 3.9   CHLORIDE 98 - 107 mmol/L 98   Bicarbonate 21 - 32 mmol/L 30   Anion Gap 10 - 20 mmol/L 13   Blood Urea Nitrogen 6 - 23 mg/dL 12   Creatinine 0.50 - 1.30 mg/dL 0.89   EGFR >60 mL/min/1.73m*2 >90   Calcium 8.6 - 10.3 mg/dL 9.2   Albumin 3.4 - 5.0 g/dL 4.4   Alkaline Phosphatase 33 - 120 U/L 46   ALT 10 - 52 U/L 38   AST 9 - 39 U/L 76 (H)   Bilirubin Total 0.0 - 1.2 mg/dL 0.5   Total Protein 6.4 - 8.2 g/dL 7.8   LDH 84 - 246 U/L 237   WBC 4.4 - 11.3 x10*3/uL 3.1 (L)   nRBC 0.0 - 0.0 /100 WBCs 0.0   RBC 4.50 - 5.90 x10*6/uL 5.20   HEMOGLOBIN 13.5 - 17.5 g/dL 13.2 (L)   HEMATOCRIT 41.0 - 52.0 % 40.7 (L)   MCV 80 - 100 fL 78 (L)   MCH 26.0 - 34.0 pg 25.4 (L)   MCHC 32.0 - 36.0 g/dL 32.4   RED CELL DISTRIBUTION WIDTH 11.5 - 14.5 % 16.5 (H)   Platelets 150 - 450 x10*3/uL 389   Neutrophils % 40.0 - 80.0 % 47.5   Immature Granulocytes %, Automated 0.0 - 0.9 % 0.3   Lymphocytes % 13.0 - 44.0 % 26.6   Monocytes % 2.0 - 10.0 % 8.3   Eosinophils % 0.0 - 6.0 % 16.7   Basophils % 0.0 - 2.0 % 0.6   Neutrophils Absolute 1.20 - 7.70 x10*3/uL 1.48   Immature Granulocytes Absolute, Automated 0.00 - 0.70 x10*3/uL 0.01   Lymphocytes Absolute 1.20 - 4.80 x10*3/uL 0.83 (L)   Monocytes Absolute 0.10 - 1.00 x10*3/uL 0.26   Eosinophils Absolute 0.00 - 0.70 x10*3/uL 0.52   Basophils Absolute 0.00 - 0.10 x10*3/uL 0.02   (H): Data is  abnormally high  (L): Data is abnormally low    MRI Thoracic/lumbar spine 2/29/2024  IMPRESSION:  New loss of vertebral body height centrally at T8 with 25-50% loss of  vertebral body height and no osseous retropulsion into the spinal  canal. Otherwise no significant interval change from the prior exam  11/16/2023. Bone marrow replacing lesions throughout the thoracic  spine and tumor within the epidural space and neural foramen are not  significantly changed.  IMPRESSION:  Epidural tumor within the right neural foramen at L1-L2 appears  slightly increased compared to the prior exam 11/15/2023. Otherwise,  no significant interval change in the marrow replacing lesions and  epidural tumor compared to the prior exam.      Assessment/Plan     This is a very nice 53-year-old gentleman with malignant thymoma type B2 diagnosed now 8 years ago in March 2016, metastatic to the thoracic lumbar and sacral spine and epidural tumor extension with chronic left lower extremity weakness.  He has been on single agent Afinitor for since September 2023 with stable disease, however he recently developed increased weakness in left leg, and was found to have tumor extension into the cauda equina.  He has not noticed any improvement with steroids.  Today we will do the following:   Order LP to see if leptomeningeal disease is present  Continue dexamethasone 4 mg every day for now  Order OT for help with home management  Alert  to need for modifications/ assistance and equipment at home.    Renew gabapentin, dexamethasone.    Patient has follow-up with surgeon Manolo Melendez MD on 5/22- urged not to miss this appointment  Schedule with Dr. Rodriguez after LP to determine next steps for his cancer treatment- will continue everolimus for now.     Cancer Staging   Neoplasm of thymus  Staging form: Thymus, AJCC 8th Edition  - Clinical stage from 3/4/2016: Stage SHONDA (cT2, cN0, cM1a) - Signed by Justo Rodriguez MD on  11/22/2023      Oncology History   Neoplasm of thymus   3/4/2016 Cancer Staged    Staging form: Thymus, AJCC 8th Edition, Clinical stage from 3/4/2016: Stage SHONDA (cT2, cN0, cM1a) - Signed by Justo Rodriguez MD on 11/22/2023     10/6/2023 Initial Diagnosis    Neoplasm of thymus     Malignant thymoma (Multi)   9/28/2023 -  Chemotherapy    Everolimus, 28 Day Cycles      10/6/2023 Initial Diagnosis    Malignant thymoma (CMS/HCC)         Justo Rodriguez MD  Professor of Medicine and Oncology  [facility]  [employer] Chair in Lung Cancer  Thoracic Oncology  [facility]

## 2024-05-09 ENCOUNTER — OFFICE VISIT (OUTPATIENT)
Dept: PRIMARY CARE | Facility: CLINIC | Age: 54
End: 2024-05-09
Payer: COMMERCIAL

## 2024-05-09 VITALS
BODY MASS INDEX: 30.24 KG/M2 | WEIGHT: 223 LBS | OXYGEN SATURATION: 94 % | SYSTOLIC BLOOD PRESSURE: 109 MMHG | TEMPERATURE: 96.6 F | DIASTOLIC BLOOD PRESSURE: 74 MMHG | HEART RATE: 94 BPM

## 2024-05-09 DIAGNOSIS — I10 HYPERTENSION, UNSPECIFIED TYPE: ICD-10-CM

## 2024-05-09 DIAGNOSIS — D49.2 LUMBAR SPINE TUMOR: Primary | ICD-10-CM

## 2024-05-09 DIAGNOSIS — C37 MALIGNANT THYMOMA (MULTI): ICD-10-CM

## 2024-05-09 DIAGNOSIS — M89.9 BONE LESION: ICD-10-CM

## 2024-05-09 DIAGNOSIS — R26.2 DIFFICULTY WALKING: ICD-10-CM

## 2024-05-09 PROCEDURE — 3078F DIAST BP <80 MM HG: CPT | Performed by: INTERNAL MEDICINE

## 2024-05-09 PROCEDURE — 99213 OFFICE O/P EST LOW 20 MIN: CPT | Performed by: INTERNAL MEDICINE

## 2024-05-09 PROCEDURE — 1036F TOBACCO NON-USER: CPT | Performed by: INTERNAL MEDICINE

## 2024-05-09 PROCEDURE — 3074F SYST BP LT 130 MM HG: CPT | Performed by: INTERNAL MEDICINE

## 2024-05-09 RX ORDER — LISINOPRIL 20 MG/1
20 TABLET ORAL DAILY
Qty: 100 TABLET | Refills: 3 | Status: SHIPPED | OUTPATIENT
Start: 2024-05-09 | End: 2025-06-13

## 2024-05-09 ASSESSMENT — PATIENT HEALTH QUESTIONNAIRE - PHQ9
1. LITTLE INTEREST OR PLEASURE IN DOING THINGS: NOT AT ALL
2. FEELING DOWN, DEPRESSED OR HOPELESS: NOT AT ALL
SUM OF ALL RESPONSES TO PHQ9 QUESTIONS 1 AND 2: 0

## 2024-05-09 ASSESSMENT — ENCOUNTER SYMPTOMS
OCCASIONAL FEELINGS OF UNSTEADINESS: 0
DEPRESSION: 0
LOSS OF SENSATION IN FEET: 0

## 2024-05-09 NOTE — PROGRESS NOTES
Subjective   Patient ID: Altaf Parsons is a 53 y.o. male who presents for Hospital Follow-up (4/26/24).    HPI   53 years old male comes in to see me today with a history of malignant thymoma metastasized to the bones and lung.  He had problem with his left leg unable to weight-bear on it and unable to move or walk, he was told that there is a pinching nerve in his spine causing this.  The weakness in the left leg has been going on for 5 weeks.  He is in the wheelchair now.  He is able to ambulate with a walker at times.  He is asking for a lift chair.  Review of Systems  12 system reviewed and 12 systems are negative.  He feels very well he said.  He needs a refill on blood pressure pill.  We tried to explain that his blood pressure is on the low side and we want to start him on lisinopril alone 20 mg a day and he will agrees with the idea.  He is to consult Dr. Kory Melendez for his back and for the pinched nerve.  Medications reviewed and reconciled.  He is on oxycodone and Decadron, everolimus 10 mg daily.  Gabapentin 300 mg 3 times a day daily lisinopril is going to be 20 mg a day from now on.  Robaxin 500 mg every 6 hours.  Pantoprazole as needed.  Objective   /74 (BP Location: Left arm, Patient Position: Sitting, BP Cuff Size: Adult)   Pulse 94   Temp 35.9 °C (96.6 °F) (Temporal)   Wt 101 kg (223 lb)   SpO2 94%   BMI 30.24 kg/m²     Physical Exam  Alert oriented in no distress nonicteric sclera very nice very pleasant.  Sitting in the wheelchair.  Unable to move his left leg.  Is almost paralyzed.  Neck supple no masses no lymph node no thyromegaly or jugular venous distention.  Lungs clear no rales wheezing or crackles.  Heart normal S1 and S2 regular rhythm.  Abdomen benign nontender no masses no organomegaly neurologically left leg weakness almost paralyzed from things lumbar area.  I reviewed MRI of the lumbar spine which showed when compared to the prior MRI of February this year new  enhancement of the cauda equina nerve roots and the conus concerning for spread of the malignancy leptomeningeal.  Slight increase in the epidural expression of the tumor at L1-L2.  Enhancement of epidural tumor extension at L4-L5 and S1 to the left with compression of the thecal sac on the left side.  The revision of the thoracic spine MRI revealed that he had extensive osseous metastatic disease throughout the thoracic spine with epidural extension and cord compression.  Not sure how much the patient is aware of but in my opinion had no does not look fluid.  I told him to come back shortly so we can discuss this further to make him aware of these findings.  Because in my opinion and the way he talked about it he felt everything is good except he does have something pinching in the lower back and that is why his left leg is weak.  They want to try to treat that he is.  Assessment/Plan   Diagnoses and all orders for this visit:  Lumbar spine tumor  Difficulty walking  Bone lesion  Malignant thymoma (Multi)

## 2024-05-14 ENCOUNTER — APPOINTMENT (OUTPATIENT)
Dept: RADIOLOGY | Facility: CLINIC | Age: 54
End: 2024-05-14
Payer: COMMERCIAL

## 2024-05-16 ENCOUNTER — PATIENT OUTREACH (OUTPATIENT)
Dept: CARE COORDINATION | Facility: CLINIC | Age: 54
End: 2024-05-16
Payer: COMMERCIAL

## 2024-05-16 ENCOUNTER — HOSPITAL ENCOUNTER (OUTPATIENT)
Dept: RADIOLOGY | Facility: CLINIC | Age: 54
Discharge: HOME | End: 2024-05-16
Payer: COMMERCIAL

## 2024-05-16 NOTE — PROGRESS NOTES
Attempted follow up call, post hospital DC.  Left msg on VM, provided my contact information for return call.

## 2024-05-17 ENCOUNTER — PATIENT OUTREACH (OUTPATIENT)
Dept: CARE COORDINATION | Facility: CLINIC | Age: 54
End: 2024-05-17
Payer: COMMERCIAL

## 2024-05-17 DIAGNOSIS — C37 THYMUS CANCER (MULTI): Primary | ICD-10-CM

## 2024-05-17 RX ORDER — OXYCODONE HYDROCHLORIDE 10 MG/1
10 TABLET ORAL
Qty: 90 TABLET | Refills: 0 | Status: SHIPPED | OUTPATIENT
Start: 2024-05-17 | End: 2024-06-04 | Stop reason: HOSPADM

## 2024-05-17 NOTE — PROGRESS NOTES
Received return call from pt.  Reports he has been having increased difficulty w ambulating recently, also has continued back and LLE pain.  Working w PT but per pt, they are unable to help further.  Using walker and wheelchair for assistance.  Asking if I could contact Carla Meza at [facility] as he needs a refill of oxycodone, to be obtained at [facility].  States he currently has about 15 tabs remaining but does not have his bottle of medication with him at this time.  Has follow up visits w Ortho on 5.22, PT on 5.23 and 5.29, and Dr. Rodriguez on 6.5.  Advised I would send message to Carla per his request; pt is appreciative.  Message sent.  Continue to follow.

## 2024-05-22 ENCOUNTER — OFFICE VISIT (OUTPATIENT)
Dept: ORTHOPEDIC SURGERY | Facility: CLINIC | Age: 54
End: 2024-05-22
Payer: COMMERCIAL

## 2024-05-22 DIAGNOSIS — C79.51 METASTATIC CANCER TO SPINE (MULTI): Primary | ICD-10-CM

## 2024-05-22 PROCEDURE — 99214 OFFICE O/P EST MOD 30 MIN: CPT | Performed by: ORTHOPAEDIC SURGERY

## 2024-05-22 PROCEDURE — 1036F TOBACCO NON-USER: CPT | Performed by: ORTHOPAEDIC SURGERY

## 2024-05-22 NOTE — LETTER
May 22, 2024     Darius Meza MD  [address] [facility] [address]    Patient: Altaf Parsons   YOB: 1970   Date of Visit: 5/22/2024       Dear Dr. Darius Meza MD:    Thank you for referring Altaf Parsons to me for evaluation. Below are my notes for this consultation.  If you have questions, please do not hesitate to call me. I look forward to following your patient along with you.       Sincerely,     Kory Melendez MD      CC: No Recipients  ______________________________________________________________________________________    Calin and his wife returned.  By review, he is a 53-year-old man who I saw on 1 occasion in May 2023.  He has a history of metastatic thymoma.  He had had a prior thoracic decompression and fusion for metastatic disease by my partner, Dr. Delgado in 2021 that he did well with.    When I evaluated him in May 2023, he was ambulatory and effectively asymptomatic.  At that time he was noted to have significant thoracic spinal column metastatic disease but no high-grade spinal cord compression.    He has received proton therapy and radiation.    Currently, he describes a 2-month history of increasing weakness and inability to walk.    In April, he was admitted to [facility] with inability to walk and lower extremity weakness.  His evaluation included updated imaging.  He had a neurosurgery consultation and no surgery was recommended.    For at least 2 weeks, he states that he has been in a wheelchair.    He has urinary and bowel urgency.    His general health has been stable.    He is not on any pharmacologic anticoagulation.    On exam, he is sitting in a motorized wheelchair.    He has painless motion of the cervical spine.  His strength is intact in the upper extremities.    He has very limited active motion of the lower extremities.  He is able to flex his right hip and the strength is 2/5.  He has no effective active motion below the right knee.  His  left leg shows only a flicker of hip flexion and elsewhere no other active motion.    We reviewed his updated imaging.  There is recurrent tumor within the thoracic spine at the sites of his prior surgery with varying degrees of canal stenosis.  In the lumbar spine, there is diffuse metastatic disease within the spinal column at each vertebrae.  There is no pathologic fracture.  There is fairly severe canal stenosis at L1-2 and L2-3 related to soft tissue tumor within the canal.    Impression: This man with a history of metastatic thymoma and a prior thoracic decompression and fusion for metastatic disease currently presents with a 2-month history of progressive lower extremity weakness and now an inability to stand or walk.  He has profound weakness in his lower extremities.    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    I have spoken with his medical oncologist personally this morning, Dr. Rodriguez.  We have discussed his prognosis which is fairly good from a general standpoint.  We have also discussed other options to treat his metastatic disease which are very limited.  Further radiation therapy would not be an option.    As such, further surgery could be considered.  This would be a significant undertaking and that would require a revision decompression in the thoracic spine, a decompression in the lumbar spine and extension of his fusion from the thoracic to the lumbar spine.    Surgery would come at significant risk of an adverse event, a perioperative complication, or ultimately no clinical improvement.    Given the prior surgery and the prior radiation therapy, these perioperative risks would be higher than normal.    Given the magnitude of his lower extremity weakness as well, it would be very difficult to predict the potential for any significant improvement following surgery.    Both the patient and his wife understand this.  I have offered him admission  to the hospital to expedite surgical planning.    At present, he would like to think about things and he will let us know if he would like to proceed.    ** Dictated with voice recognition software and not immediately reviewed for errors in grammar and/or spelling **

## 2024-05-22 NOTE — PROGRESS NOTES
Calin and his wife returned.  By review, he is a 53-year-old man who I saw on 1 occasion in May 2023.  He has a history of metastatic thymoma.  He had had a prior thoracic decompression and fusion for metastatic disease by my partner, Dr. Delgado in 2021 that he did well with.    When I evaluated him in May 2023, he was ambulatory and effectively asymptomatic.  At that time he was noted to have significant thoracic spinal column metastatic disease but no high-grade spinal cord compression.    He has received proton therapy and radiation.    Currently, he describes a 2-month history of increasing weakness and inability to walk.    In April, he was admitted to [facility] with inability to walk and lower extremity weakness.  His evaluation included updated imaging.  He had a neurosurgery consultation and no surgery was recommended.    For at least 2 weeks, he states that he has been in a wheelchair.    He has urinary and bowel urgency.    His general health has been stable.    He is not on any pharmacologic anticoagulation.    On exam, he is sitting in a motorized wheelchair.    He has painless motion of the cervical spine.  His strength is intact in the upper extremities.    He has very limited active motion of the lower extremities.  He is able to flex his right hip and the strength is 2/5.  He has no effective active motion below the right knee.  His left leg shows only a flicker of hip flexion and elsewhere no other active motion.    We reviewed his updated imaging.  There is recurrent tumor within the thoracic spine at the sites of his prior surgery with varying degrees of canal stenosis.  In the lumbar spine, there is diffuse metastatic disease within the spinal column at each vertebrae.  There is no pathologic fracture.  There is fairly severe canal stenosis at L1-2 and L2-3 related to soft tissue tumor within the canal.    Impression: This man with a history of metastatic thymoma and a prior thoracic  decompression and fusion for metastatic disease currently presents with a 2-month history of progressive lower extremity weakness and now an inability to stand or walk.  He has profound weakness in his lower extremities.    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    I have spoken with his medical oncologist personally this morning, Dr. Rodriguez.  We have discussed his prognosis which is fairly good from a general standpoint.  We have also discussed other options to treat his metastatic disease which are very limited.  Further radiation therapy would not be an option.    As such, further surgery could be considered.  This would be a significant undertaking and that would require a revision decompression in the thoracic spine, a decompression in the lumbar spine and extension of his fusion from the thoracic to the lumbar spine.    Surgery would come at significant risk of an adverse event, a perioperative complication, or ultimately no clinical improvement.    Given the prior surgery and the prior radiation therapy, these perioperative risks would be higher than normal.    Given the magnitude of his lower extremity weakness as well, it would be very difficult to predict the potential for any significant improvement following surgery.    Both the patient and his wife understand this.  I have offered him admission to the hospital to expedite surgical planning.    At present, he would like to think about things and he will let us know if he would like to proceed.    ** Dictated with voice recognition software and not immediately reviewed for errors in grammar and/or spelling **

## 2024-05-23 ENCOUNTER — CLINICAL SUPPORT (OUTPATIENT)
Dept: EMERGENCY MEDICINE | Facility: HOSPITAL | Age: 54
DRG: 029 | End: 2024-05-23
Payer: COMMERCIAL

## 2024-05-23 ENCOUNTER — HOSPITAL ENCOUNTER (INPATIENT)
Age: 54
End: 2024-05-23
Attending: HOSPITALIST | Admitting: HOSPITALIST
Payer: COMMERCIAL

## 2024-05-23 ENCOUNTER — TELEPHONE (OUTPATIENT)
Dept: ADMISSION | Facility: HOSPITAL | Age: 54
End: 2024-05-23
Payer: COMMERCIAL

## 2024-05-23 PROCEDURE — 99285 EMERGENCY DEPT VISIT HI MDM: CPT

## 2024-05-23 PROCEDURE — 93005 ELECTROCARDIOGRAM TRACING: CPT

## 2024-05-23 ASSESSMENT — COLUMBIA-SUICIDE SEVERITY RATING SCALE - C-SSRS
2. HAVE YOU ACTUALLY HAD ANY THOUGHTS OF KILLING YOURSELF?: NO
1. IN THE PAST MONTH, HAVE YOU WISHED YOU WERE DEAD OR WISHED YOU COULD GO TO SLEEP AND NOT WAKE UP?: NO
6. HAVE YOU EVER DONE ANYTHING, STARTED TO DO ANYTHING, OR PREPARED TO DO ANYTHING TO END YOUR LIFE?: NO

## 2024-05-23 ASSESSMENT — PAIN SCALES - GENERAL: PAINLEVEL_OUTOF10: 2

## 2024-05-23 ASSESSMENT — PAIN - FUNCTIONAL ASSESSMENT: PAIN_FUNCTIONAL_ASSESSMENT: 0-10

## 2024-05-23 ASSESSMENT — PAIN DESCRIPTION - PAIN TYPE: TYPE: ACUTE PAIN

## 2024-05-23 ASSESSMENT — PAIN DESCRIPTION - LOCATION: LOCATION: BACK

## 2024-05-23 NOTE — TELEPHONE ENCOUNTER
Altaf called the office to follow up on his plan of care; he states Dr. Melendez was supposed to reach out the team yesterday regarding possible surgery.   Altaf asking to speak with Dr. Rodriguez or Carla Meza directly.     Secure Chat sent to team.

## 2024-05-23 NOTE — TELEPHONE ENCOUNTER
"Per Dr. Melendez \"Hi ... Any chance he could be admitted to medical oncology with me to consult? I left it with he and his wife that they were going to consider surgical options and let me know. Potentially, he could have surgery this weekend, but admission to [facility] would work better for me.  Thanks\"    I have added the Med Onc team to the conversation and will update the note accordingly.   "

## 2024-05-23 NOTE — TELEPHONE ENCOUNTER
Per the notes, it looks like he was ordered for a direct admission today. Team aware that I will stop trying to reach him to send him to ER as it appears he is set up for the direct admission

## 2024-05-23 NOTE — TELEPHONE ENCOUNTER
The patient called in, wants to know when he is being admitted today. I advised that this admission would be coming from Dr. Melendez and I will reach out to him via secure chat to get some of those details. He was agreeable.

## 2024-05-23 NOTE — TELEPHONE ENCOUNTER
I called the patient, per Med onc and Dr. Melendez, they recommend that he go through ER.  There are no beds at [facility] currently and to expedite, he will need to go through ER.     I called him and left this on his VM, will follow up with him shortly to see if he received the message

## 2024-05-23 NOTE — TELEPHONE ENCOUNTER
I called the patient again (both numbers) both went to .     I left another message that he is going to need to go through ER, will attempt to call wife back shortly

## 2024-05-24 ENCOUNTER — HOSPITAL ENCOUNTER (INPATIENT)
Facility: HOSPITAL | Age: 54
LOS: 11 days | Discharge: NURSING, REHAB OR LONG-TERM CARE | DRG: 029 | End: 2024-06-04
Attending: EMERGENCY MEDICINE
Payer: COMMERCIAL

## 2024-05-24 ENCOUNTER — APPOINTMENT (OUTPATIENT)
Dept: RADIOLOGY | Facility: CLINIC | Age: 54
End: 2024-05-24
Payer: COMMERCIAL

## 2024-05-24 ENCOUNTER — APPOINTMENT (OUTPATIENT)
Dept: RADIOLOGY | Facility: HOSPITAL | Age: 54
DRG: 029 | End: 2024-05-24
Payer: COMMERCIAL

## 2024-05-24 DIAGNOSIS — M48.062 SPINAL STENOSIS OF LUMBAR REGION WITH NEUROGENIC CLAUDICATION: ICD-10-CM

## 2024-05-24 DIAGNOSIS — G47.33 OBSTRUCTIVE SLEEP APNEA OF ADULT: ICD-10-CM

## 2024-05-24 DIAGNOSIS — C79.49 CANCER WITH LEPTOMENINGEAL SPREAD (MULTI): ICD-10-CM

## 2024-05-24 DIAGNOSIS — R29.898 WEAKNESS OF BOTH LOWER EXTREMITIES: ICD-10-CM

## 2024-05-24 DIAGNOSIS — J30.2 SEASONAL ALLERGIES: ICD-10-CM

## 2024-05-24 DIAGNOSIS — Z98.1 STATUS POST LAMINECTOMY WITH SPINAL FUSION: ICD-10-CM

## 2024-05-24 DIAGNOSIS — C37 MALIGNANT THYMOMA (MULTI): Primary | ICD-10-CM

## 2024-05-24 DIAGNOSIS — M48.062 LUMBAR STENOSIS WITH NEUROGENIC CLAUDICATION: ICD-10-CM

## 2024-05-24 DIAGNOSIS — E61.1 IRON DEFICIENCY: ICD-10-CM

## 2024-05-24 DIAGNOSIS — D49.2 THORACIC SPINE TUMOR: ICD-10-CM

## 2024-05-24 DIAGNOSIS — G95.20 SPINAL CORD COMPRESSION (MULTI): ICD-10-CM

## 2024-05-24 DIAGNOSIS — C37 THYMOMA, MALIGNANT (MULTI): ICD-10-CM

## 2024-05-24 DIAGNOSIS — D75.839 THROMBOCYTOSIS: ICD-10-CM

## 2024-05-24 LAB
ABO GROUP (TYPE) IN BLOOD: NORMAL
ALBUMIN SERPL BCP-MCNC: 4.4 G/DL (ref 3.4–5)
ALP SERPL-CCNC: 57 U/L (ref 33–120)
ALT SERPL W P-5'-P-CCNC: 20 U/L (ref 10–52)
ANION GAP SERPL CALC-SCNC: 16 MMOL/L (ref 10–20)
ANTIBODY SCREEN: NORMAL
APTT PPP: 57 SECONDS (ref 27–38)
AST SERPL W P-5'-P-CCNC: 28 U/L (ref 9–39)
ATRIAL RATE: 130 BPM
BASOPHILS # BLD AUTO: 0.03 X10*3/UL (ref 0–0.1)
BASOPHILS NFR BLD AUTO: 0.4 %
BILIRUB SERPL-MCNC: 0.6 MG/DL (ref 0–1.2)
BUN SERPL-MCNC: 16 MG/DL (ref 6–23)
CALCIUM SERPL-MCNC: 9.9 MG/DL (ref 8.6–10.6)
CHLORIDE SERPL-SCNC: 96 MMOL/L (ref 98–107)
CO2 SERPL-SCNC: 31 MMOL/L (ref 21–32)
CREAT SERPL-MCNC: 0.99 MG/DL (ref 0.5–1.3)
EGFRCR SERPLBLD CKD-EPI 2021: 90 ML/MIN/1.73M*2
EOSINOPHIL # BLD AUTO: 0.12 X10*3/UL (ref 0–0.7)
EOSINOPHIL NFR BLD AUTO: 1.7 %
ERYTHROCYTE [DISTWIDTH] IN BLOOD BY AUTOMATED COUNT: 17.7 % (ref 11.5–14.5)
GLUCOSE BLD MANUAL STRIP-MCNC: 141 MG/DL (ref 74–99)
GLUCOSE BLD MANUAL STRIP-MCNC: 93 MG/DL (ref 74–99)
GLUCOSE BLD MANUAL STRIP-MCNC: 98 MG/DL (ref 74–99)
GLUCOSE BLD MANUAL STRIP-MCNC: 99 MG/DL (ref 74–99)
GLUCOSE SERPL-MCNC: 111 MG/DL (ref 74–99)
HCT VFR BLD AUTO: 42.4 % (ref 41–52)
HGB BLD-MCNC: 14.3 G/DL (ref 13.5–17.5)
IMM GRANULOCYTES # BLD AUTO: 0.02 X10*3/UL (ref 0–0.7)
IMM GRANULOCYTES NFR BLD AUTO: 0.3 % (ref 0–0.9)
INR PPP: 1.1 (ref 0.9–1.1)
LYMPHOCYTES # BLD AUTO: 0.49 X10*3/UL (ref 1.2–4.8)
LYMPHOCYTES NFR BLD AUTO: 6.9 %
MAGNESIUM SERPL-MCNC: 2.06 MG/DL (ref 1.6–2.4)
MCH RBC QN AUTO: 25.4 PG (ref 26–34)
MCHC RBC AUTO-ENTMCNC: 33.7 G/DL (ref 32–36)
MCV RBC AUTO: 75 FL (ref 80–100)
MONOCYTES # BLD AUTO: 0.55 X10*3/UL (ref 0.1–1)
MONOCYTES NFR BLD AUTO: 7.8 %
NEUTROPHILS # BLD AUTO: 5.85 X10*3/UL (ref 1.2–7.7)
NEUTROPHILS NFR BLD AUTO: 82.9 %
NRBC BLD-RTO: 0 /100 WBCS (ref 0–0)
P AXIS: 89 DEGREES
P OFFSET: 184 MS
P ONSET: 134 MS
PHOSPHATE SERPL-MCNC: 3.6 MG/DL (ref 2.5–4.9)
PLATELET # BLD AUTO: 345 X10*3/UL (ref 150–450)
POTASSIUM SERPL-SCNC: 3.8 MMOL/L (ref 3.5–5.3)
PR INTERVAL: 154 MS
PROT SERPL-MCNC: 8.3 G/DL (ref 6.4–8.2)
PROTHROMBIN TIME: 12.2 SECONDS (ref 9.8–12.8)
Q ONSET: 211 MS
QRS COUNT: 22 BEATS
QRS DURATION: 128 MS
QT INTERVAL: 334 MS
QTC CALCULATION(BAZETT): 491 MS
QTC FREDERICIA: 432 MS
R AXIS: 154 DEGREES
RBC # BLD AUTO: 5.64 X10*6/UL (ref 4.5–5.9)
RH FACTOR (ANTIGEN D): NORMAL
SARS-COV-2 RNA RESP QL NAA+PROBE: NOT DETECTED
SODIUM SERPL-SCNC: 139 MMOL/L (ref 136–145)
T AXIS: -4 DEGREES
T OFFSET: 378 MS
VENTRICULAR RATE: 130 BPM
WBC # BLD AUTO: 7.1 X10*3/UL (ref 4.4–11.3)

## 2024-05-24 PROCEDURE — 99221 1ST HOSP IP/OBS SF/LOW 40: CPT | Performed by: ORTHOPAEDIC SURGERY

## 2024-05-24 PROCEDURE — 80053 COMPREHEN METABOLIC PANEL: CPT

## 2024-05-24 PROCEDURE — 82947 ASSAY GLUCOSE BLOOD QUANT: CPT

## 2024-05-24 PROCEDURE — 84100 ASSAY OF PHOSPHORUS: CPT

## 2024-05-24 PROCEDURE — 2500000001 HC RX 250 WO HCPCS SELF ADMINISTERED DRUGS (ALT 637 FOR MEDICARE OP)

## 2024-05-24 PROCEDURE — 96374 THER/PROPH/DIAG INJ IV PUSH: CPT

## 2024-05-24 PROCEDURE — 87635 SARS-COV-2 COVID-19 AMP PRB: CPT

## 2024-05-24 PROCEDURE — 36415 COLL VENOUS BLD VENIPUNCTURE: CPT

## 2024-05-24 PROCEDURE — 71046 X-RAY EXAM CHEST 2 VIEWS: CPT

## 2024-05-24 PROCEDURE — 1170000001 HC PRIVATE ONCOLOGY ROOM DAILY

## 2024-05-24 PROCEDURE — 2500000004 HC RX 250 GENERAL PHARMACY W/ HCPCS (ALT 636 FOR OP/ED)

## 2024-05-24 PROCEDURE — 85025 COMPLETE CBC W/AUTO DIFF WBC: CPT

## 2024-05-24 PROCEDURE — 96375 TX/PRO/DX INJ NEW DRUG ADDON: CPT

## 2024-05-24 PROCEDURE — 99233 SBSQ HOSP IP/OBS HIGH 50: CPT

## 2024-05-24 PROCEDURE — 85610 PROTHROMBIN TIME: CPT

## 2024-05-24 PROCEDURE — 99223 1ST HOSP IP/OBS HIGH 75: CPT

## 2024-05-24 PROCEDURE — 83735 ASSAY OF MAGNESIUM: CPT

## 2024-05-24 PROCEDURE — 86901 BLOOD TYPING SEROLOGIC RH(D): CPT

## 2024-05-24 RX ORDER — METHOCARBAMOL 100 MG/ML
1000 INJECTION, SOLUTION INTRAMUSCULAR; INTRAVENOUS EVERY 8 HOURS PRN
Status: DISCONTINUED | OUTPATIENT
Start: 2024-05-24 | End: 2024-05-24

## 2024-05-24 RX ORDER — DIPHENHYDRAMINE HCL 50 MG
50 CAPSULE ORAL NIGHTLY PRN
Status: DISCONTINUED | OUTPATIENT
Start: 2024-05-24 | End: 2024-06-04 | Stop reason: HOSPADM

## 2024-05-24 RX ORDER — ONDANSETRON HYDROCHLORIDE 2 MG/ML
4 INJECTION, SOLUTION INTRAVENOUS ONCE
Status: COMPLETED | OUTPATIENT
Start: 2024-05-24 | End: 2024-05-24

## 2024-05-24 RX ORDER — HYDROMORPHONE HYDROCHLORIDE 1 MG/ML
0.4 INJECTION, SOLUTION INTRAMUSCULAR; INTRAVENOUS; SUBCUTANEOUS
Status: DISCONTINUED | OUTPATIENT
Start: 2024-05-24 | End: 2024-05-24

## 2024-05-24 RX ORDER — DEXTROSE 50 % IN WATER (D50W) INTRAVENOUS SYRINGE
25
Status: DISCONTINUED | OUTPATIENT
Start: 2024-05-24 | End: 2024-06-04 | Stop reason: HOSPADM

## 2024-05-24 RX ORDER — POLYETHYLENE GLYCOL 3350 17 G/17G
17 POWDER, FOR SOLUTION ORAL DAILY
Status: DISCONTINUED | OUTPATIENT
Start: 2024-05-24 | End: 2024-05-26

## 2024-05-24 RX ORDER — HYDROMORPHONE HYDROCHLORIDE 1 MG/ML
1 INJECTION, SOLUTION INTRAMUSCULAR; INTRAVENOUS; SUBCUTANEOUS
Status: DISCONTINUED | OUTPATIENT
Start: 2024-05-24 | End: 2024-05-25

## 2024-05-24 RX ORDER — HYDROMORPHONE HYDROCHLORIDE 1 MG/ML
1 INJECTION, SOLUTION INTRAMUSCULAR; INTRAVENOUS; SUBCUTANEOUS ONCE
Status: COMPLETED | OUTPATIENT
Start: 2024-05-24 | End: 2024-05-24

## 2024-05-24 RX ORDER — CHOLECALCIFEROL (VITAMIN D3) 25 MCG
2000 TABLET ORAL DAILY
Status: DISCONTINUED | OUTPATIENT
Start: 2024-05-24 | End: 2024-06-04 | Stop reason: HOSPADM

## 2024-05-24 RX ORDER — MULTIVIT-MIN/IRON FUM/FOLIC AC 7.5 MG-4
1 TABLET ORAL DAILY
Status: DISCONTINUED | OUTPATIENT
Start: 2024-05-24 | End: 2024-06-04 | Stop reason: HOSPADM

## 2024-05-24 RX ORDER — METHOCARBAMOL 500 MG/1
500 TABLET, FILM COATED ORAL EVERY 6 HOURS SCHEDULED
Status: DISCONTINUED | OUTPATIENT
Start: 2024-05-24 | End: 2024-06-04 | Stop reason: HOSPADM

## 2024-05-24 RX ORDER — GABAPENTIN 300 MG/1
300 CAPSULE ORAL 3 TIMES DAILY
Status: DISCONTINUED | OUTPATIENT
Start: 2024-05-24 | End: 2024-05-27 | Stop reason: WASHOUT

## 2024-05-24 RX ORDER — LISINOPRIL 20 MG/1
20 TABLET ORAL DAILY
Status: DISCONTINUED | OUTPATIENT
Start: 2024-05-24 | End: 2024-06-04 | Stop reason: HOSPADM

## 2024-05-24 RX ORDER — OXYCODONE HYDROCHLORIDE 5 MG/1
15 TABLET ORAL
Status: DISCONTINUED | OUTPATIENT
Start: 2024-05-24 | End: 2024-05-26

## 2024-05-24 RX ORDER — OXYCODONE HYDROCHLORIDE 5 MG/1
10 TABLET ORAL
Status: DISCONTINUED | OUTPATIENT
Start: 2024-05-24 | End: 2024-05-24

## 2024-05-24 RX ORDER — INSULIN LISPRO 100 [IU]/ML
0-5 INJECTION, SOLUTION INTRAVENOUS; SUBCUTANEOUS EVERY 4 HOURS
Status: DISCONTINUED | OUTPATIENT
Start: 2024-05-24 | End: 2024-05-29

## 2024-05-24 RX ORDER — PANTOPRAZOLE SODIUM 40 MG/1
40 TABLET, DELAYED RELEASE ORAL
Status: DISCONTINUED | OUTPATIENT
Start: 2024-05-24 | End: 2024-06-04 | Stop reason: HOSPADM

## 2024-05-24 RX ORDER — ACETAMINOPHEN 500 MG
5 TABLET ORAL DAILY
Status: DISCONTINUED | OUTPATIENT
Start: 2024-05-24 | End: 2024-06-04 | Stop reason: HOSPADM

## 2024-05-24 RX ORDER — EVEROLIMUS 5 MG/1
10 TABLET ORAL DAILY
Status: DISCONTINUED | OUTPATIENT
Start: 2024-05-24 | End: 2024-05-28

## 2024-05-24 RX ORDER — OXYCODONE HYDROCHLORIDE 5 MG/1
10 TABLET ORAL
Status: DISCONTINUED | OUTPATIENT
Start: 2024-05-24 | End: 2024-05-26

## 2024-05-24 RX ORDER — DEXTROSE 50 % IN WATER (D50W) INTRAVENOUS SYRINGE
12.5
Status: DISCONTINUED | OUTPATIENT
Start: 2024-05-24 | End: 2024-06-04 | Stop reason: HOSPADM

## 2024-05-24 RX ADMIN — OXYCODONE HYDROCHLORIDE 10 MG: 5 TABLET ORAL at 13:44

## 2024-05-24 RX ADMIN — HYDROMORPHONE HYDROCHLORIDE 0.4 MG: 1 INJECTION, SOLUTION INTRAMUSCULAR; INTRAVENOUS; SUBCUTANEOUS at 10:39

## 2024-05-24 RX ADMIN — METHOCARBAMOL 500 MG: 500 TABLET ORAL at 13:47

## 2024-05-24 RX ADMIN — GABAPENTIN 300 MG: 300 CAPSULE ORAL at 21:01

## 2024-05-24 RX ADMIN — GABAPENTIN 300 MG: 300 CAPSULE ORAL at 16:34

## 2024-05-24 RX ADMIN — Medication 5 MG: at 21:01

## 2024-05-24 RX ADMIN — ONDANSETRON 4 MG: 2 INJECTION INTRAMUSCULAR; INTRAVENOUS at 05:11

## 2024-05-24 RX ADMIN — METHOCARBAMOL 1000 MG: 100 INJECTION INTRAMUSCULAR; INTRAVENOUS at 05:11

## 2024-05-24 RX ADMIN — OXYCODONE HYDROCHLORIDE 10 MG: 5 TABLET ORAL at 16:35

## 2024-05-24 RX ADMIN — GABAPENTIN 300 MG: 300 CAPSULE ORAL at 13:28

## 2024-05-24 RX ADMIN — METHOCARBAMOL 500 MG: 500 TABLET ORAL at 21:02

## 2024-05-24 RX ADMIN — HYDROMORPHONE HYDROCHLORIDE 1 MG: 1 INJECTION, SOLUTION INTRAMUSCULAR; INTRAVENOUS; SUBCUTANEOUS at 05:11

## 2024-05-24 SDOH — SOCIAL STABILITY: SOCIAL INSECURITY: WERE YOU ABLE TO COMPLETE ALL THE BEHAVIORAL HEALTH SCREENINGS?: YES

## 2024-05-24 SDOH — SOCIAL STABILITY: SOCIAL INSECURITY: ARE THERE ANY APPARENT SIGNS OF INJURIES/BEHAVIORS THAT COULD BE RELATED TO ABUSE/NEGLECT?: NO

## 2024-05-24 SDOH — SOCIAL STABILITY: SOCIAL INSECURITY: ABUSE: ADULT

## 2024-05-24 SDOH — SOCIAL STABILITY: SOCIAL INSECURITY: ARE YOU OR HAVE YOU BEEN THREATENED OR ABUSED PHYSICALLY, EMOTIONALLY, OR SEXUALLY BY ANYONE?: NO

## 2024-05-24 SDOH — SOCIAL STABILITY: SOCIAL INSECURITY: HAVE YOU HAD THOUGHTS OF HARMING ANYONE ELSE?: NO

## 2024-05-24 SDOH — SOCIAL STABILITY: SOCIAL INSECURITY: DO YOU FEEL UNSAFE GOING BACK TO THE PLACE WHERE YOU ARE LIVING?: NO

## 2024-05-24 SDOH — SOCIAL STABILITY: SOCIAL INSECURITY: DO YOU FEEL ANYONE HAS EXPLOITED OR TAKEN ADVANTAGE OF YOU FINANCIALLY OR OF YOUR PERSONAL PROPERTY?: NO

## 2024-05-24 SDOH — SOCIAL STABILITY: SOCIAL INSECURITY: HAVE YOU HAD ANY THOUGHTS OF HARMING ANYONE ELSE?: NO

## 2024-05-24 SDOH — SOCIAL STABILITY: SOCIAL INSECURITY: HAS ANYONE EVER THREATENED TO HURT YOUR FAMILY OR YOUR PETS?: NO

## 2024-05-24 SDOH — SOCIAL STABILITY: SOCIAL INSECURITY: DOES ANYONE TRY TO KEEP YOU FROM HAVING/CONTACTING OTHER FRIENDS OR DOING THINGS OUTSIDE YOUR HOME?: NO

## 2024-05-24 ASSESSMENT — COGNITIVE AND FUNCTIONAL STATUS - GENERAL
MOBILITY SCORE: 11
MOVING TO AND FROM BED TO CHAIR: A LOT
TURNING FROM BACK TO SIDE WHILE IN FLAT BAD: A LITTLE
STANDING UP FROM CHAIR USING ARMS: TOTAL
DRESSING REGULAR UPPER BODY CLOTHING: A LITTLE
MOVING FROM LYING ON BACK TO SITTING ON SIDE OF FLAT BED WITH BEDRAILS: A LITTLE
TOILETING: A LITTLE
DAILY ACTIVITIY SCORE: 18
MOVING FROM LYING ON BACK TO SITTING ON SIDE OF FLAT BED WITH BEDRAILS: A LITTLE
TOILETING: A LITTLE
DAILY ACTIVITIY SCORE: 18
PATIENT BASELINE BEDBOUND: NO
MOBILITY SCORE: 11
WALKING IN HOSPITAL ROOM: TOTAL
DRESSING REGULAR LOWER BODY CLOTHING: A LITTLE
DRESSING REGULAR UPPER BODY CLOTHING: A LITTLE
STANDING UP FROM CHAIR USING ARMS: TOTAL
CLIMB 3 TO 5 STEPS WITH RAILING: TOTAL
EATING MEALS: A LITTLE
HELP NEEDED FOR BATHING: A LITTLE
HELP NEEDED FOR BATHING: A LITTLE
DRESSING REGULAR LOWER BODY CLOTHING: A LITTLE
PERSONAL GROOMING: A LITTLE
CLIMB 3 TO 5 STEPS WITH RAILING: TOTAL
WALKING IN HOSPITAL ROOM: TOTAL
EATING MEALS: A LITTLE
MOVING TO AND FROM BED TO CHAIR: A LOT
TURNING FROM BACK TO SIDE WHILE IN FLAT BAD: A LITTLE
PERSONAL GROOMING: A LITTLE

## 2024-05-24 ASSESSMENT — ACTIVITIES OF DAILY LIVING (ADL)
ADEQUATE_TO_COMPLETE_ADL: YES
FEEDING YOURSELF: NEEDS ASSISTANCE
LACK_OF_TRANSPORTATION: NO
HEARING - LEFT EAR: FUNCTIONAL
DRESSING YOURSELF: NEEDS ASSISTANCE
GROOMING: NEEDS ASSISTANCE
PATIENT'S MEMORY ADEQUATE TO SAFELY COMPLETE DAILY ACTIVITIES?: YES
JUDGMENT_ADEQUATE_SAFELY_COMPLETE_DAILY_ACTIVITIES: YES
ASSISTIVE_DEVICE: OTHER (COMMENT);WHEELCHAIR
TOILETING: NEEDS ASSISTANCE
HEARING - RIGHT EAR: FUNCTIONAL
WALKS IN HOME: DEPENDENT
BATHING: NEEDS ASSISTANCE

## 2024-05-24 ASSESSMENT — PATIENT HEALTH QUESTIONNAIRE - PHQ9
SUM OF ALL RESPONSES TO PHQ9 QUESTIONS 1 & 2: 0
2. FEELING DOWN, DEPRESSED OR HOPELESS: NOT AT ALL
1. LITTLE INTEREST OR PLEASURE IN DOING THINGS: NOT AT ALL

## 2024-05-24 ASSESSMENT — ENCOUNTER SYMPTOMS
PSYCHIATRIC NEGATIVE: 1
ACTIVITY CHANGE: 1
ALLERGIC/IMMUNOLOGIC NEGATIVE: 1
EYES NEGATIVE: 1
CARDIOVASCULAR NEGATIVE: 1
ENDOCRINE NEGATIVE: 1
WEAKNESS: 1
FREQUENCY: 1
RESPIRATORY NEGATIVE: 1

## 2024-05-24 ASSESSMENT — LIFESTYLE VARIABLES
PRESCIPTION_ABUSE_PAST_12_MONTHS: NO
SUBSTANCE_ABUSE_PAST_12_MONTHS: NO
AUDIT-C TOTAL SCORE: 0
HOW OFTEN DO YOU HAVE A DRINK CONTAINING ALCOHOL: NEVER
HAVE PEOPLE ANNOYED YOU BY CRITICIZING YOUR DRINKING: NO
TOTAL SCORE: 0
EVER FELT BAD OR GUILTY ABOUT YOUR DRINKING: NO
HOW MANY STANDARD DRINKS CONTAINING ALCOHOL DO YOU HAVE ON A TYPICAL DAY: PATIENT DOES NOT DRINK
EVER HAD A DRINK FIRST THING IN THE MORNING TO STEADY YOUR NERVES TO GET RID OF A HANGOVER: NO
AUDIT-C TOTAL SCORE: 0
HAVE YOU EVER FELT YOU SHOULD CUT DOWN ON YOUR DRINKING: NO
SKIP TO QUESTIONS 9-10: 1
HOW OFTEN DO YOU HAVE 6 OR MORE DRINKS ON ONE OCCASION: NEVER

## 2024-05-24 ASSESSMENT — PAIN - FUNCTIONAL ASSESSMENT
PAIN_FUNCTIONAL_ASSESSMENT: 0-10
PAIN_FUNCTIONAL_ASSESSMENT: 0-10

## 2024-05-24 ASSESSMENT — PAIN SCALES - GENERAL
PAINLEVEL_OUTOF10: 8
PAINLEVEL_OUTOF10: 2
PAINLEVEL_OUTOF10: 5 - MODERATE PAIN
PAINLEVEL_OUTOF10: 5 - MODERATE PAIN
PAINLEVEL_OUTOF10: 8
PAINLEVEL_OUTOF10: 3
PAINLEVEL_OUTOF10: 5 - MODERATE PAIN

## 2024-05-24 NOTE — PROGRESS NOTES
Pharmacy Medication History Review    Altaf Parsons is a 53 y.o. male admitted for Malignant thymoma (Multi). Pharmacy reviewed the patient's gcgyk-vs-ljpfrhuga medications and allergies for accuracy.    The list below reflects the updated PTA list. Comments regarding how patient may be taking medications differently can be found in the Admit Orders Activity  Prior to Admission Medications   Prescriptions Last Dose Informant   NON FORMULARY 2024 Self   Sig: Take 1 Dose by mouth once daily. tumeric   OMEGA-3 FLAXSEED OIL ORAL  Self   Sig: Take by mouth once daily. 1 TSP   cholecalciferol, vitamin D3, (VITAMIN D3 ORAL) 2024 Self   Si orally once a day   dexAMETHasone (Decadron) 4 mg tablet 2024 Self   Sig: Take 1 tablet (4 mg) by mouth once daily for 21 days.   docosahexaenoic acid/epa (FISH OIL ORAL) 2024 Self   Sig: Take 600 mg by mouth once daily.   everolimus (Afinitor) 10 mg tablet 2024 Self   Sig: Take 1 tablet (10 mg total) by mouth once daily.  Swallow whole with a glass of water. Do not take any tablet that is broken or crushed. Take at the same time each day.   gabapentin (Neurontin) 300 mg capsule 2024 Self   Sig: Take 1 capsule (300 mg) by mouth 3 times a day.   lisinopril 20 mg tablet 2024 Self   Sig: Take 1 tablet (20 mg) by mouth once daily.   methocarbamol (Robaxin) 500 mg tablet 2024 Self   Sig: Take 1 tablet (500 mg) by mouth every 6 hours.   multivitamin (MULTIPLE VITAMINS ORAL) 2024 Self   Sig: Take 1 tablet by mouth once daily.   oxyCODONE (Roxicodone) 10 mg immediate release tablet 2024 Self   Sig: Take 1 tablet (10 mg) by mouth every 3 hours if needed for severe pain (7 - 10) for up to 90 doses.   pantoprazole (ProtoNix) 40 mg EC tablet Not Taking    Sig: Take 1 tablet (40 mg) by mouth once daily in the morning. Take before meals for 10 days. Do not crush, chew, or split.    For stomach protection while taking steroids.   Patient not taking:  Reported on 5/24/2024      Facility-Administered Medications: None        The list below reflects the updated allergy list. Please review each documented allergy for additional clarification and justification.  Allergies  Reviewed by Barb Morrison RN on 5/23/2024   No Known Allergies         Patient accepts M2B at discharge. Pharmacy has been updated to [facility].    Sources used to complete the med history include out patient fill history, OARRS, and patient interview (Patient was a decent historian.). Progress note 5/9/24    Below are additional concerns with the patient's PTA list.  Patient stated that he is still taking Lisinopril-hydrochlorothiazide 20/25MG Tab. Lisinopril/hydrochlorothiazide 20/25mg discontinued at PCP appointment on 5/9/24 and lisinopril 20mg daily ordered.     BreAnna Phoenix, CPhT  Transitions of Care  [facility]  Please reach out via Secure Chat for questions, or if no response call [employer] or [employer]     Verified all information represents best possible medication history   Jeff Herman, PharmD, Transitions of Care Pharmacist  Transitions of Care Pharmacist  Medication reconciliation complete  Please reach out via [employer] Secure Chat for questions, or if no response call [employer] or [employer]  [facility]

## 2024-05-24 NOTE — ED TRIAGE NOTES
2 weeks of not being able to walk. Hx of malignant thymoma. Has known about a tumor on his spine for about a year. Has been doing chemo/radiation. Denies loss of control of bowel/bladder. Called his provider and was told to come in for surgery on Saturday. MRI 1.5 weeks ago.

## 2024-05-24 NOTE — CONSULTS
SUPPORTIVE AND PALLIATIVE ONCOLOGY CONSULT    Inpatient consult to [facility] Adult Supportive Oncology  Consult performed by: Mercy Resendiz, APRN-CNP, DNP  Consult ordered by: Nate Collins MD        SERVICE DATE: 5/24/2024      PALLIATIVE MEDICINE OUTPATIENT PROVIDER:  None  CURRENT ATTENDING PROVIDER: Nate Collins MD;Timothy *     Medical Oncologist: MD Rachelle Sinclair MD   Radiation Oncologist: No care team member to display  Primary Physician: Darius Meza  [phone]    REASON FOR CONSULT/CHIEF CONSULT COMPLAINT: pain management    Subjective   HISTORY OF PRESENT ILLNESS: Altaf Parsons is a 53 y.o. male diagnosed with metastatic malignant thymoma (sites of disease mediastinum, lung, TLS spine, T12-L1 paraspinal region, left lateral spine canal T11-S1) dx 2016 s/p Proton therapy, RT, multiple spine surgeries, chemo and most recently on everolimus. PMH significant for HTN, GERD, hemothorax, HLD, sleep apnea. Admitted 5/24/2024 for further evaluation and management of progressive left leg weakness and worsening pain and to expedite planning for spine surgery. Course complicated by recurrent tumor in his spine, high risk surgery due to recurrent tumor, previous surgeries and previous RT. Supportive and Palliative Oncology is consulted for pain management.     Follows with Dr. Rodriguez and Carla PETERSEN who has been managing his pain. Recent disease progression with plan for LP to assess for LMD and surgical evaluation by Dr. Melendez. Pain meds recently adjusted from gabapentin only to addition of oxycodone 5 mg then increased to 10 mg, and methocarbamol due to worsening pain and muscle spasms. Pt reports increase in oxycodone was helpful. Recently tapered off dexamethasone due to LE edema (after his lisinopril/hydrochlorothiazide was stopped) and his last dose of dexamethasone was Sunday.   Pt and wife report progressively worsening weakness and inability to walk, now sleeping on air mattress on the  first floor. Also reports urinary and bowel urgency.  Recently saw Dr. Melendez  for consideration of spine surgery and offered admission for expedited workup for surgery, was recommended he be admitted earlier for pain management.     Pain Assessment:  Onset: years, worsening over past weeks to month  Location: Left hip, upper leg, lower leg, knee, and lower back  Duration: Intermittent  Characteristics:   Ratin   Descriptors:  throbbing and spasms   Aggravating: movement and bending    Relieving: Analgesics and Modifying activity   Intolerances:Altaf Parsons has No Known Allergies.   Personal Pain Goal: 3    Interference with Function: Very Much  Including: walking and activity   Coping Strategies: prayer   Emotional Response: None   Barriers to Pain Management: None    Opioid Use  Past 24 h opioid use: since in ER overnight  Hydromorphone 1 mg IV x 1 doses = 1 mg = 12.5 OME  Total 24h OME use:  12.5    Note: OME calculations based on equianalgesic table below. Please note this table is based on best available evidence but conversions are still approximate. These are NOT opioid DOSES for individual patient use; this is equivalency information.  Drug Parenteral Enteral   Morphine 10 25   Oxycodone N/A 20   Hydromorphone 2 5   Fentanyl 0.15 N/A   Tramadol N/A 120   Citation: Loki GUTIERREZ. Demystifying opioid conversion calculations: A guide for effective dosing, Second edition. [place]: American Society of Health-System Pharmacists, 2018.    OARRS/PDMP reviewed 24 no aberrant behavior noted.    Symptom Assessment:  Pain:very much   Headache: none  Dizziness:none  Lack of energy: a little  Difficulty sleeping: a little  Worrying: none  Anxiety: none  Depression: none  Pain in mouth/swallowing: none  Dry mouth: none  Taste changes: none  Shortness of breath: none  Lack of appetite: none   Nausea: a little  Vomiting: none  Constipation: none  Diarrhea: none  Sore muscles: very much  Numbness or  tingling in hands/feet/other: a little  Weight loss: none  Other: none        Information obtained from: chart review  ______________________________________________________________________     Oncology History   Neoplasm of thymus   3/4/2016 Cancer Staged    Staging form: Thymus, AJCC 8th Edition, Clinical stage from 3/4/2016: Stage SHONDA (cT2, cN0, cM1a) - Signed by Justo Rodriguez MD on 11/22/2023     10/6/2023 Initial Diagnosis    Neoplasm of thymus     Malignant thymoma (Multi)   9/28/2023 -  Chemotherapy    Everolimus, 28 Day Cycles      10/6/2023 Initial Diagnosis    Malignant thymoma (CMS/HCC)         Past Medical History:   Diagnosis Date    Abnormal weight gain 12/08/2014    Abnormal weight gain    Hemothorax     Hemothorax    Personal history of other diseases of the digestive system 09/10/2013    History of gastritis    Personal history of other diseases of the digestive system 03/19/2015    History of esophageal reflux    Personal history of other diseases of the nervous system and sense organs 11/24/2020    History of sleep apnea    Personal history of other endocrine, nutritional and metabolic disease     History of hypercholesterolemia    Personal history of other endocrine, nutritional and metabolic disease     History of hypothyroidism    Personal history of other specified conditions 03/14/2014    History of chest pain    Strain of unspecified muscle, fascia and tendon at shoulder and upper arm level, right arm, initial encounter 05/20/2014    Right shoulder strain     Past Surgical History:   Procedure Laterality Date    CT GUIDED PERCUTANEOUS BIOPSY MUSCLE  11/13/2019    CT GUIDED PERCUTANEOUS BIOPSY MUSCLE 11/13/2019 [facility]    CT GUIDED PERCUTANEOUS BIOPSY MUSCLE  8/13/2020    CT GUIDED PERCUTANEOUS BIOPSY MUSCLE 8/13/2020 [facility]    CT GUIDED PLEURA PERCUTANEOUS BIOPSY  5/10/2017    CT GUIDED PLEURA PERCUTANEOUS BIOPSY 5/10/2017 [facility]    KNEE SURGERY  09/10/2013    Knee  Surgery    MR CHEST ANGIO W IV CONTRAST  2/9/2016    MR CHEST ANGIO W IV CONTRAST 2/9/2016 [facility]CILL[facility]GAC[facility]    OTHER SURGICAL HISTORY  09/10/2013    History Of Prior Surgery     No family history on file.     SOCIAL HISTORY:  Marital Status  to Wolfe, Children 2 daughter age 14, son age 21, and Employment disability previously worked for [employer]   Social History:  reports that he has never smoked. He has never used smokeless tobacco. He reports that he does not currently use alcohol. He reports that he does not currently use drugs.    Sabianist and Importance of Sabianist:  Zoroastrianism   Role of yesica in daily life/Role of spirituality in health care decision-making: very much     REVIEW OF SYSTEMS:  Review of systems negative unless noted in HPI.       Objective       Lab Results   Component Value Date    WBC 7.1 05/24/2024    HGB 14.3 05/24/2024    HCT 42.4 05/24/2024    MCV 75 (L) 05/24/2024     05/24/2024      Lab Results   Component Value Date    GLUCOSE 111 (H) 05/24/2024    CALCIUM 9.9 05/24/2024     05/24/2024    K 3.8 05/24/2024    CO2 31 05/24/2024    CL 96 (L) 05/24/2024    BUN 16 05/24/2024    CREATININE 0.99 05/24/2024     Lab Results   Component Value Date    ALT 20 05/24/2024    AST 28 05/24/2024    ALKPHOS 57 05/24/2024    BILITOT 0.6 05/24/2024     Estimated Creatinine Clearance: 103.7 mL/min (by C-G formula based on SCr of 0.99 mg/dL).     MRI L spine 4/27/2024  IMPRESSION:  1. Thickening and enhancement of epidural tumor extension at L4, L5  and S1 asymmetrical to the left with compression of the thecal sac on  the left side. There is extension to the left S1 and S2 nerve roots  within the thecal sac.  2. 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  3. Slight interval increase in the epidural expression of the tumor  at L1-2. Otherwise, no significant  interval change in the marrow  replacing lesions and epidural tumor compared to the prior exam.    MRI T spine 4/27/2024  IMPRESSION:  Extensive osseous metastatic disease again noted throughout the  thoracic spine with epidural extension and cord compression as  previously demonstrated and described.      No evidence of compression fracture     Encounter Date: 05/24/24   ECG 12 lead   Result Value    Ventricular Rate 130    Atrial Rate 130    ID Interval 154    QRS Duration 128    QT Interval 334    QTC Calculation(Bazett) 491    P Axis 89    R Axis 154    T Axis -4    QRS Count 22    Q Onset 211    P Onset 134    P Offset 184    T Offset 378    QTC Fredericia 432    Narrative    Sinus tachycardia  Right bundle branch block  Inferior infarct , age undetermined  Anterolateral infarct , age undetermined  Abnormal ECG  When compared with ECG of 11-MAR-2018 18:08,  Right bundle branch block is now Present  Anterior infarct is now Present  Anterolateral infarct is now Present  Inferior infarct is now Present    See ED provider note for full interpretation and clinical correlation  Confirmed by Rachel Chisholm ([id number]) on 5/24/2024 1:21:07 AM     Wt Readings from Last 5 Encounters:   05/23/24 101 kg (223 lb)   05/09/24 101 kg (223 lb)   04/28/24 101 kg (222 lb 0.1 oz)   03/06/24 102 kg (225 lb 4.8 oz)   01/16/24 100 kg (221 lb)       Current Outpatient Medications   Medication Instructions    cholecalciferol, vitamin D3, (VITAMIN D3 ORAL) 1 orally once a day<BR>    dexAMETHasone (DECADRON) 4 mg, oral, Daily    docosahexaenoic acid/epa (FISH OIL ORAL) 600 mg, oral, 2 times daily    everolimus (AFINITOR) 10 mg, oral, Daily, Swallow whole with a glass of water. Do not take any tablet that is broken or crushed. Take at the same time each day.    gabapentin (NEURONTIN) 300 mg, oral, 3 times daily    lisinopril 20 mg, oral, Daily    methocarbamol (ROBAXIN) 500 mg, oral, Every 6 hours scheduled    multivitamin (MULTIPLE VITAMINS  ORAL) 1 tablet, oral, Daily    NON FORMULARY 1 Dose, oral, Daily, daren root<BR>    NON FORMULARY 1 Dose, oral, 2 times daily, tumeric<BR>    oxyCODONE (ROXICODONE) 10 mg, oral, Every 3 hours PRN    pantoprazole (PROTONIX) 40 mg, oral, Daily before breakfast, Do not crush, chew, or split.<BR><BR>For stomach protection while taking steroids.     Scheduled medications   cholecalciferol, 2,000 Units, oral, Daily  everolimus, 10 mg, oral, Daily  gabapentin, 300 mg, oral, TID  insulin lispro, 0-5 Units, subcutaneous, q4h  lisinopril, 20 mg, oral, Daily  methocarbamol, 500 mg, oral, q6h CHERI  multivitamin with minerals, 1 tablet, oral, Daily  pantoprazole, 40 mg, oral, Daily before breakfast  polyethylene glycol, 17 g, oral, Daily      Continuous medications     PRN medications  dextrose, 12.5 g, q15 min PRN  dextrose, 25 g, q15 min PRN  glucagon, 1 mg, q15 min PRN  glucagon, 1 mg, q15 min PRN  oxyCODONE, 10 mg, q3h PRN         Allergies: No Known Allergies             PHYSICAL EXAMINATION:  Vital Signs:   Vital signs reviewed  Vitals:    05/23/24 2334   BP: 131/84   Pulse: (!) 131   Resp: 16   Temp: 36.8 °C (98.2 °F)   SpO2: 95%     Pain Score: 5 - Moderate pain     Physical Exam  Vitals reviewed.   Constitutional:       General: He is not in acute distress.  HENT:      Head: Normocephalic and atraumatic.      Mouth/Throat:      Mouth: Mucous membranes are moist.   Eyes:      Conjunctiva/sclera: Conjunctivae normal.   Pulmonary:      Effort: Pulmonary effort is normal. No respiratory distress.   Abdominal:      General: There is no distension.   Musculoskeletal:         General: Normal range of motion.      Right lower leg: Edema present.      Left lower leg: Edema present.      Comments: Trace bilateral LE edema   Skin:     General: Skin is warm and dry.      Comments: Healing abrasion right knee   Neurological:      General: No focal deficit present.      Mental Status: He is alert and oriented to person, place,  and time.      Motor: Weakness present.   Psychiatric:         Mood and Affect: Mood normal.         Behavior: Behavior normal.         Thought Content: Thought content normal.         Judgment: Judgment normal.         ASSESSMENT/PLAN:  Altaf Parsons is a 53 y.o. male diagnosed with metastatic malignant thymoma (sites of disease mediastinum, lung, TLS spine, T12-L1 paraspinal region, left lateral spine canal T11-S1) dx 2016 s/p Proton therapy, RT, multiple spine surgeries, chemo and most recently on everolimus. PMH significant for HTN, GERD, hemothorax, HLD, sleep apnea. Admitted 5/24/2024 for further evaluation and management of progressive left leg weakness and worsening pain and to expedite planning for spine surgery. Course complicated by recurrent tumor in his spine, high risk surgery due to recurrent tumor, previous surgeries and previous RT. Supportive and Palliative Oncology is consulted for pain management.     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  Pain is: cancer related pain  Type: somatic and neuropathic frequent muscle spasms  Pain control: sub-optimally controlled  Home regimen:  Oxycodone 10 mg 2-3 times per day, Gabapentin 300 mg TID, and Muscle Relaxers 500 mg TID  Intolerances/previously tried: none  Personalized pain goal: 3  Total OME usage for the past 24 hours:  12.5  Start Oxycodone IR 10 mg PO q3 h prn moderate pain   Start Oxycodone IR 15 mg PO q3 h prn severe pain   Start Hydromorphone 1 mg IV q3h prn breakthrough pain or if NPO  Continue gabapentin 300 mg PO TID, can consider increasing  Continue Methocarbamol 500 mg PO q6h scheduled for muscle spasms  Consider dexamethasone per ortho, recently tapered off  Continue to monitor pain scores and administer PRN medications  as appropriate  Continue/initiate nonpharmacologic pain management strategies including ice/heat therapy, distraction techniques, deep breathing/relaxation techniques, calming music, and repositioning  Continue to monitor for signs of opioid efficacy (pain scores, improved functionality) and toxicity (pinpoint pupils, excess sedation/drowsiness/confusion, respiratory depression, etc.)    Nausea:  At risk for nausea with vomiting related to opioids   Home regimen:  none  Denies  Start Ondansetron 4 mg IV/PO q6h prn nausea first line     Constipation  At risk for constipation related to opioids, currently not constipated  Usual bowel pattern: multiple times per day  Home regimen: none  LBM 5/23  Monitor BM frequency, adjust regimen as needed  Goal to have BM without straining q48-72h  Pt has regular Bms without needing bowel regimen  Change Miralax 17 gm to daily PRN   Start senna 1 tab BID prn    Sleeping Difficulty:  Impaired sleep related to pain  Home regimen:  none  Pain management as above      Medical Decision Making/Goals of Care/Advance Care Planning:  Patient's current clinical condition, including diagnosis, prognosis, and management plan, and goals of care were discussed.   Life limiting disease: metastatic malignancy  Family: Supportive wife, children  Performance status: Major  limitations due to pain and physical disability  Joys/meaning/strength: Family and Annamarie  Understanding of health: Demonstrates good understanding of disease process, understands plan for possible surgery  Information:Wants full disclosure      Advance Directives  Existence of Advance Directives:No - not interested  Decision maker: Surrogate decision maker is wife Marilia Jaqueline  Code Status: Full code    Introduction to Supportive and Palliative Oncology:  Spoke with patient and wife at bedside  Introduced the role and philosophy of Supportive and Palliative oncology in the evaluation and management of symptoms during cancer  treatment  Palliative care was introduced as a service for patients with serious illness to help with symptoms, assist with goals of care conversations, navigate complex decision making, improve quality of life for patients, and provide support both patients and families.  Patient seemed to appreciate the extra layer of support.     Supportive Interventions: Interventions: SPO Spiritual Care: offered    Disposition:  Please  start the process of having prior authorization with meds to beds deliver medications to patient prior to discharge via [facility] pharmacy. Prescriptions will need to be sent 48-72 hours prior to discharge so that a prior authorization can be completed.     Discharge date: unknown pending acute issues  Will request an appointment with Outpatient Supportive Oncology as appropriate      Signature and billing:  Thank you for allowing us to participate in the care of this patient. Recommendations will be communicated back to the consulting service by way of shared electronic medical record or face-to-face.    Medical complexity was high level due to due to complexity of problems, extensive data review, and high risk of management/treatment.    I spent 75 minutes in the care of this patient which included chart review, interviewing patient/family, discussion with primary team, coordination of care, and documentation.      DATA   Diagnostic tests and information reviewed for today's visit:  Conversation with primary team, Conversation with RN, Most recent labs and imaging results, Most recent EKG, Medications         Plan of Care discussed with: Provider, RN, Patient    Thank you for asking Supportive and Palliative Oncology to assist with care of this patient.  We will continue to follow.  Please contact us for additional questions or concerns.      SIGNATURE: RAFI Evans-CNP, DNP  PAGER/CONTACT:  Contact information:  Supportive and Palliative Oncology  Monday-Friday 8 AM-5 PM  Epic Secure chat  or pager [phone].  After hours and weekends:  pager [phone]

## 2024-05-24 NOTE — ED PROVIDER NOTES
HPI   Chief Complaint   Patient presents with    inability to ambulate       Pt is a 53 y.o. male with h/o HTN, HLD, hypoT, restrictive lung disease, DALLIN on CPAP, GERD, malignant thymoma (dx 2016) with mets to the lungs s/p total pulmonary decortication, resection of anterior metastatic mass in the thymus, LLL & SINDI wedge resections, chemoradiation ([id number]) mets to the serratus anterior muscle (2019, s/p proton therapy), T12-L1 spine mets (s/p T9-L1 lami & fusion for decompression in 2021 and also s/p proton therapy, with progression, pt declined systemic therapy), s/p T-L spine palliative radiation 09/2021, s/p radiation to T4-6 & L1-L3 in 03/2022, currently on chemo, 4/26 p/w L hip flexion weakness and inability to walk of 3 wks duration, MRI CTL with some new cauda equina enhancement c/f leptomeningeal spread, slight interval increase in L1-L2 lesion. He was planning for direct admit to med/onc with surg onc c/s, with plans for OR with Dr. Melendez. Patient is having worsening pain and symptoms now not able to move his left leg hardly at all and right leg only minimally.  Patient's last dose of pain medications at 9 PM last night.  No significant falls or traumas.  Not on blood thinners.  No headaches vision changes chest pain.  No infectious symptoms.                          Jamila Coma Scale Score: 15                  Patient History   Past Medical History:   Diagnosis Date    Abnormal weight gain 12/08/2014    Abnormal weight gain    Hemothorax     Hemothorax    Personal history of other diseases of the digestive system 09/10/2013    History of gastritis    Personal history of other diseases of the digestive system 03/19/2015    History of esophageal reflux    Personal history of other diseases of the nervous system and sense organs 11/24/2020    History of sleep apnea    Personal history of other endocrine, nutritional and metabolic disease     History of hypercholesterolemia    Personal history of other  endocrine, nutritional and metabolic disease     History of hypothyroidism    Personal history of other specified conditions 03/14/2014    History of chest pain    Strain of unspecified muscle, fascia and tendon at shoulder and upper arm level, right arm, initial encounter 05/20/2014    Right shoulder strain     Past Surgical History:   Procedure Laterality Date    CT GUIDED PERCUTANEOUS BIOPSY MUSCLE  11/13/2019    CT GUIDED PERCUTANEOUS BIOPSY MUSCLE [place] [facility]    CT GUIDED PERCUTANEOUS BIOPSY MUSCLE  8/13/2020    CT GUIDED PERCUTANEOUS BIOPSY MUSCLE [place]13/2020 [facility]    CT GUIDED PLEURA PERCUTANEOUS BIOPSY  5/10/2017    CT GUIDED PLEURA PERCUTANEOUS BIOPSY [place]/2017 [facility]    KNEE SURGERY  09/10/2013    Knee Surgery    MR CHEST ANGIO W IV CONTRAST  2/9/2016    MR CHEST ANGIO W IV CONTRAST 2[place]9/2016 [facility]    OTHER SURGICAL HISTORY  09/10/2013    History Of Prior Surgery     No family history on file.  Social History     Tobacco Use    Smoking status: Never    Smokeless tobacco: Never   Substance Use Topics    Alcohol use: Not Currently    Drug use: Not Currently       Physical Exam   ED Triage Vitals [05/23/24 2334]   Temperature Heart Rate Respirations BP   36.8 °C (98.2 °F) (!) 131 16 131/84      Pulse Ox Temp Source Heart Rate Source Patient Position   95 % Temporal -- --      BP Location FiO2 (%)     -- --       Physical Exam  Vitals and nursing note reviewed.   Constitutional:       General: He is not in acute distress.     Appearance: He is well-developed and normal weight.   HENT:      Head: Normocephalic and atraumatic.      Nose: Nose normal.      Mouth/Throat:      Mouth: Mucous membranes are moist.   Eyes:      Extraocular Movements: Extraocular movements intact.      Conjunctiva/sclera: Conjunctivae normal.      Pupils: Pupils are equal, round, and reactive to light.   Cardiovascular:      Rate and Rhythm: Regular rhythm. Tachycardia present.      Pulses:  Normal pulses.      Heart sounds: Normal heart sounds. No murmur heard.  Pulmonary:      Effort: Pulmonary effort is normal. No respiratory distress.      Breath sounds: Normal breath sounds. No wheezing.   Abdominal:      Palpations: Abdomen is soft.      Tenderness: There is no abdominal tenderness.   Musculoskeletal:         General: No swelling.      Cervical back: Neck supple.      Right lower leg: No edema.      Left lower leg: No edema.   Skin:     General: Skin is warm and dry.      Capillary Refill: Capillary refill takes less than 2 seconds.      Comments: Abrasion R knee, healing   Neurological:      Mental Status: He is alert and oriented to person, place, and time.      Cranial Nerves: No cranial nerve deficit.      Sensory: No sensory deficit.      Motor: Weakness (4/5 RLE PF/DF, 4/5 knee F/E, 3/5 R hip flexion; L 3/5 DF/PF, 2/5 knee F/E, 1/5 hip) present.      Gait: Gait abnormal.   Psychiatric:         Mood and Affect: Mood normal.         ED Course & MDM       Medical Decision Making  Patient is a 53-year-old male who comes in for pain control and worsening neurosymptoms in the setting of known leptomeningeal spread of his malignancy.  Patient follows with Dr. Melendez.  He was given IV pain medications with improvement of his symptoms.  Patient vitals significant for tachycardia in the setting of pain.  Labs were obtained for this patient        Please see ED course for updates on patient status, results, and disposition.     Diagnoses as of 05/24/24 0453   Malignant thymoma (Multi)   Weakness of both lower extremities   Cancer with leptomeningeal spread (Multi)       Procedure  Procedures    Patient seen and discussed with No att. providers found    Fiordaliza Angulo DO  PGY-2 Emergency Medicine         ATTENDING ATTESTATION  53-year-old male with history of malignant thymoma and known leptomeningeal metastasis with progressive weakness scheduled for elective surgical procedure in the next couple of days  presenting to the emergency department this evening with progressive uncontrolled pain at home.  Patient has had known leptomeningeal disease with compression of the cord, outpatient MRI demonstrated actionable disease there was plan for admission and surgical intervention.  No new falls injuries or trauma no fevers or chills patient presented this evening with continued pain.  He has obvious motor weakness in the lower extremities as described above, diminished sensation, he otherwise is in no distress hemodynamically stable.  Patient's initial tachycardia more likely driven by pain as I assessed him at the bedside his heart rate is closer to 110, no fever and doubt infection.  We will control the patient's pain, plan for admission    Mook Covington DO  [facility]    The patient was seen by the resident/fellow.  I have personally performed a substantive portion of the encounter.  I have seen and examined the patient; agree with the workup, evaluation, MDM, management and diagnosis.    I have reviewed all the nurses' notes and have confirmed their findings, and have incorporated those findings into this medical record.   The care plan has been discussed with the resident/fellow; I have reviewed the resident/fellow’s note and agree with the documented findings with the exception/addition of information listed above.  On my own examination I agree and incorporated in this document my own history, examination findings and clinical decision making.  All notation in this Addendum supersedes information presented by the resident or GURPREET as listed above.        Mook Covington DO  05/25/24 0113

## 2024-05-24 NOTE — PROGRESS NOTES
Subjective   No events this morning. Reports pain has been stable at a 5-6/10 at home. Came to the hospital due to inability to bear weight on right leg for last two weeks. Denies any numbness or tingling. No shortness of breath, chest pain, nausea, or vomiting. No incontinence, urinary or bladder.    Meds  Scheduled medications  cholecalciferol, 2,000 Units, oral, Daily  [Held by provider] everolimus, 10 mg, oral, Daily  gabapentin, 300 mg, oral, TID  insulin lispro, 0-5 Units, subcutaneous, q4h  lisinopril, 20 mg, oral, Daily  melatonin, 5 mg, oral, Daily  methocarbamol, 500 mg, oral, q6h CHERI  multivitamin with minerals, 1 tablet, oral, Daily  pantoprazole, 40 mg, oral, Daily before breakfast  polyethylene glycol, 17 g, oral, Daily      Continuous medications     PRN medications  PRN medications: dextrose, dextrose, diphenhydrAMINE, glucagon, glucagon, HYDROmorphone, oxyCODONE, oxyCODONE      Objective   Vital signs in last 24 hours:  Temp:  [36.8 °C (98.2 °F)-36.9 °C (98.4 °F)] 36.9 °C (98.4 °F)  Heart Rate:  [116-131] 117  Resp:  [16-20] 18  BP: (121-146)/(67-92) 146/92  FiO2 (%):  [21 %] 21 %    Intake/Output this shift:  No intake or output data in the 24 hours ending 05/24/24 1811    Physical  General: Patient is awake, non-toxic appearing, normal body habitus  Pulm: Normal WOB at rest and when sitting up, no crackles or rhonchi  Cardiac: Regular rate and rhythm, normal S1/S2  Abdomen: Non-tender to palpation, non-distended  Extremities: No peripheral edema, or cyanosis  Neuro: Patient alert and oriented, cranial nerves grossly intact, 0/5 strength to hip and knee flexion in LLE, 1-2/5 strength to hip and knee flexion RLE    Results  Results for orders placed or performed during the hospital encounter of 05/24/24 (from the past 24 hour(s))   ECG 12 lead   Result Value Ref Range    Ventricular Rate 130 BPM    Atrial Rate 130 BPM    DE Interval 154 ms    QRS Duration 128 ms    QT Interval 334 ms    QTC  Calculation(Bazett) 491 ms    P Axis 89 degrees    R Axis 154 degrees    T Axis -4 degrees    QRS Count 22 beats    Q Onset 211 ms    P Onset 134 ms    P Offset 184 ms    T Offset 378 ms    QTC Fredericia 432 ms   CBC and Auto Differential   Result Value Ref Range    WBC 7.1 4.4 - 11.3 x10*3/uL    nRBC 0.0 0.0 - 0.0 /100 WBCs    RBC 5.64 4.50 - 5.90 x10*6/uL    Hemoglobin 14.3 13.5 - 17.5 g/dL    Hematocrit 42.4 41.0 - 52.0 %    MCV 75 (L) 80 - 100 fL    MCH 25.4 (L) 26.0 - 34.0 pg    MCHC 33.7 32.0 - 36.0 g/dL    RDW 17.7 (H) 11.5 - 14.5 %    Platelets 345 150 - 450 x10*3/uL    Neutrophils % 82.9 40.0 - 80.0 %    Immature Granulocytes %, Automated 0.3 0.0 - 0.9 %    Lymphocytes % 6.9 13.0 - 44.0 %    Monocytes % 7.8 2.0 - 10.0 %    Eosinophils % 1.7 0.0 - 6.0 %    Basophils % 0.4 0.0 - 2.0 %    Neutrophils Absolute 5.85 1.20 - 7.70 x10*3/uL    Immature Granulocytes Absolute, Automated 0.02 0.00 - 0.70 x10*3/uL    Lymphocytes Absolute 0.49 (L) 1.20 - 4.80 x10*3/uL    Monocytes Absolute 0.55 0.10 - 1.00 x10*3/uL    Eosinophils Absolute 0.12 0.00 - 0.70 x10*3/uL    Basophils Absolute 0.03 0.00 - 0.10 x10*3/uL   Phosphorus   Result Value Ref Range    Phosphorus 3.6 2.5 - 4.9 mg/dL   Magnesium   Result Value Ref Range    Magnesium 2.06 1.60 - 2.40 mg/dL   Comprehensive metabolic panel   Result Value Ref Range    Glucose 111 (H) 74 - 99 mg/dL    Sodium 139 136 - 145 mmol/L    Potassium 3.8 3.5 - 5.3 mmol/L    Chloride 96 (L) 98 - 107 mmol/L    Bicarbonate 31 21 - 32 mmol/L    Anion Gap 16 10 - 20 mmol/L    Urea Nitrogen 16 6 - 23 mg/dL    Creatinine 0.99 0.50 - 1.30 mg/dL    eGFR 90 >60 mL/min/1.73m*2    Calcium 9.9 8.6 - 10.6 mg/dL    Albumin 4.4 3.4 - 5.0 g/dL    Alkaline Phosphatase 57 33 - 120 U/L    Total Protein 8.3 (H) 6.4 - 8.2 g/dL    AST 28 9 - 39 U/L    Bilirubin, Total 0.6 0.0 - 1.2 mg/dL    ALT 20 10 - 52 U/L   Coagulation Screen   Result Value Ref Range    Protime 12.2 9.8 - 12.8 seconds    INR 1.1 0.9 -  1.1    aPTT 57 (H) 27 - 38 seconds   Type And Screen   Result Value Ref Range    ABO TYPE O     Rh TYPE POS     ANTIBODY SCREEN NEG    Sars-CoV-2 PCR   Result Value Ref Range    Coronavirus 2019, PCR Not Detected Not Detected   POCT GLUCOSE   Result Value Ref Range    POCT Glucose 93 74 - 99 mg/dL   POCT GLUCOSE   Result Value Ref Range    POCT Glucose 99 74 - 99 mg/dL   POCT GLUCOSE   Result Value Ref Range    POCT Glucose 98 74 - 99 mg/dL       Imaging  XR chest 2 views    Result Date: 5/24/2024  STUDY: Chest Radiographs;  05/24/2024 5:48 AM INDICATION: Pre Operative. COMPARISON: Chest XR 11/13/2019, and 11/13/2019. ACCESSION NUMBER(S): [id number] ORDERING CLINICIAN: STEPHANIE DICKINSON TECHNIQUE:  Frontal and lateral chest. FINDINGS: CARDIOMEDIASTINAL SILHOUETTE: Cardiomediastinal silhouette is normal in size and configuration.  LUNGS: Lungs are clear.  There is diminished inspiration.  ABDOMEN: No remarkable upper abdominal findings.  BONES: No acute osseous changes.  Lower thoracic and lumbar fusion hardware is partially visualized.    No definite acute cardiopulmonary disease. Signed by Joshua Aguilar    ECG 12 lead    Result Date: 5/24/2024  Sinus tachycardia Right bundle branch block Inferior infarct , age undetermined Anterolateral infarct , age undetermined Abnormal ECG When compared with ECG of 11-MAR-2018 18:08, Right bundle branch block is now Present Anterior infarct is now Present Anterolateral infarct is now Present Inferior infarct is now Present See ED provider note for full interpretation and clinical correlation Confirmed by Rachel Chisholm ([id number]) on 5/24/2024 1:21:07 AM        Assessment/Plan   Principal Problem:    Malignant thymoma (Multi)  Active Problems:    Spinal cord compression (Multi)    Spinal stenosis of lumbar region    Lumbar stenosis with neurogenic claudication    Cancer with leptomeningeal spread (Multi)    Altaf Parsons is a 53 y.o. male with PMHx of malignant thymoma (dx. 03/2016) w/  mets to bone and lung that [place] [facility] [place]23 with worsening weakness, loss of L hip flexion and uncontrolled pain. Admitted for surgical intervention       #Weakness on hip flexion  #Malignant spinal cord compression  - MRI (4/26) showing new enhancement of cauda equine nerve roots, concern for leptomeningeal spread  - new progressive weakness possibly related to this leptomeningeal spread of disease  - no acute surgical intervention per NSGY consult on last admit (4/26)  -Orthopedics consulted, appreciate recs    Plan  - PT/OT for dispo, patient has outpatient PT/OT  - NPO at midnight for procedure tomorrow  - Updated coags, RCRI note    #Metastatic Malignant Thymoma, Type B2 (predominant lymphocytic population with scattered epithelial cells)  -July 2007: Massive left-sided hemothorax. Underwent left VATS drainage of massive hemothorax, pleural biopsy and core needle biopsy of lingular mass and decortication. Biopsies all negative. CT scan showed 7 cm lesion within mediastinum with a calcified rim. Followed with serial Cts  - Feb 2016: New enhancing soft tissue mass superior to calcified structure with pericardial LN and basilar RLL nodule  - March 4, 2016: Diagnosed from left lower lobe of lung wedge resection and mediastinal fat biopsy  - Aug 1 2016- Sep 7 2016: 59.4 Gy proton therapy to resection bed  - May 2017: Left pleural nodule showing thymoma  -8/4/17- Started 3 cycles of cyclophosphamide, adriamycin, and cisplatin (PAC-P/PAC/CAP regimen, Demi et al 2004)  -11/2019: Left serratus anterior biopsy positive for thymoma  - 12/10/19- 12/31/19: SINDI mass and left serratus anterior mass, 45 CGE/15 fx, PROTONS   - Aug 2020: T12-L1 paraspinal mass showing thymoma  -Jan 2021: Spine tumor biopsy showing thymoma. Posterior spinal fusion T9-L1 with use of bilateral pedicle screw instrumentation, allograft and demineralized bone matrix fiber. T12 laminectomy with laminectomy of L1 and T11 and remove intraspinal  extradural neoplasm at T12-L1 segment  -Sep,7 2021 - Completed palliative irradiation of 30 Gy in 10 fractions to T-L/S1 spine  - Jan-Feb 2022- Treated with pemetrexed per NCCN Guidelines. Progressed  -April 2022 - Finished RT of 30 Gy in 10 fractions from T4-T6, and L1-L3  -Dec 2022- April 2023: Started on single agent capecitabine with stable disease  - Sep 2023: Some somatostatin receptor disease noted by [employer] PET  -Sep 2023: Single agent everolimus started. Stable disease so far  -Dr. Rodriguez aware  - Hold everolimus 10mg daily in setting of surgery     #Chronic left low back pain secondary to malignancy  -Consulted supportive oncology, appreciate recs  -Hold dexamethasone 4 mg daily  - cont home gabapentin 300mg TID  - cont home methocarbimol 500mg Q6   - Oxycodone 10 mg Q3H PRN for moderate pain  - Oxycodone 15 mg Q3H PRN for severe pain  - Hydromorphone 1 mg IV Q3H for breakthrough     #HTN  - cont home lisinopril 20mg     #Misc  - cont home Vit D, multivitamin      #Prophy  - no current AC  - cont home protonix 40mg daily  - SCDs     #DISPO  - Full code, confirmed on admission  - NOK: Marilia (wife) #[phone]     LOS: 0 days     Shlomo Collins MD/PhD   PGY-1.833

## 2024-05-24 NOTE — SIGNIFICANT EVENT
Cardiac Risk Assessment    [facility]    History of ischemic heart diseae: No  History of CHF: No  History of Cerebrovascular Disease: No  Pre-operative treatment with insulin: No  Pre-operative creatinine >2 mg/dL: No (0.99)    Based on these findings, patient has an RCRI score of 0 which only caries a 3.9% risk of death, MI or cardiac arrest which is low risk.

## 2024-05-24 NOTE — H&P
History Of Present Illness  Altaf Parsons is a 53 y.o. male with PMHx of malignant thymoma (dx. 03/2016) w/ mets to bone and lung that [place] [facility] [place] with worsening weakness, loss of L hip flexion and uncontrolled pain.     Patient seen at bedside. He is endorsing progressive L leg numbness and weakness and worsening inability of L hip and knee flexion. He endorses limited movement of the R leg and R knee but is able to flex the R knee. He states that when he saw Dr. Rodriguez in February, he had no issues with ambulation, and in March he began to notice heaviness and weakness that started in his left leg. He also endorses occasional swelling of the L foot. He reports intermittent severe pain located in his L leg and L hip that has been mostly controlled with medication at home however the pain has started to become more severe over the past few weeks. Also reporting urinary and bowel frequency and diarrhea, but denies incontinence. Last BM 5/23.    Of note, he was [place] [facility] [place] with c/o inability to walk and lower extremity weakness.  His evaluation included updated imaging, and he had a neurosurgery consultation and no surgery was recommended. He states he has been wheelchair bound for 2 weeks. He also recently saw Dr. Melendez outpatient on 5/22, and discussed possible advanced surgical intervention that would require a revision decompression in the thoracic spine, a decompression in the lumbar spine and extension of his fusion from the thoracic to the lumbar spine. The patient and his wife have decided to proceed with this intervention, and a plan was made to direct admit to the hospital to further expedite surgical planning on Saturday 5/25, however the patient came in early to the ED overnight for further pain control. Admitted for further pain management and surgical intervention work-up. He currently denies any recent sick contacts or illnesses. Denies CP, SOB, numbness, rash, vision  changes, abdominal pain, N/V, constipation, bleeding, dizziness, H/A, cough, rhinorrhea.     ED course:  Vitals: T 36.8, , RR 16, /84, SpOx. 95% on RA  Labs: WNL, INR 1.1, aPTT 57  Imaging: CXR- no acute cardiopulmonary disease, EKG- Sinus tachycardia, R BBB, Inferior infarct, age undetermined, Anterolateral infarct, age undetermined  Medications: IV Dilaudid 1mg x1, IV Zofran 4mg x1     Past Medical History  He has a past medical history of Abnormal weight gain (12/08/2014), Hemothorax, Personal history of other diseases of the digestive system (09/10/2013), Personal history of other diseases of the digestive system (03/19/2015), Personal history of other diseases of the nervous system and sense organs (11/24/2020), Personal history of other endocrine, nutritional and metabolic disease, Personal history of other endocrine, nutritional and metabolic disease, Personal history of other specified conditions (03/14/2014), and Strain of unspecified muscle, fascia and tendon at shoulder and upper arm level, right arm, initial encounter (05/20/2014).    Surgical History  He has a past surgical history that includes Knee surgery (09/10/2013); Other surgical history (09/10/2013); MR angio chest w IV contrast (2/9/2016); CT guided percutaneous biopsy muscle (11/13/2019); CT guided percutaneous biopsy muscle (8/13/2020); and CT guided pleura percutaneous biopsy (5/10/2017).    Infectious/Oncologic History  Diagnosis:  -Diagnosed 3/2016 from lung resection, multiple lung/pleural nodules  -Spinal mass biopsied 8/2020 consistent with thymoma, again spinal bx in Jan 2021,     Staging:  T2N0M1     Current Mets + procedures:  Mediastinum, lung, paraspinal region T, L, S spine, left pleura, T12-L1 paraspinal region, left lateral aspect of the spinal canal spanning from at least T11-S1.   Mets to the lungs s/p total pulmonary decortication, resection of anterior metastatic mass in the thymus, LLL & SINDI wedge resections,  chemoradiation ([id number]), mets to the serratus anterior muscle (2019, s/p proton therapy), T12-L1 spine mets (s/p T9-L1 lami & fusion for decompression in 2021 and also s/p proton therapy, with progression, pt declined systemic therapy), s/p T-L spine palliative radiation 09/2021, s/p radiation to T4-6 & L1-L3 in 03/2022, currently on chemo      Prior Therapy:  1. Radiation Aug 2016 with cyclophosphamide, adriamycin, cisplatin on 8/14/17 x 3 cycles. Charged particle therapy 3/2018, 12/2019  2. Laminectomy + spinal fusion by ortho  3. Palliative radiation to spine completed 9/2021  4. Single agents alimta per [facility] guideliens on January 10, 2022.   Received 2 cycles in January 2022.  Progressive disease based on MRI in February 2022.  5. 10- Radiation therapy to 30 Gy in 10 fractions, from T4-T6 and L1-L3 completed in mid April 2022  6.11- 1- Dec 2022 - single agent capecitabine 500 mg tab, 4 tabs BID. Cycle - 21 days (two weeks on with one week holiday). He started (C4) on 4/25/23.    Social History  He reports that he has never smoked. He has never used smokeless tobacco. He reports that he does not currently use alcohol. He reports that he does not currently use drugs.     Allergies  Patient has no known allergies.    Review of Systems   Constitutional:  Positive for activity change.   HENT: Negative.     Eyes: Negative.    Respiratory: Negative.     Cardiovascular: Negative.    Endocrine: Negative.    Genitourinary:  Positive for frequency.   Musculoskeletal:  Positive for gait problem.   Skin: Negative.    Allergic/Immunologic: Negative.    Neurological:  Positive for weakness (BL LE).   Psychiatric/Behavioral: Negative.          Physical Exam  Vitals reviewed.   Constitutional:       Appearance: Normal appearance.   HENT:      Head: Normocephalic and atraumatic.      Nose: Nose normal.      Mouth/Throat:      Mouth: Mucous membranes are moist.      Pharynx: Oropharynx is clear.   Eyes:      Extraocular  Movements: Extraocular movements intact.      Pupils: Pupils are equal, round, and reactive to light.   Cardiovascular:      Rate and Rhythm: Normal rate and regular rhythm.      Pulses: Normal pulses.      Heart sounds: Normal heart sounds.   Pulmonary:      Effort: Pulmonary effort is normal.      Breath sounds: Normal breath sounds.   Abdominal:      General: Bowel sounds are normal.      Palpations: Abdomen is soft.   Musculoskeletal:         General: Normal range of motion.      Comments: Unable to move left leg voluntarily, weak, unable to stand and bear weight on left leg, upper extremities equally strong    Skin:     General: Skin is warm.   Neurological:      General: No focal deficit present.      Mental Status: He is alert and oriented to person, place, and time. Mental status is at baseline.      Motor: Weakness (L LE weakness) present.   Psychiatric:         Mood and Affect: Mood normal.         Behavior: Behavior normal.       Last Recorded Vitals  Blood pressure 131/84, pulse (!) 131, temperature 36.8 °C (98.2 °F), temperature source Temporal, resp. rate 16, height 1.829 m (6'), weight 101 kg (223 lb), SpO2 95%.    Relevant Results  Scheduled medications  cholecalciferol, 2,000 Units, oral, Daily  everolimus, 10 mg, oral, Daily  gabapentin, 300 mg, oral, TID  lisinopril, 20 mg, oral, Daily  methocarbamol, 500 mg, oral, q6h CHERI  multivitamin with minerals, 1 tablet, oral, Daily  pantoprazole, 40 mg, oral, Daily before breakfast  polyethylene glycol, 17 g, oral, Daily      Continuous medications     PRN medications  PRN medications: methocarbamol, oxyCODONE     XR chest 2 views    Result Date: 5/24/2024  STUDY: Chest Radiographs;  05/24/2024 5:48 AM INDICATION: Pre Operative. COMPARISON: Chest XR 11/13/2019, and 11/13/2019. ACCESSION NUMBER(S): [id number] ORDERING CLINICIAN: STEPHANIE DICKINSON TECHNIQUE:  Frontal and lateral chest. FINDINGS: CARDIOMEDIASTINAL SILHOUETTE: Cardiomediastinal silhouette is  normal in size and configuration.  LUNGS: Lungs are clear.  There is diminished inspiration.  ABDOMEN: No remarkable upper abdominal findings.  BONES: No acute osseous changes.  Lower thoracic and lumbar fusion hardware is partially visualized.    No definite acute cardiopulmonary disease. Signed by Joshua Aguilar    ECG 12 lead    Result Date: 5/24/2024  Sinus tachycardia Right bundle branch block Inferior infarct , age undetermined Anterolateral infarct , age undetermined Abnormal ECG When compared with ECG of 11-MAR-2018 18:08, Right bundle branch block is now Present Anterior infarct is now Present Anterolateral infarct is now Present Inferior infarct is now Present See ED provider note for full interpretation and clinical correlation Confirmed by Rachel Chisholm ([id number]) on 5/24/2024 1:21:07 AM    MR cervical spine w and wo IV contrast    Result Date: 4/27/2024  Interpreted By:  Jarred Cortes,  and Dedra Patterson STUDY: MR CERVICAL SPINE W AND WO IV CONTRAST;  4/27/2024 1:04 am   INDICATION: Signs/Symptoms:L hip weakness, hx of spinal mets.   Per clinical notes: Patient has a history of malignant thymoma diagnosed in 2016 status post left lung wedge resection. Biopsy-proven T12-L1 paraspinal mass metastasis. On single agent Afinitor   COMPARISON: PET-CT 09/18/2023   ACCESSION NUMBER(S): [id number]   ORDERING CLINICIAN: WADE GALLEGOS   TECHNIQUE: Sagittal T1, T2, STIR, axial T1 and axial T2 weighted images were acquired through the cervical spine without, and following administration of 20 cc Dotarem gadolinium based IV contrast.   FINDINGS:   Evaluation is moderately degraded due to motion artifact.   Alignment: There is straightening of the normal cervical lordosis, which may be related to patient positioning or muscle spasm. The alignment is preserved.   Vertebrae/Intervertebral Discs: The vertebral bodies demonstrate expected height.  The marrow signal is within normal limits. Multilevel loss of  intervertebral disc height and disc desiccation noted.   Cord: Normal in caliber and signal. No abnormal enhancement.   C1-C2: Disc-osteophyte complex and facet hypertrophy contribute to mild right foraminal narrowing and mild asymmetric narrowing of the central canal.   C2-C3: Disc-osteophyte complex and facet hypertrophic changes contribute to marked bilateral foraminal narrowing and moderate narrowing of the central canal.   C3-C4: Disc-osteophyte complex and facet hypertrophy contribute to marked left and moderate right foraminal narrowing. There is moderate narrowing of the central canal.   C4-C5: Disc-osteophyte complex and facet hypertrophic changes contribute to marked bilateral foraminal and central canal narrowing.   C5-C6: Disc-osteophyte complex and facet hypertrophy contribute to marked left foraminal and mild central canal narrowing.   C6-C7:  There is no significant central canal or neural foraminal stenosis.   C7-T1: There is no posterior disc contour abnormality. There is no significant central canal or neural foraminal stenosis.   The prevertebral and posterior paraspinous soft tissues are within normal limits.   Please refer to same day separately dictated thoracic spine MRI for details on the abnormalities of the thoracic spine.       1.  Degenerative changes of the cervical spine with marked multilevel foraminal and moderate central canal narrowing as detailed above. 2. No evidence of metastatic disease identified in the cervical spine.     I personally reviewed the images/study and I agree with the findings as stated by Dr. Yesenia Colmenares. The study was interpreted at [place], [place].   MACRO: None   Signed by: Jarred Cortes 4/27/2024 8:05 AM Dictation workstation:   [id number]    MR thoracic spine w and wo IV contrast    Result Date: 4/27/2024  Interpreted By:  Jarred Cortes,  and Dedra Patterson STUDY: MR THORACIC SPINE W AND WO IV  CONTRAST;  4/27/2024 1:04 am   INDICATION: Signs/Symptoms:L hip weakness, hx of spinal mets.   Per clinical notes: Patient has a history of malignant thymoma diagnosed in 2016 status post left lung wedge resection. Biopsy-proven T12-L1 paraspinal mass metastasis. On single agent Afinitor   COMPARISON: Thoracic spine MRI 02/29/2024   ACCESSION NUMBER(S): [id number]   ORDERING CLINICIAN: WADE GALLEGOS   TECHNIQUE: Sagittal T1, T2, STIR and axial T2 and T1 weighted MR images of the thoracic spine were obtained without, following administration of 20 cc Dotarem gadolinium based IV contrast.   FINDINGS: The alignment is maintained. Postsurgical changes of T9 through L1 posterolateral fusion again noted. There is laminectomy at T12. There is unchanged compression deformity vertebral body height centrally at T8 with approximately 20% height loss and no osseous retropulsion into the central canal. Minimal loss vertebral body height at T10 is also unchanged. Extensive marrow replacing lesions are noted throughout thoracic spine which are overall similar to prior MRI dated 02/29/2024. There is corresponding STIR hyperintense signal and loss of normal T1 marrow signal. There is complete replacement of the bone marrow at T8 through T11. Multilevel loss of intervertebral disc height and disc desiccation noted. Similar changes of radiation therapy with increased background T1 signal of the bone marrow again noted.   There is again evidence of epidural extension of the enhancing soft tissue component at T4 through T12, not measurably changed from prior examination. There is resultant severe narrowing of the central canal predominantly at T5-6 through T11-12.       Extensive osseous metastatic disease again noted throughout the thoracic spine with epidural extension and cord compression as previously demonstrated and described.   No evidence of compression fracture scratch the   I personally reviewed the images/study and I agree  with the findings as stated by Dr. Yesenia Colmenares. The study was interpreted at [facility], [place], [place].   MACRO: None   Signed by: Jarred Cortes 4/27/2024 6:54 AM Dictation workstation:   [id number]    MR lumbar spine w and wo IV contrast    Result Date: 4/27/2024  Interpreted By:  Jarred Cortes,  and Dedra Patterson STUDY: MR LUMBAR SPINE W AND WO IV CONTRAST;  4/27/2024 1:04 am   INDICATION: Signs/Symptoms:L hip no effort against gravity, hx of spinal mets.   COMPARISON: MRI lumbar spine 02/29/2024   ACCESSION NUMBER(S): [id number]   ORDERING CLINICIAN: WADE GALLEGOS   TECHNIQUE: Sagittal T1, T2, STIR, axial T1 and T2 weighted images of the lumbar spine were acquired without, following administration of 20 cc gadolinium based IV contrast. Sagittal postcontrast T1 sequence was obtained. Axial postcontrast T1 sequence was not obtained due to patient's intolerance.   FINDINGS: Partially visualized changes of thoracolumbar fusion with associated susceptibility artifact are similar to previous. T1 hypointense, T2 hyperintense enhancing lesions throughout the visualized marrow are Overall similar to prior MRI on 02/29/2024. No definite new marrow lesion is identified. Ventral epidural extension, left-greater-than-right is similar to prior examination.   The conus terminates at L1-2. There is diffuse enhancement of the cauda equina nerve roots as well as the conus, new when compared to prior MRI examinations.   The alignment is within normal limits. Vertebral body heights are preserved. Multilevel loss of normal disc T2 signal and disc height.   T12-L1: Epidural tumor effacing both neural foramina is similar to previous,  no spinal canal narrowing.   L1-2: Epidural tumor effacing the neural foramina again noted. There has been slight interval increase in the posterior epidural extension of the tumor compared to MRI dated 02/29/2024 (series 9, image 17). Facet  hypertrophy, ligamentum flavum thickening, prominence of the posterior epidural fat along with epidural tumor extension contribute to moderate diffuse central canal narrowing.   L2-3: Epidural tumor effacing the left neural foramen is similar to previous. Facet hypertrophy, and ligamentum flavum thickening, and prominent epidural fat with diffuse mild spinal canal narrowing and moderate right neural foramen narrowing is unchanged.   L3-4: Epidural tumor effacing the left neural foramen is similar to previous. Facet hypertrophy with moderate right neural foramen narrowing is also similar to previous, no spinal canal narrowing.   L4-5: Epidural tumor effacing the left subarticular space and the left neural foramen is unchanged. Left neural foramen. Facet hypertrophy and ligamentum flavum thickening results in moderate right neural foramen narrowing which is similar to previous.   L5-S1: Epidural tumor effacing the left subarticular space is similar to previous. Facet hypertrophy and disc bulge contribute to mild left foraminal narrowing.   Paraspinal muscular atrophy is again noted. There is diffuse edema and enhancement of the posterior paraspinous soft tissues, similar to previous.       1. Thickening and enhancement of epidural tumor extension at L4, L5 and S1 asymmetrical to the left with compression of the thecal sac on the left side. There is extension to the left S1 and S2 nerve roots within the thecal sac. 2. 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 3. Slight interval increase in the epidural expression of the tumor at L1-2. Otherwise, no significant interval change in the marrow replacing lesions and epidural tumor compared to the prior exam.     I personally reviewed the images/study and I agree with the findings as stated by Dr. Yesenia Colmenares. The study was interpreted at [place], [place], [place].   MACRO: None   Signed by: Jarred Cortes 4/27/2024 6:46 AM Dictation workstation:   [id number]       Assessment/Plan   Principal Problem:    Malignant thymoma (Multi)  Altaf Parsons is a 53 y.o. male with PMHx of malignant thymoma (dx. 03/2016) w/ mets to bone and lung that [place] [facility] [place] on 5/23 with worsening weakness, loss of L hip flexion and uncontrolled pain. Admitted for further pain management and surgical intervention work-up.     #Pain 2/2 malignancy  - cont home oxycodone 5mg Q3 PRN  - cont home gabapentin 300mg TID  - cont home methocarbimol 500mg Q6   - previously on toradol, meloxicam, topiramate, tramadol but has not been using these     #Malignant Thymoma  - follows with Dr. Rodriguez - notify of admission in AM  - cont everolimus 10mg daily     #Weakness on hip flexion  - MRI (4/26) showing new enhancement of cauda equine nerve roots, concern for leptomeningeal spread  - new progressive weakness possibly related to this leptomeningeal spread of disease  - no acute surgical intervention per NSGY consult on last admit (4/26)  - NPO on admit (5/24)  - ortho consult in AM  - PT/OT for dispo, patient has outpatient PT/OT     #HTN  - cont home lisinopril 20mg    #Misc  - cont home Vit D, multivitamin     #Prophy  - no current AC  - cont home protonix 40mg daily  - SCDs    #DISPO  - Full code, confirmed on admission  - NOK: Marilia (wife) #[phone]  - DC pending pain control, surgical intervention and PT/OT recs     I spent >90 minutes in the professional and overall care of this patient.    Gina Loza, APRN-CNP

## 2024-05-24 NOTE — CONSULTS
Orthopaedic Surgery Consult Note    Subjective:    Injury: thoracolumbar metastatic disease  HPI: 53M (metastatic thymoma w thoracic osseous mets s/p radiation and thoracic decompression & fusion w Dr. Delgado in 2021) direct admit for surgical intervention after clinic visit on 5/22 w Dr. Melendez. Adm in late April by NSGY for progressive LE weakness w ambulation difficulty (no surg intervention at that time). Denies b/b incontinence or saddle anesthesia. On exam, 2/5 HF and 3/5 EHL RLE, otherwise 5/5 RLE. LLE diffusely weak throughout. SILT throughout BLE. No clonus. MRI CTL spine (4/2024) w recurrence of T-spine dz and new L spine mets w leptomeningeal spread and canal stenosis at L1/2 and L2/3.    PMH: per above/EMR  PSH: per above/EMR  SocHx:      - Denies tobacco use      - Denies EtOH use      - Denies other drug use  FamHx:  Non-contributory to this patient's acute orthopaedic problem other than as mentioned in HPI  All: Reviewed in EMR  Meds: Reviewed in EMR    Objective:  · Physical Exam:  - Constitutional: No acute distress, cooperative  - Eyes: EOM grossly intact  - Head/Neck: Trachea midline  - Respiratory/Thorax: Normal work of breathing  - Cardiovascular: RRR on peripheral palpation  - Gastrointestinal: Nondistended  - Psychological: Appropriate mood/behavior  - Skin: Warm and dry. Additional findings in musculoskeletal evaluation  - Musculoskeletal:  Spine Exam:  C5: SILT   Deltoid 5/5 Left; 5/5 Right  C6: SILT   Wrist Ext: 5/5 Left; 5/5 Right  C7: SILT   Triceps: 5/5 Left; 5/5 Right  C8: SILT   Finger flexion: 5/5 Left; 5/5 Right  T1: SILT    Interossei: 5/5 Left; 5/5 Right    Becerra: Negative    L1: SILT       L2: SILT      Hip flexors 0-1/5 Left; 2/5 Right  L3: SILT      Knee extension 0-1/5 Left; 5/5 Right  L4: SILT      Tib Ant. (Dorsiflexion) 0-1/5 Left; 5/5 Right  L5: SILT      EHL 0-1/5 Left; 3/5 Right  S1: SILT      Plantarflexion 0-1/5 Left; 5/5 Right    No clonus    Rectal exam deferred given  planned surgical intervention.    ROS      - 14 point ROS negative except as above    Results for orders placed or performed during the hospital encounter of 05/24/24 (from the past 24 hour(s))   ECG 12 lead   Result Value Ref Range    Ventricular Rate 130 BPM    Atrial Rate 130 BPM    MA Interval 154 ms    QRS Duration 128 ms    QT Interval 334 ms    QTC Calculation(Bazett) 491 ms    P Axis 89 degrees    R Axis 154 degrees    T Axis -4 degrees    QRS Count 22 beats    Q Onset 211 ms    P Onset 134 ms    P Offset 184 ms    T Offset 378 ms    QTC Fredericia 432 ms   CBC and Auto Differential   Result Value Ref Range    WBC 7.1 4.4 - 11.3 x10*3/uL    nRBC 0.0 0.0 - 0.0 /100 WBCs    RBC 5.64 4.50 - 5.90 x10*6/uL    Hemoglobin 14.3 13.5 - 17.5 g/dL    Hematocrit 42.4 41.0 - 52.0 %    MCV 75 (L) 80 - 100 fL    MCH 25.4 (L) 26.0 - 34.0 pg    MCHC 33.7 32.0 - 36.0 g/dL    RDW 17.7 (H) 11.5 - 14.5 %    Platelets 345 150 - 450 x10*3/uL    Neutrophils % 82.9 40.0 - 80.0 %    Immature Granulocytes %, Automated 0.3 0.0 - 0.9 %    Lymphocytes % 6.9 13.0 - 44.0 %    Monocytes % 7.8 2.0 - 10.0 %    Eosinophils % 1.7 0.0 - 6.0 %    Basophils % 0.4 0.0 - 2.0 %    Neutrophils Absolute 5.85 1.20 - 7.70 x10*3/uL    Immature Granulocytes Absolute, Automated 0.02 0.00 - 0.70 x10*3/uL    Lymphocytes Absolute 0.49 (L) 1.20 - 4.80 x10*3/uL    Monocytes Absolute 0.55 0.10 - 1.00 x10*3/uL    Eosinophils Absolute 0.12 0.00 - 0.70 x10*3/uL    Basophils Absolute 0.03 0.00 - 0.10 x10*3/uL   Phosphorus   Result Value Ref Range    Phosphorus 3.6 2.5 - 4.9 mg/dL   Magnesium   Result Value Ref Range    Magnesium 2.06 1.60 - 2.40 mg/dL   Comprehensive metabolic panel   Result Value Ref Range    Glucose 111 (H) 74 - 99 mg/dL    Sodium 139 136 - 145 mmol/L    Potassium 3.8 3.5 - 5.3 mmol/L    Chloride 96 (L) 98 - 107 mmol/L    Bicarbonate 31 21 - 32 mmol/L    Anion Gap 16 10 - 20 mmol/L    Urea Nitrogen 16 6 - 23 mg/dL    Creatinine 0.99 0.50 - 1.30  mg/dL    eGFR 90 >60 mL/min/1.73m*2    Calcium 9.9 8.6 - 10.6 mg/dL    Albumin 4.4 3.4 - 5.0 g/dL    Alkaline Phosphatase 57 33 - 120 U/L    Total Protein 8.3 (H) 6.4 - 8.2 g/dL    AST 28 9 - 39 U/L    Bilirubin, Total 0.6 0.0 - 1.2 mg/dL    ALT 20 10 - 52 U/L   Coagulation Screen   Result Value Ref Range    Protime 12.2 9.8 - 12.8 seconds    INR 1.1 0.9 - 1.1    aPTT 57 (H) 27 - 38 seconds   Type And Screen   Result Value Ref Range    ABO TYPE O     Rh TYPE POS     ANTIBODY SCREEN NEG    Sars-CoV-2 PCR   Result Value Ref Range    Coronavirus 2019, PCR Not Detected Not Detected   POCT GLUCOSE   Result Value Ref Range    POCT Glucose 93 74 - 99 mg/dL   POCT GLUCOSE   Result Value Ref Range    POCT Glucose 99 74 - 99 mg/dL   POCT GLUCOSE   Result Value Ref Range    POCT Glucose 98 74 - 99 mg/dL       XR chest 2 views   Final Result   No definite acute cardiopulmonary disease.   Signed by Joshua Aguilar          Imaging:   MRI CTL spine (4/2024) w recurrence of T-spine dz and new L spine mets w leptomeningeal spread and canal stenosis at L1/2 and L2/3.    Assessment/Plan:    Injury: thoracolumbar metastatic disease  HPI: 53M (metastatic thymoma w thoracic osseous mets s/p radiation and thoracic decompression & fusion w Dr. Delgado in 2021) direct admit for surgical intervention after clinic visit on 5/22 w Dr. Melendez. Adm in late April by NSGY for progressive LE weakness w ambulation difficulty (no surg intervention at that time). Denies b/b incontinence or saddle anesthesia. On exam, 2/5 HF and 3/5 EHL RLE, otherwise 5/5 RLE. LLE diffusely weak throughout. SILT throughout BLE. No clonus. MRI CTL spine (4/2024) w recurrence of T-spine dz and new L spine mets w leptomeningeal spread and canal stenosis at L1/2 and L2/3.    Plan: C/p posterior thoracolumbar decompression & fusion w Dr. Melendez 5/25. 2u pRBCs on hold.    Plan:   - NPO at midnight for upcoming surgery with orthopedics.  - Admit to onc , clearance pending for OR  tomorrow; Appreciate documentation of clearance by primary team  - RCRI documented by primary team  - Please obtain pre-operative labs/studies: T&S, PT/INR, CBC, BMP, CXR, EKG  - Consented and posted to OR schedule for posterior thoracic decompression and fusion w/ Dr. Melendez on 5/25  - Strict Bedrest  - Pre-operative ABx: None indicated preoperatively  - No indication for transfusion pre-operatively, 2U PRBC on hold for OR  - DVT PPx: SCDs, please hold DVT ppx    Consult seen and evaluated within 30 minutes of notification.     This consult was staffed with attending physician, Dr. Melendez.    Tutu Mackenzie MD  Orthopaedic Surgery PGY-1  Resident On-Call  Pager: [phone]  Available via Epic Chat    While admitted, this patient will be followed by the Ortho Spine Team. Please contact below residents with any questions (available via Epic Chat).     First call: Kelsi Mejia, PGY-2   Second call: Mario Spivey, PGY-3    Orthopedic spine attending    As noted, this patient has been recently evaluated for profound lower extremity weakness.  He has a history of metastatic thymoma.  At our recent office visit we reviewed his imaging with he and his wife.  We discussed different treatment options both surgical and nonsurgical.  I had a consultation personally with his oncologist, Dr. Rodriguez.  We discussed the fact that adjuvant treatment to address his metastatic disease is not an option.    The patient has given consideration over the last 2 days and has decided he would like to consider surgery.  We have again discussed the nature of his clinical and radiographic presentation.  We have discussed the role of surgery which is palliative.  Given the extent of his metastatic disease, his prior surgery and prior adjuvant therapy, as well as his significant weakness, it is very difficult to predict the potential for clinical improvement surgically.  Certainly the perioperative risks are significant given his clinical  presentation as are the risks of an adverse event, complication, or poor clinical outcome.    He understands and would like to proceed with surgery.    ** Dictated with voice recognition software and not immediately reviewed for errors in grammar and/or spelling **

## 2024-05-24 NOTE — PROGRESS NOTES
Pharmacy Admission Order Reconciliation Review    Altaf Parsons is a 53 y.o. male admitted for Malignant thymoma (Multi). Pharmacy reviewed the patient's unreconciled admission medications.    Prior to admission medications that were reviewed and acted on by the pharmacist include:  Omega-3 flaxseed oil  These medications have been reconciled.     Any other unreconcilied medications have been addressed and will be ordered or held by the patient's medical team. Medications addressed by the pharmacist may be added or changed by the patient's medical team at any time.    Jeff Herman, PharmD  Transitions of Care Pharmacist  [facility]  Please reach out via Secure Chat for questions

## 2024-05-25 ENCOUNTER — APPOINTMENT (OUTPATIENT)
Dept: NEUROLOGY | Facility: HOSPITAL | Age: 54
DRG: 029 | End: 2024-05-25
Payer: COMMERCIAL

## 2024-05-25 ENCOUNTER — ANESTHESIA EVENT (OUTPATIENT)
Dept: OPERATING ROOM | Facility: HOSPITAL | Age: 54
DRG: 029 | End: 2024-05-25
Payer: COMMERCIAL

## 2024-05-25 ENCOUNTER — ANESTHESIA (OUTPATIENT)
Dept: OPERATING ROOM | Facility: HOSPITAL | Age: 54
DRG: 029 | End: 2024-05-25
Payer: COMMERCIAL

## 2024-05-25 ENCOUNTER — APPOINTMENT (OUTPATIENT)
Dept: RADIOLOGY | Facility: HOSPITAL | Age: 54
DRG: 029 | End: 2024-05-25
Payer: COMMERCIAL

## 2024-05-25 LAB — GLUCOSE BLD MANUAL STRIP-MCNC: 120 MG/DL (ref 74–99)

## 2024-05-25 PROCEDURE — 2500000004 HC RX 250 GENERAL PHARMACY W/ HCPCS (ALT 636 FOR OP/ED)

## 2024-05-25 PROCEDURE — 2500000004 HC RX 250 GENERAL PHARMACY W/ HCPCS (ALT 636 FOR OP/ED): Mod: JZ | Performed by: STUDENT IN AN ORGANIZED HEALTH CARE EDUCATION/TRAINING PROGRAM

## 2024-05-25 PROCEDURE — 2500000004 HC RX 250 GENERAL PHARMACY W/ HCPCS (ALT 636 FOR OP/ED): Performed by: ANESTHESIOLOGIST ASSISTANT

## 2024-05-25 PROCEDURE — 88311 DECALCIFY TISSUE: CPT | Performed by: PATHOLOGY

## 2024-05-25 PROCEDURE — 99233 SBSQ HOSP IP/OBS HIGH 50: CPT | Performed by: STUDENT IN AN ORGANIZED HEALTH CARE EDUCATION/TRAINING PROGRAM

## 2024-05-25 PROCEDURE — 2500000001 HC RX 250 WO HCPCS SELF ADMINISTERED DRUGS (ALT 637 FOR MEDICARE OP): Performed by: ANESTHESIOLOGY

## 2024-05-25 PROCEDURE — 3700000002 HC GENERAL ANESTHESIA TIME - EACH INCREMENTAL 1 MINUTE: Performed by: ORTHOPAEDIC SURGERY

## 2024-05-25 PROCEDURE — 4A11X4G MONITORING OF PERIPHERAL NERVOUS ELECTRICAL ACTIVITY, INTRAOPERATIVE, EXTERNAL APPROACH: ICD-10-PCS | Performed by: ORTHOPAEDIC SURGERY

## 2024-05-25 PROCEDURE — 00BX0ZZ EXCISION OF THORACIC SPINAL CORD, OPEN APPROACH: ICD-10-PCS | Performed by: ORTHOPAEDIC SURGERY

## 2024-05-25 PROCEDURE — 3700000001 HC GENERAL ANESTHESIA TIME - INITIAL BASE CHARGE: Performed by: ORTHOPAEDIC SURGERY

## 2024-05-25 PROCEDURE — 2500000005 HC RX 250 GENERAL PHARMACY W/O HCPCS: Performed by: ANESTHESIOLOGY

## 2024-05-25 PROCEDURE — 2500000001 HC RX 250 WO HCPCS SELF ADMINISTERED DRUGS (ALT 637 FOR MEDICARE OP)

## 2024-05-25 PROCEDURE — 95955 EEG DURING SURGERY: CPT

## 2024-05-25 PROCEDURE — 2500000004 HC RX 250 GENERAL PHARMACY W/ HCPCS (ALT 636 FOR OP/ED): Performed by: ANESTHESIOLOGY

## 2024-05-25 PROCEDURE — C1776 JOINT DEVICE (IMPLANTABLE): HCPCS | Performed by: ORTHOPAEDIC SURGERY

## 2024-05-25 PROCEDURE — 88305 TISSUE EXAM BY PATHOLOGIST: CPT | Performed by: PATHOLOGY

## 2024-05-25 PROCEDURE — 2500000004 HC RX 250 GENERAL PHARMACY W/ HCPCS (ALT 636 FOR OP/ED): Performed by: ORTHOPAEDIC SURGERY

## 2024-05-25 PROCEDURE — A4217 STERILE WATER/SALINE, 500 ML: HCPCS | Performed by: ORTHOPAEDIC SURGERY

## 2024-05-25 PROCEDURE — 1170000001 HC PRIVATE ONCOLOGY ROOM DAILY

## 2024-05-25 PROCEDURE — 3600000004 HC OR TIME - INITIAL BASE CHARGE - PROCEDURE LEVEL FOUR: Performed by: ORTHOPAEDIC SURGERY

## 2024-05-25 PROCEDURE — 2780000003 HC OR 278 NO HCPCS: Performed by: ORTHOPAEDIC SURGERY

## 2024-05-25 PROCEDURE — 88341 IMHCHEM/IMCYTCHM EA ADD ANTB: CPT | Performed by: PATHOLOGY

## 2024-05-25 PROCEDURE — 2500000005 HC RX 250 GENERAL PHARMACY W/O HCPCS: Performed by: ANESTHESIOLOGIST ASSISTANT

## 2024-05-25 PROCEDURE — 2720000007 HC OR 272 NO HCPCS: Performed by: ORTHOPAEDIC SURGERY

## 2024-05-25 PROCEDURE — 7100000001 HC RECOVERY ROOM TIME - INITIAL BASE CHARGE: Performed by: ORTHOPAEDIC SURGERY

## 2024-05-25 PROCEDURE — C1713 ANCHOR/SCREW BN/BN,TIS/BN: HCPCS | Performed by: ORTHOPAEDIC SURGERY

## 2024-05-25 PROCEDURE — 22610 ARTHRD PST TQ 1NTRSPC THRC: CPT | Performed by: ORTHOPAEDIC SURGERY

## 2024-05-25 PROCEDURE — 63276 BX/EXC XDRL SPINE LESN THRC: CPT | Performed by: ORTHOPAEDIC SURGERY

## 2024-05-25 PROCEDURE — 7100000002 HC RECOVERY ROOM TIME - EACH INCREMENTAL 1 MINUTE: Performed by: ORTHOPAEDIC SURGERY

## 2024-05-25 PROCEDURE — 2500000005 HC RX 250 GENERAL PHARMACY W/O HCPCS: Performed by: ORTHOPAEDIC SURGERY

## 2024-05-25 PROCEDURE — 22614 ARTHRD PST TQ 1NTRSPC EA ADD: CPT | Performed by: ORTHOPAEDIC SURGERY

## 2024-05-25 PROCEDURE — 88305 TISSUE EXAM BY PATHOLOGIST: CPT | Mod: TC,SUR | Performed by: HOSPITALIST

## 2024-05-25 PROCEDURE — 82947 ASSAY GLUCOSE BLOOD QUANT: CPT

## 2024-05-25 PROCEDURE — 00NX0ZZ RELEASE THORACIC SPINAL CORD, OPEN APPROACH: ICD-10-PCS | Performed by: ORTHOPAEDIC SURGERY

## 2024-05-25 PROCEDURE — 22842 INSERT SPINE FIXATION DEVICE: CPT | Performed by: ORTHOPAEDIC SURGERY

## 2024-05-25 PROCEDURE — 88342 IMHCHEM/IMCYTCHM 1ST ANTB: CPT | Performed by: PATHOLOGY

## 2024-05-25 PROCEDURE — 3600000009 HC OR TIME - EACH INCREMENTAL 1 MINUTE - PROCEDURE LEVEL FOUR: Performed by: ORTHOPAEDIC SURGERY

## 2024-05-25 PROCEDURE — 2500000001 HC RX 250 WO HCPCS SELF ADMINISTERED DRUGS (ALT 637 FOR MEDICARE OP): Performed by: ORTHOPAEDIC SURGERY

## 2024-05-25 PROCEDURE — 0RG70K1 FUSION OF 2 TO 7 THORACIC VERTEBRAL JOINTS WITH NONAUTOLOGOUS TISSUE SUBSTITUTE, POSTERIOR APPROACH, POSTERIOR COLUMN, OPEN APPROACH: ICD-10-PCS | Performed by: ORTHOPAEDIC SURGERY

## 2024-05-25 PROCEDURE — 20985 CPTR-ASST DIR MS PX: CPT | Performed by: ORTHOPAEDIC SURGERY

## 2024-05-25 PROCEDURE — P9045 ALBUMIN (HUMAN), 5%, 250 ML: HCPCS | Mod: JZ | Performed by: ANESTHESIOLOGIST ASSISTANT

## 2024-05-25 DEVICE — IMPLANTABLE DEVICE: Type: IMPLANTABLE DEVICE | Site: SPINE THORACIC | Status: FUNCTIONAL

## 2024-05-25 DEVICE — SCREW SET, STD 1/4 - 32: Type: IMPLANTABLE DEVICE | Site: SPINE THORACIC | Status: FUNCTIONAL

## 2024-05-25 DEVICE — SET SCREW, 5.5, TI NS BRK OFF: Type: IMPLANTABLE DEVICE | Site: SPINE THORACIC | Status: FUNCTIONAL

## 2024-05-25 DEVICE — BONE, CHIPS, CANCELLOUS, ASEPTIC,90 CC: Type: IMPLANTABLE DEVICE | Site: SPINE THORACIC | Status: FUNCTIONAL

## 2024-05-25 RX ORDER — OXYCODONE HYDROCHLORIDE 5 MG/1
5 TABLET ORAL EVERY 4 HOURS PRN
Status: DISCONTINUED | OUTPATIENT
Start: 2024-05-25 | End: 2024-05-25 | Stop reason: HOSPADM

## 2024-05-25 RX ORDER — ROCURONIUM BROMIDE 10 MG/ML
INJECTION, SOLUTION INTRAVENOUS AS NEEDED
Status: DISCONTINUED | OUTPATIENT
Start: 2024-05-25 | End: 2024-05-25

## 2024-05-25 RX ORDER — DEXMEDETOMIDINE HYDROCHLORIDE 4 UG/ML
INJECTION, SOLUTION INTRAVENOUS CONTINUOUS PRN
Status: DISCONTINUED | OUTPATIENT
Start: 2024-05-25 | End: 2024-05-25

## 2024-05-25 RX ORDER — NALOXONE HYDROCHLORIDE 0.4 MG/ML
0.2 INJECTION, SOLUTION INTRAMUSCULAR; INTRAVENOUS; SUBCUTANEOUS AS NEEDED
Status: DISCONTINUED | OUTPATIENT
Start: 2024-05-25 | End: 2024-06-04 | Stop reason: HOSPADM

## 2024-05-25 RX ORDER — SODIUM CHLORIDE, SODIUM LACTATE, POTASSIUM CHLORIDE, CALCIUM CHLORIDE 600; 310; 30; 20 MG/100ML; MG/100ML; MG/100ML; MG/100ML
100 INJECTION, SOLUTION INTRAVENOUS CONTINUOUS
Status: DISCONTINUED | OUTPATIENT
Start: 2024-05-25 | End: 2024-05-25 | Stop reason: HOSPADM

## 2024-05-25 RX ORDER — PROPOFOL 10 MG/ML
INJECTION, EMULSION INTRAVENOUS AS NEEDED
Status: DISCONTINUED | OUTPATIENT
Start: 2024-05-25 | End: 2024-05-25

## 2024-05-25 RX ORDER — SODIUM CHLORIDE, SODIUM LACTATE, POTASSIUM CHLORIDE, CALCIUM CHLORIDE 600; 310; 30; 20 MG/100ML; MG/100ML; MG/100ML; MG/100ML
INJECTION, SOLUTION INTRAVENOUS CONTINUOUS PRN
Status: DISCONTINUED | OUTPATIENT
Start: 2024-05-25 | End: 2024-05-25

## 2024-05-25 RX ORDER — MIDAZOLAM HYDROCHLORIDE 1 MG/ML
INJECTION INTRAMUSCULAR; INTRAVENOUS AS NEEDED
Status: DISCONTINUED | OUTPATIENT
Start: 2024-05-25 | End: 2024-05-25

## 2024-05-25 RX ORDER — FENTANYL CITRATE 50 UG/ML
INJECTION, SOLUTION INTRAMUSCULAR; INTRAVENOUS AS NEEDED
Status: DISCONTINUED | OUTPATIENT
Start: 2024-05-25 | End: 2024-05-25

## 2024-05-25 RX ORDER — ONDANSETRON HYDROCHLORIDE 2 MG/ML
4 INJECTION, SOLUTION INTRAVENOUS ONCE AS NEEDED
Status: DISCONTINUED | OUTPATIENT
Start: 2024-05-25 | End: 2024-05-25 | Stop reason: HOSPADM

## 2024-05-25 RX ORDER — CEFAZOLIN 1 G/1
INJECTION, POWDER, FOR SOLUTION INTRAVENOUS AS NEEDED
Status: DISCONTINUED | OUTPATIENT
Start: 2024-05-25 | End: 2024-05-25

## 2024-05-25 RX ORDER — PHENYLEPHRINE 10 MG/250 ML(40 MCG/ML)IN 0.9 % SOD.CHLORIDE INTRAVENOUS
CONTINUOUS PRN
Status: DISCONTINUED | OUTPATIENT
Start: 2024-05-25 | End: 2024-05-25

## 2024-05-25 RX ORDER — ALBUMIN HUMAN 50 G/1000ML
SOLUTION INTRAVENOUS AS NEEDED
Status: DISCONTINUED | OUTPATIENT
Start: 2024-05-25 | End: 2024-05-25

## 2024-05-25 RX ORDER — PROPOFOL 10 MG/ML
INJECTION, EMULSION INTRAVENOUS CONTINUOUS PRN
Status: DISCONTINUED | OUTPATIENT
Start: 2024-05-25 | End: 2024-05-25

## 2024-05-25 RX ORDER — HYDROMORPHONE HCL/0.9% NACL/PF 15 MG/30ML
PATIENT CONTROLLED ANALGESIA SYRINGE INTRAVENOUS CONTINUOUS
Status: DISCONTINUED | OUTPATIENT
Start: 2024-05-25 | End: 2024-05-25

## 2024-05-25 RX ORDER — LIDOCAINE HYDROCHLORIDE 10 MG/ML
0.1 INJECTION INFILTRATION; PERINEURAL ONCE
Status: DISCONTINUED | OUTPATIENT
Start: 2024-05-25 | End: 2024-05-25 | Stop reason: HOSPADM

## 2024-05-25 RX ORDER — GLYCOPYRROLATE 0.2 MG/ML
INJECTION INTRAMUSCULAR; INTRAVENOUS AS NEEDED
Status: DISCONTINUED | OUTPATIENT
Start: 2024-05-25 | End: 2024-05-25

## 2024-05-25 RX ORDER — HYDROMORPHONE HCL/0.9% NACL/PF 15 MG/30ML
PATIENT CONTROLLED ANALGESIA SYRINGE INTRAVENOUS CONTINUOUS
Status: DISCONTINUED | OUTPATIENT
Start: 2024-05-25 | End: 2024-05-26

## 2024-05-25 RX ORDER — BACITRACIN 500 [USP'U]/G
OINTMENT TOPICAL AS NEEDED
Status: DISCONTINUED | OUTPATIENT
Start: 2024-05-25 | End: 2024-05-25 | Stop reason: HOSPADM

## 2024-05-25 RX ORDER — HYDROMORPHONE HYDROCHLORIDE 1 MG/ML
0.5 INJECTION, SOLUTION INTRAMUSCULAR; INTRAVENOUS; SUBCUTANEOUS EVERY 5 MIN PRN
Status: DISCONTINUED | OUTPATIENT
Start: 2024-05-25 | End: 2024-05-25 | Stop reason: HOSPADM

## 2024-05-25 RX ORDER — PHENYLEPHRINE HCL IN 0.9% NACL 0.4MG/10ML
SYRINGE (ML) INTRAVENOUS AS NEEDED
Status: DISCONTINUED | OUTPATIENT
Start: 2024-05-25 | End: 2024-05-25

## 2024-05-25 RX ORDER — LIDOCAINE HYDROCHLORIDE 20 MG/ML
INJECTION, SOLUTION INFILTRATION; PERINEURAL AS NEEDED
Status: DISCONTINUED | OUTPATIENT
Start: 2024-05-25 | End: 2024-05-25

## 2024-05-25 RX ORDER — ONDANSETRON HYDROCHLORIDE 2 MG/ML
INJECTION, SOLUTION INTRAVENOUS AS NEEDED
Status: DISCONTINUED | OUTPATIENT
Start: 2024-05-25 | End: 2024-05-25

## 2024-05-25 RX ORDER — CEFAZOLIN SODIUM 2 G/100ML
2 INJECTION, SOLUTION INTRAVENOUS EVERY 8 HOURS
Status: COMPLETED | OUTPATIENT
Start: 2024-05-25 | End: 2024-05-26

## 2024-05-25 RX ADMIN — Medication 120 MCG: at 12:05

## 2024-05-25 RX ADMIN — PANTOPRAZOLE SODIUM 40 MG: 40 TABLET, DELAYED RELEASE ORAL at 06:07

## 2024-05-25 RX ADMIN — Medication: at 18:40

## 2024-05-25 RX ADMIN — ONDANSETRON 4 MG: 2 INJECTION, SOLUTION INTRAMUSCULAR; INTRAVENOUS at 13:18

## 2024-05-25 RX ADMIN — ROCURONIUM 20 MG: 50 INJECTION, SOLUTION INTRAVENOUS at 09:32

## 2024-05-25 RX ADMIN — METHOCARBAMOL 500 MG: 500 TABLET ORAL at 06:07

## 2024-05-25 RX ADMIN — METHOCARBAMOL 500 MG: 500 TABLET ORAL at 01:52

## 2024-05-25 RX ADMIN — DIPHENHYDRAMINE HYDROCHLORIDE 50 MG: 25 CAPSULE ORAL at 01:51

## 2024-05-25 RX ADMIN — Medication 0.8 MCG/KG/MIN: at 08:15

## 2024-05-25 RX ADMIN — OXYCODONE HYDROCHLORIDE 5 MG: 5 TABLET ORAL at 15:34

## 2024-05-25 RX ADMIN — HYDROMORPHONE HYDROCHLORIDE 0.5 MG: 1 INJECTION, SOLUTION INTRAMUSCULAR; INTRAVENOUS; SUBCUTANEOUS at 14:28

## 2024-05-25 RX ADMIN — HYDROMORPHONE HYDROCHLORIDE 0.5 MG: 1 INJECTION, SOLUTION INTRAMUSCULAR; INTRAVENOUS; SUBCUTANEOUS at 14:19

## 2024-05-25 RX ADMIN — CEFAZOLIN 2 G: 1 INJECTION, POWDER, FOR SOLUTION INTRAMUSCULAR; INTRAVENOUS at 09:01

## 2024-05-25 RX ADMIN — CEFAZOLIN SODIUM 2 G: 2 INJECTION, SOLUTION INTRAVENOUS at 21:09

## 2024-05-25 RX ADMIN — PROPOFOL 50 MCG/KG/MIN: 10 INJECTION, EMULSION INTRAVENOUS at 08:39

## 2024-05-25 RX ADMIN — PROPOFOL 180 MG: 10 INJECTION, EMULSION INTRAVENOUS at 08:02

## 2024-05-25 RX ADMIN — SODIUM CHLORIDE, POTASSIUM CHLORIDE, SODIUM LACTATE AND CALCIUM CHLORIDE 100 ML/HR: 600; 310; 30; 20 INJECTION, SOLUTION INTRAVENOUS at 16:19

## 2024-05-25 RX ADMIN — Medication 120 MCG: at 08:55

## 2024-05-25 RX ADMIN — LIDOCAINE HYDROCHLORIDE 80 MG: 20 INJECTION, SOLUTION INFILTRATION; PERINEURAL at 08:02

## 2024-05-25 RX ADMIN — Medication 120 MCG: at 08:45

## 2024-05-25 RX ADMIN — SUGAMMADEX 400 MG: 100 INJECTION, SOLUTION INTRAVENOUS at 13:53

## 2024-05-25 RX ADMIN — FENTANYL CITRATE 100 MCG: 50 INJECTION, SOLUTION INTRAMUSCULAR; INTRAVENOUS at 08:02

## 2024-05-25 RX ADMIN — Medication 2 L/MIN: at 15:27

## 2024-05-25 RX ADMIN — Medication 160 MCG: at 08:10

## 2024-05-25 RX ADMIN — GABAPENTIN 300 MG: 300 CAPSULE ORAL at 21:12

## 2024-05-25 RX ADMIN — HYDROMORPHONE HYDROCHLORIDE 0.2 MG: 1 INJECTION, SOLUTION INTRAMUSCULAR; INTRAVENOUS; SUBCUTANEOUS at 14:03

## 2024-05-25 RX ADMIN — GLYCOPYRROLATE 0.3 MG: 0.2 INJECTION INTRAMUSCULAR; INTRAVENOUS at 09:22

## 2024-05-25 RX ADMIN — DEXAMETHASONE SODIUM PHOSPHATE 10 MG: 4 INJECTION INTRA-ARTICULAR; INTRALESIONAL; INTRAMUSCULAR; INTRAVENOUS; SOFT TISSUE at 09:26

## 2024-05-25 RX ADMIN — SODIUM CHLORIDE, POTASSIUM CHLORIDE, SODIUM LACTATE AND CALCIUM CHLORIDE: 600; 310; 30; 20 INJECTION, SOLUTION INTRAVENOUS at 08:02

## 2024-05-25 RX ADMIN — ROCURONIUM 60 MG: 50 INJECTION, SOLUTION INTRAVENOUS at 08:02

## 2024-05-25 RX ADMIN — ROCURONIUM 10 MG: 50 INJECTION, SOLUTION INTRAVENOUS at 10:50

## 2024-05-25 RX ADMIN — Medication 120 MCG: at 08:30

## 2024-05-25 RX ADMIN — HYDROMORPHONE HYDROCHLORIDE 0.5 MG: 1 INJECTION, SOLUTION INTRAMUSCULAR; INTRAVENOUS; SUBCUTANEOUS at 14:45

## 2024-05-25 RX ADMIN — ALBUMIN (HUMAN) 250 ML: 12.5 SOLUTION INTRAVENOUS at 10:29

## 2024-05-25 RX ADMIN — ALBUMIN (HUMAN) 250 ML: 12.5 SOLUTION INTRAVENOUS at 08:51

## 2024-05-25 RX ADMIN — CEFAZOLIN 2 G: 1 INJECTION, POWDER, FOR SOLUTION INTRAMUSCULAR; INTRAVENOUS at 13:01

## 2024-05-25 RX ADMIN — HYDROMORPHONE HYDROCHLORIDE 0.2 MG: 1 INJECTION, SOLUTION INTRAMUSCULAR; INTRAVENOUS; SUBCUTANEOUS at 13:07

## 2024-05-25 RX ADMIN — HYDROMORPHONE HYDROCHLORIDE 0.4 MG: 1 INJECTION, SOLUTION INTRAMUSCULAR; INTRAVENOUS; SUBCUTANEOUS at 14:10

## 2024-05-25 RX ADMIN — ALBUMIN (HUMAN) 250 ML: 12.5 SOLUTION INTRAVENOUS at 11:12

## 2024-05-25 RX ADMIN — HYDROMORPHONE HYDROCHLORIDE 0.2 MG: 1 INJECTION, SOLUTION INTRAMUSCULAR; INTRAVENOUS; SUBCUTANEOUS at 13:17

## 2024-05-25 RX ADMIN — OXYCODONE HYDROCHLORIDE 10 MG: 5 TABLET ORAL at 01:50

## 2024-05-25 RX ADMIN — HYDROMORPHONE HYDROCHLORIDE 0.5 MG: 1 INJECTION, SOLUTION INTRAMUSCULAR; INTRAVENOUS; SUBCUTANEOUS at 14:38

## 2024-05-25 RX ADMIN — DEXMEDETOMIDINE HYDROCHLORIDE 0.2 MCG/KG/HR: 4 INJECTION, SOLUTION INTRAVENOUS at 08:43

## 2024-05-25 RX ADMIN — MIDAZOLAM HYDROCHLORIDE 2 MG: 1 INJECTION, SOLUTION INTRAMUSCULAR; INTRAVENOUS at 07:59

## 2024-05-25 SDOH — HEALTH STABILITY: MENTAL HEALTH: CURRENT SMOKER: 0

## 2024-05-25 ASSESSMENT — PAIN SCALES - GENERAL
PAINLEVEL_OUTOF10: 8
PAINLEVEL_OUTOF10: 0 - NO PAIN
PAINLEVEL_OUTOF10: 1
PAINLEVEL_OUTOF10: 6
PAINLEVEL_OUTOF10: 5 - MODERATE PAIN
PAINLEVEL_OUTOF10: 5 - MODERATE PAIN
PAINLEVEL_OUTOF10: 2
PAINLEVEL_OUTOF10: 6
PAINLEVEL_OUTOF10: 8
PAINLEVEL_OUTOF10: 2
PAIN_LEVEL: 3
PAINLEVEL_OUTOF10: 6
PAINLEVEL_OUTOF10: 8
PAINLEVEL_OUTOF10: 8
PAINLEVEL_OUTOF10: 5 - MODERATE PAIN
PAINLEVEL_OUTOF10: 5 - MODERATE PAIN

## 2024-05-25 ASSESSMENT — COGNITIVE AND FUNCTIONAL STATUS - GENERAL
TOILETING: A LOT
PERSONAL GROOMING: A LITTLE
CLIMB 3 TO 5 STEPS WITH RAILING: A LOT
HELP NEEDED FOR BATHING: A LOT
DRESSING REGULAR LOWER BODY CLOTHING: A LOT
MOVING FROM LYING ON BACK TO SITTING ON SIDE OF FLAT BED WITH BEDRAILS: A LITTLE
DAILY ACTIVITIY SCORE: 16
MOBILITY SCORE: 13
STANDING UP FROM CHAIR USING ARMS: A LOT
TURNING FROM BACK TO SIDE WHILE IN FLAT BAD: A LOT
MOVING TO AND FROM BED TO CHAIR: A LOT
DRESSING REGULAR UPPER BODY CLOTHING: A LITTLE
WALKING IN HOSPITAL ROOM: A LOT

## 2024-05-25 ASSESSMENT — PAIN - FUNCTIONAL ASSESSMENT

## 2024-05-25 NOTE — H&P
[facility] Department of Orthopaedic Surgery   Surgical History & Physical <30 Days    Reason for Surgery: thoracolumbar metastatic disease  Planned Procedure: posterior thoracolumbar decompression & fusion     History & Physical Reviewed:  I have reviewed the History and Physical within 30 days dated 5/24. Relevant findings and updates are noted below:  No significant changes.    Home medications were reviewed with significant updates noted below:  No significant changes.    ERAS patient?: No    COVID-19 Risk Consent:   Surgeon has reviewed the key risks related to emy COVID-19 and subsequent sequelae.     05/25/24 at 12:08 AM - Shawnee Rey MD

## 2024-05-25 NOTE — ANESTHESIA POSTPROCEDURE EVALUATION
Patient: Altaf Parsons    Procedure Summary       Date: 05/25/24 Room / Location: [facility] OR 07 / Virtual [facility] OR    Anesthesia Start: 0755 Anesthesia Stop: 1411    Procedure: Decompression & Fusion Spine Posterior Thoracic Lumbar (Spine Thoracic) Diagnosis:       Cancer with leptomeningeal spread (Multi)      Lumbar stenosis with neurogenic claudication      Spinal stenosis of lumbar region with neurogenic claudication      Spinal cord compression (Multi)      (Cancer with leptomeningeal spread (Multi) [C79.49])      (Lumbar stenosis with neurogenic claudication [M48.062])      (Spinal stenosis of lumbar region with neurogenic claudication [M48.062])      (Spinal cord compression (Multi) [G95.20])    Surgeons: Kory Melendez MD Responsible Provider: Abraham Vidal MD    Anesthesia Type: general ASA Status: 3 - Emergent            Anesthesia Type: general    Vitals Value Taken Time   /88 05/25/24 1411   Temp 36.1 05/25/24 1411   Pulse 94 05/25/24 1408   Resp 5 05/25/24 1408   SpO2 100 % 05/25/24 1408   Vitals shown include unfiled device data.    Anesthesia Post Evaluation    Patient location during evaluation: PACU  Patient participation: complete - patient participated  Level of consciousness: sleepy but conscious  Pain score: 3  Pain management: adequate  Airway patency: patent  Cardiovascular status: acceptable  Respiratory status: acceptable and face mask  Hydration status: acceptable  Postoperative Nausea and Vomiting: none        No notable events documented.

## 2024-05-25 NOTE — SIGNIFICANT EVENT
Spine Exam in PACU:    L1: SILT       L2: SILT      Hip flexors 0/5 Left; 3/5 Right  L3: SILT      Knee extension 0/5 Left; 4/5 Right  L4: SILT      Tib Ant. (Dorsiflexion) 0/5 Left; 5/5 Right  L5: SILT      EHL 0/5 Left; 5/5 Right  S1: SILT      GAS (Plantarflexion) 0/5 Left; 5/5 Right

## 2024-05-25 NOTE — ANESTHESIA PROCEDURE NOTES
Arterial Line:    Date/Time: 5/25/2024 8:25 AM    Staffing  Performed: ADELINA   Authorized by: Abraham Vidal MD    Performed by: ADELINA Hurt    An arterial line was placed. Procedure performed using surface landmarks.in the OR for the following indication(s): continuous blood pressure monitoring.    A 20 gauge (size) (length), Angiocath (type) catheter was placed into the Right radial artery, secured by Tegaderm,   Seldinger technique used.  Events:  patient tolerated procedure well with no complications.

## 2024-05-25 NOTE — ANESTHESIA PROCEDURE NOTES
Peripheral IV  Date/Time: 5/25/2024 8:20 AM      Placement  Needle size: 18 G  Laterality: right  Location: hand  Site prep: alcohol  Technique: anatomical landmarks  Attempts: 1

## 2024-05-25 NOTE — PROGRESS NOTES
Altaf Parsons is a 53 y.o. male on day 1 of admission presenting with Malignant thymoma (Multi).    Subjective   Pt was not seen today, as he was already in the OR\.        Objective     Physical Exam  Unable to examine, due to being in the OR     Last Recorded Vitals  Blood pressure 130/85, pulse (!) 118, temperature 36.5 °C (97.7 °F), temperature source Temporal, resp. rate 18, height 1.829 m (6'), weight 101 kg (223 lb), SpO2 97%.  Intake/Output last 3 Shifts:  I/O last 3 completed shifts:  In: - (0 mL/kg)   Out: 600 (5.9 mL/kg) [Urine:600 (0.2 mL/kg/hr)]  Weight: 101.2 kg     Relevant Results  Results for orders placed or performed during the hospital encounter of 05/24/24 (from the past 24 hour(s))   POCT GLUCOSE   Result Value Ref Range    POCT Glucose 98 74 - 99 mg/dL   POCT GLUCOSE   Result Value Ref Range    POCT Glucose 141 (H) 74 - 99 mg/dL           Assessment/Plan   Principal Problem:    Malignant thymoma (Multi)  Active Problems:    Spinal cord compression (Multi)    Spinal stenosis of lumbar region    Lumbar stenosis with neurogenic claudication    Cancer with leptomeningeal spread (Multi)  Altaf Parsons is a 53 y.o. male with PMHx of malignant thymoma (dx. 03/2016) w/ mets to bone and lung that presents to [facility] ED on 5/23 with worsening weakness, loss of L hip flexion and uncontrolled pain. Admitted for surgical intervention    Updates 5/25:  - in OR today for thoracolumbar decomperession and fusion  -f/u PT/OT for dispo  -f/u ortho recs after surgery         #Weakness on hip flexion  #Malignant spinal cord compression  - MRI (4/26) showing new enhancement of cauda equine nerve roots, concern for leptomeningeal spread  - new progressive weakness possibly related to this leptomeningeal spread of disease  - no acute surgical intervention per NSGY consult on last admit (4/26)  -Orthopedics consulted, appreciate recs     Plan  - PT/OT for dispo, patient has outpatient PT/OT  -in OR today for  thoracolumbar decomperession and fusion  -f/u ortho recs after surgery      #Metastatic Malignant Thymoma, Type B2 (predominant lymphocytic population with scattered epithelial cells)  -July 2007: Massive left-sided hemothorax. Underwent left VATS drainage of massive hemothorax, pleural biopsy and core needle biopsy of lingular mass and decortication. Biopsies all negative. CT scan showed 7 cm lesion within mediastinum with a calcified rim. Followed with serial Cts  - Feb 2016: New enhancing soft tissue mass superior to calcified structure with pericardial LN and basilar RLL nodule  - March 4, 2016: Diagnosed from left lower lobe of lung wedge resection and mediastinal fat biopsy  - Aug 1 2016- Sep 7 2016: 59.4 Gy proton therapy to resection bed  - May 2017: Left pleural nodule showing thymoma  -8/4/17- Started 3 cycles of cyclophosphamide, adriamycin, and cisplatin (PAC-P/PAC/CAP regimen, Demi et al 2004)  -11/2019: Left serratus anterior biopsy positive for thymoma  - 12/10/19- 12/31/19: SINDI mass and left serratus anterior mass, 45 CGE/15 fx, PROTONS   - Aug 2020: T12-L1 paraspinal mass showing thymoma  -Jan 2021: Spine tumor biopsy showing thymoma. Posterior spinal fusion T9-L1 with use of bilateral pedicle screw instrumentation, allograft and demineralized bone matrix fiber. 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 Progressed  -April 2022 - Finished RT of 30 Gy in 10 fractions from T4-T6, and L1-L3  -Dec 2022- April 2023: Started on single agent capecitabine with stable disease  - Sep 2023: Some somatostatin receptor disease noted by [employer] PET  -Sep 2023: Single agent everolimus started. Stable disease so far  -Dr. Rodriguez aware  - Hold everolimus 10mg daily in setting of surgery      #Chronic left low back pain secondary to malignancy  -Consulted supportive oncology, appreciate recs  -Hold dexamethasone 4 mg daily  - cont home gabapentin 300mg TID  - cont home methocarbimol 500mg Q6   - Oxycodone 10 mg Q3H PRN for moderate pain  - Oxycodone 15 mg Q3H PRN for severe pain  - Hydromorphone 1 mg IV Q3H for breakthrough     #HTN  - cont home lisinopril 20mg     #Misc  - cont home Vit D, multivitamin      #Prophy  - no current AC  - cont home protonix 40mg daily  - SCDs     #DISPO  - Full code, confirmed on admission  - NOK: Marilia (wife) #[phone]      LOS: 0 days              An Gerard MD

## 2024-05-25 NOTE — ANESTHESIA PROCEDURE NOTES
Airway  Date/Time: 5/25/2024 8:17 AM  Urgency: elective    Airway not difficult    Staffing  Performed: ADELINA   Authorized by: Abraham Vidal MD    Performed by: ADELINA Hurt  Patient location during procedure: OR    Indications and Patient Condition  Indications for airway management: anesthesia and airway protection  Preoxygenated: yes  Patient position: sniffing  Mask difficulty assessment: 1 - vent by mask  No planned trial extubation    Final Airway Details  Final airway type: endotracheal airway      Successful airway: ETT  Cuffed: yes   Successful intubation technique: video laryngoscopy  Blade: Nadeem  Blade size: #4  ETT size (mm): 7.5  Cormack-Lehane Classification: grade I - full view of glottis  Placement verified by: chest auscultation and capnometry   Cuff volume (mL): 8  Measured from: lips  ETT to lips (cm): 23  Number of attempts at approach: 1  Ventilation between attempts: none

## 2024-05-25 NOTE — CARE PLAN
The patient's goals for the shift include      The clinical goals for the shift include patient will remain safe and free of falls throughout shift    Problem: Pain - Adult  Goal: Verbalizes/displays adequate comfort level or baseline comfort level  Outcome: Progressing

## 2024-05-25 NOTE — ANESTHESIA PREPROCEDURE EVALUATION
Patient: Altaf Parsons    Procedure Information       Date/Time: 05/25/24 0745    Procedure: Decompression & Fusion Spine Posterior Thoracic Lumbar (Spine Thoracic)    Location: [facility] OR 07 / Virtual [facility] OR    Surgeons: Kory Melendez MD            Relevant Problems   Cardiac   (+) Benign essential hypertension   (+) Chest pain   (+) Hyperlipidemia      Pulmonary   (+) DALLIN on CPAP   (+) Obstructive sleep apnea of adult   (+) Restrictive lung disease      Neuro   (+) Cancer with leptomeningeal spread (Multi)      Endocrine   (+) Hypothyroidism   (+) Malignant thymoma (Multi)   (+) Morbid obesity (Multi)   (+) Thymoma, malignant (Multi)      Musculoskeletal   (+) Lumbar stenosis with neurogenic claudication   (+) Myelopathy concurrent with and due to spinal stenosis of thoracic region (Multi)   (+) Spinal stenosis of lumbar region      HEENT   (+) Chronic sinusitis       Clinical information reviewed:   Tobacco  Allergies  Meds   Med Hx  Surg Hx   Fam Hx  Soc Hx        NPO Detail:  NPO/Void Status  Date of Last Liquid: 05/24/24  Time of Last Liquid: 2100  Date of Last Solid: 05/24/24  Time of Last Solid: 2100  Time of Last Void: 0000 (arnett in place)         Physical Exam    Airway  Mallampati: I  TM distance: >3 FB  Neck ROM: full     Cardiovascular - normal exam     Dental - normal exam     Pulmonary - normal exam     Abdominal - normal exam             Anesthesia Plan    History of general anesthesia?: yes  History of complications of general anesthesia?: no    ASA 3 - emergent     general     The patient is not a current smoker.  Patient was not previously instructed to abstain from smoking on day of procedure.  Patient did not smoke on day of procedure.    intravenous induction   Postoperative administration of opioids is intended.  Anesthetic plan and risks discussed with patient.  Use of blood products discussed with patient who.    Plan discussed with CAA.

## 2024-05-25 NOTE — PROGRESS NOTES
Physical Therapy                 Therapy Communication Note    Patient Name: Altaf Parsons  MRN: [id number]  Today's Date: 5/25/2024     Discipline: Physical Therapy    Missed Visit Reason: Missed Visit Reason: Patient placed on medical hold (0709. OR today. Will follow post op as appropriate.)    Missed Time: Attempt    Comment:

## 2024-05-26 LAB
ALBUMIN SERPL BCP-MCNC: 3.7 G/DL (ref 3.4–5)
ANION GAP SERPL CALC-SCNC: 16 MMOL/L (ref 10–20)
BUN SERPL-MCNC: 13 MG/DL (ref 6–23)
CALCIUM SERPL-MCNC: 8.1 MG/DL (ref 8.6–10.6)
CHLORIDE SERPL-SCNC: 97 MMOL/L (ref 98–107)
CO2 SERPL-SCNC: 23 MMOL/L (ref 21–32)
CREAT SERPL-MCNC: 0.74 MG/DL (ref 0.5–1.3)
EGFRCR SERPLBLD CKD-EPI 2021: >90 ML/MIN/1.73M*2
ERYTHROCYTE [DISTWIDTH] IN BLOOD BY AUTOMATED COUNT: 16.5 % (ref 11.5–14.5)
GLUCOSE BLD MANUAL STRIP-MCNC: 101 MG/DL (ref 74–99)
GLUCOSE BLD MANUAL STRIP-MCNC: 103 MG/DL (ref 74–99)
GLUCOSE BLD MANUAL STRIP-MCNC: 107 MG/DL (ref 74–99)
GLUCOSE BLD MANUAL STRIP-MCNC: 118 MG/DL (ref 74–99)
GLUCOSE BLD MANUAL STRIP-MCNC: 128 MG/DL (ref 74–99)
GLUCOSE SERPL-MCNC: 78 MG/DL (ref 74–99)
HCT VFR BLD AUTO: 29 % (ref 41–52)
HGB BLD-MCNC: 9.1 G/DL (ref 13.5–17.5)
MAGNESIUM SERPL-MCNC: 2.46 MG/DL (ref 1.6–2.4)
MCH RBC QN AUTO: 25.1 PG (ref 26–34)
MCHC RBC AUTO-ENTMCNC: 31.4 G/DL (ref 32–36)
MCV RBC AUTO: 80 FL (ref 80–100)
NRBC BLD-RTO: 0 /100 WBCS (ref 0–0)
PHOSPHATE SERPL-MCNC: 2.6 MG/DL (ref 2.5–4.9)
PLATELET # BLD AUTO: 245 X10*3/UL (ref 150–450)
POTASSIUM SERPL-SCNC: 4.7 MMOL/L (ref 3.5–5.3)
RBC # BLD AUTO: 3.62 X10*6/UL (ref 4.5–5.9)
SODIUM SERPL-SCNC: 131 MMOL/L (ref 136–145)
WBC # BLD AUTO: 8.9 X10*3/UL (ref 4.4–11.3)

## 2024-05-26 PROCEDURE — 2500000001 HC RX 250 WO HCPCS SELF ADMINISTERED DRUGS (ALT 637 FOR MEDICARE OP)

## 2024-05-26 PROCEDURE — 97530 THERAPEUTIC ACTIVITIES: CPT | Mod: GP | Performed by: PHYSICAL MEDICINE & REHABILITATION

## 2024-05-26 PROCEDURE — 37799 UNLISTED PX VASCULAR SURGERY: CPT

## 2024-05-26 PROCEDURE — 97162 PT EVAL MOD COMPLEX 30 MIN: CPT | Mod: GP | Performed by: PHYSICAL MEDICINE & REHABILITATION

## 2024-05-26 PROCEDURE — 93010 ELECTROCARDIOGRAM REPORT: CPT | Performed by: INTERNAL MEDICINE

## 2024-05-26 PROCEDURE — 80069 RENAL FUNCTION PANEL: CPT

## 2024-05-26 PROCEDURE — 82947 ASSAY GLUCOSE BLOOD QUANT: CPT

## 2024-05-26 PROCEDURE — 1170000001 HC PRIVATE ONCOLOGY ROOM DAILY

## 2024-05-26 PROCEDURE — 2500000004 HC RX 250 GENERAL PHARMACY W/ HCPCS (ALT 636 FOR OP/ED): Mod: JZ | Performed by: STUDENT IN AN ORGANIZED HEALTH CARE EDUCATION/TRAINING PROGRAM

## 2024-05-26 PROCEDURE — 99233 SBSQ HOSP IP/OBS HIGH 50: CPT

## 2024-05-26 PROCEDURE — 2500000004 HC RX 250 GENERAL PHARMACY W/ HCPCS (ALT 636 FOR OP/ED)

## 2024-05-26 PROCEDURE — 83735 ASSAY OF MAGNESIUM: CPT

## 2024-05-26 PROCEDURE — 83605 ASSAY OF LACTIC ACID: CPT

## 2024-05-26 PROCEDURE — 85027 COMPLETE CBC AUTOMATED: CPT

## 2024-05-26 RX ORDER — DIPHENHYDRAMINE HCL 25 MG
25 CAPSULE ORAL ONCE
Status: COMPLETED | OUTPATIENT
Start: 2024-05-26 | End: 2024-05-27

## 2024-05-26 RX ORDER — HYDROMORPHONE HCL/0.9% NACL/PF 15 MG/30ML
PATIENT CONTROLLED ANALGESIA SYRINGE INTRAVENOUS CONTINUOUS
Status: DISCONTINUED | OUTPATIENT
Start: 2024-05-26 | End: 2024-05-28

## 2024-05-26 RX ORDER — SENNOSIDES 8.6 MG/1
1 TABLET ORAL NIGHTLY
Status: DISCONTINUED | OUTPATIENT
Start: 2024-05-26 | End: 2024-05-27

## 2024-05-26 RX ORDER — GABAPENTIN 300 MG/1
300 CAPSULE ORAL NIGHTLY
Status: DISCONTINUED | OUTPATIENT
Start: 2024-05-26 | End: 2024-05-27 | Stop reason: WASHOUT

## 2024-05-26 RX ORDER — POLYETHYLENE GLYCOL 3350 17 G/17G
17 POWDER, FOR SOLUTION ORAL 2 TIMES DAILY
Status: DISCONTINUED | OUTPATIENT
Start: 2024-05-26 | End: 2024-06-04 | Stop reason: HOSPADM

## 2024-05-26 RX ORDER — HYDROMORPHONE HCL/0.9% NACL/PF 15 MG/30ML
PATIENT CONTROLLED ANALGESIA SYRINGE INTRAVENOUS CONTINUOUS
Status: DISCONTINUED | OUTPATIENT
Start: 2024-05-26 | End: 2024-05-26

## 2024-05-26 RX ORDER — ACETAMINOPHEN 325 MG/1
975 TABLET ORAL 3 TIMES DAILY
Status: DISCONTINUED | OUTPATIENT
Start: 2024-05-26 | End: 2024-06-04 | Stop reason: HOSPADM

## 2024-05-26 RX ADMIN — POLYETHYLENE GLYCOL 3350 17 G: 17 POWDER, FOR SOLUTION ORAL at 08:19

## 2024-05-26 RX ADMIN — LISINOPRIL 20 MG: 20 TABLET ORAL at 08:20

## 2024-05-26 RX ADMIN — Medication 5 MG: at 21:42

## 2024-05-26 RX ADMIN — GABAPENTIN 300 MG: 300 CAPSULE ORAL at 16:47

## 2024-05-26 RX ADMIN — POLYETHYLENE GLYCOL 3350 17 G: 17 POWDER, FOR SOLUTION ORAL at 21:42

## 2024-05-26 RX ADMIN — CEFAZOLIN SODIUM 2 G: 2 INJECTION, SOLUTION INTRAVENOUS at 11:19

## 2024-05-26 RX ADMIN — ACETAMINOPHEN 975 MG: 325 TABLET ORAL at 22:39

## 2024-05-26 RX ADMIN — GABAPENTIN 300 MG: 300 CAPSULE ORAL at 21:42

## 2024-05-26 RX ADMIN — GABAPENTIN 300 MG: 300 CAPSULE ORAL at 21:41

## 2024-05-26 RX ADMIN — METHOCARBAMOL 500 MG: 500 TABLET ORAL at 18:39

## 2024-05-26 RX ADMIN — METHOCARBAMOL 500 MG: 500 TABLET ORAL at 06:55

## 2024-05-26 RX ADMIN — CEFAZOLIN SODIUM 2 G: 2 INJECTION, SOLUTION INTRAVENOUS at 03:28

## 2024-05-26 RX ADMIN — METHOCARBAMOL 500 MG: 500 TABLET ORAL at 01:00

## 2024-05-26 RX ADMIN — Medication 1 TABLET: at 08:19

## 2024-05-26 RX ADMIN — GABAPENTIN 300 MG: 300 CAPSULE ORAL at 08:19

## 2024-05-26 RX ADMIN — Medication: at 11:06

## 2024-05-26 RX ADMIN — SODIUM CHLORIDE, POTASSIUM CHLORIDE, SODIUM LACTATE AND CALCIUM CHLORIDE 2000 ML: 600; 310; 30; 20 INJECTION, SOLUTION INTRAVENOUS at 22:35

## 2024-05-26 RX ADMIN — PANTOPRAZOLE SODIUM 40 MG: 40 TABLET, DELAYED RELEASE ORAL at 06:55

## 2024-05-26 RX ADMIN — SENNOSIDES 8.6 MG: 8.6 TABLET, FILM COATED ORAL at 21:42

## 2024-05-26 RX ADMIN — METHOCARBAMOL 500 MG: 500 TABLET ORAL at 13:10

## 2024-05-26 RX ADMIN — Medication 2000 UNITS: at 08:19

## 2024-05-26 ASSESSMENT — PAIN - FUNCTIONAL ASSESSMENT
PAIN_FUNCTIONAL_ASSESSMENT: 0-10
PAIN_FUNCTIONAL_ASSESSMENT: 0-10

## 2024-05-26 ASSESSMENT — COGNITIVE AND FUNCTIONAL STATUS - GENERAL
TOILETING: TOTAL
DRESSING REGULAR UPPER BODY CLOTHING: A LITTLE
WALKING IN HOSPITAL ROOM: TOTAL
MOBILITY SCORE: 11
MOVING TO AND FROM BED TO CHAIR: A LOT
MOVING FROM LYING ON BACK TO SITTING ON SIDE OF FLAT BED WITH BEDRAILS: A LITTLE
TURNING FROM BACK TO SIDE WHILE IN FLAT BAD: A LOT
MOVING TO AND FROM BED TO CHAIR: TOTAL
TURNING FROM BACK TO SIDE WHILE IN FLAT BAD: A LOT
CLIMB 3 TO 5 STEPS WITH RAILING: TOTAL
HELP NEEDED FOR BATHING: A LITTLE
STANDING UP FROM CHAIR USING ARMS: A LOT
MOVING FROM LYING ON BACK TO SITTING ON SIDE OF FLAT BED WITH BEDRAILS: A LITTLE
MOBILITY SCORE: 9
STANDING UP FROM CHAIR USING ARMS: TOTAL
DAILY ACTIVITIY SCORE: 18
DRESSING REGULAR LOWER BODY CLOTHING: A LITTLE
WALKING IN HOSPITAL ROOM: TOTAL
CLIMB 3 TO 5 STEPS WITH RAILING: TOTAL

## 2024-05-26 ASSESSMENT — PAIN SCALES - GENERAL
PAINLEVEL_OUTOF10: 3
PAINLEVEL_OUTOF10: 7
PAINLEVEL_OUTOF10: 5 - MODERATE PAIN
PAINLEVEL_OUTOF10: 5 - MODERATE PAIN

## 2024-05-26 ASSESSMENT — ACTIVITIES OF DAILY LIVING (ADL): ADL_ASSISTANCE: INDEPENDENT

## 2024-05-26 NOTE — PROGRESS NOTES
Physical Therapy    Physical Therapy Evaluation & Treatment    Patient Name: Altaf Parsons  MRN: [id number]  Today's Date: 5/26/2024   Time Calculation  Start Time: 1029  Stop Time: 1109  Time Calculation (min): 40 min    Assessment/Plan   PT Assessment  PT Assessment Results: Decreased strength, Impaired balance, Decreased mobility, Decreased safety awareness, Pain, Decreased skin integrity  Rehab Prognosis: Good  End of Session Communication: Bedside nurse  Assessment Comment: 53 year old male now s/p T5-7 laminectomy and extension of posterior spinal instrumented fusion to T4 on 5/25/24 with Dr. Melendez. Patient demo significant BLE weakness L>R with decline in functional mobility as compared to baseline. Patient could benefit from continued acute PT services in order to address,  End of Session Patient Position: Bed, 3 rail up, Alarm on   IP OR SWING BED PT PLAN  Inpatient or Swing Bed: Inpatient  PT Plan  Treatment/Interventions: Bed mobility, Transfer training, Balance training, Gait training, Neuromuscular re-education, Strengthening, Endurance training, Range of motion, Therapeutic exercise, Therapeutic activity, Positioning  PT Plan: Skilled PT  PT Frequency: Daily  PT Discharge Recommendations: High intensity level of continued care  PT Recommended Transfer Status: Total assist  PT - OK to Discharge: Yes (Pateint could benefit from continued PT services at HIGH Intensity once medically cleared.)      Subjective     General Visit Information:  General  Reason for Referral: 53 year old male now s/p T5-7 laminectomy and extension of posterior spinal instrumented fusion to T4 on 5/25/24 with Dr. Melendez (due to malignant thymoma with spinal cord compression)  Past Medical History Relevant to Rehab: cancer with leptomeningeal spread, spinal cord compression  Prior to Session Communication: Bedside nurse  Patient Position Received: Bed, 3 rail up, Alarm on  Preferred Learning Style: verbal, visual  General Comment:  Patient pleasant and agreeable to PT Eval  Home Living:  Home Living  Type of Home: House  Lives With: Spouse (children ages 14 and 21. Able to assist as needed)  Home Adaptive Equipment: Walker rolling or standard, Wheelchair-manual, Cane (shower chair)  Home Layout: Bed/bath upstairs, Stairs to alternate level with rails  Alternate Level Stairs-Rails: Right  Alternate Level Stairs-Number of Steps: 13  Home Access: Stairs to enter with rails  Entrance Stairs-Rails: Right  Entrance Stairs-Number of Steps: 2  Prior Level of Function:  Prior Function Per Pt/Caregiver Report  Level of [place]: Independent with ADLs and functional transfers, Independent with homemaking with ambulation (was receiving outpatient PT)  ADL Assistance: Independent  Homemaking Assistance: Independent  Ambulatory Assistance: Independent (independent with SPC PRN up until ~2 weeks ago. Patient has been mostly scooting to  wheelchair from bed for 2 weeks prior to date. Attempted use of RW with falls)  Precautions:  Precautions  Medical Precautions: Fall precautions, Spinal precautions  Post-Surgical Precautions: Spinal precautions      Objective   Pain:  Pain Assessment  Pain Assessment: 0-10  Pain Score: 3  Pain Type: Surgical pain  Pain Location: Back  Pain Interventions: Repositioned  Response to Interventions: Comfortable end of session  Cognition:  Cognition  Orientation Level: Oriented X4    General Assessments:  General Observation  General Observation: Pleasant and agreeable to mobility. Reports fear of falling but able to be easily reassured     Sensation  Light Touch:  (Reports numbvness in plantar surface of bilateral feet. No deficits upon exam)       Coordination  Movements are Fluid and Coordinated: Yes         Static Sitting Balance  Static Sitting-Balance Support: Feet supported, Bilateral upper extremity supported  Static Sitting-Level of Assistance: Contact guard       Functional Assessments:  Bed Mobility  Bed Mobility:  Yes  Bed Mobility 1  Bed Mobility 1: Supine to sitting, Sitting to supine (via log roll and reverse log roll)  Level of Assistance 1: Maximum assistance, Maximum verbal cues, Maximum tactile cues  Bed Mobility Comments 1: min A to roll onto R side. Able to perform R knee flexion with min A from PT. Cues for reaching across midline to initiate trunk rotation with max A at trunk and BLEs. Max A for controlled descent of trunk with cues for sequencing and technique during sit to sidelying. Mod A at BLEs during sidelying to supinr    Transfers  Transfer: No (Unable to clear buttocks from surface of bed 2/2 weakness in BLEs and reports of dizziness with need to return to supine)    Extremity/Trunk Assessments:  RUE   RUE :  (RUE appears grossly 4/5)  LUE   LUE:  (LUE appears grossly 4/5)  RLE   RLE :  (RLE unabe to perform SLR. R hip flexion <2/5, R knee able to exert moderate force during quad set uable to perform SLR indicating <3/5, R ankle DF 2+/5)  LLE   LLE :  (LLE grossly 1/5)  Treatments:  Bed Mobility  Bed Mobility: Yes  Bed Mobility 1  Bed Mobility 1: Supine to sitting, Sitting to supine (via log roll and reverse log roll)  Level of Assistance 1: Maximum assistance, Maximum verbal cues, Maximum tactile cues  Bed Mobility Comments 1: min A to roll onto R side. Able to perform R knee flexion with min A from PT. Cues for reaching across midline to initiate trunk rotation with max A at trunk and BLEs. Max A for controlled descent of trunk with cues for sequencing and technique during sit to sidelying. Mod A at BLEs during sidelying to supinr  Outcome Measures:  [facility] Basic Mobility  Turning from your back to your side while in a flat bed without using bedrails: A little  Moving from lying on your back to sitting on the side of a flat bed without using bedrails: A lot  Moving to and from bed to chair (including a wheelchair): Total  Standing up from a chair using your arms (e.g. wheelchair or bedside chair):  Total  To walk in hospital room: Total  Climbing 3-5 steps with railing: Total  Basic Mobility - Total Score: 9    Encounter Problems       Encounter Problems (Active)       Balance       STG - Maintains dynamic sitting balance without upper extremity support >/= 5 min with SBA (Progressing)       Start:  05/26/24    Expected End:  06/09/24       INTERVENTIONS:  1. Practice sitting on the edge of a bed/mat with minimal support.  2. Educate patient about maintining total hip precautions while maintaining balance.  3. Educate patient about pressure relief.  4. Educate patient about use of assistive device.            PT Transfers       STG - Transfer from bed to/from wheelchair using slide board with min A (Progressing)       Start:  05/26/24    Expected End:  06/09/24            STG - Patient will perform bed mobility with min A (Progressing)       Start:  05/26/24    Expected End:  06/09/24            STG - Patient will transfer sit to and from stand with mod A x 2 and RW (Progressing)       Start:  05/26/24    Expected End:  06/09/24               Pain - Adult              Education Documentation  Mobility Training, taught by Beverley Chung PT at 5/26/2024  2:32 PM.  Learner: Patient  Readiness: Acceptance  Method: Demonstration, Explanation  Response: Verbalizes Understanding  Comment: Extensive education on nature of condiiton, role of PT in an acute care setting, spinal precautions, and eval findings including proper positioning to prevent pressure sores and contractures    Education Comments  No comments found.

## 2024-05-26 NOTE — PROGRESS NOTES
SUPPORTIVE AND PALLIATIVE ONCOLOGY INPATIENT FOLLOW-UP      SERVICE DATE: 05/26/24     SUBJECTIVE:  HISTORY OF PRESENT ILLNESS: Altaf Parsons is a 53 y.o. male diagnosed with metastatic malignant thymoma (sites of disease mediastinum, lung, TLS spine, T12-L1 paraspinal region, left lateral spine canal T11-S1) dx 2016 s/p Proton therapy, RT, multiple spine surgeries, chemo, and most recently on everolimus. PMHx significant for HTN, GERD, hemothorax, HLD, and sleep apnea. Admitted 5/24/2024 for further evaluation and management of progressive left leg weakness and worsening pain and to expedite planning for spine surgery. Course complicated by recurrent tumor in his spine, high risk surgery due to recurrent tumor, previous surgeries and previous RT. Supportive and Palliative Oncology is consulted for pain management.    (Per [facility] Resendiz, CNP's, 5/24/24 note) Follows with Dr. Rodriguez and Carla Meza, CNS, who have been managing his pain. Recent disease progression with plan for LP to assess for LMD and surgical evaluation by Dr. Melendez. Pain meds recently adjusted from gabapentin [only], to addition of oxycodone 5mg, then increased to 10mg, and methocarbamol then added due to worsening pain and muscle spasms. Pt reports increase in oxycodone was helpful. Recently tapered off dexamethasone due to LE edema (after his lisinopril/hydrochlorothiazide was stopped) and his last dose of dexamethasone was Sunday (5/19/24).     Pt and wife report progressively worsening weakness and inability to walk, now sleeping on air mattress on the first floor. Also reports urinary and bowel urgency.  Recently saw Dr. Melendez 5/22 for consideration of spine surgery and offered admission for expedited workup for surgery, was recommended he be admitted earlier for pain management.      Interval Events:  Now s/p T5-7 laminectomy and extension of posterior spinal instrumented fusion to T4 on 5/25/24 with Dr. Melendez.     Met with pt and his wife at  bedside today, his pain remains uncontrolled post surgery. Pt notes that pain is most significantly in his middle back, and all previous pain in L hip, upper leg, lower leg, knee, and lower back is less noticeable at this time. Discussed with pt and his wife increasing pt bolus dose on hydromorphone PCA as well increasing pt's nighttime dose of gabapentin to 600mg to assist with his pain. Pt and his wife agreeable to this plan.    Pt's wife inquiring whether acetaminophen would be appropriate for the pt. Informed her that I would discuss with the primary team regarding this matter. LFTs WNL and no recent fevers.     Pain Assessment:  Location: Middle back most prominently, hx of L hip, upper leg, lower leg, knee, and lower back  Duration: Constant  Characteristics:  Ratin/10  Descriptors:  Throbbing, spasms  Aggravating: Movement, bending   Relieving: Analgesics --see EMR, Positioning, and Modifying activity  Interference with Function: Very Much    Opioid Use  Past 24h opioid use:   (-, 6887-5347)  hydromorphone 0.5mg IV x 4 = 2mg IV = 25 OME  hydromorphone 1mg IV x 4 = 4mg IV = 50 OME  hydromorphone PCA: Attempts: 48 , Received: 37 , Total: 7.95mg IV = 99.38 OME    Total 24h OME use: 174.38 OME    Note: OME calculations based on equianalgesic table below. Please note this table is based on best available evidence but conversions are still approximate. These are NOT opioid DOSES for individual patient use; this is equivalency information.  Drug Parenteral Enteral   Morphine 10 25   Oxycodone N/A 20   Hydromorphone 2 5   Fentanyl 0.15 N/A   Tramadol N/A 120   Citation: Loki GUTIERREZ. Demystifying opioid conversion calculations: A guide for effective dosing, Second edition. [place]: [employer]System Pharmacists, 2018.    Symptom Assessment:  Nausea: a little  Low Mood: none  Anxiety: none  Constipation: somewhat     Information obtained from: chart review and interview of  patient  ______________________________________________________________________   OBJECTIVE:    Lab Results   Component Value Date    WBC 7.1 05/24/2024    HGB 14.3 05/24/2024    HCT 42.4 05/24/2024    MCV 75 (L) 05/24/2024     05/24/2024      Lab Results   Component Value Date    GLUCOSE 111 (H) 05/24/2024    CALCIUM 9.9 05/24/2024     05/24/2024    K 3.8 05/24/2024    CO2 31 05/24/2024    CL 96 (L) 05/24/2024    BUN 16 05/24/2024    CREATININE 0.99 05/24/2024     Lab Results   Component Value Date    ALT 20 05/24/2024    AST 28 05/24/2024    ALKPHOS 57 05/24/2024    BILITOT 0.6 05/24/2024     Estimated Creatinine Clearance: 103.7 mL/min (by C-G formula based on SCr of 0.99 mg/dL).     Scheduled medications  ceFAZolin, 2 g, intravenous, q8h  cholecalciferol, 2,000 Units, oral, Daily  [Held by provider] everolimus, 10 mg, oral, Daily  gabapentin, 300 mg, oral, TID  insulin lispro, 0-5 Units, subcutaneous, q4h  lisinopril, 20 mg, oral, Daily  melatonin, 5 mg, oral, Daily  methocarbamol, 500 mg, oral, q6h CHERI  multivitamin with minerals, 1 tablet, oral, Daily  pantoprazole, 40 mg, oral, Daily before breakfast  polyethylene glycol, 17 g, oral, BID  sennosides, 1 tablet, oral, Nightly    Continuous medications  HYDROmorphone,     PRN medications  dextrose, 12.5 g, q15 min PRN  dextrose, 25 g, q15 min PRN  diphenhydrAMINE, 50 mg, Nightly PRN  glucagon, 1 mg, q15 min PRN  glucagon, 1 mg, q15 min PRN  naloxone, 0.2 mg, PRN  oxyCODONE, 10 mg, q3h PRN  oxyCODONE, 15 mg, q3h PRN    PHYSICAL EXAMINATION:    Vital Signs:   Vital signs reviewed  Visit Vitals  /74 (BP Location: Left arm, Patient Position: Lying)   Pulse (!) 116 Comment: rn notified   Temp 36.5 °C (97.7 °F) (Temporal)   Resp 14      Pain Score: 2     Physical Exam  Vitals and nursing note reviewed.   Constitutional:       Comments: Alert, awake gentleman sitting up in bed. Intermittent facial tensing with movement. Pleasant, cooperative, and  participating in interview.     HENT:      Head:      Comments: Normocephalic, atraumatic.     Eyes:      Comments: Sclera clear, EOM intact.    Neck:      Comments: Trachea midline.  Pulmonary:      Comments: Symmetrical chest rise. Regular rate and depth of respirations. Room air.   Abdominal:      Comments: Abdomen slightly distended, non tender.   Musculoskeletal:      Comments: Generalized muscle weakness and atrophy. MAHONEY x4. No visible extremity edema.   Skin:     Comments: No lesions, rash, or abrasions present on visible skin. Skin color appropriate for ethnicity. [Midline back incision not visualized 2/2 uncontrolled pain]   Neurological:      Comments: A&Ox4, follows commands, no apparent sensory deficits.   Psychiatric:      Comments: Mood and behavior appropriate.        ASSESSMENT/PLAN:  Altaf Parsons is a 53 y.o. male diagnosed with metastatic malignant thymoma (sites of disease mediastinum, lung, TLS spine, T12-L1 paraspinal region, left lateral spine canal T11-S1) dx 2016 s/p Proton therapy, RT, multiple spine surgeries, chemo, and most recently on everolimus. PMHx significant for HTN, GERD, hemothorax, HLD, and sleep apnea. Admitted 5/24/2024 for further evaluation and management of progressive left leg weakness and worsening pain and to expedite planning for spine surgery. Course complicated by recurrent tumor in his spine, high risk surgery due to recurrent tumor, previous surgeries and previous RT. Supportive and Palliative Oncology is consulted for pain management.    Neoplasm Related Pain  Left lower back, left hip, thigh and knee pain related to metastatic malignant thymoma with extensive spine mets with new enhancement of the cauda equina nerve roots and the conus, concerning for leptomeningeal spread of the malignancy  Pain is: Acute on chronic, cancer related pain c/b acute post-operative pain  Type: Somatic and neuropathic [frequent muscle spasms]  Pain control: Sub-optimally  controlled  Home regimen: oxycodone IR 10mg (x2-3/day), gabapentin 300mg TID, and methocarbamol 500mg TID  Intolerances/previously tried: None  Personalized pain goal: 3/10  Total OME usage for the past 24 hours: 174.38 OME  Reviewed: (5/24/24) Estimated Creatinine Clearance: 103.7 mL/min (by C-G formula based on SCr of 0.99 mg/dL).  Reviewed: (5/24/24) LFTs WNL  Consider starting scheduled acetaminophen 650mg PO q6h if no c/f fever masking or post-surgical contraindication (pt's wife had requested if possible)  Discontinue oxycodone IR 10mg PO q3h PRN for moderate pain   Discontinue oxycodone IR 15mg PO q3h PRN for severe pain   Change hydromorphone PCA settings: Patient Bolus Dose: 0.3mg , Lockout Interval: 10 minutes , REMOVE BASAL RATE (0mg/h), One Hour Dose Limit: 1.8mg  Additionally, recommend removing expiration time from hydromorphone PCA and allow Supportive Oncology evaluation prior discontinuation so that pt's pain remains controlled and regimen is transitioned appropriately  Increase gabapentin to 300mg/300mg/600mg PO TID  Continue methocarbamol 500mg PO q6h scheduled for muscle spasms  Consider dexamethasone per ortho, recently tapered off  Continue to monitor pain scores and administer PRN medications as appropriate  Continue/initiate nonpharmacologic pain management strategies including ice/heat therapy, distraction techniques, deep breathing/relaxation techniques, calming music, and repositioning  Continue to monitor for signs of opioid efficacy (pain scores, improved functionality) and toxicity (pinpoint pupils, excess sedation/drowsiness/confusion, respiratory depression, etc.)     Nausea  At risk for nausea with vomiting related to opioids--intermittent occurences post-surgery  Home regimen: None  EKG reviewed from 5/23/24, QTc 491 (borderline high, continue to monitor)  PPI per primary  Start ondansetron 4mg IV/PO q6h PRN for nausea, first line      Constipation  At risk for constipation related  to opioids, currently constipated  Usual bowel pattern: multiple times per day  Home regimen: None  LBM: 5/23/24  Continue Miralax 17g PO BID [increased 5/26 AM]  May increase senna to 2 tabs PO BID if needed  Monitor BM frequency, adjust regimen as needed  Goal to have BM without straining q48-72h  Encourage mobility as tolerated, PT/OT following     Sleeping Difficulty  Impaired sleep related to pain  Home regimen: None  Pain management as above  Continue melatonin 5mg PO HS     Medical Decision Making/Goals of Care/Advance Care Planning:  (Per [facility] Resendiz, CNP's, 5/24/24 note) Patient's current clinical condition, including diagnosis, prognosis, and management plan, and goals of care were discussed.   Life limiting disease: metastatic malignancy  Family: Supportive wife, children  Performance status: Major limitations due to pain and physical disability  Joys/meaning/strength: Family and Annamarie  Understanding of health: Demonstrates good understanding of disease process, understands plan for possible surgery  Information: Wants full disclosure     Advance Directives  Existence of Advance Directives: No - not interested  Decision maker: Surrogate decision maker is Marilia mcconnell  Code Status: Full Code     Disposition:  Please start the process of having prior authorization with meds to beds deliver medications to patient prior to discharge via [facility] pharmacy. Prescriptions will need to be sent 48-72 hours prior to discharge so that a prior authorization can be completed.      Discharge date: unknown pending acute issues  Will request an appointment with Outpatient Supportive Oncology as appropriate    Supportive and Palliative Oncology encounter:  Spoke with patient at bedside  Emotional support provided  Coordination of care     Signature and billing:  Thank you for allowing us to participate in the care of this patient. Recommendations will be communicated back to the consulting service by way of shared  electronic medical record or face-to-face.    Medical complexity was high level due to due to complexity of problems, extensive data review, and high risk of management/treatment.    I spent 50 minutes in the care of this patient which included chart review, interviewing patient/family, discussion with primary team, coordination of care, and documentation.    Data:   Diagnostic tests and information reviewed for today's visit:  Conversation with primary team, Most recent labs, Most recent EKG, Medications    Some elements copied from Mercy Resendiz CNP's, note on 5/24/24, the elements have been updated and all reflect current decision making from today, 05/26/24.    Plan of Care discussed with: Primary team, pt    Thank you for asking Supportive and Palliative Oncology to assist with care of this patient.  We will continue to follow.  Please contact us for additional questions or concerns.    SIGNATURE: RAFI Guerra-DMITRY   PAGER/CONTACT:  Contact information:  Supportive and Palliative Oncology  Monday-Friday 8 AM-5 PM  Epic Secure chat or pager [phone].  After hours and weekends:  pager [phone]

## 2024-05-26 NOTE — PROGRESS NOTES
Altaf Parsons is a 53 y.o. male on day 2 of admission presenting with Malignant thymoma (Multi).    Subjective   Patient examined at bedside.  Pain is controlled but sore from the surgery. Denies any chest or abdominal pain.        Objective     Physical Exam    L1: SILT       L2: SILT      Hip flexors 0/5 Left; 3/5 Right  L3: SILT      Knee extension 0/5 Left; 4/5 Right  L4: SILT      Tib Ant. (Dorsiflexion) 0/5 Left; 5/5 Right  L5: SILT      EHL 1/5 Left; 5/5 Right  S1: SILT      GAS (Plantarflexion) 0/5 Left; 5/5 Right    Last Recorded Vitals  Blood pressure 115/74, pulse (!) 116, temperature 36.5 °C (97.7 °F), temperature source Temporal, resp. rate 14, height 1.829 m (6'), weight 101 kg (223 lb), SpO2 94%.  Intake/Output last 3 Shifts:  I/O last 3 completed shifts:  In: 860 (8.5 mL/kg) [P.O.:460; I.V.:400 (4 mL/kg)]  Out: 1978 (19.6 mL/kg) [Urine:1365 (0.4 mL/kg/hr); Drains:513; Blood:100]  Weight: 101.2 kg     Relevant Results      Scheduled medications  ceFAZolin, 2 g, intravenous, q8h  cholecalciferol, 2,000 Units, oral, Daily  [Held by provider] everolimus, 10 mg, oral, Daily  gabapentin, 300 mg, oral, TID  insulin lispro, 0-5 Units, subcutaneous, q4h  lisinopril, 20 mg, oral, Daily  melatonin, 5 mg, oral, Daily  methocarbamol, 500 mg, oral, q6h CHERI  multivitamin with minerals, 1 tablet, oral, Daily  pantoprazole, 40 mg, oral, Daily before breakfast  polyethylene glycol, 17 g, oral, BID  sennosides, 1 tablet, oral, Nightly      Continuous medications  HYDROmorphone,       PRN medications  PRN medications: dextrose, dextrose, diphenhydrAMINE, glucagon, glucagon, naloxone, oxyCODONE, oxyCODONE  Results for orders placed or performed during the hospital encounter of 05/24/24 (from the past 24 hour(s))   POCT GLUCOSE   Result Value Ref Range    POCT Glucose 120 (H) 74 - 99 mg/dL   POCT GLUCOSE   Result Value Ref Range    POCT Glucose 103 (H) 74 - 99 mg/dL                            Assessment/Plan   Principal  Problem:    Malignant thymoma (Multi)  Active Problems:    Spinal cord compression (Multi)    Spinal stenosis of lumbar region    Lumbar stenosis with neurogenic claudication    Cancer with leptomeningeal spread (Multi)    53 year old male now s/p T5-7 laminectomy and extension of posterior spinal instrumented fusion to T4 on 5/25/24 with Dr. Melendez.     Plan:  -Weightbearing: Weightbearing as tolerated  -Antibiotics: Ancef 2 g every 8 hours x 3 doses (ordered)  -Diet: Okay for diet from orthopedic standpoint  -Pain: Okay for and recommend PCA for short-term in the immediate postoperative period with transition to p.o./IV regimen after the first couple of days postoperatively  -Heme: Transfuse as needed, approximately 200cc blood loss intraoperatively  -Steroids: No steroids recommended  -Drains: Hemovac x 1 and Prevena x 1, please record outputs every 8 hours  -Pathology: Intraoperative pathology sent for permanent, follow-up results  -PT/OT, may require rehabilitation facility given significant motor deficit.  May also require AFO on left lower extremity given foot drop  -Appreciate excellent care of medical oncology team, orthopedic spine team to continue to follow while in-house     While admitted, this patient will be followed by the Ortho Spine Team. Please contact below residents with any questions (available via Epic Chat).      First call: Kelsi Mejia PGY-2   Second call: Mario Spivey PGY-3           Mario Spivey,

## 2024-05-26 NOTE — SIGNIFICANT EVENT
Rapid Response RN Note     05/26/24 0156   Onset Documentation   Rapid Response Initiated By Radar auto page   Location/Room [facility]  ([facility] 4011)   Pager Time 0149   Arrival Time 0156   Event End Time 0159   Primary Reason for Call Radar auto page  (score 7)     RADAR score 7 due to the following VS: T 36.0 °Celsius; ; RR 14; /96; SPO2 93% on supplemental oxygen.     Reviewed above VS with bedside RN via phone.  VS verified: ; /79; SPO2 94%.  Staff to page rapid response for any concerns or acute change in condition/VS.

## 2024-05-26 NOTE — PROGRESS NOTES
Subjective   Had a RADAR auto page overnight due to HR of 118. Got PRN Benadryl for sleep. This morning reports pain goes down to a 4/10 with his PCA pump but is 7/10 when he has been moving around. He has been able to turn around in bed but getting out can still be uncomfortable. No bowel movements but feels things moving on the inside, and is passing gas. Worried he is accumulating fluid and wants to be on lisinopril-hydrochlorothiazide combo he was on outpatient (currently not on hydrochlorothiazide).    Meds  Scheduled medications  cholecalciferol, 2,000 Units, oral, Daily  diphenhydrAMINE, 25 mg, oral, Once  [Held by provider] everolimus, 10 mg, oral, Daily  gabapentin, 300 mg, oral, TID  gabapentin, 300 mg, oral, Nightly  insulin lispro, 0-5 Units, subcutaneous, q4h  lisinopril, 20 mg, oral, Daily  melatonin, 5 mg, oral, Daily  methocarbamol, 500 mg, oral, q6h CHERI  multivitamin with minerals, 1 tablet, oral, Daily  pantoprazole, 40 mg, oral, Daily before breakfast  polyethylene glycol, 17 g, oral, BID  sennosides, 1 tablet, oral, Nightly      Continuous medications  HYDROmorphone,       PRN medications  PRN medications: dextrose, dextrose, diphenhydrAMINE, glucagon, glucagon, naloxone      Objective   Vital signs in last 24 hours:  Temp:  [36 °C (96.8 °F)-37.8 °C (100 °F)] 37.3 °C (99.1 °F)  Heart Rate:  [] 120  Resp:  [11-16] 16  BP: (115-149)/(74-98) 115/74  Arterial Line BP 1: (142-174)/(76-86) 170/83    Intake/Output this shift:    Intake/Output Summary (Last 24 hours) at 5/26/2024 1219  Last data filed at 5/26/2024 1000  Gross per 24 hour   Intake 860 ml   Output 1288 ml   Net -428 ml       Physical  General: Patient is awake, non-toxic appearing, normal body habitus  Pulm: Normal WOB at rest and when sitting up, no crackles or rhonchi  Cardiac: Regular rate and rhythm, normal S1/S2  Abdomen: Non-tender to palpation, non-distended  Back: No drainage or erythema around surgical site, tender to  palpation in left upper back  Extremities: No peripheral edema  Neuro: Patient alert and oriented, cranial nerves grossly intact, 0/5 in left lower extremity, 1/5 in right lower extremity    Results  Results for orders placed or performed during the hospital encounter of 05/24/24 (from the past 24 hour(s))   POCT GLUCOSE   Result Value Ref Range    POCT Glucose 120 (H) 74 - 99 mg/dL   POCT GLUCOSE   Result Value Ref Range    POCT Glucose 103 (H) 74 - 99 mg/dL   Magnesium   Result Value Ref Range    Magnesium 2.46 (H) 1.60 - 2.40 mg/dL   Renal Function Panel   Result Value Ref Range    Glucose 78 74 - 99 mg/dL    Sodium 131 (L) 136 - 145 mmol/L    Potassium 4.7 3.5 - 5.3 mmol/L    Chloride 97 (L) 98 - 107 mmol/L    Bicarbonate 23 21 - 32 mmol/L    Anion Gap 16 10 - 20 mmol/L    Urea Nitrogen 13 6 - 23 mg/dL    Creatinine 0.74 0.50 - 1.30 mg/dL    eGFR >90 >60 mL/min/1.73m*2    Calcium 8.1 (L) 8.6 - 10.6 mg/dL    Phosphorus 2.6 2.5 - 4.9 mg/dL    Albumin 3.7 3.4 - 5.0 g/dL   POCT GLUCOSE   Result Value Ref Range    POCT Glucose 118 (H) 74 - 99 mg/dL       Imaging  FL fluoro images no charge    Result Date: 5/25/2024  These images are not reportable by radiology and will not be interpreted by  Radiologists.        Assessment/Plan   Principal Problem:    Malignant thymoma (Multi)  Active Problems:    Spinal cord compression (Multi)    Spinal stenosis of lumbar region    Lumbar stenosis with neurogenic claudication    Cancer with leptomeningeal spread (Multi)  Altaf Parsons is a 53 y.o. male with PMHx of malignant thymoma (dx. 03/2016) w/ mets to bone and lung that presents to [facility] ED on 5/23 with worsening weakness, loss of L hip flexion and uncontrolled pain. Admitted for surgical intervention     Updates 5/26:  -Ortho recs: WBAT, Ancef 2 gQ8H for 3 doses, PCA pump in post op, no steroids, Hemovac/Prevena with outputs Q8H, PT/OT  - Everolimus can antagonize wound healing (Dakota GERBER et al 2013, Clin Transplant)  especially at higher doses. A 10 mg dose would probably have a stronger negative effect on wound healing  -Uptitrate bowel regimen to Miralax 17 g BID with senna.   - Increase gabapentin to 300-300-600 dosing  - On PCA pump, remove basal rate, and increase demand dose to 0.3 mg    #Weakness on hip flexion  #Malignant spinal cord compression, s/p decompression on 5/25  - MRI (4/26) showing new enhancement of cauda equine nerve roots, concern for leptomeningeal spread  - new progressive weakness possibly related to this leptomeningeal spread of disease  - no acute surgical intervention per NSGY consult on last admit (4/26)  -Orthopedics consulted, now s/p thoracolumbar decompression and fusion on 5/25     Plan  - PT/OT for dispo, patient has outpatient PT/OT  -Continue pain control  - Weight bearing as tolerated     #Metastatic Malignant Thymoma, Type B2 (predominant lymphocytic population with scattered epithelial cells)  -July 2007: Massive left-sided hemothorax. Underwent left VATS drainage of massive hemothorax, pleural biopsy and core needle biopsy of lingular mass and decortication. Biopsies all negative. CT scan showed 7 cm lesion within mediastinum with a calcified rim. Followed with serial Cts  - Feb 2016: New enhancing soft tissue mass superior to calcified structure with pericardial LN and basilar RLL nodule  - March 4, 2016: Diagnosed from left lower lobe of lung wedge resection and mediastinal fat biopsy  - Aug 1 2016- Sep 7 2016: 59.4 Gy proton therapy to resection bed  - May 2017: Left pleural nodule showing thymoma  -8/4/17- Started 3 cycles of cyclophosphamide, adriamycin, and cisplatin (PAC-P/PAC/CAP regimen, Demi et al 2004)  -11/2019: Left serratus anterior biopsy positive for thymoma  - 12/10/19- 12/31/19: SINDI mass and left serratus anterior mass, 45 CGE/15 fx, PROTONS   - Aug 2020: T12-L1 paraspinal mass showing thymoma  -Jan 2021: Spine tumor biopsy showing thymoma. Posterior spinal fusion T9-L1  with use of bilateral pedicle screw instrumentation, allograft and demineralized bone matrix fiber. 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 Progressed  -April 2022 - Finished RT of 30 Gy in 10 fractions from T4-T6, and L1-L3  -Dec 2022- April 2023: Started on single agent capecitabine with stable disease  - Sep 2023: Some somatostatin receptor disease noted by [employer] PET  -Sep 2023: Single agent everolimus started. Stable disease so far  -Dr. Rodriguez aware  - Hold everolimus 10mg daily due to negative effect on wound healing     #Chronic left low back pain secondary to malignancy  -Consulted supportive oncology, appreciate recs  -Hold dexamethasone 4 mg daily  - Increase gabapentin to 300-300-600  - cont home methocarbimol 500mg Q6   -Discontinue oral and IV PRNs while on pump     #HTN  -At home was on lisinopril 20-hydrochlorothiazide 25 mg  - cont home lisinopril 20mg  -Can consider restarting hydrochlorothiazide 25 mg later in hospitalization     #Misc  - cont home Vit D, multivitamin      #Prophy  - no current AC  - cont home protonix 40mg daily  - SCDs     #DISPO  - Full code, confirmed on admission  - NOK: Marilia (wife) #[phone]       LOS: 2 days     Shlomo Collins MD/PhD   PGY-1.833

## 2024-05-26 NOTE — CARE PLAN
The patient's goals for the shift include      The clinical goals for the shift include patient will remain safe and free of falls throughout shift

## 2024-05-27 ENCOUNTER — APPOINTMENT (OUTPATIENT)
Dept: RADIOLOGY | Facility: HOSPITAL | Age: 54
DRG: 029 | End: 2024-05-27
Payer: COMMERCIAL

## 2024-05-27 LAB
ALBUMIN SERPL BCP-MCNC: 3.2 G/DL (ref 3.4–5)
ANION GAP SERPL CALC-SCNC: 12 MMOL/L (ref 10–20)
BASOPHILS # BLD AUTO: 0.01 X10*3/UL (ref 0–0.1)
BASOPHILS # BLD AUTO: 0.02 X10*3/UL (ref 0–0.1)
BASOPHILS NFR BLD AUTO: 0.1 %
BASOPHILS NFR BLD AUTO: 0.2 %
BNP SERPL-MCNC: 20 PG/ML (ref 0–99)
BUN SERPL-MCNC: 15 MG/DL (ref 6–23)
CALCIUM SERPL-MCNC: 8.1 MG/DL (ref 8.6–10.6)
CHLORIDE SERPL-SCNC: 98 MMOL/L (ref 98–107)
CO2 SERPL-SCNC: 28 MMOL/L (ref 21–32)
CREAT SERPL-MCNC: 0.81 MG/DL (ref 0.5–1.3)
D DIMER PPP FEU-MCNC: 1582 NG/ML FEU
EGFRCR SERPLBLD CKD-EPI 2021: >90 ML/MIN/1.73M*2
EOSINOPHIL # BLD AUTO: 0.13 X10*3/UL (ref 0–0.7)
EOSINOPHIL # BLD AUTO: 0.16 X10*3/UL (ref 0–0.7)
EOSINOPHIL NFR BLD AUTO: 1.5 %
EOSINOPHIL NFR BLD AUTO: 1.6 %
ERYTHROCYTE [DISTWIDTH] IN BLOOD BY AUTOMATED COUNT: 16.6 % (ref 11.5–14.5)
ERYTHROCYTE [DISTWIDTH] IN BLOOD BY AUTOMATED COUNT: 16.7 % (ref 11.5–14.5)
GLUCOSE BLD MANUAL STRIP-MCNC: 105 MG/DL (ref 74–99)
GLUCOSE BLD MANUAL STRIP-MCNC: 108 MG/DL (ref 74–99)
GLUCOSE BLD MANUAL STRIP-MCNC: 125 MG/DL (ref 74–99)
GLUCOSE BLD MANUAL STRIP-MCNC: 136 MG/DL (ref 74–99)
GLUCOSE BLD MANUAL STRIP-MCNC: 142 MG/DL (ref 74–99)
GLUCOSE SERPL-MCNC: 100 MG/DL (ref 74–99)
HCT VFR BLD AUTO: 28.6 % (ref 41–52)
HCT VFR BLD AUTO: 30.1 % (ref 41–52)
HGB BLD-MCNC: 9 G/DL (ref 13.5–17.5)
HGB BLD-MCNC: 9.3 G/DL (ref 13.5–17.5)
IMM GRANULOCYTES # BLD AUTO: 0.04 X10*3/UL (ref 0–0.7)
IMM GRANULOCYTES # BLD AUTO: 0.05 X10*3/UL (ref 0–0.7)
IMM GRANULOCYTES NFR BLD AUTO: 0.4 % (ref 0–0.9)
IMM GRANULOCYTES NFR BLD AUTO: 0.6 % (ref 0–0.9)
LACTATE SERPL-SCNC: 1.2 MMOL/L (ref 0.4–2)
LYMPHOCYTES # BLD AUTO: 0.41 X10*3/UL (ref 1.2–4.8)
LYMPHOCYTES # BLD AUTO: 0.54 X10*3/UL (ref 1.2–4.8)
LYMPHOCYTES NFR BLD AUTO: 4.7 %
LYMPHOCYTES NFR BLD AUTO: 5.4 %
MAGNESIUM SERPL-MCNC: 2.13 MG/DL (ref 1.6–2.4)
MCH RBC QN AUTO: 25.4 PG (ref 26–34)
MCH RBC QN AUTO: 25.4 PG (ref 26–34)
MCHC RBC AUTO-ENTMCNC: 30.9 G/DL (ref 32–36)
MCHC RBC AUTO-ENTMCNC: 31.5 G/DL (ref 32–36)
MCV RBC AUTO: 81 FL (ref 80–100)
MCV RBC AUTO: 82 FL (ref 80–100)
MONOCYTES # BLD AUTO: 1.36 X10*3/UL (ref 0.1–1)
MONOCYTES # BLD AUTO: 1.42 X10*3/UL (ref 0.1–1)
MONOCYTES NFR BLD AUTO: 14.3 %
MONOCYTES NFR BLD AUTO: 15.5 %
NEUTROPHILS # BLD AUTO: 6.8 X10*3/UL (ref 1.2–7.7)
NEUTROPHILS # BLD AUTO: 7.78 X10*3/UL (ref 1.2–7.7)
NEUTROPHILS NFR BLD AUTO: 77.6 %
NEUTROPHILS NFR BLD AUTO: 78.1 %
NRBC BLD-RTO: 0 /100 WBCS (ref 0–0)
NRBC BLD-RTO: 0 /100 WBCS (ref 0–0)
PHOSPHATE SERPL-MCNC: 2.9 MG/DL (ref 2.5–4.9)
PLATELET # BLD AUTO: 239 X10*3/UL (ref 150–450)
PLATELET # BLD AUTO: 269 X10*3/UL (ref 150–450)
POTASSIUM SERPL-SCNC: 4.3 MMOL/L (ref 3.5–5.3)
PROCALCITONIN SERPL-MCNC: 0.2 NG/ML
RBC # BLD AUTO: 3.55 X10*6/UL (ref 4.5–5.9)
RBC # BLD AUTO: 3.66 X10*6/UL (ref 4.5–5.9)
SODIUM SERPL-SCNC: 134 MMOL/L (ref 136–145)
TSH SERPL-ACNC: 1.31 MIU/L (ref 0.44–3.98)
WBC # BLD AUTO: 10 X10*3/UL (ref 4.4–11.3)
WBC # BLD AUTO: 8.8 X10*3/UL (ref 4.4–11.3)

## 2024-05-27 PROCEDURE — 2500000004 HC RX 250 GENERAL PHARMACY W/ HCPCS (ALT 636 FOR OP/ED)

## 2024-05-27 PROCEDURE — 1170000001 HC PRIVATE ONCOLOGY ROOM DAILY

## 2024-05-27 PROCEDURE — 80069 RENAL FUNCTION PANEL: CPT

## 2024-05-27 PROCEDURE — 85025 COMPLETE CBC W/AUTO DIFF WBC: CPT

## 2024-05-27 PROCEDURE — 71275 CT ANGIOGRAPHY CHEST: CPT | Performed by: RADIOLOGY

## 2024-05-27 PROCEDURE — 82947 ASSAY GLUCOSE BLOOD QUANT: CPT

## 2024-05-27 PROCEDURE — 2500000001 HC RX 250 WO HCPCS SELF ADMINISTERED DRUGS (ALT 637 FOR MEDICARE OP)

## 2024-05-27 PROCEDURE — 71045 X-RAY EXAM CHEST 1 VIEW: CPT

## 2024-05-27 PROCEDURE — 37799 UNLISTED PX VASCULAR SURGERY: CPT

## 2024-05-27 PROCEDURE — 84145 PROCALCITONIN (PCT): CPT | Performed by: INTERNAL MEDICINE

## 2024-05-27 PROCEDURE — 85379 FIBRIN DEGRADATION QUANT: CPT | Performed by: INTERNAL MEDICINE

## 2024-05-27 PROCEDURE — 2550000001 HC RX 255 CONTRASTS

## 2024-05-27 PROCEDURE — 99233 SBSQ HOSP IP/OBS HIGH 50: CPT

## 2024-05-27 PROCEDURE — 37799 UNLISTED PX VASCULAR SURGERY: CPT | Performed by: INTERNAL MEDICINE

## 2024-05-27 PROCEDURE — 71275 CT ANGIOGRAPHY CHEST: CPT

## 2024-05-27 PROCEDURE — 83880 ASSAY OF NATRIURETIC PEPTIDE: CPT | Performed by: INTERNAL MEDICINE

## 2024-05-27 PROCEDURE — 71045 X-RAY EXAM CHEST 1 VIEW: CPT | Performed by: RADIOLOGY

## 2024-05-27 PROCEDURE — 84443 ASSAY THYROID STIM HORMONE: CPT | Performed by: INTERNAL MEDICINE

## 2024-05-27 PROCEDURE — 83735 ASSAY OF MAGNESIUM: CPT

## 2024-05-27 RX ORDER — ALBUMIN HUMAN 50 G/1000ML
12.5 SOLUTION INTRAVENOUS ONCE
Status: DISCONTINUED | OUTPATIENT
Start: 2024-05-27 | End: 2024-05-27

## 2024-05-27 RX ORDER — SENNOSIDES 8.6 MG/1
1 TABLET ORAL 2 TIMES DAILY
Status: DISCONTINUED | OUTPATIENT
Start: 2024-05-27 | End: 2024-05-29

## 2024-05-27 RX ORDER — GABAPENTIN 300 MG/1
600 CAPSULE ORAL NIGHTLY
Status: DISCONTINUED | OUTPATIENT
Start: 2024-05-27 | End: 2024-06-04 | Stop reason: HOSPADM

## 2024-05-27 RX ORDER — GABAPENTIN 300 MG/1
300 CAPSULE ORAL
Status: DISCONTINUED | OUTPATIENT
Start: 2024-05-27 | End: 2024-06-04 | Stop reason: HOSPADM

## 2024-05-27 RX ADMIN — PANTOPRAZOLE SODIUM 40 MG: 40 TABLET, DELAYED RELEASE ORAL at 06:19

## 2024-05-27 RX ADMIN — Medication 2000 UNITS: at 08:47

## 2024-05-27 RX ADMIN — GABAPENTIN 300 MG: 300 CAPSULE ORAL at 15:30

## 2024-05-27 RX ADMIN — ACETAMINOPHEN 975 MG: 325 TABLET ORAL at 20:26

## 2024-05-27 RX ADMIN — SODIUM CHLORIDE, POTASSIUM CHLORIDE, SODIUM LACTATE AND CALCIUM CHLORIDE 1000 ML: 600; 310; 30; 20 INJECTION, SOLUTION INTRAVENOUS at 20:27

## 2024-05-27 RX ADMIN — SENNOSIDES 8.6 MG: 8.6 TABLET, FILM COATED ORAL at 20:27

## 2024-05-27 RX ADMIN — Medication 1 TABLET: at 08:49

## 2024-05-27 RX ADMIN — GABAPENTIN 300 MG: 300 CAPSULE ORAL at 08:48

## 2024-05-27 RX ADMIN — METHOCARBAMOL 500 MG: 500 TABLET ORAL at 12:35

## 2024-05-27 RX ADMIN — METHOCARBAMOL 500 MG: 500 TABLET ORAL at 18:16

## 2024-05-27 RX ADMIN — POLYETHYLENE GLYCOL 3350 17 G: 17 POWDER, FOR SOLUTION ORAL at 08:47

## 2024-05-27 RX ADMIN — LISINOPRIL 20 MG: 20 TABLET ORAL at 08:47

## 2024-05-27 RX ADMIN — ACETAMINOPHEN 975 MG: 325 TABLET ORAL at 15:30

## 2024-05-27 RX ADMIN — DIPHENHYDRAMINE HYDROCHLORIDE 25 MG: 25 CAPSULE ORAL at 13:03

## 2024-05-27 RX ADMIN — GABAPENTIN 600 MG: 300 CAPSULE ORAL at 20:26

## 2024-05-27 RX ADMIN — Medication 5 MG: at 20:27

## 2024-05-27 RX ADMIN — IOHEXOL 58 ML: 350 INJECTION, SOLUTION INTRAVENOUS at 15:29

## 2024-05-27 RX ADMIN — METHOCARBAMOL 500 MG: 500 TABLET ORAL at 00:01

## 2024-05-27 RX ADMIN — POLYETHYLENE GLYCOL 3350 17 G: 17 POWDER, FOR SOLUTION ORAL at 20:26

## 2024-05-27 RX ADMIN — SODIUM CHLORIDE, POTASSIUM CHLORIDE, SODIUM LACTATE AND CALCIUM CHLORIDE 1000 ML: 600; 310; 30; 20 INJECTION, SOLUTION INTRAVENOUS at 10:44

## 2024-05-27 RX ADMIN — ACETAMINOPHEN 975 MG: 325 TABLET ORAL at 08:47

## 2024-05-27 RX ADMIN — METHOCARBAMOL 500 MG: 500 TABLET ORAL at 06:19

## 2024-05-27 ASSESSMENT — COGNITIVE AND FUNCTIONAL STATUS - GENERAL
TURNING FROM BACK TO SIDE WHILE IN FLAT BAD: A LITTLE
MOVING FROM LYING ON BACK TO SITTING ON SIDE OF FLAT BED WITH BEDRAILS: A LITTLE
MOBILITY SCORE: 11
STANDING UP FROM CHAIR USING ARMS: A LOT
DRESSING REGULAR UPPER BODY CLOTHING: A LITTLE
TOILETING: TOTAL
HELP NEEDED FOR BATHING: A LITTLE
DAILY ACTIVITIY SCORE: 14
CLIMB 3 TO 5 STEPS WITH RAILING: TOTAL
MOVING TO AND FROM BED TO CHAIR: A LOT
MOVING FROM LYING ON BACK TO SITTING ON SIDE OF FLAT BED WITH BEDRAILS: A LITTLE
TURNING FROM BACK TO SIDE WHILE IN FLAT BAD: A LITTLE
WALKING IN HOSPITAL ROOM: TOTAL
HELP NEEDED FOR BATHING: A LOT
TOILETING: TOTAL
STANDING UP FROM CHAIR USING ARMS: A LOT
STANDING UP FROM CHAIR USING ARMS: A LOT
WALKING IN HOSPITAL ROOM: TOTAL
STANDING UP FROM CHAIR USING ARMS: A LOT
HELP NEEDED FOR BATHING: A LOT
MOVING TO AND FROM BED TO CHAIR: A LOT
DRESSING REGULAR LOWER BODY CLOTHING: A LITTLE
DAILY ACTIVITIY SCORE: 14
MOBILITY SCORE: 12
DRESSING REGULAR LOWER BODY CLOTHING: TOTAL
CLIMB 3 TO 5 STEPS WITH RAILING: TOTAL
DRESSING REGULAR LOWER BODY CLOTHING: TOTAL
DRESSING REGULAR UPPER BODY CLOTHING: A LITTLE
DRESSING REGULAR UPPER BODY CLOTHING: A LOT
MOVING TO AND FROM BED TO CHAIR: A LOT
MOVING TO AND FROM BED TO CHAIR: A LOT
CLIMB 3 TO 5 STEPS WITH RAILING: TOTAL
TOILETING: A LITTLE
TOILETING: TOTAL
DAILY ACTIVITIY SCORE: 15
MOBILITY SCORE: 12
TURNING FROM BACK TO SIDE WHILE IN FLAT BAD: A LITTLE
DRESSING REGULAR UPPER BODY CLOTHING: A LOT
MOBILITY SCORE: 12
MOVING FROM LYING ON BACK TO SITTING ON SIDE OF FLAT BED WITH BEDRAILS: A LITTLE
TURNING FROM BACK TO SIDE WHILE IN FLAT BAD: A LOT
WALKING IN HOSPITAL ROOM: TOTAL
CLIMB 3 TO 5 STEPS WITH RAILING: TOTAL
WALKING IN HOSPITAL ROOM: TOTAL
DRESSING REGULAR LOWER BODY CLOTHING: TOTAL
DAILY ACTIVITIY SCORE: 20
HELP NEEDED FOR BATHING: A LOT
MOVING FROM LYING ON BACK TO SITTING ON SIDE OF FLAT BED WITH BEDRAILS: A LITTLE

## 2024-05-27 ASSESSMENT — PAIN SCALES - GENERAL
PAINLEVEL_OUTOF10: 4
PAINLEVEL_OUTOF10: 4

## 2024-05-27 NOTE — PROGRESS NOTES
Atlaf Parsons is a 53 y.o. male on day 3 of admission presenting with Malignant thymoma (Multi).    Subjective   Patient examined at bedside.  Denies chest pain or abdominal pain. Denies N/T down the lower extremities.         Objective     Physical Exam    L1: SILT       L2: SILT      Hip flexors 0/5 Left; 2/5 Right  L3: SILT      Knee extension 1/5 Left; 2/5 Right  L4: SILT      Tib Ant. (Dorsiflexion) 0/5 Left; 1/5 Right  L5: SILT      EHL 1/5 Left; 1/5 Right  S1: SILT      GAS (Plantarflexion) 0/5 Left; 1/5 Right    Drain and prevena intact with bloody output in the drain     Last Recorded Vitals  Blood pressure 101/67, pulse (!) 117, temperature 37.4 °C (99.3 °F), resp. rate 16, height 1.829 m (6'), weight 101 kg (223 lb), SpO2 96%.  Intake/Output last 3 Shifts:  I/O last 3 completed shifts:  In: 2720 (26.9 mL/kg) [P.O.:360; I.V.:360 (3.6 mL/kg); IV Piggyback:2000]  Out: 1670 (16.5 mL/kg) [Urine:1050 (0.3 mL/kg/hr); Drains:620]  Weight: 101.2 kg     Relevant Results      Scheduled medications  acetaminophen, 975 mg, oral, TID  cholecalciferol, 2,000 Units, oral, Daily  diphenhydrAMINE, 25 mg, oral, Once  [Held by provider] everolimus, 10 mg, oral, Daily  gabapentin, 300 mg, oral, TID  gabapentin, 300 mg, oral, Nightly  insulin lispro, 0-5 Units, subcutaneous, q4h  lisinopril, 20 mg, oral, Daily  melatonin, 5 mg, oral, Daily  methocarbamol, 500 mg, oral, q6h CHERI  multivitamin with minerals, 1 tablet, oral, Daily  pantoprazole, 40 mg, oral, Daily before breakfast  polyethylene glycol, 17 g, oral, BID  sennosides, 1 tablet, oral, Nightly      Continuous medications  HYDROmorphone,       PRN medications  PRN medications: dextrose, dextrose, diphenhydrAMINE, glucagon, glucagon, naloxone  Results for orders placed or performed during the hospital encounter of 05/24/24 (from the past 24 hour(s))   POCT GLUCOSE   Result Value Ref Range    POCT Glucose 101 (H) 74 - 99 mg/dL   POCT GLUCOSE   Result Value Ref Range     POCT Glucose 128 (H) 74 - 99 mg/dL   CBC   Result Value Ref Range    WBC 8.9 4.4 - 11.3 x10*3/uL    nRBC 0.0 0.0 - 0.0 /100 WBCs    RBC 3.62 (L) 4.50 - 5.90 x10*6/uL    Hemoglobin 9.1 (L) 13.5 - 17.5 g/dL    Hematocrit 29.0 (L) 41.0 - 52.0 %    MCV 80 80 - 100 fL    MCH 25.1 (L) 26.0 - 34.0 pg    MCHC 31.4 (L) 32.0 - 36.0 g/dL    RDW 16.5 (H) 11.5 - 14.5 %    Platelets 245 150 - 450 x10*3/uL   Lactate   Result Value Ref Range    Lactate 1.2 0.4 - 2.0 mmol/L   POCT GLUCOSE   Result Value Ref Range    POCT Glucose 136 (H) 74 - 99 mg/dL                            Assessment/Plan   Principal Problem:    Malignant thymoma (Multi)  Active Problems:    Spinal cord compression (Multi)    Spinal stenosis of lumbar region    Lumbar stenosis with neurogenic claudication    Cancer with leptomeningeal spread (Multi)    53 year old male now s/p T5-7 laminectomy and extension of posterior spinal instrumented fusion to T4 on 5/25/24 with Dr. Melendez.     Plan:  -Weightbearing: Weightbearing as tolerated  -Antibiotics: Ancef 2 g every 8 hours x 3 doses.  Completed   -Diet: Regular diet   -Pain: would recommend transition to orals when able   -Heme: Transfuse as needed, approximately 200cc blood loss intraoperatively  -Steroids: No steroids recommended  -Drains: Hemovac x 1 and Prevena x 1, please record outputs every 8 hours  -Pathology: Intraoperative pathology sent for permanent, follow-up results  -PT/OT, may require rehabilitation facility given significant motor deficit.  May also require AFO on left lower extremity given foot drop     While admitted, this patient will be followed by the Ortho Spine Team. Please contact below residents with any questions (available via Epic Chat).      First call: Kelsi Mejia PGY-2   Second call: Mario Spivey PGY-3           Mario Spivey, DO

## 2024-05-27 NOTE — CARE PLAN
The patient's goals for the shift include      The clinical goals for the shift include managing pain and keeping VSS

## 2024-05-27 NOTE — PROGRESS NOTES
"SUPPORTIVE AND PALLIATIVE ONCOLOGY INPATIENT FOLLOW-UP    SERVICE DATE: 05/27/24     SUBJECTIVE:  HISTORY OF PRESENT ILLNESS: Altaf Parsons is a 53 y.o. male diagnosed with metastatic malignant thymoma (sites of disease mediastinum, lung, TLS spine, T12-L1 paraspinal region, left lateral spine canal T11-S1) dx 2016 s/p Proton therapy, RT, multiple spine surgeries, chemo, and most recently on everolimus. PMHx significant for HTN, GERD, hemothorax, HLD, and sleep apnea. Admitted 5/24/2024 for further evaluation and management of progressive left leg weakness and worsening pain and to expedite planning for spine surgery. Course complicated by recurrent tumor in his spine, high risk surgery due to recurrent tumor, previous surgeries and previous RT. Supportive and Palliative Oncology is consulted for pain management.     Interval Events:  Pt's pain better controlled than yesterday, however still remains 4-5/10 per pt. Plans to change hydromorphone PCA settings--reviewed with pt at bedside, see Assessment & Plan for detailed recs.     Ideally hope to be able to wean pt off of hydromorphone PCA in the next day or two.     Pain Assessment:  Location: Upper to mid back, incision site  Duration: Constant  Characteristics:  Rating: \"my pain is ok *grimaces*\", 4-5/10  Descriptors: Throbbing, sore, spasms  Aggravating: Movement, bending, pressure   Relieving: Analgesics --see EMR, positioning, and modifying activity  Interference with Function: Somewhat    Opioid Use  Past 24h opioid use:   (5/26-5/27, 6375-4180)  hydromorphone PCA: Attempts: 49, Received: 37, Total: 11.1mg IV = 138.75 OME    Total 24h OME use: 138.75 OME    Note: OME calculations based on equianalgesic table below. Please note this table is based on best available evidence but conversions are still approximate. These are NOT opioid DOSES for individual patient use; this is equivalency information.  Drug Parenteral Enteral   Morphine 10 25   Oxycodone N/A 20 "   Hydromorphone 2 5   Fentanyl 0.15 N/A   Tramadol N/A 120   Citation: Loki GUTIERREZ. Demystifying opioid conversion calculations: A guide for effective dosing, Second edition. [place]: American [employer] of [employer]-System Pharmacists, 2018.    Symptom Assessment:  Nausea: none  Constipation: somewhat--states he is now passing gas    Information obtained from: interview of patient  ______________________________________________________________________   OBJECTIVE:    Lab Results   Component Value Date    WBC 8.8 05/27/2024    HGB 9.3 (L) 05/27/2024    HCT 30.1 (L) 05/27/2024    MCV 82 05/27/2024     05/27/2024      Lab Results   Component Value Date    GLUCOSE 100 (H) 05/27/2024    CALCIUM 8.1 (L) 05/27/2024     (L) 05/27/2024    K 4.3 05/27/2024    CO2 28 05/27/2024    CL 98 05/27/2024    BUN 15 05/27/2024    CREATININE 0.81 05/27/2024     Lab Results   Component Value Date    ALT 20 05/24/2024    AST 28 05/24/2024    ALKPHOS 57 05/24/2024    BILITOT 0.6 05/24/2024     Estimated Creatinine Clearance: 125 mL/min (by C-G formula based on SCr of 0.81 mg/dL).     Scheduled medications  acetaminophen, 975 mg, oral, TID  cholecalciferol, 2,000 Units, oral, Daily  diphenhydrAMINE, 25 mg, oral, Once  [Held by provider] everolimus, 10 mg, oral, Daily  gabapentin, 300 mg, oral, TID  gabapentin, 300 mg, oral, Nightly  insulin lispro, 0-5 Units, subcutaneous, q4h  lisinopril, 20 mg, oral, Daily  melatonin, 5 mg, oral, Daily  methocarbamol, 500 mg, oral, q6h CHERI  multivitamin with minerals, 1 tablet, oral, Daily  pantoprazole, 40 mg, oral, Daily before breakfast  polyethylene glycol, 17 g, oral, BID  sennosides, 1 tablet, oral, Nightly    Continuous medications  HYDROmorphone,     PRN medications  dextrose, 12.5 g, q15 min PRN  dextrose, 25 g, q15 min PRN  diphenhydrAMINE, 50 mg, Nightly PRN  glucagon, 1 mg, q15 min PRN  glucagon, 1 mg, q15 min PRN  naloxone, 0.2 mg, PRN    PHYSICAL EXAMINATION:    Vital Signs:    Vital signs reviewed  Visit Vitals  /67 (Patient Position: Lying)   Pulse (!) 117   Temp 37.4 °C (99.3 °F)   Resp 16      Pain Score: 5 - Moderate pain     Physical Exam  Vitals and nursing note reviewed.   Constitutional:       Comments: Alert, awake gentleman sitting up in bed. Intermittently tensing his facial muscles and grimacing in response to movement. Pleasant, cooperative, and participating in interview.     HENT:      Head:      Comments: Normocephalic, atraumatic.     Eyes:      Comments: Sclera clear, EOM intact.   Neck:      Comments: Trachea midline.  Pulmonary:      Comments: Symmetrical chest rise. Regular rate and depth of respirations. 2L NC. Productive cough. Sputum clear.   Abdominal:      Comments: Abdomen slightly distended, non tender, and semi firm.   Musculoskeletal:      Comments: Generalized muscle weakness and atrophy. MAHONEY x4. No visible extremity edema.   Skin:     Comments: No lesions, rash, or abrasions present on visible skin. Skin color appropriate for ethnicity.   Neurological:      Comments: A&Ox4, follows commands, no apparent sensory deficits.   Psychiatric:      Comments: Mood and behavior appropriate.       ASSESSMENT/PLAN:  Altaf Parsons is a 53 y.o. male diagnosed with metastatic malignant thymoma (sites of disease mediastinum, lung, TLS spine, T12-L1 paraspinal region, left lateral spine canal T11-S1) dx 2016 s/p Proton therapy, RT, multiple spine surgeries, chemo, and most recently on everolimus. PMHx significant for HTN, GERD, hemothorax, HLD, and sleep apnea. Admitted 5/24/2024 for further evaluation and management of progressive left leg weakness and worsening pain and to expedite planning for spine surgery. Course complicated by recurrent tumor in his spine, high risk surgery due to recurrent tumor, previous surgeries and previous RT. Supportive and Palliative Oncology is consulted for pain management.     Neoplasm Related Pain  Left lower back, left hip, thigh  and knee pain related to metastatic malignant thymoma with extensive spine mets with new enhancement of the cauda equina nerve roots and the conus, concerning for leptomeningeal spread of the malignancy  Pain is: Acute on chronic, cancer related pain c/b acute post-operative pain  Type: Somatic and neuropathic [frequent muscle spasms]  Pain control: Sub-optimally controlled  Home regimen: oxycodone IR 10mg (x2-3/day), gabapentin 300mg TID, and methocarbamol 500mg TID  Intolerances/previously tried: None  Personalized pain goal: 3/10  Total OME usage for the past 24 hours: 138.75 OME  Reviewed: (5/27/24) Estimated Creatinine Clearance: 125 mL/min (by C-G formula based on SCr of 0.81 mg/dL).  Reviewed: (5/24/24) LFTs WNL  Continue scheduled acetaminophen 975mg PO q8h  Change hydromorphone PCA settings: Patient Bolus Dose: 0.4mg , Lockout Interval: 10 minutes, No Basal Rate (0mg/h), One Hour Dose Limit: 2.4mg  Continue gabapentin to 300mg/300mg/600mg PO TID  Continue methocarbamol 500mg PO q6h scheduled for muscle spasms  Consider dexamethasone per ortho, recently tapered off  Continue to monitor pain scores and administer PRN medications as appropriate  Continue/initiate nonpharmacologic pain management strategies including ice/heat therapy, distraction techniques, deep breathing/relaxation techniques, calming music, and repositioning  Continue to monitor for signs of opioid efficacy (pain scores, improved functionality) and toxicity (pinpoint pupils, excess sedation/drowsiness/confusion, respiratory depression, etc.)     Nausea  At risk for nausea with vomiting related to opioids--intermittent occurences post-surgery  Home regimen: None  EKG reviewed from 5/23/24, QTc 491 (borderline high, continue to monitor)  PPI per primary  If needed, start ondansetron 4mg IV/PO q6h PRN for nausea, first line      Constipation  At risk for constipation related to opioids, currently constipated  Usual bowel pattern: multiple times  per day  Home regimen: None  LBM: 5/23/24--now passing gas  Continue Miralax 17g PO BID  Increase senna to 2 tabs PO BID  Monitor BM frequency, adjust regimen as needed  Goal to have BM without straining q48-72h  Encourage mobility as tolerated, PT/OT following     Sleeping Difficulty  Impaired sleep related to pain  Home regimen: None  Pain management as above  Continue melatonin 5mg PO HS     Medical Decision Making/Goals of Care/Advance Care Planning:  (Per [facility] Resend[facility], CNP's, 5/24/24 note) Patient's current clinical condition, including diagnosis, prognosis, and management plan, and goals of care were discussed.   Life limiting disease: metastatic malignancy  Family: Supportive wife, children  Performance status: Major limitations due to pain and physical disability  Joys/meaning/strength: Family and Annamarie  Understanding of health: Demonstrates good understanding of disease process, understands plan for possible surgery  Information: Wants full disclosure     Advance Directives  Existence of Advance Directives: No - not interested  Decision maker: Surrogate decision maker is wifeMarilia  Code Status: Full Code     Disposition:  Please start the process of having prior authorization with meds to beds deliver medications to patient prior to discharge via [facility] pharmacy. Prescriptions will need to be sent 48-72 hours prior to discharge so that a prior authorization can be completed.      Discharge date pending resolution of acute hospital issues.   Will request an appointment with Outpatient Supportive Oncology as appropriate.    Supportive and Palliative Oncology encounter:  Spoke with patient at bedside  Emotional support provided  Coordination of care     Signature and billing:  Thank you for allowing us to participate in the care of this patient. Recommendations will be communicated back to the consulting service by way of shared electronic medical record or face-to-face.    Medical complexity was  high level due to due to complexity of problems, extensive data review, and high risk of management/treatment.    I spent 50 minutes in the care of this patient which included chart review, interviewing patient/family, discussion with primary team, coordination of care, and documentation.    Data:   Diagnostic tests and information reviewed for today's visit:  Conversation with primary team, Most recent labs, Most recent EKG, Medications    Some elements copied from my note on 5/26/24, the elements have been updated and all reflect current decision making from today, 05/27/24.    Plan of Care discussed with: Primary team, pt     Thank you for asking Supportive and Palliative Oncology to assist with care of this patient.  We will continue to follow.  Please contact us for additional questions or concerns.    SIGNATURE: RAFI Guerra-DMITRY   PAGER/CONTACT:  Contact information:  Supportive and Palliative Oncology  Monday-Friday 8 AM-5 PM  Epic Secure chat or pager [phone].  After hours and weekends:  pager [phone]

## 2024-05-27 NOTE — CARE PLAN
Problem: Pain - Adult  Goal: Verbalizes/displays adequate comfort level or baseline comfort level  Outcome: Progressing     Problem: Safety - Adult  Goal: Free from fall injury  Outcome: Progressing

## 2024-05-27 NOTE — PROGRESS NOTES
Assessment per chart, will attempt to meet with patient.     From home with spouse, recent admit and DC home with walker, at home using wheelchair as well with continued decrease in function. H/O malignant thymoma (dx. 03/2016) w/ mets to bone and lung ; MRI 4/26 showing new enhancement of cauda equine nerve roots, concern for leptomeningeal spread ; per notes new weakness possible related. Plan for revision decompression in the thoracic spine, a decompression in the lumbar spine and extension of his fusion from the thoracic to the lumbar spine that was discussed OP, Orthopedics w/surgery 5/25.     Currently on PCA  Hemovac and prevena in place  May require AFO/foot drop  Anticipate need for rehab, consider PM&R     Referrals started to  [facility] for review.

## 2024-05-27 NOTE — OP NOTE
Decompression & Fusion Spine Posterior Thoracic Lumbar Operative Note     Date: 2024  OR Location: [facility] OR    Name: Altaf Parsons, : 1970, Age: 53 y.o., MRN: [id number], Sex: male    Diagnosis  Pre-op Diagnosis     * Cancer with leptomeningeal spread (Multi) [C79.49]     * Lumbar stenosis with neurogenic claudication [M48.062]     * Spinal stenosis of lumbar region with neurogenic claudication [M48.062]     * Spinal cord compression (Multi) [G95.20] Post-op Diagnosis     * Cancer with leptomeningeal spread (Multi) [C79.49]     * Lumbar stenosis with neurogenic claudication [M48.062]     * Spinal stenosis of lumbar region with neurogenic claudication [M48.062]     * Spinal cord compression (Multi) [G95.20]     Procedures  Thoracic Decompression T5-T9 with laminectomies and spinal cord decompression and tumor debulking  Posterior Spine Fusion T4-T9 with Pedicle Screw Instrumentation  Surgical Navigation  Explore Fusion Mass  Spinal Cord Monitoring          Surgeons      * Kory Melendez - Primary    Resident/Fellow/Other Assistant:  Surgeons and Role:     * Kory Chung MD - Resident - Assisting    Procedure Summary  Anesthesia: General  ASA: III  Anesthesia Staff: Anesthesiologist: Abraham Vidal MD  C-AA: ADELINA Hurt  Estimated Blood Loss: 150 mL  Intra-op Medications:   Administrations occurring from 0745 to 1250 on 24:   Medication Name Total Dose   bacitracin ointment 1 Application   thrombin (recombinant) (Recothrom) topical solution 20,000 Units   gelatin absorbable (Gelfoam) 100 sponge 2 each   polymyxin B 1,000,000 Units in sodium chloride 0.9 % 1,000 mL irrigation Cannot be calculated   cholecalciferol (Vitamin D-3) tablet 2,000 Units Cannot be calculated   everolimus (Afinitor) tablet 10 mg Cannot be calculated   insulin lispro (HumaLOG) injection 0-5 Units Cannot be calculated   lisinopril tablet 20 mg Cannot be calculated   methocarbamol (Robaxin) tablet 500  mg Cannot be calculated   multivitamin with minerals 1 tablet Cannot be calculated   gabapentin (Neurontin) capsule 300 mg Cannot be calculated   polyethylene glycol (Glycolax, Miralax) packet 17 g Cannot be calculated         Intraprocedure I/O Totals       None           Specimen:   ID Type Source Tests Collected by Time   1 : METASTATIC DISEASE SPINE;  HISTORY OF THYMOMA Tissue BONE RESECTION SURGICAL PATHOLOGY EXAM Kory Melendez MD 5/25/2024 1200        Staff:   Circulator: Kyleigh Gaxiola Person: Carmen         Drains and/or Catheters:   Closed/Suction Drain Back Accordion 15 Fr. (Active)   Site Description Unable to view 05/27/24 0900   Dressing Status Clean 05/27/24 0900   Drainage Appearance Bloody 05/25/24 2100   Status To bulb suction 05/25/24 2100   Sutures Removed Intact Yes 05/25/24 2100   Output (mL) 30 mL 05/27/24 1706       Urethral Catheter 16 Fr. (Active)   Site Assessment Clean 05/27/24 0951   Collection Container Standard drainage bag 05/25/24 1424   Securement Method Securing device (Describe) 05/25/24 1424   Reason for Continuing Urinary Catheterization surgical procedures: urological/gynecological, pelvic oncology, anal, prolonged surgical procedure 05/25/24 0657   Output (mL) 350 mL 05/27/24 1706       Tourniquet Times:         Implants:  Implants       Type Name Action Serial No.      Spinal Hardware SCREW, MAS 5.5 X 35 CC SOLERA - [id number] Implanted       MEDTRONIC SCREW, SOLERA, 5.5 X 40MM POLY Implanted      He MEDTRONIC HE Implanted      Screw SET SCREW, 5.5, TI NS BRK OFF - [id number] Implanted       5.5 SIDE LOADING OSKAR Implanted      Graft BONE, CHIPS, CANCELLOUS, ASEPTIC,90 CC - [id number] - [id number] Implanted [id number]     Spinal Hardware SCREW SET, STD 1/4 - 32 - [id number] Implanted          Clinical note: This man has a history of metastatic thymoma.  He has had a remote thoracolumbar decompression and fusion T9-L1.  He has had adjuvant therapy.    He  has developed severe lower extremity weakness.  He has diffuse spinal cord compression from metastatic disease.  This is most notable at the levels proximal to his prior surgery.  He does have some degree of degenerative stenosis and recurrent tumor in the lumbar spine.    We have had lengthy discussions with the patient and his wife as well as his medical oncologist, Dr. Rodriguez.  We have discussed that there are no further adjuvant options to control his metastatic disease in the spine.  We have discussed surgery.  We discussed the limited predictability of surgery helping him.  However, given the profound nature of his weakness surgery is an option to potentially allow for some recovery of function.    The rate and degree of neurologic and functional improvement is unpredictable.  There may be no neurologic improvement which the patient and his wife understand.    We have discussed the risks and benefits and expectations of surgery.  Specific risks including death in the perioperative period, failure to thrive, prolonged mechanical ventilation, further spinal cord injury, epidural hematoma, infection, instrumentation failure, adjacent level disease, need for further surgery have all been discussed.  The patient and his wife understand and wish to proceed.    Discussion with the medical oncologist has also been undertaken and they agree with the indication for surgery.    The patient was brought to the operating room.  Huddle was held, he was identified, antibiotics administered, sequential compression devices placed.  A general anesthetic was secured.  Baseline somatosensory evoked potentials were obtained.  A Meza catheter was placed.    He was carefully turned into the prone position on the Lewis frame with all body prominences well-padded.  His back was initially cleaned with careful scrub soap.  I then formal prepping and draping was performed.  His prior incision was initially employed to expose his prior  instrumentation.  As expected, from his prior surgery and adjuvant treatment, the soft tissue was very firm and woody.  It was relatively avascular.  With great care in a very time-consuming fashion we created a midline approach and then extended to expose his instrumentation.  The upper portion of his instrumentation was identified.  We looked laterally and there did appear to be bone fusion mass.  The instrumentation appeared to be solid without obvious gross loosening.  As such our plan was to connect the new instrumentation with his prior instrumentation which appeared to be solid.  We continued the exposure proximally up to the T4 level.  Meticulous hemostasis was obtained.  We frequently irrigated the wound with warm antibiotic irrigation.  Once the exposure had been performed we brought the O-arm in and obtained surgical navigation guidance.  We used the guidance to place pedicle screws bilaterally T4-T8.  The usual sequence of drilling and entry point with a high-speed bur, the use of the pedicle finder and tapping were performed.  Good purchase was noted.  We then employed our spinal cord monitoring apparatus to stimulate our screws and we had appropriate thresholds.  Rods were then cut and conformed to the appropriate dimensions.  The rods were connected to the new screws and via joe connectors secured to the distal fixation between the previously placed T9 and T10 screws.  The locking mechanisms were tightened.  The construct was quite stable.  We then began the decompression.  I chose to address the spinal cord compression from T5-T9 as this appears to be the most significant.  The spinous processes were trimmed.  A high-speed bur was used to create a trough between the junction of the lateral mass and the lamina.  This was carried down to the anterior cortex.  This was completed with micro Kerrison rongeurs.  No downward pressure was allowed within the canal.  The lateral masses of the most severely  affected levels T5-T9 were taken down to allow for exposure of the gutters.  All visible tumor was debulked.  This tissue was sent for permanent pathology.  At the completion of the decompression the dorsal aspect and the lateral aspects of the spinal cord were well decompressed.  Morselized allograft bone was prepared.  We decorticated the lateral masses across the new fusion levels and packed the bone graft out on either side.  Very meticulous hemostasis was obtained.  Again we thoroughly irrigated the wound.  A Hemovac drain was placed deep to the fascia.  The deep fascia was closed in a very meticulous fashion with #1 Vicryl's.  Again the previously treated surgical areas had very woody thickened deep tissue and we meticulously closed this in a watertight fashion.  The subcutaneous tissue was closed with interrupted 2-0 Vicryl's and the skin with interrupted nylon sutures.  A Prevena vacuum type dressing was placed.    The patient was carefully turned into his hospital bed awakened and extubated and he was transferred to the recovery room in stable condition.  There were no changes in the evoked tonsils throughout the case.    ** Dictated with voice recognition software and not immediately reviewed for errors in grammar and/or spelling **    I was present and scrubbed for the entire procedure.    Kory Melendez  Phone Number: [phone]

## 2024-05-27 NOTE — PROGRESS NOTES
Internal Medicine Daily Progress Note    Subjective     Interval events:  Overnight was sinus tachy with hypotension and new O2 requirement 2L NC. Labs collected remarkable for Hgb of 9.1 (preop Hgb ~ 14). Lactate 1.2. Received 2L IVF bolus with improvement in tachycardia/hypotension. Remains tachycardic this AM. Denies melena/hematochezia, nosebleeds, or other bleeding. Passing gas but no new BM. Reports new cough that he developed about 3 days ago productive of yellow sputum. Pain is well controlled on PCA.       Objective     Vitals:  Visit Vitals  /67 (Patient Position: Lying)   Pulse (!) 117   Temp 37.4 °C (99.3 °F)   Resp 16         Intake/Output Summary (Last 24 hours) at 5/27/2024 0734  Last data filed at 5/27/2024 0516  Gross per 24 hour   Intake 2360 ml   Output 1050 ml   Net 1310 ml       Physical exam:  Constitutional: Well-developed male in no acute distress. Lying in bed on 2L NC. Slightly diaphoretic.  HEENT: Normocephalic, atraumatic.  Respiratory: CTA anteriorly. No wheezes, rales, or rhonchi. Normal respiratory effort.  Cardiovascular: RRR. No murmurs, gallops, or rubs.  Abdominal: Soft, mildly distended, nontender to palpation. Bowel sounds present. Hemovac with sanguineous fluid.  Neuro: A&Ox3  MSK: No LE edema bilaterally  Skin: Warm, dry. No rashes or wounds  Psych: Appropriate mood and affect    Medications:  acetaminophen, 975 mg, oral, TID  cholecalciferol, 2,000 Units, oral, Daily  diphenhydrAMINE, 25 mg, oral, Once  [Held by provider] everolimus, 10 mg, oral, Daily  gabapentin, 300 mg, oral, TID  gabapentin, 300 mg, oral, Nightly  insulin lispro, 0-5 Units, subcutaneous, q4h  lisinopril, 20 mg, oral, Daily  melatonin, 5 mg, oral, Daily  methocarbamol, 500 mg, oral, q6h CHERI  multivitamin with minerals, 1 tablet, oral, Daily  pantoprazole, 40 mg, oral, Daily before breakfast  polyethylene glycol, 17 g, oral, BID  sennosides, 1 tablet, oral, Nightly      HYDROmorphone,       PRN  medications: dextrose, dextrose, diphenhydrAMINE, glucagon, glucagon, naloxone    Labs:      Results from last 72 hours   Lab Units 05/26/24  2233 05/26/24  0629   WBC AUTO x10*3/uL 8.9  --    HEMOGLOBIN g/dL 9.1*  --    HEMATOCRIT % 29.0*  --    PLATELETS AUTO x10*3/uL 245  --    SODIUM mmol/L  --  131*   POTASSIUM mmol/L  --  4.7   CHLORIDE mmol/L  --  97*   CO2 mmol/L  --  23   BUN mg/dL  --  13   CREATININE mg/dL  --  0.74   GLUCOSE mg/dL  --  78   CALCIUM mg/dL  --  8.1*   MAGNESIUM mg/dL  --  2.46*   PHOSPHORUS mg/dL  --  2.6   ALBUMIN g/dL  --  3.7           Imaging:  FL fluoro images no charge    Result Date: 5/25/2024  These images are not reportable by radiology and will not be interpreted by  Radiologists.        Assessment and Plan:  Altaf Parsons is a 53 y.o. male with PMHx of malignant thymoma (dx. 03/2016) w/ mets to bone and lung who presented with worsening LE weakness and pain, now s/p thoracolumbar decompression/fusion for malignant spinal cord compression on 5/25.    Updates 05/27/24:  - CXR w/o evidence of PNA  - another 1L LR bolus for tachycardia, dehydration vs relative anemia vs early infection  - supportive onc recs: increased senna to BID, PCA bolus to 0.4 and hourly max to 2.4    #Malignant spinal cord compression, s/p decompression on 5/25  - MRI (4/26) showing new enhancement of cauda equine nerve roots, concern for leptomeningeal spread  - new progressive weakness possibly related to this leptomeningeal spread of disease  - ortho performed thoracolumbar decompression and fusion on 5/25  Plan  - appreciate ortho recs:   - no steroids   - hemovac/prevena with q8h outputs  - PT/OT for dispo, patient has outpatient PT/OT  - Weight bearing as tolerated  - Continue pain control with PCA, gabapentin, tylenol     #Metastatic Malignant Thymoma, Type B2 (predominant lymphocytic population with scattered epithelial cells)  - July 2007: Massive left-sided hemothorax. Underwent left VATS  drainage of massive hemothorax, pleural biopsy and core needle biopsy of lingular mass and decortication. Biopsies all negative. CT scan showed 7 cm lesion within mediastinum with a calcified rim. Followed with serial Cts  - Feb 2016: New enhancing soft tissue mass superior to calcified structure with pericardial LN and basilar RLL nodule  - March 4, 2016: Diagnosed from left lower lobe of lung wedge resection and mediastinal fat biopsy  - Aug 1 2016- Sep 7 2016: 59.4 Gy proton therapy to resection bed  - May 2017: Left pleural nodule showing thymoma  - 8/4/17- Started 3 cycles of cyclophosphamide, adriamycin, and cisplatin (PAC-P/PAC/CAP regimen, Demi et al 2004)  - 11/2019: Left serratus anterior biopsy positive for thymoma  - 12/10/19- 12/31/19: SINDI mass and left serratus anterior mass, 45 CGE/15 fx, PROTONS   - Aug 2020: T12-L1 paraspinal mass showing thymoma  -Jan 2021: Spine tumor biopsy showing thymoma. Posterior spinal fusion T9-L1 with use of bilateral pedicle screw instrumentation, allograft and demineralized bone matrix fiber. 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 Progressed  -April 2022 - Finished RT of 30 Gy in 10 fractions from T4-T6, and L1-L3  - Dec 2022- April 2023: Started on single agent capecitabine with stable disease  - Sep 2023: Some somatostatin receptor disease noted by [employer] PET  -Sep 2023: Single agent everolimus started. Stable disease so far  - Dr. Rodriguez aware  Plan:  - hold everolimus 10 mg daily due to negative effect on wound healing     #Chronic left low back pain secondary to malignancy  - Consulted supportive oncology, appreciate recs  - Hold dexamethasone 4 mg daily  - continue gabapentin 300-300-600  - cont home methocarbimol 500mg Q6   - Discontinue  oral and IV PRNs while on pump     #HTN  - At home was on lisinopril 20-hydrochlorothiazide 25 mg  - cont home lisinopril 20mg  - Can consider restarting hydrochlorothiazide 25 mg later in hospitalization      #Misc  - cont home Vit D, multivitamin, Protonix 40 mg daily     F: s/p 2L bolus on 5/27  E: prn  N: regular diet  A: PIV, Meza  DVT prophylaxis: SCDs  Code status: Full code, confirmed on admission  NOK: Marilia (wife) #[phone]    Patient and plan discussed with attending physician.    Nga Rocha MD  PGY-1 Internal Medicine  [facility]

## 2024-05-27 NOTE — HOSPITAL COURSE
Altaf Parsons is a 53 y.o. male with PMHx of malignant thymoma (dx. 03/2016) w/ mets to bone and lung that [place] to [facility] [place] on 5/23 with increasing weakness on right side with inability to bear weight on it. He also has had progressive weakness of left leg and hip with no movement. Vitals on admission  showed he was afebrile and hemodynamically stable. Admission labs within normal limits. His last MRI lumbar spine performed on 4/26 demonstrated epidural tumor extension at L4, L5, and S1 with compression of thecal sac, combined with enhancement of cauda equina and conus. It also identified tumor extending into epidural space from T4-T12.  Due to prior history of radiation, he was seen by Dr. Melendez on 5/22 and was recommended to be admitted to oncology with orthopedics consulting. He was taken back for a T5-7 laminectomy and fusion to T4 on 5/25. Surgery went well with no complications. Post-op he was started on a PCA pump for pain control. Supportive oncology consulted for pain management. Was able to be transitioned from PCA to a PO regimen on 5/28. Post-op course was complicated by persistent sinus tachycardia that was felt to be related to pain and relative anemia (pre-op Hgb was around 14 and post-op reached 7.8 on 5/30). He received 1 unit RBCs on 5/31. Ortho spine continued to follow the patient and removed his Hemovac on 5/29. Prevena will remain in place to acute rehab. In the last five days of admission patient developed gradually worsening thrombocytosis. Benign hematology was consulted who believed the thrombocytosis to be likely reactive in the setting of recent surgery though wanted to rule out JAK2 mutation and MPN panel and follow up in the outpatient setting. Patient was hemodynamically stable and appropriate for discharge to acute rehabilitation on 6/4.    To follow up:  - Ortho follow up on 7/3 to manage Prevena, follow post op course   - Oncology follow up (has appt with Dr. Rodriguez tomorrow,  will be rescheduled)  - Benign hematology to follow up thrombocytosis and JAK2 and MPN results

## 2024-05-28 ENCOUNTER — APPOINTMENT (OUTPATIENT)
Dept: CARDIOLOGY | Facility: HOSPITAL | Age: 54
End: 2024-05-28
Payer: COMMERCIAL

## 2024-05-28 ENCOUNTER — HOSPITAL ENCOUNTER (OUTPATIENT)
Dept: CARDIOLOGY | Facility: HOSPITAL | Age: 54
Discharge: HOME | End: 2024-05-28
Payer: COMMERCIAL

## 2024-05-28 LAB
ALBUMIN SERPL BCP-MCNC: 3 G/DL (ref 3.4–5)
ANION GAP SERPL CALC-SCNC: 14 MMOL/L (ref 10–20)
BASOPHILS # BLD AUTO: 0.01 X10*3/UL (ref 0–0.1)
BASOPHILS NFR BLD AUTO: 0.1 %
BUN SERPL-MCNC: 10 MG/DL (ref 6–23)
CALCIUM SERPL-MCNC: 8.2 MG/DL (ref 8.6–10.6)
CHLORIDE SERPL-SCNC: 97 MMOL/L (ref 98–107)
CO2 SERPL-SCNC: 27 MMOL/L (ref 21–32)
CREAT SERPL-MCNC: 0.67 MG/DL (ref 0.5–1.3)
EGFRCR SERPLBLD CKD-EPI 2021: >90 ML/MIN/1.73M*2
EOSINOPHIL # BLD AUTO: 0.1 X10*3/UL (ref 0–0.7)
EOSINOPHIL NFR BLD AUTO: 1.1 %
ERYTHROCYTE [DISTWIDTH] IN BLOOD BY AUTOMATED COUNT: 16.7 % (ref 11.5–14.5)
GLUCOSE BLD MANUAL STRIP-MCNC: 105 MG/DL (ref 74–99)
GLUCOSE BLD MANUAL STRIP-MCNC: 117 MG/DL (ref 74–99)
GLUCOSE BLD MANUAL STRIP-MCNC: 124 MG/DL (ref 74–99)
GLUCOSE BLD MANUAL STRIP-MCNC: 93 MG/DL (ref 74–99)
GLUCOSE BLD MANUAL STRIP-MCNC: 94 MG/DL (ref 74–99)
GLUCOSE SERPL-MCNC: 84 MG/DL (ref 74–99)
HCT VFR BLD AUTO: 28.7 % (ref 41–52)
HGB BLD-MCNC: 8.6 G/DL (ref 13.5–17.5)
IMM GRANULOCYTES # BLD AUTO: 0.07 X10*3/UL (ref 0–0.7)
IMM GRANULOCYTES NFR BLD AUTO: 0.8 % (ref 0–0.9)
LYMPHOCYTES # BLD AUTO: 0.41 X10*3/UL (ref 1.2–4.8)
LYMPHOCYTES NFR BLD AUTO: 4.5 %
MAGNESIUM SERPL-MCNC: 2.24 MG/DL (ref 1.6–2.4)
MCH RBC QN AUTO: 24.6 PG (ref 26–34)
MCHC RBC AUTO-ENTMCNC: 30 G/DL (ref 32–36)
MCV RBC AUTO: 82 FL (ref 80–100)
MONOCYTES # BLD AUTO: 1.1 X10*3/UL (ref 0.1–1)
MONOCYTES NFR BLD AUTO: 12.1 %
NEUTROPHILS # BLD AUTO: 7.42 X10*3/UL (ref 1.2–7.7)
NEUTROPHILS NFR BLD AUTO: 81.4 %
NRBC BLD-RTO: 0 /100 WBCS (ref 0–0)
PHOSPHATE SERPL-MCNC: 2.4 MG/DL (ref 2.5–4.9)
PLATELET # BLD AUTO: 268 X10*3/UL (ref 150–450)
POTASSIUM SERPL-SCNC: 4.5 MMOL/L (ref 3.5–5.3)
RBC # BLD AUTO: 3.5 X10*6/UL (ref 4.5–5.9)
SODIUM SERPL-SCNC: 133 MMOL/L (ref 136–145)
WBC # BLD AUTO: 9.1 X10*3/UL (ref 4.4–11.3)

## 2024-05-28 PROCEDURE — 97110 THERAPEUTIC EXERCISES: CPT | Mod: GP

## 2024-05-28 PROCEDURE — 2500000001 HC RX 250 WO HCPCS SELF ADMINISTERED DRUGS (ALT 637 FOR MEDICARE OP)

## 2024-05-28 PROCEDURE — 93005 ELECTROCARDIOGRAM TRACING: CPT

## 2024-05-28 PROCEDURE — 2500000004 HC RX 250 GENERAL PHARMACY W/ HCPCS (ALT 636 FOR OP/ED)

## 2024-05-28 PROCEDURE — 82947 ASSAY GLUCOSE BLOOD QUANT: CPT

## 2024-05-28 PROCEDURE — 97530 THERAPEUTIC ACTIVITIES: CPT | Mod: GO

## 2024-05-28 PROCEDURE — 99233 SBSQ HOSP IP/OBS HIGH 50: CPT

## 2024-05-28 PROCEDURE — 37799 UNLISTED PX VASCULAR SURGERY: CPT

## 2024-05-28 PROCEDURE — 85025 COMPLETE CBC W/AUTO DIFF WBC: CPT

## 2024-05-28 PROCEDURE — 82565 ASSAY OF CREATININE: CPT

## 2024-05-28 PROCEDURE — 97165 OT EVAL LOW COMPLEX 30 MIN: CPT | Mod: GO

## 2024-05-28 PROCEDURE — 83735 ASSAY OF MAGNESIUM: CPT

## 2024-05-28 PROCEDURE — 1170000001 HC PRIVATE ONCOLOGY ROOM DAILY

## 2024-05-28 RX ORDER — OXYCODONE HYDROCHLORIDE 5 MG/1
10 TABLET ORAL EVERY 6 HOURS PRN
Status: DISCONTINUED | OUTPATIENT
Start: 2024-05-28 | End: 2024-05-29

## 2024-05-28 RX ORDER — LORATADINE 10 MG/1
10 TABLET ORAL DAILY
Status: DISCONTINUED | OUTPATIENT
Start: 2024-05-28 | End: 2024-06-04 | Stop reason: HOSPADM

## 2024-05-28 RX ORDER — OXYCODONE HYDROCHLORIDE 15 MG/1
15 TABLET, FILM COATED, EXTENDED RELEASE ORAL EVERY 12 HOURS SCHEDULED
Status: DISCONTINUED | OUTPATIENT
Start: 2024-05-28 | End: 2024-06-04 | Stop reason: HOSPADM

## 2024-05-28 RX ORDER — SODIUM,POTASSIUM PHOSPHATES 280-250MG
1 POWDER IN PACKET (EA) ORAL 4 TIMES DAILY
Status: COMPLETED | OUTPATIENT
Start: 2024-05-28 | End: 2024-05-28

## 2024-05-28 RX ORDER — KETOTIFEN FUMARATE 0.35 MG/ML
1 SOLUTION/ DROPS OPHTHALMIC 2 TIMES DAILY PRN
Status: DISCONTINUED | OUTPATIENT
Start: 2024-05-28 | End: 2024-06-04 | Stop reason: HOSPADM

## 2024-05-28 RX ORDER — OXYCODONE HYDROCHLORIDE 5 MG/1
15 TABLET ORAL
Status: DISCONTINUED | OUTPATIENT
Start: 2024-05-28 | End: 2024-06-04 | Stop reason: HOSPADM

## 2024-05-28 RX ORDER — HYDROMORPHONE HYDROCHLORIDE 1 MG/ML
0.4 INJECTION, SOLUTION INTRAMUSCULAR; INTRAVENOUS; SUBCUTANEOUS
Status: DISCONTINUED | OUTPATIENT
Start: 2024-05-28 | End: 2024-06-04 | Stop reason: HOSPADM

## 2024-05-28 RX ADMIN — POLYETHYLENE GLYCOL 3350 17 G: 17 POWDER, FOR SOLUTION ORAL at 09:10

## 2024-05-28 RX ADMIN — GABAPENTIN 300 MG: 300 CAPSULE ORAL at 09:10

## 2024-05-28 RX ADMIN — Medication 5 MG: at 21:52

## 2024-05-28 RX ADMIN — GABAPENTIN 600 MG: 300 CAPSULE ORAL at 21:52

## 2024-05-28 RX ADMIN — Medication 2000 UNITS: at 09:10

## 2024-05-28 RX ADMIN — SENNOSIDES 8.6 MG: 8.6 TABLET, FILM COATED ORAL at 09:10

## 2024-05-28 RX ADMIN — OXYCODONE HYDROCHLORIDE 10 MG: 5 TABLET ORAL at 15:07

## 2024-05-28 RX ADMIN — ACETAMINOPHEN 975 MG: 325 TABLET ORAL at 09:10

## 2024-05-28 RX ADMIN — METHOCARBAMOL 500 MG: 500 TABLET ORAL at 12:39

## 2024-05-28 RX ADMIN — ACETAMINOPHEN 975 MG: 325 TABLET ORAL at 21:52

## 2024-05-28 RX ADMIN — METHOCARBAMOL 500 MG: 500 TABLET ORAL at 05:57

## 2024-05-28 RX ADMIN — METHOCARBAMOL 500 MG: 500 TABLET ORAL at 00:22

## 2024-05-28 RX ADMIN — OXYCODONE HYDROCHLORIDE 15 MG: 15 TABLET, FILM COATED, EXTENDED RELEASE ORAL at 21:52

## 2024-05-28 RX ADMIN — LORATADINE 10 MG: 10 TABLET ORAL at 10:42

## 2024-05-28 RX ADMIN — PANTOPRAZOLE SODIUM 40 MG: 40 TABLET, DELAYED RELEASE ORAL at 06:00

## 2024-05-28 RX ADMIN — GABAPENTIN 300 MG: 300 CAPSULE ORAL at 15:00

## 2024-05-28 RX ADMIN — POTASSIUM & SODIUM PHOSPHATES POWDER PACK 280-160-250 MG 1 PACKET: 280-160-250 PACK at 12:39

## 2024-05-28 RX ADMIN — DIPHENHYDRAMINE HYDROCHLORIDE 50 MG: 25 CAPSULE ORAL at 01:52

## 2024-05-28 RX ADMIN — METHOCARBAMOL 500 MG: 500 TABLET ORAL at 23:12

## 2024-05-28 RX ADMIN — HYDROMORPHONE HYDROCHLORIDE 0.4 MG: 1 INJECTION, SOLUTION INTRAMUSCULAR; INTRAVENOUS; SUBCUTANEOUS at 22:35

## 2024-05-28 RX ADMIN — OXYCODONE HYDROCHLORIDE 15 MG: 15 TABLET, FILM COATED, EXTENDED RELEASE ORAL at 15:00

## 2024-05-28 RX ADMIN — Medication 1 TABLET: at 09:10

## 2024-05-28 RX ADMIN — SODIUM CHLORIDE, POTASSIUM CHLORIDE, SODIUM LACTATE AND CALCIUM CHLORIDE 1000 ML: 600; 310; 30; 20 INJECTION, SOLUTION INTRAVENOUS at 09:12

## 2024-05-28 RX ADMIN — DIPHENHYDRAMINE HYDROCHLORIDE 50 MG: 25 CAPSULE ORAL at 22:43

## 2024-05-28 RX ADMIN — ACETAMINOPHEN 975 MG: 325 TABLET ORAL at 15:00

## 2024-05-28 RX ADMIN — METHOCARBAMOL 500 MG: 500 TABLET ORAL at 16:25

## 2024-05-28 RX ADMIN — Medication: at 10:55

## 2024-05-28 RX ADMIN — POTASSIUM & SODIUM PHOSPHATES POWDER PACK 280-160-250 MG 1 PACKET: 280-160-250 PACK at 16:24

## 2024-05-28 ASSESSMENT — PAIN SCALES - GENERAL
PAINLEVEL_OUTOF10: 5 - MODERATE PAIN
PAINLEVEL_OUTOF10: 7
PAINLEVEL_OUTOF10: 5 - MODERATE PAIN
PAINLEVEL_OUTOF10: 4
PAINLEVEL_OUTOF10: 7
PAINLEVEL_OUTOF10: 6

## 2024-05-28 ASSESSMENT — COGNITIVE AND FUNCTIONAL STATUS - GENERAL
TOILETING: TOTAL
PERSONAL GROOMING: A LITTLE
EATING MEALS: A LITTLE
STANDING UP FROM CHAIR USING ARMS: TOTAL
DRESSING REGULAR UPPER BODY CLOTHING: A LOT
CLIMB 3 TO 5 STEPS WITH RAILING: TOTAL
TURNING FROM BACK TO SIDE WHILE IN FLAT BAD: TOTAL
MOVING TO AND FROM BED TO CHAIR: TOTAL
MOVING FROM LYING ON BACK TO SITTING ON SIDE OF FLAT BED WITH BEDRAILS: TOTAL
HELP NEEDED FOR BATHING: A LOT
DAILY ACTIVITIY SCORE: 12
WALKING IN HOSPITAL ROOM: TOTAL
MOBILITY SCORE: 6
DRESSING REGULAR LOWER BODY CLOTHING: TOTAL

## 2024-05-28 ASSESSMENT — PAIN - FUNCTIONAL ASSESSMENT
PAIN_FUNCTIONAL_ASSESSMENT: 0-10

## 2024-05-28 ASSESSMENT — PAIN SCALES - WONG BAKER: WONGBAKER_NUMERICALRESPONSE: HURTS EVEN MORE

## 2024-05-28 NOTE — PROGRESS NOTES
Occupational Therapy    Evaluation    Patient Name: Altaf Parsons  MRN: [id number]  Today's Date: 5/28/2024  Time Calculation  Start Time: 0935  Stop Time: 1015  Time Calculation (min): 40 min    Assessment  IP OT Assessment  OT Assessment: Pt with impaired ability to complete upper and lower body ADLs, impaired functional bed mobility and transfers. Pt was limtied by pain.  Prognosis: Good  Barriers to Discharge: None  Evaluation/Treatment Tolerance: Patient tolerated treatment well  Medical Staff Made Aware: Yes  End of Session Communication: Bedside nurse  End of Session Patient Position: Bed, 3 rail up, Alarm on  Plan:  Treatment Interventions: ADL retraining, Functional transfer training, Endurance training  OT Frequency: 3 times per week  OT Discharge Recommendations: Moderate intensity level of continued care  OT Recommended Transfer Status: Dependent  OT - OK to Discharge: Yes    Subjective     Current Problem:  1. Malignant thymoma (Multi)        2. Weakness of both lower extremities        3. Cancer with leptomeningeal spread (Multi)  Case Request Operating Room: Decompression & Fusion Spine Posterior Thoracic Lumbar    Case Request Operating Room: Decompression & Fusion Spine Posterior Thoracic Lumbar    Surgical Pathology Exam    Surgical Pathology Exam      4. Lumbar stenosis with neurogenic claudication  Case Request Operating Room: Decompression & Fusion Spine Posterior Thoracic Lumbar    Case Request Operating Room: Decompression & Fusion Spine Posterior Thoracic Lumbar    Surgical Pathology Exam    Surgical Pathology Exam      5. Spinal stenosis of lumbar region with neurogenic claudication  Case Request Operating Room: Decompression & Fusion Spine Posterior Thoracic Lumbar    Case Request Operating Room: Decompression & Fusion Spine Posterior Thoracic Lumbar    Surgical Pathology Exam    Surgical Pathology Exam      6. Spinal cord compression (Multi)  Case Request Operating Room: Decompression &  Fusion Spine Posterior Thoracic Lumbar    Case Request Operating Room: Decompression & Fusion Spine Posterior Thoracic Lumbar    Surgical Pathology Exam    Surgical Pathology Exam          General:  Reason for Referral: 53 year old male now s/p T5-7 laminectomy and extension of posterior spinal instrumented fusion to T4 on 5/25/24 with Dr. Melendez  Past Medical History Relevant to Rehab: cancer with leptomeningeal spread, spinal cord compression  Prior to Session Communication: Bedside nurse  Patient Position Received: Bed, 3 rail up, Alarm on  Family/Caregiver Present: No  General Comment: Cooperative and pleasant     Precautions:  Medical Precautions: Fall precautions, Spinal precautions  Post-Surgical Precautions: Spinal precautions  Braces Applied:  (pt has his own B LE braces around his proximal LE to assist him with moving his legs.)  Precautions Comment: Provided handouts for spine precautions and log roll.    Pain:  Pain Assessment  Pain Assessment: 0-10  Pain Score: 6  Pain Type: Acute pain  Pain Location: Back  Pain Interventions:  (PCA available.)      Objective   Cognition:  Overall Cognitive Status: Within Functional Limits  Orientation Level: Oriented X4    Home Living:  Type of Home: House  Lives With: Spouse (children 14 and 21 able to assist PRN)  Home Adaptive Equipment: Walker rolling or standard, Wheelchair-manual, Cane  Home Layout: Bed/bath upstairs, Stairs to alternate level with rails  Alternate Level Stairs-Rails: Right  Home Access: Stairs to enter with rails  Entrance Stairs-Rails: Right  Bathroom Equipment: Shower chair with back  Home Living Comments: 2 level house, pt is planning on getting a stairs lift.     Prior Function:  Level of [place]:  (Pt with decreased independence with ADLs and ambulation in the past month and a half. Had difficulties with donning pants, wife assisted, kids assisted with donning socks. Initially ambulated with a cane, then used two canes, then used a  walker, then w/c)  Receives Help From:  (spouse)  Prior Function Comments: Pt was initially able to scoot from his wheelchair to a lateral surface, (toilet and shower chair) however, with increased weakness, he was not able to scoot to a shower chair any longer and was sponge bathing recently.    ADL:  Eating Assistance: Stand by  Eating Deficit: Setup  Grooming Assistance: Stand by  Grooming Deficit: Setup (in a supporte position.)  Bathing Assistance:  (anticipate mod assist with upper body bathing and total assist with lower body bathing. due to impaired ability to reach B LE.)  LE Dressing Assistance: Total  LE Dressing Deficit: Don/doff R sock, Don/doff L sock (anticipate)  Toileting Assistance with Device: Total  Toileting Deficit: Urinary Catheter    Activity Tolerance:  Endurance: Decreased tolerance for upright activites    Balance:  Dynamic Sitting Balance  Dynamic Sitting-Balance Support: Bilateral upper extremity supported  Dynamic Sitting-Balance:  (attempted lateral scooting however, unable to complete.)  Dynamic Sitting-Comments: pt attempted scooting at the side of the bed with max assist x 1, however, unable to complete at this time due to pain and impaired posture control. Pt required assistance with B LE movement while sitting at the side of the bed. Increased time provided to complete dynamic sitting balance activity.  Static Sitting Balance  Static Sitting-Balance Support: Bilateral upper extremity supported  Static Sitting-Level of Assistance: Contact guard    Bed Mobility/Transfers: Bed Mobility  Bed Mobility: Yes  Bed Mobility 1  Bed Mobility 1: Supine to sitting, Sitting to supine  Level of Assistance 1: Dependent, Minimal verbal cues  Bed Mobility 2  Bed Mobility  2: Scooting  Level of Assistance 2: Dependent     Vision: Vision - Basic Assessment  Current Vision: No visual deficits    Sensation:  Light Touch: No apparent deficits    Coordination:  Movements are Fluid and Coordinated: Yes      Hand Function:  Hand Function  Gross Grasp: Functional  Coordination: Functional    Extremities: RUE   RUE : Within Functional Limits, LUE   LUE: Within Functional Limits,  , and      Outcome Measures: [facility] Daily Activity  Putting on and taking off regular lower body clothing: Total  Bathing (including washing, rinsing, drying): A lot  Putting on and taking off regular upper body clothing: A lot  Toileting, which includes using toilet, bedpan or urinal: Total  Taking care of personal grooming such as brushing teeth: A little  Eating Meals: A little  Daily Activity - Total Score: 12         ,     OT Adult Other Outcome Measures  4AT: negative    Education Documentation  Body Mechanics, taught by Phil Cano OT at 5/28/2024 10:56 AM.  Learner: Patient  Readiness: Acceptance  Method: Explanation  Response: Verbalizes Understanding    Precautions, taught by Phil Cano OT at 5/28/2024 10:56 AM.  Learner: Patient  Readiness: Acceptance  Method: Explanation  Response: Verbalizes Understanding    ADL Training, taught by Phil Cano OT at 5/28/2024 10:56 AM.  Learner: Patient  Readiness: Acceptance  Method: Explanation  Response: Verbalizes Understanding    Education Comments  No comments found.        Goals:   Encounter Problems       Encounter Problems (Active)       ADLs       Patient will perform UB and LB bathing  with minimal assist  level of assistance and bedside commode. (Progressing)       Start:  05/28/24    Expected End:  06/18/24            Patient with complete lower body dressing with moderate assist level of assistance donning and doffing all LE clothes  with PRN adaptive equipment while edge of bed  (Progressing)       Start:  05/28/24    Expected End:  06/18/24            Patient will complete toileting including hygiene clothing management/hygiene with moderate assist level of assistance and bedside commode. (Progressing)       Start:  05/28/24    Expected End:  06/18/24                BALANCE       Pt will maintain dynamic sitting balance during ADL task with moderate assist level of assistance in order to demonstrate decreased risk of falling and improved postural control. (Progressing)       Start:  05/28/24    Expected End:  06/18/24               EXERCISE/STRENGTHENING       Patient will complete BUE exercises for 15 reps in order to improve strength and activity for ADL performance.  (Progressing)       Start:  05/28/24    Expected End:  06/18/24               TRANSFERS       Patient will perform bed mobility moderate assist level of assistance and bed rails in order to improve safety and independence with mobility (Progressing)       Start:  05/28/24    Expected End:  06/18/24                   Treatment Completed on Evaluation    Therapy/Activity:        With B UE support,  pt attempted scooting at the side of the bed with max assist x 1, however, unable to complete at this time due to pain and impaired posture control. Pt required assistance with B LE movement while sitting at the side of the bed. Increased time provided to complete dynamic sitting balance activity.        05/28/24 at 10:59 AM   Phil GARCIA/L, SUKHWINDER  Rehab Office: [phone]

## 2024-05-28 NOTE — PROGRESS NOTES
Internal Medicine Daily Progress Note    Subjective     Interval events:  NAEON. CT PE negative though could not entirely rule out small distal subsegmental PE. Received 1L LR with some improvement in tachycardia. Dyspnea improved from yesterday. Reports pain is about 5/10.       Objective     Vitals:  Visit Vitals  /71   Pulse (!) 116   Temp 37.3 °C (99.2 °F)   Resp 20         Intake/Output Summary (Last 24 hours) at 5/28/2024 0802  Last data filed at 5/28/2024 0707  Gross per 24 hour   Intake 999 ml   Output 1370 ml   Net -371 ml       Physical exam:  Constitutional: Well-developed male in no acute distress. Lying in bed on 2L NC. Slightly diaphoretic.  HEENT: Normocephalic, atraumatic.  Respiratory: CTA anteriorly. No wheezes, rales, or rhonchi. Normal respiratory effort.  Cardiovascular: RRR. No murmurs, gallops, or rubs.  Abdominal: Soft, mildly distended, nontender to palpation. Bowel sounds present. Hemovac with sanguineous fluid.  Neuro: A&Ox3  MSK: No LE edema bilaterally  Skin: Warm, dry. No rashes or wounds  Psych: Appropriate mood and affect    Medications:  acetaminophen, 975 mg, oral, TID  cholecalciferol, 2,000 Units, oral, Daily  [Held by provider] everolimus, 10 mg, oral, Daily  gabapentin, 300 mg, oral, 2 times per day  gabapentin, 600 mg, oral, Nightly  insulin lispro, 0-5 Units, subcutaneous, q4h  lactated Ringer's, 1,000 mL, intravenous, Once  lisinopril, 20 mg, oral, Daily  melatonin, 5 mg, oral, Daily  methocarbamol, 500 mg, oral, q6h CHERI  multivitamin with minerals, 1 tablet, oral, Daily  pantoprazole, 40 mg, oral, Daily before breakfast  polyethylene glycol, 17 g, oral, BID  sennosides, 1 tablet, oral, BID      HYDROmorphone,       PRN medications: dextrose, dextrose, diphenhydrAMINE, glucagon, glucagon, naloxone    Labs:      Results from last 72 hours   Lab Units 05/27/24  1901 05/27/24  0735 05/26/24  2233 05/26/24  0629   WBC AUTO x10*3/uL 10.0 8.8 8.9  --    HEMOGLOBIN g/dL  9.0* 9.3* 9.1*  --    HEMATOCRIT % 28.6* 30.1* 29.0*  --    PLATELETS AUTO x10*3/uL 269 239 245  --    SODIUM mmol/L  --  134*  --  131*   POTASSIUM mmol/L  --  4.3  --  4.7   CHLORIDE mmol/L  --  98  --  97*   CO2 mmol/L  --  28  --  23   BUN mg/dL  --  15  --  13   CREATININE mg/dL  --  0.81  --  0.74   GLUCOSE mg/dL  --  100*  --  78   CALCIUM mg/dL  --  8.1*  --  8.1*   MAGNESIUM mg/dL  --  2.13  --  2.46*   PHOSPHORUS mg/dL  --  2.9  --  2.6   ALBUMIN g/dL  --  3.2*  --  3.7           Imaging:  CT angio chest for pulmonary embolism    Result Date: 5/27/2024  STUDY: CT ANGIO CHEST FOR PULMONARY EMBOLISM;  5/27/2024 3:27 pm   INDICATION: Signs/Symptoms:hypoxia, tachycardia.   COMPARISON: CT chest on 08/25/2021.   ACCESSION NUMBER(S): [id number]   ORDERING CLINICIAN: JESU KOEHLER   TECHNIQUE: Helical data acquisition of the chest was obtained after intravenous administration of 58 ML Omnipaque 350, as per PE protocol. Images were reformatted in coronal and sagittal planes. Axial and coronal maximum intensity projection (MIP) images were created and reviewed.   FINDINGS: POTENTIAL LIMITATIONS OF THE STUDY: The assessment is limited by suboptimal contrast opacification of pulmonary arteries.   HEART AND VESSELS: No discrete filling defects within the main pulmonary artery or its branches to  proximal segmental level. Please note that, assessment of distal segmental and subsegmental branches is limited and small peripheral emboli are not entirely excluded. Mild dilation of the main pulmonary artery, measuring up to 3.4 cm.   The thoracic aorta normal in course and caliber. No coronary artery calcifications are seen. Please note, the study is not optimized for evaluation of coronary arteries.   The cardiac chambers are not enlarged. There is no pericardial effusion seen.   MEDIASTINUM AND GUILLERMO, LOWER NECK AND AXILLA: The visualized thyroid gland is within normal limits. No evidence of thoracic lymphadenopathy by CT  criteria. Esophagus appears within normal limits as seen.   LUNGS AND AIRWAYS: The trachea and central airways are patent. No endobronchial lesion is seen.   Posttreatment changes in the left upper lung with associated atelectasis/volume loss again noted, similar to prior exam. Mild bibasilar atelectasis/scarring. Nodular opacities within the posterior aspect of the right upper lobe (series 5, image 102). Otherwise no consolidation, pleural effusion, or pneumothorax.   UPPER ABDOMEN: Redemonstration of a 4 cm hypoattenuating lesion within hepatic segment 4B, likely representing a simple cyst. Cholelithiasis without evidence of acute cholecystitis. Subdiaphragmatic structures are otherwise unremarkable.   CHEST WALL AND OSSEOUS STRUCTURES: Chest wall is within normal limits. Postsurgical changes from posterior spinal fusion of the near entirety of the thoracic spine and visualized lumbar spine. No acute osseous pathology.There are no suspicious osseous lesions.       1. No evidence of acute pulmonary embolism to proximal segmental level. Please note that, assessment of distal segmental and subsegmental branches is limited and small peripheral emboli are not entirely excluded. 2. Few nodular opacities within the posterior right upper lobe, which may be infectious/inflammatory. 3. Postsurgical/posttreatment changes again noted in the medial and lateral aspects of the left lung, similar to prior exam. 4. Mild dilation of the main pulmonary artery, measuring up to 3.4 cm, which may represent pulmonary hypertension. 5. Cholelithiasis without evidence of acute cholecystitis. 6. Additional chronic findings as above.   I personally reviewed the images/study and I agree with the findings as stated by Dr. Iglesia Zavala M.D. This study was interpreted at [facility], [place], [place].   MACRO: None     Dictation workstation:   [id number]    XR chest 1 view    Result Date:  5/27/2024  Interpreted By:  Deisy Smith, STUDY: XR CHEST 1 VIEW; 5/27/2024 8:00 am   INDICATION: Signs/Symptoms:hypoxia.   COMPARISON: Radiograph dated 05/24/2024   ACCESSION NUMBER(S): [id number]   ORDERING CLINICIAN: JESU KOEHLER   FINDINGS: The cardiac silhouette size is within normal limits.   Low lung volumes and bronchovascular crowding. Bibasilar atelectasis. No sizable pleural effusion or edema. No sizable pneumothorax.   Postsurgical changes in the visualized portion of the spine.   Multiple distended loops of bowel in the upper abdomen.       1. Mild bibasilar atelectasis. Low lung volumes 2. Gaseous distention of multiple loops of bowel throughout the visualized upper abdomen. Correlate with ileus. Recommend abdominal radiograph.       Signed by: Deisy Caro 5/27/2024 10:46 AM Dictation workstation:   [id number]        Assessment and Plan:  Altaf Parsons is a 53 y.o. male with PMHx of malignant thymoma (dx. 03/2016) w/ mets to bone and lung who presented with worsening LE weakness and pain, now s/p thoracolumbar decompression/fusion for malignant spinal cord compression on 5/25.    Updates 05/28/24:  - repeat 1L LR bolus today  - discontinuing PCA, starting PO/IV pain management as recommended by supportive onc  - consulted PM&R for dispo/rehab  - emailed Dr. Rodriguez about post admission cancer treatment plans and timing of resumption of everolimus, he recommended discontinuing everolimus    #Malignant spinal cord compression, s/p decompression on 5/25  - MRI (4/26) showing new enhancement of cauda equine nerve roots, concern for leptomeningeal spread  - new progressive weakness possibly related to this leptomeningeal spread of disease  - ortho performed thoracolumbar decompression and fusion on 5/25  Plan  - appreciate ortho recs:   - no steroids   - hemovac/prevena with q8h outputs  - PT/OT for dispo, patient has outpatient PT/OT  - Weight bearing as tolerated  - Continue pain  control with PCA, gabapentin, tylenol     #Metastatic Malignant Thymoma, Type B2 (predominant lymphocytic population with scattered epithelial cells)  - July 2007: Massive left-sided hemothorax. Underwent left VATS drainage of massive hemothorax, pleural biopsy and core needle biopsy of lingular mass and decortication. Biopsies all negative. CT scan showed 7 cm lesion within mediastinum with a calcified rim. Followed with serial Cts  - Feb 2016: New enhancing soft tissue mass superior to calcified structure with pericardial LN and basilar RLL nodule  - March 4, 2016: Diagnosed from left lower lobe of lung wedge resection and mediastinal fat biopsy  - Aug 1 2016- Sep 7 2016: 59.4 Gy proton therapy to resection bed  - May 2017: Left pleural nodule showing thymoma  - 8/4/17- Started 3 cycles of cyclophosphamide, adriamycin, and cisplatin (PAC-P/PAC/CAP regimen, Demi et al 2004)  - 11/2019: Left serratus anterior biopsy positive for thymoma  - 12/10/19- 12/31/19: SINDI mass and left serratus anterior mass, 45 CGE/15 fx, PROTONS   - Aug 2020: T12-L1 paraspinal mass showing thymoma  -Jan 2021: Spine tumor biopsy showing thymoma. Posterior spinal fusion T9-L1 with use of bilateral pedicle screw instrumentation, allograft and demineralized bone matrix fiber. 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 Progressed  -April 2022 - Finished RT of 30 Gy in 10 fractions from T4-T6, and L1-L3  - Dec 2022- April 2023: Started on single agent capecitabine with stable disease  - Sep 2023: Some somatostatin receptor disease noted by [employer] PET  -Sep 2023: Single agent everolimus started. Stable disease so far  - Dr. Rodriguez aware  Plan:  - hold everolimus 10 mg daily due to negative effect on wound healing     #Chronic  left low back pain secondary to malignancy  - Consulted supportive oncology, appreciate recs  - Hold dexamethasone 4 mg daily  - continue gabapentin 300-300-600  - cont home methocarbimol 500mg Q6   - Discontinue oral and IV PRNs while on pump     #HTN  - At home was on lisinopril 20-hydrochlorothiazide 25 mg  - cont home lisinopril 20mg  - Can consider restarting hydrochlorothiazide 25 mg later in hospitalization      #Misc  - cont home Vit D, multivitamin, Protonix 40 mg daily     F: s/p 2L bolus on 5/27  E: prn  N: regular diet  A: PIV, Meza  DVT prophylaxis: SCDs  Code status: Full code, confirmed on admission  NOK: Marilia (wife) #[phone]    Patient and plan discussed with attending physician.    Nga Rocha MD  PGY-1 Internal Medicine  [facility]

## 2024-05-28 NOTE — DISCHARGE INSTRUCTIONS
Dear Altaf Parsons,    You were admitted to [facility] from for compression of your spinal cord causing leg weakness. The compression was related to thymoma. We have discharged you on a regimen for pain and to go to rehab to get stronger. As recommended by Dr. Rodriguez, we have stopped your everolimus. He will see you outpatient and determine what the best treatment is moving forward for your thymoma.     It was a pleasure taking care of you,    Your  Care Team    **Please call Kaycee Hong RN (at [phone]) for any          postoperative questions or concerns.

## 2024-05-28 NOTE — PROGRESS NOTES
SUPPORTIVE AND PALLIATIVE ONCOLOGY INPATIENT FOLLOW-UP      SERVICE DATE: 24     SUBJECTIVE:  Interval Events:  Gabapentin increased   Senna increased to BID   Hydromorphone PCA demand dose increased to 0.4 mg yesterday.  Likely plan for acute rehab, will need to be transitioned off PCA.    Patient open to transitioning to PO regimen including a long acting oxycodone (reports had hallucinations with Morphine). Recommended consideration of a long-acting given home use (30-40 mg) and plan for acute rehab to maximize rehab potential and can plan to wean as he heals if desired. Patient agreeable.     Has not had BM, +gas, shared difficult to use bedpan      Pain Assessment:  Location:  upper to mid-back, incision  Duration: Constant  Characteristics:   Ratin   Descriptors: throbbing and sore, spasms   Aggravating: movement    Relieving: Analgesics  , Positioning, and Modifying activity   Interference with Function: Very Much    Opioid Use  Past 24 h prn opioid use:  Hydromorphone PCA:  no basal rate, 14 attempts, 13 demand doses of 0.4 mg received = 5.2 mg = 65 OME  Total 24h OME use:  65    Note: OME calculations based on equianalgesic table below. Please note this table is based on best available evidence but conversions are still approximate. These are NOT opioid DOSES for individual patient use; this is equivalency information.  Drug Parenteral Enteral   Morphine 10 25   Oxycodone N/A 20   Hydromorphone 2 5   Fentanyl 0.15 N/A   Tramadol N/A 120   Citation: Loki GUTIERREZ. Demystifying opioid conversion calculations: A guide for effective dosing, Second edition. [place]: [employer]System Pharmac[employer], 2018.    Symptom Assessment:  Nausea none  Shortness of breath none  Constipation very much       Information obtained from: chart review, interview of patient, and discussion with primary team  ______________________________________________________________________         OBJECTIVE:    Lab Results   Component Value Date    WBC 10.0 05/27/2024    HGB 9.0 (L) 05/27/2024    HCT 28.6 (L) 05/27/2024    MCV 81 05/27/2024     05/27/2024      Lab Results   Component Value Date    GLUCOSE 100 (H) 05/27/2024    CALCIUM 8.1 (L) 05/27/2024     (L) 05/27/2024    K 4.3 05/27/2024    CO2 28 05/27/2024    CL 98 05/27/2024    BUN 15 05/27/2024    CREATININE 0.81 05/27/2024     Lab Results   Component Value Date    ALT 20 05/24/2024    AST 28 05/24/2024    ALKPHOS 57 05/24/2024    BILITOT 0.6 05/24/2024     Estimated Creatinine Clearance: 125 mL/min (by C-G formula based on SCr of 0.81 mg/dL).     CT angio/PULMONARY EMBOLISM 5/27/2024  IMPRESSION:  1. No evidence of acute pulmonary embolism to proximal segmental  level. Please note that, assessment of distal segmental and  subsegmental branches is limited and small peripheral emboli are not  entirely excluded.  2. Few nodular opacities within the posterior right upper lobe, which  may be infectious/inflammatory.  3. Postsurgical/posttreatment changes again noted in the medial and  lateral aspects of the left lung, similar to prior exam.  4. Mild dilation of the main pulmonary artery, measuring up to 3.4  cm, which may represent pulmonary hypertension.  5. Cholelithiasis without evidence of acute cholecystitis.  6. Additional chronic findings as above.        Scheduled medications  acetaminophen, 975 mg, oral, TID  cholecalciferol, 2,000 Units, oral, Daily  [Held by provider] everolimus, 10 mg, oral, Daily  gabapentin, 300 mg, oral, 2 times per day  gabapentin, 600 mg, oral, Nightly  insulin lispro, 0-5 Units, subcutaneous, q4h  lactated Ringer's, 1,000 mL, intravenous, Once  lisinopril, 20 mg, oral, Daily  loratadine, 10 mg, oral, Daily  melatonin, 5 mg, oral, Daily  methocarbamol, 500 mg, oral, q6h CHERI  multivitamin with minerals, 1 tablet, oral, Daily  pantoprazole, 40 mg, oral, Daily before breakfast  polyethylene glycol, 17 g, oral,  BID  sennosides, 1 tablet, oral, BID      Continuous medications  HYDROmorphone,       PRN medications  dextrose, 12.5 g, q15 min PRN  dextrose, 25 g, q15 min PRN  diphenhydrAMINE, 50 mg, Nightly PRN  glucagon, 1 mg, q15 min PRN  glucagon, 1 mg, q15 min PRN  ketotifen, 1 drop, BID PRN  naloxone, 0.2 mg, PRN      }     PHYSICAL EXAMINATION:    Vital Signs:   Vital signs reviewed  Visit Vitals  /78   Pulse (!) 121   Temp 36.5 °C (97.7 °F)   Resp 20        Pain Score: 7  Jarquin-Baker FACES Pain Rating: Hurts even more       Physical Exam  Vitals and nursing note reviewed.   Constitutional:       General: He is not in acute distress.  HENT:      Head: Normocephalic.      Mouth/Throat:      Mouth: Mucous membranes are moist.   Eyes:      Conjunctiva/sclera: Conjunctivae normal.   Pulmonary:      Effort: Pulmonary effort is normal. No respiratory distress.   Abdominal:      General: There is distension.      Tenderness: There is no abdominal tenderness.   Skin:     General: Skin is warm and dry.   Neurological:      Mental Status: He is alert and oriented to person, place, and time.   Psychiatric:         Mood and Affect: Mood normal.         Behavior: Behavior normal.         Thought Content: Thought content normal.         Cognition and Memory: Cognition normal.         Judgment: Judgment normal.          ASSESSMENT/PLAN:  Altaf Parsons is a 53 y.o. male diagnosed with metastatic malignant thymoma (sites of disease mediastinum, lung, TLS spine, T12-L1 paraspinal region, left lateral spine canal T11-S1) dx 2016 s/p Proton therapy, RT, multiple spine surgeries, chemo, and most recently on everolimus. PMHx significant for HTN, GERD, hemothorax, HLD, and sleep apnea. Admitted 5/24/2024 for further evaluation and management of progressive left leg weakness and worsening pain and to expedite planning for spine surgery. Course complicated by recurrent tumor in his spine, high risk surgery due to recurrent tumor, previous  surgeries and previous RT. Supportive and Palliative Oncology is consulted for pain management.     Neoplasm Related Pain  Left lower back, left hip, thigh and knee pain related to metastatic malignant thymoma with extensive spine mets with new enhancement of the cauda equina nerve roots and the conus, concerning for leptomeningeal spread of the malignancy  s/p T5-7 laminectomy and extension of posterior spinal instrumented fusion to T4 on 5/25/24 with Dr. Melendez   Pain is: Acute on chronic, cancer related pain c/b acute post-operative pain  Type: Somatic and neuropathic [frequent muscle spasms]  Pain control: Sub-optimally controlled  Home regimen: oxycodone IR 10mg (x2-3/day), gabapentin 300mg TID, and methocarbamol 500mg TID  Intolerances/previously tried: None  Personalized pain goal: 3/10  Total OME usage for the past 24 hours: 65 OME  Continue scheduled acetaminophen 975mg PO q8h  Transition to oral regimen in prep for acute rehab, recommend addition of long-acting for improved relief and to maximize PT participation  Discontinue hydromorphone PCA   Start OxyContin 15 mg PO q12h, starting now  Start Oxycodone IR 10 mg PO q3 h prn moderate pain   Start Oxycodone IR 15 mg PO q3 h prn severe pain   Start Hydromorphone 0.4 mg IV q3h prn breakthrough pain  OxyContin will need a Prior Auth, pt reports hallucinations with Morphine in the past, added as an intolerance  Continue gabapentin to 300mg/300mg/600mg PO TID  Continue methocarbamol 500mg PO q6h scheduled for muscle spasms  Consider dexamethasone per ortho, recently tapered off  Continue to monitor pain scores and administer PRN medications as appropriate  Continue/initiate nonpharmacologic pain management strategies including ice/heat therapy, distraction techniques, deep breathing/relaxation techniques, calming music, and repositioning  Continue to monitor for signs of opioid efficacy (pain scores, improved functionality) and toxicity (pinpoint pupils, excess  sedation/drowsiness/confusion, respiratory depression, etc.)     Nausea  At risk for nausea with vomiting related to opioids--intermittent occurences post-surgery  Home regimen: None  EKG reviewed from 5/23/24, QTc 491 (borderline high, continue to monitor)  PPI per primary  If needed, start ondansetron 4mg IV/PO q6h PRN for nausea, first line      Constipation  At risk for constipation related to opioids, currently not constipated  Usual bowel pattern: multiple times per day  Home regimen: None  LBM: 5/23/24--now passing gas  Continue Miralax 17g PO BID  Increase senna to 2 tabs PO BID  Monitor BM frequency, adjust regimen as needed  Goal to have BM without straining q48-72h  Encourage mobility as tolerated, PT/OT following, consider bedside commode if able     Sleeping Difficulty  Impaired sleep related to pain  Home regimen: None  Pain management as above  Continue melatonin 5mg PO HS     Medical Decision Making/Goals of Care/Advance Care Planning:  (Per Mercy Resendiz, CNP's, 5/24/24 note) Patient's current clinical condition, including diagnosis, prognosis, and management plan, and goals of care were discussed.   Life limiting disease: metastatic malignancy  Family: Supportive wife, children  Performance status: Major limitations due to pain and physical disability  Joys/meaning/strength: Family and Annamarie  Understanding of health: Demonstrates good understanding of disease process, understands plan for possible surgery  Information: Wants full disclosure     Advance Directives  Existence of Advance Directives: No - not interested  Decision maker: Surrogate decision maker is Marilia mcconnell  Code Status: Full Code     Disposition:  Please start the process of having prior authorization with meds to beds deliver medications to patient prior to discharge via [facility] pharmacy. Prescriptions will need to be sent 48-72 hours prior to discharge so that a prior authorization can be completed.      Discharge date  pending resolution of acute hospital issues.   Will request an appointment with Outpatient Supportive Oncology as appropriate.    Supportive and Palliative Oncology encounter:  Spoke with patient at bedside  Emotional support provided  Coordination of care     Signature and billing:  Thank you for allowing us to participate in the care of this patient. Recommendations will be communicated back to the consulting service by way of shared electronic medical record or face-to-face.    Medical complexity was high level due to due to complexity of problems, extensive data review, and high risk of management/treatment.    I spent 50 minutes in the care of this patient which included chart review, interviewing patient/family, discussion with primary team, coordination of care, and documentation.    Data:   Diagnostic tests and information reviewed for today's visit:  Conversation with primary team, Most recent labs and imaging results, Medications       Some elements copied from PARIS Robles CNP note on 5/27/2024, the elements have been updated and all reflect current decision making from today, 05/28/24       Plan of Care discussed with: Provider, Patient, and Pharmacist    Thank you for asking Supportive and Palliative Oncology to assist with care of this patient.  We will continue to follow.  Please contact us for additional questions or concerns.      SIGNATURE: DANGELO Evans, DNP   PAGER/CONTACT:  Contact information:  Supportive and Palliative Oncology  Monday-Friday 8 AM-5 PM  Epic Secure chat or pager [phone].  After hours and weekends:  pager [phone]

## 2024-05-28 NOTE — PROGRESS NOTES
Physical Therapy    Physical Therapy Treatment    Patient Name: Altaf Parsons  MRN: [id number]  Today's Date: 5/28/2024  Time Calculation  Start Time: 1438  Stop Time: 1510  Time Calculation (min): 32 min    Assessment/Plan   PT Assessment  End of Session Communication: Bedside nurse  Assessment Comment: Pt completing supine ther ex this session. Grossly 2/5 RLE, trace/5 LLE.  End of Session Patient Position: Bed, 3 rail up, Alarm off, not on at start of session     PT Plan  Treatment/Interventions: Bed mobility, Transfer training, Balance training, Gait training, Neuromuscular re-education, Strengthening, Endurance training, Range of motion, Therapeutic exercise, Therapeutic activity, Positioning  PT Plan: Skilled PT  PT Frequency: Daily  PT Discharge Recommendations: High intensity level of continued care  PT Recommended Transfer Status: Total assist  PT - OK to Discharge: Yes (Pateint could benefit from continued PT services at HIGH Intensity once medically cleared.)    General Visit Information:   PT  Visit  PT Received On: 05/28/24  Response to Previous Treatment: Patient with no complaints from previous session.  General  Prior to Session Communication: Bedside nurse  Patient Position Received: Bed, 3 rail up, Alarm off, not on at start of session  General Comment: Pt supine in bed upon entry to room. Pt pleasant, cooperative and willing to work with PT. Noted PCA, scds, arnett.    Subjective   Precautions:  Precautions  Medical Precautions: Fall precautions  Post-Surgical Precautions: Spinal precautions  Braces Applied: pt with straps around BLE to assist with moving legs    Objective   Pain:  Pain Assessment  Pain Assessment: 0-10  Pain Score: 5 - Moderate pain  Pain Location: Back  Cognition:  Cognition  Orientation Level: Oriented X4    Extremity/Trunk Assessments:  RLE   RLE : Exceptions to WFL  Strength RLE  R Hip Flexion: 2/5  R Hip Extension: 2/5  R Knee Flexion: 2-/5  R Knee Extension: 2-/5  R Ankle  Dorsiflexion: 2/5  R Ankle Plantar Flexion: 2/5  LLE   LLE : Exceptions to WFL  Strength LLE  L Hip Flexion: 0/5  L Knee Flexion: 0/5  L Knee Extension: 1/5  L Ankle Dorsiflexion: 1/5  L Ankle Plantar Flexion: 1/5  Activity Tolerance:     Treatments:  Therapeutic Exercise  Therapeutic Exercise Performed: Yes  Therapeutic Exercise Activity 1: x10 AAROM B AP, hip flexion/extension, knee flexion/extension; x10 R quad set, x10 L quad set (occasional trace noted), x10 B glute set with pt stating he could not feel his left glute moving    Outcome Measures:  [facility] Basic Mobility  Turning from your back to your side while in a flat bed without using bedrails: Total  Moving from lying on your back to sitting on the side of a flat bed without using bedrails: Total  Moving to and from bed to chair (including a wheelchair): Total  Standing up from a chair using your arms (e.g. wheelchair or bedside chair): Total  To walk in hospital room: Total  Climbing 3-5 steps with railing: Total  Basic Mobility - Total Score: 6    Education Documentation  Mobility Training, taught by Priyanka Andrew, PT at 5/28/2024  4:20 PM.  Learner: Patient  Readiness: Acceptance  Method: Explanation  Response: Verbalizes Understanding, Needs Reinforcement    Education Comments  No comments found.        OP EDUCATION:       Encounter Problems       Encounter Problems (Active)       Balance       STG - Maintains dynamic sitting balance without upper extremity support >/= 5 min with SBA (Progressing)       Start:  05/26/24    Expected End:  06/09/24       INTERVENTIONS:  1. Practice sitting on the edge of a bed/mat with minimal support.  2. Educate patient about maintining total hip precautions while maintaining balance.  3. Educate patient about pressure relief.  4. Educate patient about use of assistive device.            Mobility       Pt will demonstrate >3/5 BLE strength to complete therapeutic exercise and participate in functional mobility.          Start:  05/28/24    Expected End:  06/11/24            Pt will perform lateral transfers from bed <>chair or other surface with min assist.        Start:  05/28/24    Expected End:  06/11/24               PT Transfers       STG - Transfer from bed to/from wheelchair using slide board with min A (Progressing)       Start:  05/26/24    Expected End:  06/09/24            STG - Patient will perform bed mobility with min A (Progressing)       Start:  05/26/24    Expected End:  06/09/24            STG - Patient will transfer sit to and from stand with mod A x 2 and RW (Progressing)       Start:  05/26/24    Expected End:  06/09/24               Pain - Adult            Priyanka Andrew, PT

## 2024-05-28 NOTE — DISCHARGE INSTR - ACTIVITY
-Activity with assistance.  -Weight bearing as tolerated.  -You should be progressively walking at least 2 times a day with a wheeled walker.  -No pushing, pulling, or lifting objects greater than 10 pounds until your postoperative follow up appointment.  -No excessive bending or twisting. No heavy house or yard work.   -You may not drive until your follow up appointment or while taking narcotic medication.  -You may start showering once the incision has been dry from drainage for 48 hours. Use soap lightly, rinse, and pat dry.

## 2024-05-28 NOTE — DISCHARGE INSTR - OTHER ORDERS
Wound Care  -Wound site: Back (Thoracic Spine)  -Wound Type: Surgical Incision  -Change the dressing daily and continue until the incision is no longer draining.          ~Once the incision  has been dry for 48 hours, you may leave the dressing off and the site open to air.  -Cover the wound with an abdominal pad and secure it with paper tape around the edges.  -If there are sutures in your incision, they will be removed at an appt. in 10-14 days  -No Lotions or Creams on or around the incision site.  -No tub soaks  -You may use heat or ice to your incision sites and or back as         needed for comfort.      **No NSAIDS (Advil, Ibuprofen, Alevel, Etc.) for 3 months, they may interfere with the healing of the fusion.

## 2024-05-28 NOTE — PROGRESS NOTES
Pharmacy Admission Order Reconciliation Review    Altaf Parsons is a 53 y.o. male admitted for Malignant thymoma (Multi). Pharmacy reviewed the patient's unreconciled admission medications.    Prior to admission medications that were reviewed and acted on by the pharmacist include:  Omega-3 Flaxseed Oil  These medications have been reconciled.     Any other unreconcilied medications have been addressed and will be ordered or held by the patient's medical team. Medications addressed by the pharmacist may be added or changed by the patient's medical team at any time.    Anu Fox, PharmD  Transitions of Care Pharmacist  [facility]  Please reach out via Secure Chat for questions

## 2024-05-28 NOTE — PROGRESS NOTES
Altaf Parsons is a 53 y.o. male on day 4 of admission presenting with Malignant thymoma (Multi).    Subjective   Patient examined at bedside. NAEO.        Objective     Physical Exam    L1: SILT       L2: SILT      Hip flexors 0/5 Left; 2/5 Right  L3: SILT      Knee extension 1/5 Left; 2/5 Right  L4: SILT      Tib Ant. (Dorsiflexion) 0/5 Left; 0/5 Right  L5: SILT      EHL 1/5 Left; 1/5 Right  S1: SILT      GAS (Plantarflexion) 0/5 Left; 1/5 Right    Drain and prevena intact with bloody output in the drain     Last Recorded Vitals  Blood pressure 103/71, pulse (!) 116, temperature 37.3 °C (99.2 °F), resp. rate 20, height 1.829 m (6'), weight 101 kg (223 lb), SpO2 98%.  Intake/Output last 3 Shifts:  I/O last 3 completed shifts:  In: 2360 (23.3 mL/kg) [I.V.:360 (3.6 mL/kg); IV Piggyback:2000]  Out: 1890 (18.7 mL/kg) [Urine:1470 (0.4 mL/kg/hr); Drains:420]  Weight: 101.2 kg     Relevant Results      Scheduled medications  acetaminophen, 975 mg, oral, TID  cholecalciferol, 2,000 Units, oral, Daily  [Held by provider] everolimus, 10 mg, oral, Daily  gabapentin, 300 mg, oral, 2 times per day  gabapentin, 600 mg, oral, Nightly  insulin lispro, 0-5 Units, subcutaneous, q4h  lisinopril, 20 mg, oral, Daily  melatonin, 5 mg, oral, Daily  methocarbamol, 500 mg, oral, q6h CHERI  multivitamin with minerals, 1 tablet, oral, Daily  pantoprazole, 40 mg, oral, Daily before breakfast  polyethylene glycol, 17 g, oral, BID  sennosides, 1 tablet, oral, BID      Continuous medications  HYDROmorphone,       PRN medications  PRN medications: dextrose, dextrose, diphenhydrAMINE, glucagon, glucagon, naloxone  Results for orders placed or performed during the hospital encounter of 05/24/24 (from the past 24 hour(s))   Magnesium   Result Value Ref Range    Magnesium 2.13 1.60 - 2.40 mg/dL   Renal Function Panel   Result Value Ref Range    Glucose 100 (H) 74 - 99 mg/dL    Sodium 134 (L) 136 - 145 mmol/L    Potassium 4.3 3.5 - 5.3 mmol/L     Chloride 98 98 - 107 mmol/L    Bicarbonate 28 21 - 32 mmol/L    Anion Gap 12 10 - 20 mmol/L    Urea Nitrogen 15 6 - 23 mg/dL    Creatinine 0.81 0.50 - 1.30 mg/dL    eGFR >90 >60 mL/min/1.73m*2    Calcium 8.1 (L) 8.6 - 10.6 mg/dL    Phosphorus 2.9 2.5 - 4.9 mg/dL    Albumin 3.2 (L) 3.4 - 5.0 g/dL   CBC and Auto Differential   Result Value Ref Range    WBC 8.8 4.4 - 11.3 x10*3/uL    nRBC 0.0 0.0 - 0.0 /100 WBCs    RBC 3.66 (L) 4.50 - 5.90 x10*6/uL    Hemoglobin 9.3 (L) 13.5 - 17.5 g/dL    Hematocrit 30.1 (L) 41.0 - 52.0 %    MCV 82 80 - 100 fL    MCH 25.4 (L) 26.0 - 34.0 pg    MCHC 30.9 (L) 32.0 - 36.0 g/dL    RDW 16.7 (H) 11.5 - 14.5 %    Platelets 239 150 - 450 x10*3/uL    Neutrophils % 77.6 40.0 - 80.0 %    Immature Granulocytes %, Automated 0.6 0.0 - 0.9 %    Lymphocytes % 4.7 13.0 - 44.0 %    Monocytes % 15.5 2.0 - 10.0 %    Eosinophils % 1.5 0.0 - 6.0 %    Basophils % 0.1 0.0 - 2.0 %    Neutrophils Absolute 6.80 1.20 - 7.70 x10*3/uL    Immature Granulocytes Absolute, Automated 0.05 0.00 - 0.70 x10*3/uL    Lymphocytes Absolute 0.41 (L) 1.20 - 4.80 x10*3/uL    Monocytes Absolute 1.36 (H) 0.10 - 1.00 x10*3/uL    Eosinophils Absolute 0.13 0.00 - 0.70 x10*3/uL    Basophils Absolute 0.01 0.00 - 0.10 x10*3/uL   B-type natriuretic peptide   Result Value Ref Range    BNP 20 0 - 99 pg/mL   POCT GLUCOSE   Result Value Ref Range    POCT Glucose 142 (H) 74 - 99 mg/dL   TSH with reflex to Free T4 if abnormal   Result Value Ref Range    Thyroid Stimulating Hormone 1.31 0.44 - 3.98 mIU/L   Procalcitonin   Result Value Ref Range    Procalcitonin 0.20 (H) <=0.07 ng/mL   POCT GLUCOSE   Result Value Ref Range    POCT Glucose 105 (H) 74 - 99 mg/dL   POCT GLUCOSE   Result Value Ref Range    POCT Glucose 108 (H) 74 - 99 mg/dL   CBC and Auto Differential   Result Value Ref Range    WBC 10.0 4.4 - 11.3 x10*3/uL    nRBC 0.0 0.0 - 0.0 /100 WBCs    RBC 3.55 (L) 4.50 - 5.90 x10*6/uL    Hemoglobin 9.0 (L) 13.5 - 17.5 g/dL    Hematocrit 28.6  (L) 41.0 - 52.0 %    MCV 81 80 - 100 fL    MCH 25.4 (L) 26.0 - 34.0 pg    MCHC 31.5 (L) 32.0 - 36.0 g/dL    RDW 16.6 (H) 11.5 - 14.5 %    Platelets 269 150 - 450 x10*3/uL    Neutrophils % 78.1 40.0 - 80.0 %    Immature Granulocytes %, Automated 0.4 0.0 - 0.9 %    Lymphocytes % 5.4 13.0 - 44.0 %    Monocytes % 14.3 2.0 - 10.0 %    Eosinophils % 1.6 0.0 - 6.0 %    Basophils % 0.2 0.0 - 2.0 %    Neutrophils Absolute 7.78 (H) 1.20 - 7.70 x10*3/uL    Immature Granulocytes Absolute, Automated 0.04 0.00 - 0.70 x10*3/uL    Lymphocytes Absolute 0.54 (L) 1.20 - 4.80 x10*3/uL    Monocytes Absolute 1.42 (H) 0.10 - 1.00 x10*3/uL    Eosinophils Absolute 0.16 0.00 - 0.70 x10*3/uL    Basophils Absolute 0.02 0.00 - 0.10 x10*3/uL   D-dimer, VTE Exclusion   Result Value Ref Range    D-Dimer, Quantitative VTE Exclusion 1,582 (H) <=500 ng/mL FEU   POCT GLUCOSE   Result Value Ref Range    POCT Glucose 125 (H) 74 - 99 mg/dL   POCT GLUCOSE   Result Value Ref Range    POCT Glucose 93 74 - 99 mg/dL   POCT GLUCOSE   Result Value Ref Range    POCT Glucose 93 74 - 99 mg/dL                            Assessment/Plan   Principal Problem:    Malignant thymoma (Multi)  Active Problems:    Spinal cord compression (Multi)    Spinal stenosis of lumbar region    Lumbar stenosis with neurogenic claudication    Cancer with leptomeningeal spread (Multi)    53 year old male now s/p T5-7 laminectomy and extension of posterior spinal instrumented fusion to T4 on 5/25/24 with Dr. Melendez.     Plan:  -Weightbearing: Weightbearing as tolerated  -Antibiotics: Ancef 2 g every 8 hours x 3 doses.  Completed   -Diet: Regular diet   -Pain: would recommend transition to orals when able   -Heme: Transfuse as needed, approximately 200cc blood loss intraoperatively  -Steroids: No steroids recommended  -Drains: Hemovac x 1 and Prevena x 1, please record outputs every 8 hours  -Pathology: Intraoperative pathology sent for permanent, follow-up results  -PT/OT, may require  rehabilitation facility given significant motor deficit.  May also require AFO on left lower extremity given foot drop     While admitted, this patient will be followed by the Ortho Spine Team. Please contact below residents with any questions (available via Epic Chat).      First call: Kelsi Mejia PGY-2   Second call: Mario Spivey PGY-3           Mario Spivey, DO

## 2024-05-29 LAB
ALBUMIN SERPL BCP-MCNC: 3 G/DL (ref 3.4–5)
ANION GAP SERPL CALC-SCNC: 13 MMOL/L (ref 10–20)
ATRIAL RATE: 133 BPM
BASOPHILS # BLD AUTO: 0.02 X10*3/UL (ref 0–0.1)
BASOPHILS NFR BLD AUTO: 0.2 %
BUN SERPL-MCNC: 9 MG/DL (ref 6–23)
CALCIUM SERPL-MCNC: 8.1 MG/DL (ref 8.6–10.6)
CHLORIDE SERPL-SCNC: 95 MMOL/L (ref 98–107)
CO2 SERPL-SCNC: 29 MMOL/L (ref 21–32)
CREAT SERPL-MCNC: 0.7 MG/DL (ref 0.5–1.3)
EGFRCR SERPLBLD CKD-EPI 2021: >90 ML/MIN/1.73M*2
EOSINOPHIL # BLD AUTO: 0.14 X10*3/UL (ref 0–0.7)
EOSINOPHIL NFR BLD AUTO: 1.7 %
ERYTHROCYTE [DISTWIDTH] IN BLOOD BY AUTOMATED COUNT: 16.5 % (ref 11.5–14.5)
GLUCOSE BLD MANUAL STRIP-MCNC: 106 MG/DL (ref 74–99)
GLUCOSE BLD MANUAL STRIP-MCNC: 108 MG/DL (ref 74–99)
GLUCOSE BLD MANUAL STRIP-MCNC: 121 MG/DL (ref 74–99)
GLUCOSE BLD MANUAL STRIP-MCNC: 130 MG/DL (ref 74–99)
GLUCOSE BLD MANUAL STRIP-MCNC: 135 MG/DL (ref 74–99)
GLUCOSE SERPL-MCNC: 89 MG/DL (ref 74–99)
HCT VFR BLD AUTO: 25.8 % (ref 41–52)
HGB BLD-MCNC: 8.5 G/DL (ref 13.5–17.5)
IMM GRANULOCYTES # BLD AUTO: 0.04 X10*3/UL (ref 0–0.7)
IMM GRANULOCYTES NFR BLD AUTO: 0.5 % (ref 0–0.9)
LYMPHOCYTES # BLD AUTO: 0.57 X10*3/UL (ref 1.2–4.8)
LYMPHOCYTES NFR BLD AUTO: 6.8 %
MAGNESIUM SERPL-MCNC: 2.04 MG/DL (ref 1.6–2.4)
MCH RBC QN AUTO: 25.6 PG (ref 26–34)
MCHC RBC AUTO-ENTMCNC: 32.9 G/DL (ref 32–36)
MCV RBC AUTO: 78 FL (ref 80–100)
MONOCYTES # BLD AUTO: 1.2 X10*3/UL (ref 0.1–1)
MONOCYTES NFR BLD AUTO: 14.4 %
NEUTROPHILS # BLD AUTO: 6.36 X10*3/UL (ref 1.2–7.7)
NEUTROPHILS NFR BLD AUTO: 76.4 %
NRBC BLD-RTO: 0 /100 WBCS (ref 0–0)
P AXIS: 31 DEGREES
P OFFSET: 208 MS
P ONSET: 159 MS
PHOSPHATE SERPL-MCNC: 3 MG/DL (ref 2.5–4.9)
PLATELET # BLD AUTO: 310 X10*3/UL (ref 150–450)
POTASSIUM SERPL-SCNC: 3.9 MMOL/L (ref 3.5–5.3)
PR INTERVAL: 140 MS
Q ONSET: 229 MS
QRS COUNT: 22 BEATS
QRS DURATION: 124 MS
QT INTERVAL: 328 MS
QTC CALCULATION(BAZETT): 488 MS
QTC FREDERICIA: 427 MS
R AXIS: 148 DEGREES
RBC # BLD AUTO: 3.32 X10*6/UL (ref 4.5–5.9)
SODIUM SERPL-SCNC: 133 MMOL/L (ref 136–145)
T AXIS: 11 DEGREES
T OFFSET: 393 MS
VENTRICULAR RATE: 133 BPM
WBC # BLD AUTO: 8.3 X10*3/UL (ref 4.4–11.3)

## 2024-05-29 PROCEDURE — 82947 ASSAY GLUCOSE BLOOD QUANT: CPT

## 2024-05-29 PROCEDURE — 2500000001 HC RX 250 WO HCPCS SELF ADMINISTERED DRUGS (ALT 637 FOR MEDICARE OP)

## 2024-05-29 PROCEDURE — 1170000001 HC PRIVATE ONCOLOGY ROOM DAILY

## 2024-05-29 PROCEDURE — 99233 SBSQ HOSP IP/OBS HIGH 50: CPT

## 2024-05-29 PROCEDURE — 80069 RENAL FUNCTION PANEL: CPT

## 2024-05-29 PROCEDURE — 97110 THERAPEUTIC EXERCISES: CPT | Mod: GP,CQ

## 2024-05-29 PROCEDURE — 97530 THERAPEUTIC ACTIVITIES: CPT | Mod: GP,CQ

## 2024-05-29 PROCEDURE — 2500000004 HC RX 250 GENERAL PHARMACY W/ HCPCS (ALT 636 FOR OP/ED)

## 2024-05-29 PROCEDURE — 37799 UNLISTED PX VASCULAR SURGERY: CPT

## 2024-05-29 PROCEDURE — 99223 1ST HOSP IP/OBS HIGH 75: CPT | Performed by: PHYSICAL MEDICINE & REHABILITATION

## 2024-05-29 PROCEDURE — 83735 ASSAY OF MAGNESIUM: CPT

## 2024-05-29 PROCEDURE — 85025 COMPLETE CBC W/AUTO DIFF WBC: CPT

## 2024-05-29 RX ORDER — SENNOSIDES 8.6 MG/1
2 TABLET ORAL 2 TIMES DAILY
Status: DISCONTINUED | OUTPATIENT
Start: 2024-05-29 | End: 2024-06-04 | Stop reason: HOSPADM

## 2024-05-29 RX ORDER — INSULIN LISPRO 100 [IU]/ML
0-5 INJECTION, SOLUTION INTRAVENOUS; SUBCUTANEOUS
Status: DISCONTINUED | OUTPATIENT
Start: 2024-05-29 | End: 2024-05-30

## 2024-05-29 RX ORDER — OXYCODONE HYDROCHLORIDE 5 MG/1
10 TABLET ORAL
Status: DISCONTINUED | OUTPATIENT
Start: 2024-05-29 | End: 2024-06-04 | Stop reason: HOSPADM

## 2024-05-29 RX ORDER — ENOXAPARIN SODIUM 100 MG/ML
40 INJECTION SUBCUTANEOUS
Status: DISCONTINUED | OUTPATIENT
Start: 2024-05-29 | End: 2024-06-04 | Stop reason: HOSPADM

## 2024-05-29 RX ADMIN — Medication 1 TABLET: at 09:16

## 2024-05-29 RX ADMIN — OXYCODONE HYDROCHLORIDE 10 MG: 5 TABLET ORAL at 10:33

## 2024-05-29 RX ADMIN — GABAPENTIN 300 MG: 300 CAPSULE ORAL at 09:16

## 2024-05-29 RX ADMIN — OXYCODONE HYDROCHLORIDE 15 MG: 5 TABLET ORAL at 13:46

## 2024-05-29 RX ADMIN — GABAPENTIN 600 MG: 300 CAPSULE ORAL at 22:16

## 2024-05-29 RX ADMIN — GABAPENTIN 300 MG: 300 CAPSULE ORAL at 15:53

## 2024-05-29 RX ADMIN — OXYCODONE HYDROCHLORIDE 15 MG: 15 TABLET, FILM COATED, EXTENDED RELEASE ORAL at 22:16

## 2024-05-29 RX ADMIN — PANTOPRAZOLE SODIUM 40 MG: 40 TABLET, DELAYED RELEASE ORAL at 07:00

## 2024-05-29 RX ADMIN — ACETAMINOPHEN 975 MG: 325 TABLET ORAL at 09:16

## 2024-05-29 RX ADMIN — ENOXAPARIN SODIUM 40 MG: 100 INJECTION SUBCUTANEOUS at 13:19

## 2024-05-29 RX ADMIN — METHOCARBAMOL 500 MG: 500 TABLET ORAL at 05:27

## 2024-05-29 RX ADMIN — LISINOPRIL 20 MG: 20 TABLET ORAL at 09:16

## 2024-05-29 RX ADMIN — ACETAMINOPHEN 975 MG: 325 TABLET ORAL at 22:17

## 2024-05-29 RX ADMIN — METHOCARBAMOL 500 MG: 500 TABLET ORAL at 18:01

## 2024-05-29 RX ADMIN — LORATADINE 10 MG: 10 TABLET ORAL at 09:16

## 2024-05-29 RX ADMIN — Medication 5 MG: at 22:17

## 2024-05-29 RX ADMIN — OXYCODONE HYDROCHLORIDE 10 MG: 5 TABLET ORAL at 05:26

## 2024-05-29 RX ADMIN — METHOCARBAMOL 500 MG: 500 TABLET ORAL at 13:18

## 2024-05-29 RX ADMIN — SODIUM CHLORIDE, POTASSIUM CHLORIDE, SODIUM LACTATE AND CALCIUM CHLORIDE 500 ML: 600; 310; 30; 20 INJECTION, SOLUTION INTRAVENOUS at 04:09

## 2024-05-29 RX ADMIN — OXYCODONE HYDROCHLORIDE 15 MG: 15 TABLET, FILM COATED, EXTENDED RELEASE ORAL at 09:16

## 2024-05-29 RX ADMIN — ACETAMINOPHEN 975 MG: 325 TABLET ORAL at 15:53

## 2024-05-29 RX ADMIN — Medication 2000 UNITS: at 09:15

## 2024-05-29 ASSESSMENT — COGNITIVE AND FUNCTIONAL STATUS - GENERAL
MOVING FROM LYING ON BACK TO SITTING ON SIDE OF FLAT BED WITH BEDRAILS: TOTAL
CLIMB 3 TO 5 STEPS WITH RAILING: TOTAL
DAILY ACTIVITIY SCORE: 10
WALKING IN HOSPITAL ROOM: TOTAL
HELP NEEDED FOR BATHING: A LOT
DRESSING REGULAR UPPER BODY CLOTHING: TOTAL
TURNING FROM BACK TO SIDE WHILE IN FLAT BAD: TOTAL
DRESSING REGULAR LOWER BODY CLOTHING: TOTAL
STANDING UP FROM CHAIR USING ARMS: TOTAL
MOVING FROM LYING ON BACK TO SITTING ON SIDE OF FLAT BED WITH BEDRAILS: TOTAL
CLIMB 3 TO 5 STEPS WITH RAILING: TOTAL
TOILETING: TOTAL
WALKING IN HOSPITAL ROOM: TOTAL
WALKING IN HOSPITAL ROOM: TOTAL
MOVING TO AND FROM BED TO CHAIR: TOTAL
STANDING UP FROM CHAIR USING ARMS: TOTAL
MOBILITY SCORE: 6
PERSONAL GROOMING: TOTAL
DRESSING REGULAR UPPER BODY CLOTHING: TOTAL
TOILETING: TOTAL
MOBILITY SCORE: 6
HELP NEEDED FOR BATHING: A LOT
MOBILITY SCORE: 6
PERSONAL GROOMING: TOTAL
MOVING TO AND FROM BED TO CHAIR: TOTAL
DRESSING REGULAR LOWER BODY CLOTHING: TOTAL
TURNING FROM BACK TO SIDE WHILE IN FLAT BAD: TOTAL
CLIMB 3 TO 5 STEPS WITH RAILING: TOTAL
STANDING UP FROM CHAIR USING ARMS: TOTAL
DAILY ACTIVITIY SCORE: 10
TURNING FROM BACK TO SIDE WHILE IN FLAT BAD: TOTAL
MOVING TO AND FROM BED TO CHAIR: TOTAL
MOVING FROM LYING ON BACK TO SITTING ON SIDE OF FLAT BED WITH BEDRAILS: TOTAL

## 2024-05-29 ASSESSMENT — PAIN SCALES - GENERAL
PAINLEVEL_OUTOF10: 6
PAINLEVEL_OUTOF10: 7
PAINLEVEL_OUTOF10: 7
PAINLEVEL_OUTOF10: 3
PAINLEVEL_OUTOF10: 3
PAINLEVEL_OUTOF10: 4
PAINLEVEL_OUTOF10: 4
PAINLEVEL_OUTOF10: 7
PAINLEVEL_OUTOF10: 6

## 2024-05-29 ASSESSMENT — PAIN SCALES - WONG BAKER: WONGBAKER_NUMERICALRESPONSE: HURTS LITTLE MORE

## 2024-05-29 ASSESSMENT — PAIN DESCRIPTION - LOCATION
LOCATION: BACK
LOCATION: BACK

## 2024-05-29 ASSESSMENT — PAIN - FUNCTIONAL ASSESSMENT
PAIN_FUNCTIONAL_ASSESSMENT: 0-10

## 2024-05-29 NOTE — PROGRESS NOTES
Internal Medicine Daily Progress Note    Subjective     Interval events:  Tachycardic to 130s overnight, received 500 ml fluid bolus with slight improvement. Patient states pain is 3/10 currently though was severe overnight while moving around in bed due to having a bowel movement. Denies dyspnea, chest pain, lightheadedness, dizziness.       Objective     Vitals:  Visit Vitals  /76 (BP Location: Left arm, Patient Position: Lying)   Pulse (!) 121   Temp 36.4 °C (97.5 °F) (Temporal)   Resp 20         Intake/Output Summary (Last 24 hours) at 5/29/2024 0758  Last data filed at 5/29/2024 0542  Gross per 24 hour   Intake 270 ml   Output 1925 ml   Net -1655 ml       Physical exam:  Constitutional: Well-developed male in no acute distress.  HEENT: Normocephalic, atraumatic.  Respiratory: CTA anteriorly. No wheezes, rales, or rhonchi. Normal respiratory effort.  Cardiovascular: RRR. No murmurs, gallops, or rubs.  Abdominal: Soft, nondistended, nontender to palpation. Bowel sounds present. Hemovac and Prevena with sanguineous fluid.  Neuro: A&Ox3  MSK: No LE edema bilaterally  Skin: Warm, dry. No rashes or wounds  Psych: Appropriate mood and affect    Medications:  acetaminophen, 975 mg, oral, TID  cholecalciferol, 2,000 Units, oral, Daily  gabapentin, 300 mg, oral, 2 times per day  gabapentin, 600 mg, oral, Nightly  insulin lispro, 0-5 Units, subcutaneous, q4h  lisinopril, 20 mg, oral, Daily  loratadine, 10 mg, oral, Daily  melatonin, 5 mg, oral, Daily  methocarbamol, 500 mg, oral, q6h CHERI  multivitamin with minerals, 1 tablet, oral, Daily  oxyCODONE ER, 15 mg, oral, q12h CHERI  pantoprazole, 40 mg, oral, Daily before breakfast  polyethylene glycol, 17 g, oral, BID  sennosides, 1 tablet, oral, BID           PRN medications: dextrose, dextrose, diphenhydrAMINE, glucagon, glucagon, HYDROmorphone, ketotifen, naloxone, oxyCODONE, oxyCODONE    Labs:      Results from last 72 hours   Lab Units 05/28/24  0757  05/27/24  1901 05/27/24  0735   WBC AUTO x10*3/uL 9.1 10.0 8.8   HEMOGLOBIN g/dL 8.6* 9.0* 9.3*   HEMATOCRIT % 28.7* 28.6* 30.1*   PLATELETS AUTO x10*3/uL 268 269 239   SODIUM mmol/L 133*  --  134*   POTASSIUM mmol/L 4.5  --  4.3   CHLORIDE mmol/L 97*  --  98   CO2 mmol/L 27  --  28   BUN mg/dL 10  --  15   CREATININE mg/dL 0.67  --  0.81   GLUCOSE mg/dL 84  --  100*   CALCIUM mg/dL 8.2*  --  8.1*   MAGNESIUM mg/dL 2.24  --  2.13   PHOSPHORUS mg/dL 2.4*  --  2.9   ALBUMIN g/dL 3.0*  --  3.2*           Imaging:  CT angio chest for pulmonary embolism    Result Date: 5/28/2024  Interpreted By:  Mook Meza and Baker Zachary STUDY: CT ANGIO CHEST FOR PULMONARY EMBOLISM;  5/27/2024 3:27 pm   INDICATION: Signs/Symptoms:hypoxia, tachycardia.   COMPARISON: CT chest on 08/25/2021.   ACCESSION NUMBER(S): [id number]   ORDERING CLINICIAN: JESU KOEHLER   TECHNIQUE: Helical data acquisition of the chest was obtained after intravenous administration of 58 ML Omnipaque 350, as per PE protocol. Images were reformatted in coronal and sagittal planes. Axial and coronal maximum intensity projection (MIP) images were created and reviewed.   FINDINGS: POTENTIAL LIMITATIONS OF THE STUDY: The assessment is limited by suboptimal contrast opacification of pulmonary arteries.   HEART AND VESSELS: No discrete filling defects within the main pulmonary artery or its branches to  proximal segmental level. Please note that, assessment of distal segmental and subsegmental branches is limited and small peripheral emboli are not entirely excluded. Mild dilation of the main pulmonary artery, measuring up to 3.4 cm.   The thoracic aorta normal in course and caliber. No coronary artery calcifications are seen. Please note, the study is not optimized for evaluation of coronary arteries.   The cardiac chambers are not enlarged. There is no pericardial effusion seen.   MEDIASTINUM AND GUILLERMO, LOWER NECK AND AXILLA: The visualized thyroid gland is  within normal limits. No evidence of thoracic lymphadenopathy by CT criteria. Esophagus appears within normal limits as seen.   LUNGS AND AIRWAYS: The trachea and central airways are patent. No endobronchial lesion is seen.   Posttreatment changes in the left upper lung with associated atelectasis/volume loss again noted, similar to prior exam. Mild bibasilar atelectasis/scarring. Nodular opacities within the posterior aspect of the right upper lobe (series 5, image 102). Otherwise no consolidation, pleural effusion, or pneumothorax. Linear high attenuation along the left lung base with elevation of left hemidiaphragm and correlate with prior diaphragmatic plication/repair   UPPER ABDOMEN: Redemonstration of a 4 cm hypoattenuating lesion within hepatic segment 4B, likely representing a simple cyst. Cholelithiasis without evidence of acute cholecystitis. Subdiaphragmatic structures are otherwise unremarkable.   CHEST WALL AND OSSEOUS STRUCTURES: Chest wall is within normal limits. Postsurgical changes from posterior spinal fusion of the near entirety of the thoracic spine and visualized lumbar spine. No acute osseous pathology.There are no suspicious osseous lesions.       1. No evidence of acute pulmonary embolism to proximal segmental level. Please note that, assessment of distal segmental and subsegmental branches is limited and small peripheral emboli are not entirely excluded. 2. Few nodular opacities within the posterior right upper lobe, which may be infectious/inflammatory. 3. Postsurgical/posttreatment changes again noted in the medial and lateral aspects of the left lung, similar to prior exam. 4. Correlate with diaphragmatic plication/repair. 5. Mild dilation of the main pulmonary artery, measuring up to 3.4 cm, which may represent pulmonary hypertension. 6. Cholelithiasis without evidence of acute cholecystitis. 7. Additional chronic findings as above.   I personally reviewed the images/study and I agree  with the findings as stated by Dr. Iglesia Zavala M.D. This study was interpreted at [facility], [place], [place].   MACRO: None   Signed by: Mook Meza 5/28/2024 1:18 PM Dictation workstation:   [id number]    XR chest 1 view    Result Date: 5/27/2024  Interpreted By:  Deisy Smith, STUDY: XR CHEST 1 VIEW; 5/27/2024 8:00 am   INDICATION: Signs/Symptoms:hypoxia.   COMPARISON: Radiograph dated 05/24/2024   ACCESSION NUMBER(S): [id number]   ORDERING CLINICIAN: JESU KOEHLER   FINDINGS: The cardiac silhouette size is within normal limits.   Low lung volumes and bronchovascular crowding. Bibasilar atelectasis. No sizable pleural effusion or edema. No sizable pneumothorax.   Postsurgical changes in the visualized portion of the spine.   Multiple distended loops of bowel in the upper abdomen.       1. Mild bibasilar atelectasis. Low lung volumes 2. Gaseous distention of multiple loops of bowel throughout the visualized upper abdomen. Correlate with ileus. Recommend abdominal radiograph.       Signed by: Deisy Caro 5/27/2024 10:46 AM Dictation workstation:   [id number]        Assessment and Plan:  Altaf Parsons is a 53 y.o. male with PMHx of malignant thymoma (dx. 03/2016) w/ mets to bone and lung who presented with worsening LE weakness and pain, now s/p thoracolumbar decompression/fusion for malignant spinal cord compression on 5/25.    Updates 05/29/24:  - sinus tachycardia continues, likely pain-related as other etiologies ruled out  - appreciate supportive onc recs for pain management, doing okay after transitioning from PCA to orals/IV PRNs  - restarted DVT prophy as okay'd by ortho  - ortho plan to remove drains today    #Malignant spinal cord compression, s/p decompression on 5/25  - MRI (4/26) showing new enhancement of cauda equine nerve roots, concern for leptomeningeal spread  - new progressive weakness possibly related to this leptomeningeal spread  of disease  - ortho performed thoracolumbar decompression and fusion on 5/25  Plan  - appreciate ortho recs:   - no steroids   - hemovac/prevena with q8h outputs  - PT/OT for dispo, patient has outpatient PT/OT  - Weight bearing as tolerated  - Continue pain control with PCA, gabapentin, tylenol     #Metastatic Malignant Thymoma, Type B2 (predominant lymphocytic population with scattered epithelial cells)  - July 2007: Massive left-sided hemothorax. Underwent left VATS drainage of massive hemothorax, pleural biopsy and core needle biopsy of lingular mass and decortication. Biopsies all negative. CT scan showed 7 cm lesion within mediastinum with a calcified rim. Followed with serial Cts  - Feb 2016: New enhancing soft tissue mass superior to calcified structure with pericardial LN and basilar RLL nodule  - March 4, 2016: Diagnosed from left lower lobe of lung wedge resection and mediastinal fat biopsy  - Aug 1 2016- Sep 7 2016: 59.4 Gy proton therapy to resection bed  - May 2017: Left pleural nodule showing thymoma  - 8/4/17- Started 3 cycles of cyclophosphamide, adriamycin, and cisplatin (PAC-P/PAC/CAP regimen, Demi et al 2004)  - 11/2019: Left serratus anterior biopsy positive for thymoma  - 12/10/19- 12/31/19: SINDI mass and left serratus anterior mass, 45 CGE/15 fx, PROTONS   - Aug 2020: T12-L1 paraspinal mass showing thymoma  -Jan 2021: Spine tumor biopsy showing thymoma. Posterior spinal fusion T9-L1 with use of bilateral pedicle screw instrumentation, allograft and demineralized bone matrix fiber. 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 Progressed  -April 2022 - Finished RT of 30 Gy in 10 fractions from T4-T6, and L1-L3  - Dec 2022- April 2023: Started on single agent capecitabine with stable  disease  - Sep 2023: Some somatostatin receptor disease noted by [employer] PET  -Sep 2023: Single agent everolimus started. Stable disease so far  - Dr. Rodriguez aware  Plan:  - discontinued everolimus as advised by Dr. Rodriguez     #Chronic left low back pain secondary to malignancy  - Consulted supportive oncology, appreciate recs  - Hold dexamethasone 4 mg daily  - continue gabapentin 300-300-600  - cont home methocarbimol 500mg Q6   - Discontinue oral and IV PRNs while on pump     #HTN  - At home was on lisinopril 20-hydrochlorothiazide 25 mg  - cont home lisinopril 20mg  - Can consider restarting hydrochlorothiazide 25 mg later in hospitalization      #Misc  - cont home Vit D, multivitamin, Protonix 40 mg daily     F: PRN  E: prn  N: regular diet  A: PIV, Meza  DVT prophylaxis: Lovenox  Code status: Full code, confirmed on admission  NOK: Marilia (wife) #[phone]    Patient and plan discussed with attending physician.    Nga Rocha MD  PGY-1 Internal Medicine  [facility]

## 2024-05-29 NOTE — CARE PLAN
Problem: Pain - Adult  Goal: Verbalizes/displays adequate comfort level or baseline comfort level  Outcome: Progressing     Problem: Safety - Adult  Goal: Free from fall injury  Outcome: Progressing   The patient's goals for the shift include      The clinical goals for the shift include pt will remain safe and free from injury and falls throughout shift

## 2024-05-29 NOTE — PROGRESS NOTES
Physical Therapy    Physical Therapy Treatment    Patient Name: Altaf Parsons  MRN: [id number]  Today's Date: 5/29/2024  Time Calculation  Start Time: 1522  Stop Time: 1554  Time Calculation (min): 32 min    Assessment/Plan   PT Assessment  End of Session Communication: Bedside nurse  Assessment Comment: Pt. is very motivated and cooperative. Pt. continually puts in effort however most ex was passive. Pt. will benefit from high intense level of therapy post acute.  End of Session Patient Position: Bed, 3 rail up, Alarm off, not on at start of session  PT Plan  Inpatient/Swing Bed or Outpatient: Inpatient  PT Plan  Treatment/Interventions: Bed mobility, Transfer training, Balance training, Gait training, Neuromuscular re-education, Strengthening, Endurance training, Range of motion, Therapeutic exercise, Therapeutic activity, Positioning  PT Plan: Skilled PT  PT Frequency: 5x/week  PT Discharge Recommendations: High intensity level of continued care  PT Recommended Transfer Status: Total assist  PT - OK to Discharge: Yes (Pateint could benefit from continued PT services at HIGH Intensity once medically cleared.)      General Visit Information:   PT  Visit  PT Received On: 05/29/24  Response to Previous Treatment: Patient with no complaints from previous session.  General  Reason for Referral: 53 year old male now s/p T5-7 laminectomy and extension of posterior spinal instrumented fusion to T4 on 5/25/24 with Dr. Melendez  Past Medical History Relevant to Rehab: cancer with leptomeningeal spread, spinal cord compression  Missed Visit: No  Family/Caregiver Present: No  Prior to Session Communication: Bedside nurse  General Comment: Pt. supine in bed upon arrival. Pt. is pleasant and willing to participate. Pt. has a bolster 2 pillows, scds and prafo's - Increased time taking down and setting back up. Pt. has a arnett cath- prevena.    Subjective   Precautions:  Precautions  Medical Precautions: Fall  precautions  Post-Surgical Precautions: Spinal precautions  Vital Signs:       Objective   Pain:  Pain Assessment  Pain Assessment: 0-10  Pain Score: 3  Pain Interventions: Medication (See MAR)  Cognition:     Coordination:     Postural Control:     Extremity/Trunk Assessments:                      Activity Tolerance:     Treatments:  Therapeutic Exercise  Therapeutic Exercise Performed: Yes  Therapeutic Exercise Activity 1: Supine passive- aarom bilat le ex AP'S, QS, SAQ'S, HS, AND ABD X 15 REPS. (Increased time for all ther ex as pt. has difficulty with each exercise.)    Therapeutic Activity  Therapeutic Activity Performed: Yes  Therapeutic Activity 1: GAstroc stretch x 20 second holds    Bed Mobility  Bed Mobility: Yes  Bed Mobility 1  Bed Mobility 1:  (Dependent boost to HOB - RN used vac to inflate pad under pt. - MAX of 2.)    Outcome Measures:  [facility] Basic Mobility  Turning from your back to your side while in a flat bed without using bedrails: Total  Moving from lying on your back to sitting on the side of a flat bed without using bedrails: Total  Moving to and from bed to chair (including a wheelchair): Total  Standing up from a chair using your arms (e.g. wheelchair or bedside chair): Total  To walk in hospital room: Total  Climbing 3-5 steps with railing: Total  Basic Mobility - Total Score: 6    Education Documentation  Home Exercise Program, taught by Mita Azar PTA at 5/29/2024  4:09 PM.  Learner: Patient  Readiness: Acceptance  Method: Explanation, Demonstration  Response: Needs Reinforcement    Education Comments  No comments found.        OP EDUCATION:       Encounter Problems       Encounter Problems (Active)       Balance       STG - Maintains dynamic sitting balance without upper extremity support >/= 5 min with SBA (Not Progressing)       Start:  05/26/24    Expected End:  06/09/24       INTERVENTIONS:  1. Practice sitting on the edge of a bed/mat with minimal support.  2. Educate patient  about maintining total hip precautions while maintaining balance.  3. Educate patient about pressure relief.  4. Educate patient about use of assistive device.            Mobility       Pt will demonstrate >3/5 BLE strength to complete therapeutic exercise and participate in functional mobility.   (Progressing)       Start:  05/28/24    Expected End:  06/11/24            Pt will perform lateral transfers from bed <>chair or other surface with min assist.  (Not Progressing)       Start:  05/28/24    Expected End:  06/11/24               PT Transfers       STG - Transfer from bed to/from wheelchair using slide board with min A (Not Progressing)       Start:  05/26/24    Expected End:  06/09/24            STG - Patient will perform bed mobility with min A (Not Progressing)       Start:  05/26/24    Expected End:  06/09/24            STG - Patient will transfer sit to and from stand with mod A x 2 and RW (Not Progressing)       Start:  05/26/24    Expected End:  06/09/24               Pain - Adult

## 2024-05-29 NOTE — PROGRESS NOTES
SUPPORTIVE AND PALLIATIVE ONCOLOGY INPATIENT FOLLOW-UP    SERVICE DATE: 24     SUBJECTIVE:  HISTORY OF PRESENT ILLNESS: Altaf Parsons is a 53 y.o. male diagnosed with metastatic malignant thymoma (sites of disease mediastinum, lung, TLS spine, T12-L1 paraspinal region, left lateral spine canal T11-S1) dx  s/p Proton therapy, RT, multiple spine surgeries, chemo, and most recently on everolimus. PMHx significant for HTN, GERD, hemothorax, HLD, and sleep apnea. Admitted 2024 for further evaluation and management of progressive left leg weakness and worsening pain and to expedite planning for spine surgery. Course complicated by recurrent tumor in his spine, high risk surgery due to recurrent tumor, previous surgeries and previous RT. Supportive and Palliative Oncology is consulted for pain management.     Interval Events:  Pt transitioned off hydromorphone PCA yesterday. Started on oxycodone ER (OxyContin) 15mg PO q12h and oxycodone IR 10-15mg PO q3h PRN for moderate to severe pain, as well as hydromorphone 0.4mg IV q3h PRN for breakthrough pain.    Pt's pain fairly controlled, however only using minimal PRN medications. Instructed pt to use his available oxycodone IR q3h PRN as needed whenever he is experiencing pain. He demonstrates understanding regarding this information.     Pain Assessment:  Location: Mid to upper back, incision site  Duration: Constant  Characteristics:  Ratin-6/10  Descriptors: Throbbing, sore, spasms  Aggravating: Movement, certain positions   Relieving: Analgesics --see EMR, positioning, and modifying activity  Interference with Function: Somewhat    Opioid Use  Past 24h opioid use:   (-, 4080-5576)  oxycodone ER (OxyContin) 15mg x 2 = 30mg = 37.5 OME  oxycodone IR 10mg x 2 = 20mg = 25 OME  hydromorphone 0.4mg IV x 1 = 5 OME    Total 24h OME use: 67.5 OME    Note: OME calculations based on equianalgesic table below. Please note this table is based on best available  evidence but conversions are still approximate. These are NOT opioid DOSES for individual patient use; this is equivalency information.  Drug Parenteral Enteral   Morphine 10 25   Oxycodone N/A 20   Hydromorphone 2 5   Fentanyl 0.15 N/A   Tramadol N/A 120   Citation: Loki GUTIERREZ. Demystifying opioid conversion calculations: A guide for effective dosing, Second edition. [place]: [employer]System [employer], 2018.    Symptom Assessment:  Nausea: none  Constipation: a little--however improving, had BM 5/29 early AM    Information obtained from: chart review, interview of patient, and discussion with primary team  ______________________________________________________________________   OBJECTIVE:    Lab Results   Component Value Date    WBC 9.1 05/28/2024    HGB 8.6 (L) 05/28/2024    HCT 28.7 (L) 05/28/2024    MCV 82 05/28/2024     05/28/2024      Lab Results   Component Value Date    GLUCOSE 84 05/28/2024    CALCIUM 8.2 (L) 05/28/2024     (L) 05/28/2024    K 4.5 05/28/2024    CO2 27 05/28/2024    CL 97 (L) 05/28/2024    BUN 10 05/28/2024    CREATININE 0.67 05/28/2024     Lab Results   Component Value Date    ALT 20 05/24/2024    AST 28 05/24/2024    ALKPHOS 57 05/24/2024    BILITOT 0.6 05/24/2024     Estimated Creatinine Clearance: 125 mL/min (by C-G formula based on SCr of 0.67 mg/dL).     Scheduled medications  acetaminophen, 975 mg, oral, TID  cholecalciferol, 2,000 Units, oral, Daily  gabapentin, 300 mg, oral, 2 times per day  gabapentin, 600 mg, oral, Nightly  insulin lispro, 0-5 Units, subcutaneous, Before meals & nightly  lisinopril, 20 mg, oral, Daily  loratadine, 10 mg, oral, Daily  melatonin, 5 mg, oral, Daily  methocarbamol, 500 mg, oral, q6h CHERI  multivitamin with minerals, 1 tablet, oral, Daily  oxyCODONE ER, 15 mg, oral, q12h CHERI  pantoprazole, 40 mg, oral, Daily before breakfast  polyethylene glycol, 17 g, oral, BID  sennosides, 1 tablet, oral, BID    Continuous  medications     PRN medications  dextrose, 12.5 g, q15 min PRN  dextrose, 25 g, q15 min PRN  diphenhydrAMINE, 50 mg, Nightly PRN  glucagon, 1 mg, q15 min PRN  glucagon, 1 mg, q15 min PRN  HYDROmorphone, 0.4 mg, q3h PRN  ketotifen, 1 drop, BID PRN  naloxone, 0.2 mg, PRN  oxyCODONE, 10 mg, q6h PRN  oxyCODONE, 15 mg, q3h PRN    PHYSICAL EXAMINATION:    Vital Signs:   Vital signs reviewed  Visit Vitals  /76 (BP Location: Left arm, Patient Position: Lying)   Pulse (!) 121   Temp 36.4 °C (97.5 °F) (Temporal)   Resp 20      Pain Score: 4  Jarquin-Baker FACES Pain Rating: Hurts even more     Physical Exam  Vitals reviewed.   Constitutional:       Comments: Drowsy, awake gentleman sitting up in bed. Occasional facial muscle tensing with movement. Pleasant, cooperative, and participating in interview.     HENT:      Head:      Comments: Normocephalic, atraumatic.     Eyes:      Comments: Sclera clear, EOM intact.   Neck:      Comments: Trachea midline.   Pulmonary:      Comments: Symmetrical chest rise. Regular rate and depth of respirations. 1L NC.  Abdominal:      Comments: Abdomen slightly distended, non tender, and soft.    Musculoskeletal:      Comments: Generalized muscle weakness and atrophy. Mid to upper back appropriately tender. MAHONEY x4. No visible extremity edema.   Skin:     Comments: No lesions, rash, or abrasions present on visible skin. Skin color appropriate for ethnicity.   Neurological:      Comments: A&Ox4, follows commands, no apparent sensory deficits.   Psychiatric:      Comments: Mood and behavior appropriate.        ASSESSMENT/PLAN:  Altaf Parsons is a 53 y.o. male diagnosed with metastatic malignant thymoma (sites of disease mediastinum, lung, TLS spine, T12-L1 paraspinal region, left lateral spine canal T11-S1) dx 2016 s/p Proton therapy, RT, multiple spine surgeries, chemo, and most recently on everolimus. PMHx significant for HTN, GERD, hemothorax, HLD, and sleep apnea. Admitted 5/24/2024 for  further evaluation and management of progressive left leg weakness and worsening pain and to expedite planning for spine surgery. Course complicated by recurrent tumor in his spine, high risk surgery due to recurrent tumor, previous surgeries and previous RT. Supportive and Palliative Oncology is consulted for pain management.     Neoplasm Related Pain  Left lower back, left hip, thigh and knee pain related to metastatic malignant thymoma with extensive spine mets with new enhancement of the cauda equina nerve roots and the conus, concerning for leptomeningeal spread of the malignancy  s/p T5-7 laminectomy and extension of posterior spinal instrumented fusion to T4 on 5/25/24 with Dr. Melendez   Pain is: Acute on chronic, cancer related pain c/b acute post-operative pain  Type: Somatic and neuropathic [frequent muscle spasms]  Pain control: Sub-optimally controlled  Home regimen: oxycodone IR 10mg (x2-3/day), gabapentin 300mg TID, and methocarbamol 500mg TID  Intolerances/previously tried: Morphine (rx: hallucinations)  Personalized pain goal: 3/10  Total OME usage for the past 24 hours: 67.5 OME  Reviewed: (5/28/24) Estimated Creatinine Clearance: 125 mL/min (by C-G formula based on SCr of 0.67 mg/dL).  Reviewed: (5/24/24) LFTs WNL  Continue scheduled acetaminophen 975mg PO q8h  Continue loratadine 10mg PO daily to assist with bone related pain  Continue oxycodone ER (OxyContin) 15mg PO g20k--apxympt 5/28/24  Continue oxycodone IR 10mg PO q3h PRN for moderate pain   Continue oxycodone IR 15mg PO q3h PRN for severe pain   Continue hydromorphone 0.4mg IV q3h PRN for breakthrough pain  oxycodone ER (OxyContin) will need a prior auth--pt reports hallucinations with morphine in the past, added as an intolerance  Continue gabapentin to 300mg/300mg/600mg PO TID  Continue methocarbamol 500mg PO q6h scheduled for muscle spasms  Consider dexamethasone per ortho, recently tapered off  Continue to monitor pain scores and  administer PRN medications as appropriate  Continue/initiate nonpharmacologic pain management strategies including ice/heat therapy, distraction techniques, deep breathing/relaxation techniques, calming music, and repositioning  Continue to monitor for signs of opioid efficacy (pain scores, improved functionality) and toxicity (pinpoint pupils, excess sedation/drowsiness/confusion, respiratory depression, etc.)     Nausea  At risk for nausea with vomiting related to opioids--intermittent occurences post-surgery  Home regimen: None  EKG reviewed from 5/24/24, QTc 488. More updated EKGs not uploaded to EMR for review.   PPI per primary  If needed, start ondansetron 4mg IV/PO q6h PRN for nausea, first line      Constipation  At risk for constipation related to opioids, experiencing intermittent constipation--resolving  Usual bowel pattern: multiple times per day  Home regimen: None  LBM: 5/29/23 early AM--medium, brown, loose/soft  Continue Miralax 17g PO BID  Increase senna to 2 tabs PO BID  Monitor BM frequency, adjust regimen as needed  Goal to have BM without straining q48-72h  Encourage mobility as tolerated, PT/OT following  Consider bedside commode if able     Sleeping Difficulty  Impaired sleep related to pain  Home regimen: None  Pain management as above  Continue melatonin 5mg PO HS     Medical Decision Making/Goals of Care/Advance Care Planning:  (Per [facility] CNP's, 5/24/24 note) Patient's current clinical condition, including diagnosis, prognosis, and management plan, and goals of care were discussed.   Life limiting disease: metastatic malignancy  Family: Supportive wife, children  Performance status: Major limitations due to pain and physical disability  Joys/meaning/strength: Family and Annamarie  Understanding of health: Demonstrates good understanding of disease process, understands plan for possible surgery  Information: Wants full disclosure     Advance Directives  Existence of Advance Directives: No  - not interested  Decision maker: Surrogate decision maker is wife, Marilia Parsons  Code Status: Full Code     Disposition:  Please start the process of having prior authorization with meds to beds deliver medications to patient prior to discharge via [facility] pharmacy. Prescriptions will need to be sent 48-72 hours prior to discharge so that a prior authorization can be completed.      Discharge date pending resolution of acute hospital issues.   Will request an appointment with Outpatient Supportive Oncology as appropriate.     Supportive and Palliative Oncology encounter:  Spoke with patient at bedside  Emotional support provided  Coordination of care     Signature and billing:  Thank you for allowing us to participate in the care of this patient. Recommendations will be communicated back to the consulting service by way of shared electronic medical record or face-to-face.    Medical complexity was high level due to due to complexity of problems, extensive data review, and high risk of management/treatment.    I spent 50 minutes in the care of this patient which included chart review, interviewing patient/family, discussion with primary team, coordination of care, and documentation.    Data:   Diagnostic tests and information reviewed for today's visit:  Conversation with primary team, Most recent labs, Most recent EKG, Medications    Some elements copied from [facility] CNP's note on 5/28/24, the elements have been updated and all reflect current decision making from today, 05/29/24.    Plan of Care discussed with: Primary team, pt     Thank you for asking Supportive and Palliative Oncology to assist with care of this patient.  We will continue to follow.  Please contact us for additional questions or concerns.    SIGNATURE: DANGELO Guerra   PAGER/CONTACT:  Contact information:  Supportive and Palliative Oncology  Monday-Friday 8 AM-5 PM  Epic Secure chat or pager [phone].  After hours and weekends:   pager [phone]

## 2024-05-29 NOTE — PROGRESS NOTES
Altaf Parsons is a 53 y.o. male on day 5 of admission presenting with Malignant thymoma (Multi).    Subjective   Patient examined at bedside.  Pain is controlled.  Feels like his arm is swollen.  NAEO.     Objective     Physical Exam    L1: SILT       L2: SILT      Hip flexors 0/5 Left; 2/5 Right  L3: SILT      Knee extension 1/5 Left; 2/5 Right  L4: SILT      Tib Ant. (Dorsiflexion) 0/5 Left; 0/5 Right  L5: SILT      EHL 0/5 Left; 0/5 Right  S1: SILT      GAS (Plantarflexion) 0/5 Left; 1/5 Right    Drain and prevena intact with bloody output in the drain     Last Recorded Vitals  Blood pressure 126/71, pulse (!) 126, temperature 36.9 °C (98.4 °F), temperature source Temporal, resp. rate 20, height 1.829 m (6'), weight 101 kg (223 lb), SpO2 100%.  Intake/Output last 3 Shifts:  I/O last 3 completed shifts:  In: 1269 (12.5 mL/kg) [P.O.:270; IV Piggyback:999]  Out: 2455 (24.3 mL/kg) [Urine:2200 (0.6 mL/kg/hr); Drains:255]  Weight: 101.2 kg     Relevant Results      Scheduled medications  acetaminophen, 975 mg, oral, TID  cholecalciferol, 2,000 Units, oral, Daily  gabapentin, 300 mg, oral, 2 times per day  gabapentin, 600 mg, oral, Nightly  insulin lispro, 0-5 Units, subcutaneous, Before meals & nightly  lisinopril, 20 mg, oral, Daily  loratadine, 10 mg, oral, Daily  melatonin, 5 mg, oral, Daily  methocarbamol, 500 mg, oral, q6h CHERI  multivitamin with minerals, 1 tablet, oral, Daily  oxyCODONE ER, 15 mg, oral, q12h CHERI  pantoprazole, 40 mg, oral, Daily before breakfast  polyethylene glycol, 17 g, oral, BID  sennosides, 1 tablet, oral, BID      Continuous medications       PRN medications  PRN medications: dextrose, dextrose, diphenhydrAMINE, glucagon, glucagon, HYDROmorphone, ketotifen, naloxone, oxyCODONE, oxyCODONE  Results for orders placed or performed during the hospital encounter of 05/24/24 (from the past 24 hour(s))   POCT GLUCOSE   Result Value Ref Range    POCT Glucose 93 74 - 99 mg/dL   POCT GLUCOSE   Result  Value Ref Range    POCT Glucose 117 (H) 74 - 99 mg/dL   POCT GLUCOSE   Result Value Ref Range    POCT Glucose 105 (H) 74 - 99 mg/dL   POCT GLUCOSE   Result Value Ref Range    POCT Glucose 106 (H) 74 - 99 mg/dL   POCT GLUCOSE   Result Value Ref Range    POCT Glucose 108 (H) 74 - 99 mg/dL                            Assessment/Plan   Principal Problem:    Malignant thymoma (Multi)  Active Problems:    Spinal cord compression (Multi)    Spinal stenosis of lumbar region    Lumbar stenosis with neurogenic claudication    Cancer with leptomeningeal spread (Multi)    53 year old male now s/p T5-7 laminectomy and extension of posterior spinal instrumented fusion to T4 on 5/25/24 with Dr. Melendez.     Plan:  -Weightbearing: Weightbearing as tolerated  -Antibiotics: Ancef 2 g every 8 hours x 3 doses.  Completed   -Diet: Regular diet   -Pain: would recommend transition to orals when able   -Heme: Transfuse as needed, approximately 200cc blood loss intraoperatively  -Ok for dvt ppx from an orthopedic standpoint   -Steroids: No steroids recommended  -Drains: plan for drain removal on 5/29  -Continue prevena until 7/8   -Pathology: Intraoperative pathology sent for permanent, follow-up results  -PT/OT, may require rehabilitation facility given significant motor deficit.  May also require AFO on left lower extremity given foot drop     While admitted, this patient will be followed by the Ortho Spine Team. Please contact below residents with any questions (available via Epic Chat).      First call: Kelsi Mejia PGY-2   Second call: Mario Spivey PGY-3           Mario Spivey,

## 2024-05-29 NOTE — CONSULTS
PM&R Consult Note -Spinal Cord Injury  Patient: Altaf Parsons   Age/sex: 53 y.o.   Medical Record #: [id number]       Referring physician: Nga Rocha    Consulting physician: Abdiel Restrepo MD    Procedures performed on/related to this admission:  T4-9 Fusion T5-7 decompression - Dr Melendez 5/25.    Chief complaint:   Impairments and activities limitations in ADLs and mobility, secondary to spinal cord injury.    HPI:   Altaf Parsons is a 53 y.o. year old male patient with Hx of malignant thymoma (dx. 03/2016) w/ mets to bone and lung who presented with worsening LE weakness and pain, now s/p thoracolumbar T4-9 Fusion T5-7 decompression - Dr Melendez for malignant spinal cord compression on 5/25.  This happened after progressive gradual loss of lower extremity function for the past 2 months, had significant pain before this, steadily progressive also requiring oxycodone and Robaxin.     He says for the past 2 to 3 weeks he has had to sleep on air mattress on the first floor because he could not get to the second floor bedroom,     He does have persistent metastases scattered throughout the thoracic spine, some residual pain mostly incisional, nonradicular.  Reports normal sensation in the lower extremities but no active movement.     Hospital course was complicated by postop pain only    Present status: Dep  PO intake: reg  Pain: moderate  Bowel: TBD  Bladder: [place]  Weight bearing status: WBAT    Precautions: none    Past Medical History:   Diagnosis Date    Abnormal weight gain 12/08/2014    Abnormal weight gain    Hemothorax     Hemothorax    Personal history of other diseases of the digestive system 09/10/2013    History of gastritis    Personal history of other diseases of the digestive system 03/19/2015    History of esophageal reflux    Personal history of other diseases of the nervous system and sense organs 11/24/2020    History of sleep apnea    Personal history of other endocrine, nutritional and  metabolic disease     History of hypercholesterolemia    Personal history of other endocrine, nutritional and metabolic disease     History of hypothyroidism    Personal history of other specified conditions 03/14/2014    History of chest pain    Strain of unspecified muscle, fascia and tendon at shoulder and upper arm level, right arm, initial encounter 05/20/2014    Right shoulder strain        Past Surgical History:   Procedure Laterality Date    CT GUIDED PERCUTANEOUS BIOPSY MUSCLE  11/13/2019    CT GUIDED PERCUTANEOUS BIOPSY MUSCLE 11/13/2019 [facility] AIB LEGACY    CT GUIDED PERCUTANEOUS BIOPSY MUSCLE  8/13/2020    CT GUIDED PERCUTANEOUS BIOPSY MUSCLE 8/13/2020 [facility] AIB LEGACY    CT GUIDED PLEURA PERCUTANEOUS BIOPSY  5/10/2017    CT GUIDED PLEURA PERCUTANEOUS BIOPSY 5/10/2017 [facility] AIB LEGACY    KNEE SURGERY  09/10/2013    Knee Surgery    MR CHEST ANGIO W IV CONTRAST  2/9/2016    MR CHEST ANGIO W IV CONTRAST 2/9/2016 [facility] ANCILLARY LEGACY    OTHER SURGICAL HISTORY  09/10/2013    History Of Prior Surgery        No family history on file.  Reviewed noncontributory    Allergies   Allergen Reactions    Morphine Hallucinations     Pt reports hallucinations previously with morpine        acetaminophen, 975 mg, oral, TID  cholecalciferol, 2,000 Units, oral, Daily  enoxaparin, 40 mg, subcutaneous, q24h CHERI  gabapentin, 300 mg, oral, 2 times per day  gabapentin, 600 mg, oral, Nightly  insulin lispro, 0-5 Units, subcutaneous, Before meals & nightly  lisinopril, 20 mg, oral, Daily  loratadine, 10 mg, oral, Daily  melatonin, 5 mg, oral, Daily  methocarbamol, 500 mg, oral, q6h [facility]  multivitamin with minerals, 1 tablet, oral, Daily  oxyCODONE ER, 15 mg, oral, q12h CHERI  pantoprazole, 40 mg, oral, Daily before breakfast  polyethylene glycol, 17 g, oral, BID  sennosides, 1 tablet, oral, BID         PRN medications: dextrose, dextrose, diphenhydrAMINE, glucagon, glucagon, HYDROmorphone, ketotifen, naloxone, oxyCODONE, oxyCODONE      Social History:  Tobacco/EtOh/Illicits:  Working History: Disabled, former   Social supports: Wife, 2 children at home ages 14 and 21 are helpful, mother will be in town also helping through July 14, 24 hour assistance will be available at least until then  Home situation: Lives in two-level home 2-3 stairs to enter (with  rails), and full flight stairs to B/B.  Plans to have wheelchair ramp and chairlift system installed to get to the upper level    Functional History:  Home DME: none Walker  Premorbid ADL's: independent   Premorbid Mobility: independent except per above  Present: Dependent    Physical Therapy Evaluation  -mod/max recommended high intensity    Occupational Therapy Evaluation  -mod/max recommended high intensity  Speech-Language Pathology Evaluation   NA    Review of Systems reviewed 12 points otherwise noncontributory    Physical Exam     NATALIE (American Spinal Injury Association) Classification of Spinal Cord Injury   Strength (NATALIE motor exam, right/left):   -C5 (elbow flexors) - T1 5/5   -L2 (hip flexors) 0/0 thru S1 0/0   -Volitional Anal Sphincter Contraction: NT  Sensation (NATALIE sensory exam):   - Light Touch intact  throughout   -Any S4-5 or deep anal sensation: NT   Sensory Level NA   Motor Level Right: L1   Neurologic Level of Injury: T6 NATALIE B  AIS Category:    Vital Capacity: na    IMPRESSION:  Altaf Parsons is a 53 y.o. year old male patient with worsening LE weakness and pain, now s/p thoracolumbar T4-9 Fusion T5-7 decompression - Dr Melendez for malignant spinal cord compression on 5/25. .       On physical exam, patient is approximately T6 NATALIE B.      PM&R was consulted for SCI recommendations and for IRF appropriateness.    Recommendations:  # SCI  - Non-traumatic SCI resulting in complete paraplegia  - Precautions: WBAT, repeat imaging and follow up per surgery   - Spinal Shock still possible but given long progressive course of weakness is likely at stable status  -  Psychiatric: H/o denies    # Pain  -Per palliative care service, agree with OxyContin/oxycodone, wean Dilaudid as soon as possible and titrate up neurontin as appropriate.  - Monitor and adjust medications as needed.     # Risk for Autonomic Dysreflexia is low but still possible  - Autonomic dysreflexia (AD) occurs in patients with spinal cord injury at or above T6. Features of AD include bradycardia and hypertension.   - Risk for autonomic dysreflexia, if this occurs, find and eliminate source (, GI most likely), sit upright, loosen clothing, Nitropaste PRN.  - Risk for orthostatic hypotension when OOB- consider thigh high TEDs, ace wraps, abdominal binder when OOB.    # Renal/Neurogenic bladder:    -Recommend leave indwelling arnett catheter in place through discharge to acute inpatient rehab.   - If arnett removed, check for urinary retention with bladder scans q4h and straight cath for any volume >400cc.  - Please Stat lock arnett to proximal medial thigh or lower abdomen to prevent bladder neck trauma   - treat UTIs only if symptomatic     # GI/Neurogenic Bowel:  - Last BM preop?   - Suggest bowel routine with Colace 100 mg PO BID-TID, senna 17.2 mg PO at noon and digital stimulation followed by dulcolax suppository at bedtime. May repeat digital stimulation every 5 minutes x 4. MOM PRN no BM x 2 days  - Monitor for ileus for first 2 weeks  - GI stress ppx when on steroids or NPO    # Nutrition/FEN -   - Patient is currently on regular diet    # Immobility   - Prevent secondary complications   - Skin protection: low air loss mattress, turn q 2 hours in bed, maintain bowel and bladder continence/containment  - Prevention of pulmonary complications: incentive spirometry and pulmonary toileting  - Maintain adequate nutrition and hydration  - Q shift PROM of upper and lower extremities to prevent contracture    # Impaired mobility and Impaired independence with ADLs and I/ADLs  - Continue PT, working on bed  mobility, transfers, balance, endurance, strength, gait, eval for appropriate assistive device  - Continue OT, working on functional cognition, functional mobility, upper limb strength/coordination, balance, endurance, eval for appropriate adaptive equipment, ADLs, and I/ADLs.    # Dispo  - Patient would benefit from an acute inpatient rehab stay to improve functional outcome of impaired mobility, ADL's, transfers, and self-care.  Treatment plan will include a comprehensive rehabilitation program with intense physical therapy, occupational therapy, and speech language pathology for 3 hours/day, 5 days/week.  Patient also requires physician supervision for monitoring .   - Patient prefers  [facility] when medically and orthopedically stable, but we could manage him at [facility] [place] or [place] if needed  - Post rehab destination: anticipated home.  I reinforced importance of getting ramp and chairlift installed and not wait for this any longer.  - Must be off IV pain meds prior to transfer  - For questions, please contact via [facility]    We will follow along with you.  Thank you for the consult.  We will follow along with you.  Thank you for the consult. Dr ÁLVARO Espinoza will be covering on Friday and Dr John Ochoa  on Mon next week if needed    Abdiel Restrepo M.D, FAAPMR, R-MSK  Chief, Division of PM&R  Board Certified in PM&R, Sports Medicine and Musculoskeletal Ultrasound

## 2024-05-29 NOTE — PROGRESS NOTES
05/29/24 1240   Discharge Planning   Who is requesting discharge planning? Provider   Home or Post Acute Services Post acute facilities (Rehab/SNF/etc)   Type of Post Acute Facility Services Rehab   Patient expects to be discharged to: Rehab   Does the patient need discharge transport arranged? Yes   RoundTrip coordination needed? Yes   Has discharge transport been arranged? No     5/29/24 @ 1240  Spoke with patient at bedside. He originally wanted [facility], but didn't realize it was in [place], and wants to stay closer to home, so his wife and mom can come visit. Referrals already sent to [facility] and [place]. FOC is [facility]. PM&R consult completed today. Will continue to follow patient for his discharge needs.  Zoë Bolton RN TCC

## 2024-05-29 NOTE — PROGRESS NOTES
Spiritual Care Visit      introduced self and spiritual care services to patient, explaining services available and checking in to see how patient is doing today. We spoke about the patient going to rehab for the first time, the patient's strong understanding of yesica and God, his commitment to see the positive through God in all that he does, and what being sick has been like for him.  offered a supportive and non-anxious presence, and encouraged the patient to reach out with any future needs.     Rev. Edouard Vu, Supportive Oncology

## 2024-05-30 LAB
ALBUMIN SERPL BCP-MCNC: 2.8 G/DL (ref 3.4–5)
ANION GAP SERPL CALC-SCNC: 12 MMOL/L (ref 10–20)
BASOPHILS # BLD AUTO: 0.02 X10*3/UL (ref 0–0.1)
BASOPHILS NFR BLD AUTO: 0.2 %
BUN SERPL-MCNC: 8 MG/DL (ref 6–23)
CALCIUM SERPL-MCNC: 8.2 MG/DL (ref 8.6–10.6)
CHLORIDE SERPL-SCNC: 95 MMOL/L (ref 98–107)
CO2 SERPL-SCNC: 29 MMOL/L (ref 21–32)
CREAT SERPL-MCNC: 0.68 MG/DL (ref 0.5–1.3)
EGFRCR SERPLBLD CKD-EPI 2021: >90 ML/MIN/1.73M*2
EOSINOPHIL # BLD AUTO: 0.2 X10*3/UL (ref 0–0.7)
EOSINOPHIL NFR BLD AUTO: 2.3 %
ERYTHROCYTE [DISTWIDTH] IN BLOOD BY AUTOMATED COUNT: 16.5 % (ref 11.5–14.5)
GLUCOSE BLD MANUAL STRIP-MCNC: 91 MG/DL (ref 74–99)
GLUCOSE SERPL-MCNC: 89 MG/DL (ref 74–99)
HCT VFR BLD AUTO: 25.2 % (ref 41–52)
HGB BLD-MCNC: 7.8 G/DL (ref 13.5–17.5)
IMM GRANULOCYTES # BLD AUTO: 0.1 X10*3/UL (ref 0–0.7)
IMM GRANULOCYTES NFR BLD AUTO: 1.1 % (ref 0–0.9)
LYMPHOCYTES # BLD AUTO: 0.54 X10*3/UL (ref 1.2–4.8)
LYMPHOCYTES NFR BLD AUTO: 6.2 %
MAGNESIUM SERPL-MCNC: 2.18 MG/DL (ref 1.6–2.4)
MCH RBC QN AUTO: 25.1 PG (ref 26–34)
MCHC RBC AUTO-ENTMCNC: 31 G/DL (ref 32–36)
MCV RBC AUTO: 81 FL (ref 80–100)
MONOCYTES # BLD AUTO: 1.23 X10*3/UL (ref 0.1–1)
MONOCYTES NFR BLD AUTO: 14.1 %
NEUTROPHILS # BLD AUTO: 6.62 X10*3/UL (ref 1.2–7.7)
NEUTROPHILS NFR BLD AUTO: 76.1 %
NRBC BLD-RTO: 0 /100 WBCS (ref 0–0)
PHOSPHATE SERPL-MCNC: 3.3 MG/DL (ref 2.5–4.9)
PLATELET # BLD AUTO: 353 X10*3/UL (ref 150–450)
POTASSIUM SERPL-SCNC: 4 MMOL/L (ref 3.5–5.3)
RBC # BLD AUTO: 3.11 X10*6/UL (ref 4.5–5.9)
SODIUM SERPL-SCNC: 132 MMOL/L (ref 136–145)
WBC # BLD AUTO: 8.7 X10*3/UL (ref 4.4–11.3)

## 2024-05-30 PROCEDURE — 2500000001 HC RX 250 WO HCPCS SELF ADMINISTERED DRUGS (ALT 637 FOR MEDICARE OP)

## 2024-05-30 PROCEDURE — 82947 ASSAY GLUCOSE BLOOD QUANT: CPT

## 2024-05-30 PROCEDURE — 37799 UNLISTED PX VASCULAR SURGERY: CPT

## 2024-05-30 PROCEDURE — 2500000004 HC RX 250 GENERAL PHARMACY W/ HCPCS (ALT 636 FOR OP/ED)

## 2024-05-30 PROCEDURE — 1200000003 HC ONCOLOGY  ROOM WITH TELEMETRY DAILY

## 2024-05-30 PROCEDURE — 85025 COMPLETE CBC W/AUTO DIFF WBC: CPT

## 2024-05-30 PROCEDURE — 80069 RENAL FUNCTION PANEL: CPT

## 2024-05-30 PROCEDURE — 83735 ASSAY OF MAGNESIUM: CPT

## 2024-05-30 PROCEDURE — 99233 SBSQ HOSP IP/OBS HIGH 50: CPT

## 2024-05-30 RX ADMIN — DIPHENHYDRAMINE HYDROCHLORIDE 50 MG: 25 CAPSULE ORAL at 21:21

## 2024-05-30 RX ADMIN — PANTOPRAZOLE SODIUM 40 MG: 40 TABLET, DELAYED RELEASE ORAL at 06:01

## 2024-05-30 RX ADMIN — LISINOPRIL 20 MG: 20 TABLET ORAL at 09:02

## 2024-05-30 RX ADMIN — METHOCARBAMOL 500 MG: 500 TABLET ORAL at 06:01

## 2024-05-30 RX ADMIN — ACETAMINOPHEN 975 MG: 325 TABLET ORAL at 21:14

## 2024-05-30 RX ADMIN — OXYCODONE HYDROCHLORIDE 10 MG: 5 TABLET ORAL at 13:06

## 2024-05-30 RX ADMIN — GABAPENTIN 300 MG: 300 CAPSULE ORAL at 16:42

## 2024-05-30 RX ADMIN — METHOCARBAMOL 500 MG: 500 TABLET ORAL at 11:58

## 2024-05-30 RX ADMIN — METHOCARBAMOL 500 MG: 500 TABLET ORAL at 00:27

## 2024-05-30 RX ADMIN — GABAPENTIN 600 MG: 300 CAPSULE ORAL at 21:14

## 2024-05-30 RX ADMIN — KETOTIFEN FUMARATE 1 DROP: 0.25 SOLUTION/ DROPS OPHTHALMIC at 11:58

## 2024-05-30 RX ADMIN — OXYCODONE HYDROCHLORIDE 10 MG: 5 TABLET ORAL at 18:50

## 2024-05-30 RX ADMIN — GABAPENTIN 300 MG: 300 CAPSULE ORAL at 09:02

## 2024-05-30 RX ADMIN — OXYCODONE HYDROCHLORIDE 15 MG: 15 TABLET, FILM COATED, EXTENDED RELEASE ORAL at 21:14

## 2024-05-30 RX ADMIN — LORATADINE 10 MG: 10 TABLET ORAL at 09:00

## 2024-05-30 RX ADMIN — ENOXAPARIN SODIUM 40 MG: 100 INJECTION SUBCUTANEOUS at 09:02

## 2024-05-30 RX ADMIN — OXYCODONE HYDROCHLORIDE 15 MG: 5 TABLET ORAL at 06:01

## 2024-05-30 RX ADMIN — OXYCODONE HYDROCHLORIDE 15 MG: 15 TABLET, FILM COATED, EXTENDED RELEASE ORAL at 09:01

## 2024-05-30 RX ADMIN — ACETAMINOPHEN 975 MG: 325 TABLET ORAL at 09:02

## 2024-05-30 RX ADMIN — Medication 2000 UNITS: at 09:01

## 2024-05-30 RX ADMIN — Medication 1 TABLET: at 09:01

## 2024-05-30 RX ADMIN — Medication 5 MG: at 21:28

## 2024-05-30 RX ADMIN — ACETAMINOPHEN 975 MG: 325 TABLET ORAL at 16:42

## 2024-05-30 RX ADMIN — METHOCARBAMOL 500 MG: 500 TABLET ORAL at 17:47

## 2024-05-30 RX ADMIN — DIPHENHYDRAMINE HYDROCHLORIDE 50 MG: 25 CAPSULE ORAL at 00:30

## 2024-05-30 ASSESSMENT — PAIN SCALES - GENERAL
PAINLEVEL_OUTOF10: 5 - MODERATE PAIN
PAINLEVEL_OUTOF10: 7
PAINLEVEL_OUTOF10: 6
PAINLEVEL_OUTOF10: 7
PAINLEVEL_OUTOF10: 0 - NO PAIN
PAINLEVEL_OUTOF10: 7
PAINLEVEL_OUTOF10: 2

## 2024-05-30 ASSESSMENT — COGNITIVE AND FUNCTIONAL STATUS - GENERAL
HELP NEEDED FOR BATHING: A LOT
DRESSING REGULAR LOWER BODY CLOTHING: A LOT
STANDING UP FROM CHAIR USING ARMS: A LOT
CLIMB 3 TO 5 STEPS WITH RAILING: TOTAL
DRESSING REGULAR LOWER BODY CLOTHING: A LOT
WALKING IN HOSPITAL ROOM: TOTAL
TOILETING: A LOT
DRESSING REGULAR UPPER BODY CLOTHING: A LITTLE
TOILETING: A LOT
MOVING TO AND FROM BED TO CHAIR: TOTAL
HELP NEEDED FOR BATHING: A LOT
MOVING FROM LYING ON BACK TO SITTING ON SIDE OF FLAT BED WITH BEDRAILS: A LITTLE
DAILY ACTIVITIY SCORE: 17
TURNING FROM BACK TO SIDE WHILE IN FLAT BAD: A LOT
WALKING IN HOSPITAL ROOM: TOTAL
CLIMB 3 TO 5 STEPS WITH RAILING: TOTAL
MOBILITY SCORE: 10
TURNING FROM BACK TO SIDE WHILE IN FLAT BAD: A LOT
DRESSING REGULAR UPPER BODY CLOTHING: A LITTLE
MOVING TO AND FROM BED TO CHAIR: TOTAL
MOBILITY SCORE: 10
MOVING FROM LYING ON BACK TO SITTING ON SIDE OF FLAT BED WITH BEDRAILS: A LITTLE
STANDING UP FROM CHAIR USING ARMS: A LOT
DAILY ACTIVITIY SCORE: 17

## 2024-05-30 ASSESSMENT — PAIN DESCRIPTION - LOCATION
LOCATION: BACK

## 2024-05-30 ASSESSMENT — PAIN - FUNCTIONAL ASSESSMENT: PAIN_FUNCTIONAL_ASSESSMENT: 0-10

## 2024-05-30 NOTE — PROGRESS NOTES
Internal Medicine Daily Progress Note    Subjective     Interval events:  Tachycardic to 120s overnight. Reports pain is well controlled, about 3-4/10. Used oxycodone IR 10 mg x1 and 15 mg x2 yesterday, no PRN IV Dilaudid needed for breakthru pain. Ortho removed hemovac yesterday and changed provena.       Objective     Vitals:  Visit Vitals  BP 94/59 (BP Location: Left arm, Patient Position: Lying)   Pulse (!) 114   Temp 37.1 °C (98.8 °F) (Temporal)   Resp 16         Intake/Output Summary (Last 24 hours) at 5/30/2024 1024  Last data filed at 5/30/2024 0845  Gross per 24 hour   Intake --   Output 1550 ml   Net -1550 ml       Physical exam:  Constitutional: Well-developed male in no acute distress.  HEENT: Normocephalic, atraumatic.  Respiratory: CTA anteriorly. No wheezes, rales, or rhonchi. Normal respiratory effort on 1L NC.  Cardiovascular: RRR. No murmurs, gallops, or rubs.  Abdominal: Soft, nondistended, nontender to palpation. Bowel sounds present.  Neuro: A&Ox3  MSK: No LE edema bilaterally  Skin: Warm, dry. No rashes or wounds  Psych: Appropriate mood and affect    Medications:  acetaminophen, 975 mg, oral, TID  cholecalciferol, 2,000 Units, oral, Daily  enoxaparin, 40 mg, subcutaneous, q24h CHERI  gabapentin, 300 mg, oral, 2 times per day  gabapentin, 600 mg, oral, Nightly  lisinopril, 20 mg, oral, Daily  loratadine, 10 mg, oral, Daily  melatonin, 5 mg, oral, Daily  methocarbamol, 500 mg, oral, q6h CHERI  multivitamin with minerals, 1 tablet, oral, Daily  oxyCODONE ER, 15 mg, oral, q12h CHERI  pantoprazole, 40 mg, oral, Daily before breakfast  polyethylene glycol, 17 g, oral, BID  sennosides, 2 tablet, oral, BID           PRN medications: dextrose, dextrose, diphenhydrAMINE, glucagon, glucagon, HYDROmorphone, ketotifen, naloxone, oxyCODONE, oxyCODONE    Labs:      Results from last 72 hours   Lab Units 05/30/24  0835 05/29/24  0754 05/28/24  0755   WBC AUTO x10*3/uL 8.7 8.3 9.1   HEMOGLOBIN g/dL 7.8* 8.5*  8.6*   HEMATOCRIT % 25.2* 25.8* 28.7*   PLATELETS AUTO x10*3/uL 353 310 268   SODIUM mmol/L 132* 133* 133*   POTASSIUM mmol/L 4.0 3.9 4.5   CHLORIDE mmol/L 95* 95* 97*   CO2 mmol/L 29 29 27   BUN mg/dL 8 9 10   CREATININE mg/dL 0.68 0.70 0.67   GLUCOSE mg/dL 89 89 84   CALCIUM mg/dL 8.2* 8.1* 8.2*   MAGNESIUM mg/dL 2.18 2.04 2.24   PHOSPHORUS mg/dL 3.3 3.0 2.4*   ALBUMIN g/dL 2.8* 3.0* 3.0*           Imaging:  No results found.       Assessment and Plan:  Altaf Parsons is a 53 y.o. male with PMHx of malignant thymoma (dx. 03/2016) w/ mets to bone and lung who presented with worsening LE weakness and pain, now s/p thoracolumbar decompression/fusion for malignant spinal cord compression on 5/25.    Updates 05/30/24:  - sinus tachycardia continues, likely pain-related or symptomatic anemia as other etiologies ruled out/less likely  - giving 1 unit blood for symptomatic anemia  - pending dispo to acute rehab    #Malignant spinal cord compression, s/p decompression on 5/25  - MRI (4/26) showing new enhancement of cauda equine nerve roots, concern for leptomeningeal spread  - new progressive weakness possibly related to this leptomeningeal spread of disease  - ortho performed thoracolumbar decompression and fusion on 5/25  Plan  - appreciate ortho recs:   - no steroids   - prevena with q8h outputs, hemovac removed 5/29  - PT/OT for dispo, patient has outpatient PT/OT  - Weight bearing as tolerated  - Continue pain control  gabapentin, tylenol     #Metastatic Malignant Thymoma, Type B2 (predominant lymphocytic population with scattered epithelial cells)  - July 2007: Massive left-sided hemothorax. Underwent left VATS drainage of massive hemothorax, pleural biopsy and core needle biopsy of lingular mass and decortication. Biopsies all negative. CT scan showed 7 cm lesion within mediastinum with a calcified rim. Followed with serial Cts  - Feb 2016: New enhancing soft tissue mass superior to calcified structure with  pericardial LN and basilar RLL nodule  - March 4, 2016: Diagnosed from left lower lobe of lung wedge resection and mediastinal fat biopsy  - Aug 1 2016- Sep 7 2016: 59.4 Gy proton therapy to resection bed  - May 2017: Left pleural nodule showing thymoma  - 8/4/17- Started 3 cycles of cyclophosphamide, adriamycin, and cisplatin (PAC-P/PAC/CAP regimen, Demi et al 2004)  - 11/2019: Left serratus anterior biopsy positive for thymoma  - 12/10/19- 12/31/19: SINDI mass and left serratus anterior mass, 45 CGE/15 fx, PROTONS   - Aug 2020: T12-L1 paraspinal mass showing thymoma  -Jan 2021: Spine tumor biopsy showing thymoma. Posterior spinal fusion T9-L1 with use of bilateral pedicle screw instrumentation, allograft and demineralized bone matrix fiber. 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 Progressed  -April 2022 - Finished RT of 30 Gy in 10 fractions from T4-T6, and L1-L3  - Dec 2022- April 2023: Started on single agent capecitabine with stable disease  - Sep 2023: Some somatostatin receptor disease noted by [employer] PET  -Sep 2023: Single agent everolimus started. Stable disease so far  - Dr. Rodriguez aware  Plan:  - discontinued everolimus as advised by Dr. Rodriguez     #Chronic left low back pain secondary to malignancy  - Consulted supportive oncology, appreciate recs   -oxycontin 15 mg BID, PRN oxy 10 or oxy 15 with IV dilaudid 0.4 mg as breakthrough  - Hold dexamethasone 4 mg daily  - continue gabapentin 300-300-600  - cont home methocarbimol 500mg Q6     #HTN  - At home was on lisinopril 20-hydrochlorothiazide 25 mg  - cont home lisinopril 20mg  - Can consider restarting hydrochlorothiazide 25 mg later in hospitalization      #Misc  - cont home Vit D, multivitamin, Protonix 40 mg daily     F:  PRN  E: prn  N: regular diet  A: PIV, Meza  DVT prophylaxis: Lovenox  Code status: Full code, confirmed on admission  NOK: Marilia (wife) #[phone]    Patient and plan discussed with attending physician.    Nga Rocha MD  PGY-1 Internal Medicine  [facility] Oncology Service

## 2024-05-30 NOTE — CARE PLAN
The patient's goals for the shift include      The clinical goals for the shift include Pt will remain safe and free from injury throughout shift.

## 2024-05-30 NOTE — SIGNIFICANT EVENT
Rapid Response RN Note    RADAR score 6 due to the following VS: T 37.1 °Celsius; ; RR 16; BP 94/59; SPO2 97%.     Reviewed above VS with bedside RN via phone.  Vital signs within patient's current trends, other than slightly lower BP. RN stated pt was resting with plan to evaluate. No acute change in condition.  No interventions by rapid response team indicated at this time.    Staff to page rapid response for any concerns or acute change in condition/VS. Obed Rico RN.

## 2024-05-30 NOTE — PROGRESS NOTES
05/29/24 1240   Discharge Planning   Who is requesting discharge planning? Provider   Home or Post Acute Services Post acute facilities (Rehab/SNF/etc)   Type of Post Acute Facility Services Rehab   Patient expects to be discharged to: Rehab   Does the patient need discharge transport arranged? Yes   RoundTrip coordination needed? Yes   Has discharge transport been arranged? No     5/29/24 @ 1240  Spoke with patient at bedside. He originally wanted [facility], but didn't realize it was in [place], and wants to stay closer to home, so his wife and mom can come visit. Referrals already sent to [facility] and [facility]. FOC is [facility]. PM&R consult completed today. Will continue to follow patient for his discharge needs.  Zoë Bolton RN TCC    5/30/24 @ 1130  [facility]. Per team, patient is medically ready. Will continue to follow for updates.  UPDATE 1525:  [facility] unable to accept. Patient aware that [facility] can accept. He is agreeable. Precert requested to be started by facility.   Zoë Bolton RN TCC

## 2024-05-30 NOTE — SIGNIFICANT EVENT
Rapid Response RN Note    Rapid response RN at bedside for RADAR score 6 due to the following VS: T 36.9 °Celsius; ; RR 20; /80; SPO2 95%.     Reviewed above VS with bedside RN.  VS within patient's current trends.  Patient denied pain, shortness of breath, dizziness or lightheadedness.  No interventions by rapid response team indicated at this time.      Staff to page rapid response for any concerns or acute change in condition/VS.

## 2024-05-30 NOTE — CARE PLAN
Problem: Pain - Adult  Goal: Verbalizes/displays adequate comfort level or baseline comfort level  Outcome: Progressing     Problem: Safety - Adult  Goal: Free from fall injury  Outcome: Progressing     Problem: Skin  Goal: Decreased wound size/increased tissue granulation at next dressing change  Outcome: Progressing  Flowsheets (Taken 5/29/2024 2308)  Decreased wound size/increased tissue granulation at next dressing change: Promote sleep for wound healing  Goal: Participates in plan/prevention/treatment measures  Outcome: Progressing  Flowsheets (Taken 5/29/2024 2308)  Participates in plan/prevention/treatment measures: Elevate heels  Goal: Prevent/manage excess moisture  Outcome: Progressing  Flowsheets (Taken 5/29/2024 2308)  Prevent/manage excess moisture: Moisturize dry skin  Goal: Prevent/minimize sheer/friction injuries  Outcome: Progressing  Flowsheets (Taken 5/29/2024 2308)  Prevent/minimize sheer/friction injuries: Turn/reposition every 2 hours/use positioning/transfer devices  Goal: Promote/optimize nutrition  Outcome: Progressing  Flowsheets (Taken 5/29/2024 2308)  Promote/optimize nutrition: Offer water/supplements/favorite foods  Goal: Promote skin healing  Outcome: Progressing  Flowsheets (Taken 5/29/2024 2308)  Promote skin healing: Rotate device position/do not position patient on device     The clinical goals for the shift include pt will remain safe and free from injury and falls throughout shift

## 2024-05-30 NOTE — PROGRESS NOTES
Altaf Parsons is a 53 y.o. male on day 6 of admission presenting with Malignant thymoma (Multi).    Subjective   Patient examined at bedside.  NAEO.  Denies SOB or chest pain.  Denies N/T down the lower extremities.     Objective     Physical Exam    L1: SILT       L2: SILT      Hip flexors 0/5 Left; 2/5 Right  L3: SILT      Knee extension 1/5 Left; 2/5 Right  L4: SILT      Tib Ant. (Dorsiflexion) 0/5 Left; 0/5 Right  L5: SILT      EHL 0/5 Left; 0/5 Right  S1: SILT      GAS (Plantarflexion) 0/5 Left; 1/5 Right    Drain removed on 5/29.  Prevena with no leaks (replaced on 5/29)    Last Recorded Vitals  Blood pressure 102/66, pulse (!) 121, temperature 36.6 °C (97.9 °F), temperature source Temporal, resp. rate 18, height 1.829 m (6'), weight 101 kg (223 lb), SpO2 98%.  Intake/Output last 3 Shifts:  I/O last 3 completed shifts:  In: 270 (2.7 mL/kg) [P.O.:270]  Out: 2875 (28.4 mL/kg) [Urine:2650 (0.7 mL/kg/hr); Drains:225]  Weight: 101.2 kg     Relevant Results      Scheduled medications  acetaminophen, 975 mg, oral, TID  cholecalciferol, 2,000 Units, oral, Daily  enoxaparin, 40 mg, subcutaneous, q24h CHERI  gabapentin, 300 mg, oral, 2 times per day  gabapentin, 600 mg, oral, Nightly  insulin lispro, 0-5 Units, subcutaneous, Before meals & nightly  lisinopril, 20 mg, oral, Daily  loratadine, 10 mg, oral, Daily  melatonin, 5 mg, oral, Daily  methocarbamol, 500 mg, oral, q6h CHERI  multivitamin with minerals, 1 tablet, oral, Daily  oxyCODONE ER, 15 mg, oral, q12h CHERI  pantoprazole, 40 mg, oral, Daily before breakfast  polyethylene glycol, 17 g, oral, BID  sennosides, 2 tablet, oral, BID      Continuous medications       PRN medications  PRN medications: dextrose, dextrose, diphenhydrAMINE, glucagon, glucagon, HYDROmorphone, ketotifen, naloxone, oxyCODONE, oxyCODONE  Results for orders placed or performed during the hospital encounter of 05/24/24 (from the past 24 hour(s))   Renal Function Panel   Result Value Ref Range     Glucose 89 74 - 99 mg/dL    Sodium 133 (L) 136 - 145 mmol/L    Potassium 3.9 3.5 - 5.3 mmol/L    Chloride 95 (L) 98 - 107 mmol/L    Bicarbonate 29 21 - 32 mmol/L    Anion Gap 13 10 - 20 mmol/L    Urea Nitrogen 9 6 - 23 mg/dL    Creatinine 0.70 0.50 - 1.30 mg/dL    eGFR >90 >60 mL/min/1.73m*2    Calcium 8.1 (L) 8.6 - 10.6 mg/dL    Phosphorus 3.0 2.5 - 4.9 mg/dL    Albumin 3.0 (L) 3.4 - 5.0 g/dL   Magnesium   Result Value Ref Range    Magnesium 2.04 1.60 - 2.40 mg/dL   CBC and Auto Differential   Result Value Ref Range    WBC 8.3 4.4 - 11.3 x10*3/uL    nRBC 0.0 0.0 - 0.0 /100 WBCs    RBC 3.32 (L) 4.50 - 5.90 x10*6/uL    Hemoglobin 8.5 (L) 13.5 - 17.5 g/dL    Hematocrit 25.8 (L) 41.0 - 52.0 %    MCV 78 (L) 80 - 100 fL    MCH 25.6 (L) 26.0 - 34.0 pg    MCHC 32.9 32.0 - 36.0 g/dL    RDW 16.5 (H) 11.5 - 14.5 %    Platelets 310 150 - 450 x10*3/uL    Neutrophils % 76.4 40.0 - 80.0 %    Immature Granulocytes %, Automated 0.5 0.0 - 0.9 %    Lymphocytes % 6.8 13.0 - 44.0 %    Monocytes % 14.4 2.0 - 10.0 %    Eosinophils % 1.7 0.0 - 6.0 %    Basophils % 0.2 0.0 - 2.0 %    Neutrophils Absolute 6.36 1.20 - 7.70 x10*3/uL    Immature Granulocytes Absolute, Automated 0.04 0.00 - 0.70 x10*3/uL    Lymphocytes Absolute 0.57 (L) 1.20 - 4.80 x10*3/uL    Monocytes Absolute 1.20 (H) 0.10 - 1.00 x10*3/uL    Eosinophils Absolute 0.14 0.00 - 0.70 x10*3/uL    Basophils Absolute 0.02 0.00 - 0.10 x10*3/uL   POCT GLUCOSE   Result Value Ref Range    POCT Glucose 108 (H) 74 - 99 mg/dL   POCT GLUCOSE   Result Value Ref Range    POCT Glucose 135 (H) 74 - 99 mg/dL   POCT GLUCOSE   Result Value Ref Range    POCT Glucose 130 (H) 74 - 99 mg/dL   POCT GLUCOSE   Result Value Ref Range    POCT Glucose 121 (H) 74 - 99 mg/dL                            Assessment/Plan   Principal Problem:    Malignant thymoma (Multi)  Active Problems:    Spinal cord compression (Multi)    Spinal stenosis of lumbar region    Lumbar stenosis with neurogenic claudication     Cancer with leptomeningeal spread (Multi)    53 year old male now s/p T5-7 laminectomy and extension of posterior spinal instrumented fusion to T4 on 5/25/24 with Dr. Melendez.     Plan:  -Weightbearing: Weightbearing as tolerated  -Diet: Regular diet   -pain with oral medications  -Heme: Transfuse as needed  -Ok for dvt ppx from an orthopedic standpoint   -Drains: plan for drain removal on 5/29  -Prevena dressing changed on 5/30  -Pathology: Intraoperative pathology sent for permanent, follow-up results  -PT/OT, may require rehabilitation facility given significant motor deficit.  May also require AFO on left lower extremity given foot drop  -PT recs acute rehab     While admitted, this patient will be followed by the Ortho Spine Team. Please contact below residents with any questions (available via Epic Chat).      First call: Kelsi Mejia PGY-2   Second call: Mario Spivey PGY-3           Mario Spivey,

## 2024-05-31 LAB
ABO GROUP (TYPE) IN BLOOD: NORMAL
ABO GROUP (TYPE) IN BLOOD: NORMAL
ALBUMIN SERPL BCP-MCNC: 2.9 G/DL (ref 3.4–5)
ANION GAP SERPL CALC-SCNC: 13 MMOL/L (ref 10–20)
ANTIBODY SCREEN: NORMAL
ANTIBODY SCREEN: NORMAL
BASOPHILS # BLD AUTO: 0.02 X10*3/UL (ref 0–0.1)
BASOPHILS # BLD AUTO: 0.02 X10*3/UL (ref 0–0.1)
BASOPHILS NFR BLD AUTO: 0.2 %
BASOPHILS NFR BLD AUTO: 0.3 %
BLOOD EXPIRATION DATE: NORMAL
BUN SERPL-MCNC: 10 MG/DL (ref 6–23)
CALCIUM SERPL-MCNC: 8.4 MG/DL (ref 8.6–10.6)
CHLORIDE SERPL-SCNC: 94 MMOL/L (ref 98–107)
CO2 SERPL-SCNC: 30 MMOL/L (ref 21–32)
CREAT SERPL-MCNC: 0.71 MG/DL (ref 0.5–1.3)
DISPENSE STATUS: NORMAL
EGFRCR SERPLBLD CKD-EPI 2021: >90 ML/MIN/1.73M*2
EOSINOPHIL # BLD AUTO: 0.22 X10*3/UL (ref 0–0.7)
EOSINOPHIL # BLD AUTO: 0.26 X10*3/UL (ref 0–0.7)
EOSINOPHIL NFR BLD AUTO: 2.8 %
EOSINOPHIL NFR BLD AUTO: 3.1 %
ERYTHROCYTE [DISTWIDTH] IN BLOOD BY AUTOMATED COUNT: 16.5 % (ref 11.5–14.5)
ERYTHROCYTE [DISTWIDTH] IN BLOOD BY AUTOMATED COUNT: 16.6 % (ref 11.5–14.5)
GLUCOSE SERPL-MCNC: 76 MG/DL (ref 74–99)
HCT VFR BLD AUTO: 28.2 % (ref 41–52)
HCT VFR BLD AUTO: 29.6 % (ref 41–52)
HGB BLD-MCNC: 8.6 G/DL (ref 13.5–17.5)
HGB BLD-MCNC: 9.4 G/DL (ref 13.5–17.5)
IMM GRANULOCYTES # BLD AUTO: 0.04 X10*3/UL (ref 0–0.7)
IMM GRANULOCYTES # BLD AUTO: 0.06 X10*3/UL (ref 0–0.7)
IMM GRANULOCYTES NFR BLD AUTO: 0.5 % (ref 0–0.9)
IMM GRANULOCYTES NFR BLD AUTO: 0.7 % (ref 0–0.9)
LYMPHOCYTES # BLD AUTO: 0.49 X10*3/UL (ref 1.2–4.8)
LYMPHOCYTES # BLD AUTO: 0.55 X10*3/UL (ref 1.2–4.8)
LYMPHOCYTES NFR BLD AUTO: 5.9 %
LYMPHOCYTES NFR BLD AUTO: 7.1 %
MAGNESIUM SERPL-MCNC: 2.22 MG/DL (ref 1.6–2.4)
MCH RBC QN AUTO: 25 PG (ref 26–34)
MCH RBC QN AUTO: 26 PG (ref 26–34)
MCHC RBC AUTO-ENTMCNC: 30.5 G/DL (ref 32–36)
MCHC RBC AUTO-ENTMCNC: 31.8 G/DL (ref 32–36)
MCV RBC AUTO: 82 FL (ref 80–100)
MCV RBC AUTO: 82 FL (ref 80–100)
MONOCYTES # BLD AUTO: 1.26 X10*3/UL (ref 0.1–1)
MONOCYTES # BLD AUTO: 1.28 X10*3/UL (ref 0.1–1)
MONOCYTES NFR BLD AUTO: 15.4 %
MONOCYTES NFR BLD AUTO: 16.3 %
NEUTROPHILS # BLD AUTO: 5.64 X10*3/UL (ref 1.2–7.7)
NEUTROPHILS # BLD AUTO: 6.22 X10*3/UL (ref 1.2–7.7)
NEUTROPHILS NFR BLD AUTO: 73 %
NEUTROPHILS NFR BLD AUTO: 74.7 %
NRBC BLD-RTO: 0 /100 WBCS (ref 0–0)
NRBC BLD-RTO: 0 /100 WBCS (ref 0–0)
PHOSPHATE SERPL-MCNC: 4.1 MG/DL (ref 2.5–4.9)
PLATELET # BLD AUTO: 424 X10*3/UL (ref 150–450)
PLATELET # BLD AUTO: 471 X10*3/UL (ref 150–450)
POTASSIUM SERPL-SCNC: 4.1 MMOL/L (ref 3.5–5.3)
PRODUCT BLOOD TYPE: 5100
PRODUCT CODE: NORMAL
RBC # BLD AUTO: 3.44 X10*6/UL (ref 4.5–5.9)
RBC # BLD AUTO: 3.61 X10*6/UL (ref 4.5–5.9)
RH FACTOR (ANTIGEN D): NORMAL
RH FACTOR (ANTIGEN D): NORMAL
SODIUM SERPL-SCNC: 133 MMOL/L (ref 136–145)
UNIT ABO: NORMAL
UNIT NUMBER: NORMAL
UNIT RH: NORMAL
UNIT VOLUME: 280
WBC # BLD AUTO: 7.7 X10*3/UL (ref 4.4–11.3)
WBC # BLD AUTO: 8.3 X10*3/UL (ref 4.4–11.3)
XM INTEP: NORMAL

## 2024-05-31 PROCEDURE — 2500000001 HC RX 250 WO HCPCS SELF ADMINISTERED DRUGS (ALT 637 FOR MEDICARE OP)

## 2024-05-31 PROCEDURE — 37799 UNLISTED PX VASCULAR SURGERY: CPT

## 2024-05-31 PROCEDURE — 97530 THERAPEUTIC ACTIVITIES: CPT | Mod: GP,CQ

## 2024-05-31 PROCEDURE — P9040 RBC LEUKOREDUCED IRRADIATED: HCPCS

## 2024-05-31 PROCEDURE — 99233 SBSQ HOSP IP/OBS HIGH 50: CPT

## 2024-05-31 PROCEDURE — 30233N1 TRANSFUSION OF NONAUTOLOGOUS RED BLOOD CELLS INTO PERIPHERAL VEIN, PERCUTANEOUS APPROACH: ICD-10-PCS | Performed by: INTERNAL MEDICINE

## 2024-05-31 PROCEDURE — 97110 THERAPEUTIC EXERCISES: CPT | Mod: GP,CQ

## 2024-05-31 PROCEDURE — 85025 COMPLETE CBC W/AUTO DIFF WBC: CPT

## 2024-05-31 PROCEDURE — 86900 BLOOD TYPING SEROLOGIC ABO: CPT

## 2024-05-31 PROCEDURE — 80069 RENAL FUNCTION PANEL: CPT

## 2024-05-31 PROCEDURE — 36430 TRANSFUSION BLD/BLD COMPNT: CPT

## 2024-05-31 PROCEDURE — 86850 RBC ANTIBODY SCREEN: CPT

## 2024-05-31 PROCEDURE — 2500000004 HC RX 250 GENERAL PHARMACY W/ HCPCS (ALT 636 FOR OP/ED)

## 2024-05-31 PROCEDURE — 1200000003 HC ONCOLOGY  ROOM WITH TELEMETRY DAILY

## 2024-05-31 PROCEDURE — 83735 ASSAY OF MAGNESIUM: CPT

## 2024-05-31 PROCEDURE — 86901 BLOOD TYPING SEROLOGIC RH(D): CPT

## 2024-05-31 PROCEDURE — 86923 COMPATIBILITY TEST ELECTRIC: CPT

## 2024-05-31 RX ADMIN — GABAPENTIN 300 MG: 300 CAPSULE ORAL at 15:17

## 2024-05-31 RX ADMIN — ACETAMINOPHEN 975 MG: 325 TABLET ORAL at 15:17

## 2024-05-31 RX ADMIN — OXYCODONE HYDROCHLORIDE 10 MG: 5 TABLET ORAL at 10:56

## 2024-05-31 RX ADMIN — OXYCODONE HYDROCHLORIDE 15 MG: 15 TABLET, FILM COATED, EXTENDED RELEASE ORAL at 09:17

## 2024-05-31 RX ADMIN — ACETAMINOPHEN 975 MG: 325 TABLET ORAL at 09:16

## 2024-05-31 RX ADMIN — PANTOPRAZOLE SODIUM 40 MG: 40 TABLET, DELAYED RELEASE ORAL at 06:04

## 2024-05-31 RX ADMIN — Medication 1 TABLET: at 09:16

## 2024-05-31 RX ADMIN — OXYCODONE HYDROCHLORIDE 15 MG: 15 TABLET, FILM COATED, EXTENDED RELEASE ORAL at 21:38

## 2024-05-31 RX ADMIN — GABAPENTIN 300 MG: 300 CAPSULE ORAL at 09:16

## 2024-05-31 RX ADMIN — GABAPENTIN 600 MG: 300 CAPSULE ORAL at 21:38

## 2024-05-31 RX ADMIN — LISINOPRIL 20 MG: 20 TABLET ORAL at 09:16

## 2024-05-31 RX ADMIN — METHOCARBAMOL 500 MG: 500 TABLET ORAL at 18:08

## 2024-05-31 RX ADMIN — LORATADINE 10 MG: 10 TABLET ORAL at 09:16

## 2024-05-31 RX ADMIN — OXYCODONE HYDROCHLORIDE 10 MG: 5 TABLET ORAL at 06:04

## 2024-05-31 RX ADMIN — Medication 2000 UNITS: at 09:16

## 2024-05-31 RX ADMIN — ACETAMINOPHEN 975 MG: 325 TABLET ORAL at 21:38

## 2024-05-31 RX ADMIN — METHOCARBAMOL 500 MG: 500 TABLET ORAL at 13:21

## 2024-05-31 RX ADMIN — ENOXAPARIN SODIUM 40 MG: 100 INJECTION SUBCUTANEOUS at 09:16

## 2024-05-31 ASSESSMENT — COGNITIVE AND FUNCTIONAL STATUS - GENERAL
CLIMB 3 TO 5 STEPS WITH RAILING: TOTAL
MOVING FROM LYING ON BACK TO SITTING ON SIDE OF FLAT BED WITH BEDRAILS: A LOT
CLIMB 3 TO 5 STEPS WITH RAILING: TOTAL
MOVING TO AND FROM BED TO CHAIR: TOTAL
MOBILITY SCORE: 8
STANDING UP FROM CHAIR USING ARMS: TOTAL
STANDING UP FROM CHAIR USING ARMS: TOTAL
MOBILITY SCORE: 8
WALKING IN HOSPITAL ROOM: TOTAL
MOVING FROM LYING ON BACK TO SITTING ON SIDE OF FLAT BED WITH BEDRAILS: A LOT
TOILETING: A LOT
WALKING IN HOSPITAL ROOM: TOTAL
DRESSING REGULAR UPPER BODY CLOTHING: A LITTLE
HELP NEEDED FOR BATHING: A LOT
TURNING FROM BACK TO SIDE WHILE IN FLAT BAD: A LOT
MOVING TO AND FROM BED TO CHAIR: TOTAL
DRESSING REGULAR LOWER BODY CLOTHING: A LOT
TURNING FROM BACK TO SIDE WHILE IN FLAT BAD: A LOT
DAILY ACTIVITIY SCORE: 17

## 2024-05-31 ASSESSMENT — PAIN DESCRIPTION - LOCATION
LOCATION: BACK
LOCATION: BACK

## 2024-05-31 ASSESSMENT — PAIN - FUNCTIONAL ASSESSMENT
PAIN_FUNCTIONAL_ASSESSMENT: 0-10

## 2024-05-31 ASSESSMENT — PAIN SCALES - GENERAL
PAINLEVEL_OUTOF10: 8
PAINLEVEL_OUTOF10: 2
PAINLEVEL_OUTOF10: 5 - MODERATE PAIN
PAINLEVEL_OUTOF10: 6

## 2024-05-31 NOTE — CARE PLAN
The patient's goals for the shift include Pain controlled, repositioned and turned as needed and get at least 6-8 hrs of rest/sleep during the shift.    The clinical goals for the shift include Pt to remain free of harm/falls, pain controlled  and continue to participate in plan of care.      Problem: Pain - Adult  Goal: Verbalizes/displays adequate comfort level or baseline comfort level  Outcome: Progressing;   Pain medications administered as needed and pain assessed/reassessed     Problem: Skin  Goal: Prevent/minimize sheer/friction injuries  Outcome: Progressing  Flowsheets (Taken 5/30/2024 2237)  Prevent/minimize sheer/friction injuries:   HOB 30 degrees or less   Turn/reposition every 2 hours/use positioning/transfer devices   Use pull sheet       Problem: Skin  Goal: Promote skin healing  Outcome: Progressing  Flowsheets (Taken 5/30/2024 2237)  Promote skin healing:   Turn/reposition every 2 hours/use positioning/transfer devices   Assess skin/pad under line(s)/device(s)   Rotate device position/do not position patient on device

## 2024-05-31 NOTE — SIGNIFICANT EVENT
Rounded with patient and offgoing nurse at bedside for bedside report. Call light within reach. Patient expresses no needs at this time.

## 2024-05-31 NOTE — PROGRESS NOTES
Internal Medicine Daily Progress Note    Subjective     Interval events:  Reports pain is well controlled, about 4/10. Worse when he moves around. Used oxycodone IR 10 mg x3 and 15 mg x1 over past 24h, no PRN IV Dilaudid needed for breakthru pain.        Objective     Vitals:  Visit Vitals  BP 97/64 (BP Location: Left arm, Patient Position: Lying)   Pulse (!) 115   Temp 36.3 °C (97.3 °F) (Temporal)   Resp 18         Intake/Output Summary (Last 24 hours) at 5/31/2024 1115  Last data filed at 5/31/2024 0532  Gross per 24 hour   Intake 120 ml   Output 1500 ml   Net -1380 ml       Physical exam:  Constitutional: Well-developed male in no acute distress.  HEENT: Normocephalic, atraumatic.  Respiratory: CTA anteriorly. No wheezes, rales, or rhonchi. Normal respiratory effort on 1L NC.  Cardiovascular: RRR. No murmurs, gallops, or rubs.  Abdominal: Soft, nondistended, nontender to palpation. Bowel sounds present.  Neuro: A&Ox3  MSK: No LE edema bilaterally  Skin: Warm, dry. No rashes or wounds  Psych: Appropriate mood and affect    Medications:  acetaminophen, 975 mg, oral, TID  cholecalciferol, 2,000 Units, oral, Daily  enoxaparin, 40 mg, subcutaneous, q24h CHERI  gabapentin, 300 mg, oral, 2 times per day  gabapentin, 600 mg, oral, Nightly  lisinopril, 20 mg, oral, Daily  loratadine, 10 mg, oral, Daily  melatonin, 5 mg, oral, Daily  methocarbamol, 500 mg, oral, q6h CHERI  multivitamin with minerals, 1 tablet, oral, Daily  oxyCODONE ER, 15 mg, oral, q12h HCERI  pantoprazole, 40 mg, oral, Daily before breakfast  polyethylene glycol, 17 g, oral, BID  sennosides, 2 tablet, oral, BID           PRN medications: dextrose, dextrose, diphenhydrAMINE, glucagon, glucagon, HYDROmorphone, ketotifen, naloxone, oxyCODONE, oxyCODONE    Labs:      Results from last 72 hours   Lab Units 05/31/24  0736 05/30/24  0835 05/29/24  0754   WBC AUTO x10*3/uL 8.3 8.7 8.3   HEMOGLOBIN g/dL 8.6* 7.8* 8.5*   HEMATOCRIT % 28.2* 25.2* 25.8*   PLATELETS  AUTO x10*3/uL 424 353 310   SODIUM mmol/L 133* 132* 133*   POTASSIUM mmol/L 4.1 4.0 3.9   CHLORIDE mmol/L 94* 95* 95*   CO2 mmol/L 30 29 29   BUN mg/dL 10 8 9   CREATININE mg/dL 0.71 0.68 0.70   GLUCOSE mg/dL 76 89 89   CALCIUM mg/dL 8.4* 8.2* 8.1*   MAGNESIUM mg/dL 2.22 2.18 2.04   PHOSPHORUS mg/dL 4.1 3.3 3.0   ALBUMIN g/dL 2.9* 2.8* 3.0*           Imaging:  No results found.       Assessment and Plan:  Altaf Parsons is a 53 y.o. male with PMHx of malignant thymoma (dx. 03/2016) w/ mets to bone and lung who presented with worsening LE weakness and pain, now s/p thoracolumbar decompression/fusion for malignant spinal cord compression on 5/25.    Updates 05/31/24:  - sinus tachycardia continues, likely pain-related or symptomatic anemia as other etiologies ruled out/less likely  - giving 1 unit blood for symptomatic anemia (didn't get blood yesterday d/t issue with T&S)  - pending dispo to acute rehab  - supportive onc recommends discharging to rehab with oxy 15 as PRN instead of combo oxy 10 and oxy 15 regimen    #Malignant spinal cord compression, s/p decompression on 5/25  - MRI (4/26) showing new enhancement of cauda equine nerve roots, concern for leptomeningeal spread  - new progressive weakness possibly related to this leptomeningeal spread of disease  - ortho performed thoracolumbar decompression and fusion on 5/25  Plan  - appreciate ortho recs:   - no steroids   - prevena with q8h outputs, hemovac removed 5/29  - PT/OT for dispo, patient has outpatient PT/OT  - Weight bearing as tolerated  - Continue pain control  gabapentin, tylenol     #Metastatic Malignant Thymoma, Type B2 (predominant lymphocytic population with scattered epithelial cells)  - July 2007: Massive left-sided hemothorax. Underwent left VATS drainage of massive hemothorax, pleural biopsy and core needle biopsy of lingular mass and decortication. Biopsies all negative. CT scan showed 7 cm lesion within mediastinum with a calcified rim.  Followed with serial Cts  - Feb 2016: New enhancing soft tissue mass superior to calcified structure with pericardial LN and basilar RLL nodule  - March 4, 2016: Diagnosed from left lower lobe of lung wedge resection and mediastinal fat biopsy  - Aug 1 2016- Sep 7 2016: 59.4 Gy proton therapy to resection bed  - May 2017: Left pleural nodule showing thymoma  - 8/4/17- Started 3 cycles of cyclophosphamide, adriamycin, and cisplatin (PAC-P/PAC/CAP regimen, Demi et al 2004)  - 11/2019: Left serratus anterior biopsy positive for thymoma  - 12/10/19- 12/31/19: SINDI mass and left serratus anterior mass, 45 CGE/15 fx, PROTONS   - Aug 2020: T12-L1 paraspinal mass showing thymoma  -Jan 2021: Spine tumor biopsy showing thymoma. Posterior spinal fusion T9-L1 with use of bilateral pedicle screw instrumentation, allograft and demineralized bone matrix fiber. 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 Progressed  -April 2022 - Finished RT of 30 Gy in 10 fractions from T4-T6, and L1-L3  - Dec 2022- April 2023: Started on single agent capecitabine with stable disease  - Sep 2023: Some somatostatin receptor disease noted by [employer] PET  -Sep 2023: Single agent everolimus started. Stable disease so far  - Dr. Rodriguez aware  Plan:  - discontinued everolimus as advised by Dr. Rodriguez     #Chronic left low back pain secondary to malignancy  - Consulted supportive oncology, appreciate recs   -oxycontin 15 mg BID, PRN oxy 10 or oxy 15 with IV dilaudid 0.4 mg as breakthrough  - Hold dexamethasone 4 mg daily  - continue gabapentin 300-300-600  - cont home methocarbimol 500mg Q6     #HTN  - At home was on lisinopril 20-hydrochlorothiazide 25 mg  - cont home lisinopril 20mg  - Can consider restarting hydrochlorothiazide 25  mg later in hospitalization      #Misc  - cont home Vit D, multivitamin, Protonix 40 mg daily     F: PRN  E: prn  N: regular diet  A: PIV, Meza  DVT prophylaxis: Lovenox  Code status: Full code, confirmed on admission  NOK: Marilia (wife) #[phone]    Patient and plan discussed with attending physician.    Nga Rocha MD  PGY-1 Internal Medicine  [facility]

## 2024-05-31 NOTE — SIGNIFICANT EVENT
Rapid Response RN Note     05/31/24 0104   Onset Documentation   Rapid Response Initiated By Radar auto page   Location/Room [facility]   Pager Time 0103   Event End Time 0108   Level II Called No   Primary Reason for Call Radar auto page       Rapid response RN at bedside for RADAR score 6 due to the following VS: T 37 °Celsius; ; RR 15; BP 93/61; SPO2 98%.     Reviewed above VS with bedside RN.  VS within patient's current trends. Per bedside RN no concerns at this time.  No interventions by rapid response team indicated at this time.      Staff to page rapid response for any concerns or acute change in condition/VS.

## 2024-05-31 NOTE — CARE PLAN
The patient's goals for the shift include Pain controlled, repositioned and turned as needed and get at least 6-8 hrs of rest/sleep during the shift.    The clinical goals for the shift include Pt to remain free of harm/falls, pain controlled  and continue to participate in plan of care.

## 2024-05-31 NOTE — PROGRESS NOTES
Physical Therapy    Physical Therapy Treatment    Patient Name: Altaf Parsons  MRN: [id number]  Today's Date: 5/31/2024  Time Calculation  Start Time: 1500  Stop Time: 1548  Time Calculation (min): 48 min    Assessment/Plan   PT Assessment  Assessment Comment: Pt. continues to be very motivated and cooperative- Pt. able to sit up on EOB without fatigue. Pt. looks fwd to increased time in therapy at a high level of intensity post acute.  End of Session Patient Position: Bed, 3 rail up, Alarm off, not on at start of session  PT Plan  Inpatient/Swing Bed or Outpatient: Inpatient  PT Plan  Treatment/Interventions: Bed mobility, Transfer training, Balance training, Gait training, Neuromuscular re-education, Strengthening, Endurance training, Range of motion, Therapeutic exercise, Therapeutic activity, Positioning  PT Plan: Skilled PT  PT Frequency: 5 times per week  PT Discharge Recommendations: High intensity level of continued care  PT Recommended Transfer Status: Total assist  PT - OK to Discharge: Yes (Pateint could benefit from continued PT services at HIGH Intensity once medically cleared.)      General Visit Information:   PT  Visit  PT Received On: 05/31/24  General  Reason for Referral: 53 year old male now s/p T5-7 laminectomy and extension of posterior spinal instrumented fusion to T4 on 5/25/24 with Dr. Melendez  Past Medical History Relevant to Rehab: cancer with leptomeningeal spread, spinal cord compression  Missed Visit: No  Family/Caregiver Present: No  Prior to Session Communication: Bedside nurse  Patient Position Received: Bed, 3 rail up, Alarm off, not on at start of session  General Comment: Pt. supine in bed upon arrival. Pt. is very motivated to participate. Pt. states that he is hoping to get into [facility] as the other facility was unable to accept at this time. (Pt. has prevena /arnett/piv/tele)    Subjective   Precautions:  Precautions  Medical Precautions: Fall precautions  Post-Surgical  Precautions: Spinal precautions  Precautions Comment: Assisted pt. into a log roll  Vital Signs:       Objective   Pain:  Pain Assessment  Pain Assessment: 0-10  Pain Score:  (Discomfort during bed mob although no complaints of pain otherwise.)  Cognition:  Cognition  Overall Cognitive Status: Within Functional Limits  Coordination:     Postural Control:  Static Sitting Balance  Static Sitting-Balance Support: Bilateral upper extremity supported (Feet supported)  Static Sitting-Comment/Number of Minutes: sba  Dynamic Sitting Balance  Dynamic Sitting-Comments: sba  Extremity/Trunk Assessments:                      Activity Tolerance:  Activity Tolerance  Endurance:  (Pt. did not complain of fatigue.)  Treatments:  Therapeutic Exercise  Therapeutic Exercise Performed: Yes  Therapeutic Exercise Activity 1: While seated EOB mostly passive isometric abd x 15 reps, LAQ'S, ABD - AAROM with abduction on left passive to adduct on left -    Therapeutic Activity  Therapeutic Activity Performed: Yes  Therapeutic Activity 1: Seated bilat ham/gastroc stretch x 3 reps 25 second holds    Balance/Neuromuscular Re-Education  Balance/Neuromuscular Re-Education Activity Performed:  (Pt. sat EOB the majority of session- Pt. worked on lateral lean and core strengthening bilat'ly x 2 sets of 12 reps.)    Bed Mobility  Bed Mobility: Yes  Bed Mobility 1  Bed Mobility 1: Rolling left, Side lying left to sit (Log roll assisted- max assist required with use of rails and HOB elevated.)  Bed Mobility 2  Bed Mobility  2:  (Return to bed- max x 2)  Bed Mobility 3  Bed Mobility 3:  (Rolling side to side to position with pillows MAX ASSIST X 2)    Outcome Measures:  [facility] Basic Mobility  Turning from your back to your side while in a flat bed without using bedrails: A lot  Moving from lying on your back to sitting on the side of a flat bed without using bedrails: A lot  Moving to and from bed to chair (including a wheelchair): Total  Standing up  from a chair using your arms (e.g. wheelchair or bedside chair): Total  To walk in hospital room: Total  Climbing 3-5 steps with railing: Total  Basic Mobility - Total Score: 8    Education Documentation  Home Exercise Program, taught by Mita Azar PTA at 5/31/2024  4:18 PM.  Learner: Patient  Readiness: Acceptance  Method: Explanation, Demonstration  Response: Needs Reinforcement    Mobility Training, taught by Mita Azar PTA at 5/31/2024  4:18 PM.  Learner: Patient  Readiness: Acceptance  Method: Explanation, Demonstration  Response: Needs Reinforcement    Education Comments  No comments found.        OP EDUCATION:       Encounter Problems       Encounter Problems (Active)       Balance       STG - Maintains dynamic sitting balance without upper extremity support >/= 5 min with SBA (Progressing)       Start:  05/26/24    Expected End:  06/09/24       INTERVENTIONS:  1. Practice sitting on the edge of a bed/mat with minimal support.  2. Educate patient about maintining total hip precautions while maintaining balance.  3. Educate patient about pressure relief.  4. Educate patient about use of assistive device.            Mobility       Pt will demonstrate >3/5 BLE strength to complete therapeutic exercise and participate in functional mobility.   (Progressing)       Start:  05/28/24    Expected End:  06/11/24            Pt will perform lateral transfers from bed <>chair or other surface with min assist.  (Not Progressing)       Start:  05/28/24    Expected End:  06/11/24               PT Transfers       STG - Transfer from bed to/from wheelchair using slide board with min A (Not Progressing)       Start:  05/26/24    Expected End:  06/09/24            STG - Patient will perform bed mobility with min A (Progressing)       Start:  05/26/24    Expected End:  06/09/24            STG - Patient will transfer sit to and from stand with mod A x 2 and RW (Not Progressing)       Start:  05/26/24    Expected End:   06/09/24               Pain - Adult

## 2024-06-01 LAB
ALBUMIN SERPL BCP-MCNC: 2.9 G/DL (ref 3.4–5)
ANION GAP SERPL CALC-SCNC: 14 MMOL/L (ref 10–20)
BUN SERPL-MCNC: 9 MG/DL (ref 6–23)
CALCIUM SERPL-MCNC: 8.6 MG/DL (ref 8.6–10.6)
CHLORIDE SERPL-SCNC: 97 MMOL/L (ref 98–107)
CO2 SERPL-SCNC: 31 MMOL/L (ref 21–32)
CREAT SERPL-MCNC: 0.67 MG/DL (ref 0.5–1.3)
EGFRCR SERPLBLD CKD-EPI 2021: >90 ML/MIN/1.73M*2
GLUCOSE SERPL-MCNC: 80 MG/DL (ref 74–99)
MAGNESIUM SERPL-MCNC: 2.22 MG/DL (ref 1.6–2.4)
PHOSPHATE SERPL-MCNC: 4 MG/DL (ref 2.5–4.9)
POTASSIUM SERPL-SCNC: 4 MMOL/L (ref 3.5–5.3)
SODIUM SERPL-SCNC: 138 MMOL/L (ref 136–145)

## 2024-06-01 PROCEDURE — 2500000001 HC RX 250 WO HCPCS SELF ADMINISTERED DRUGS (ALT 637 FOR MEDICARE OP)

## 2024-06-01 PROCEDURE — 99233 SBSQ HOSP IP/OBS HIGH 50: CPT

## 2024-06-01 PROCEDURE — 80069 RENAL FUNCTION PANEL: CPT

## 2024-06-01 PROCEDURE — 36415 COLL VENOUS BLD VENIPUNCTURE: CPT

## 2024-06-01 PROCEDURE — 2500000004 HC RX 250 GENERAL PHARMACY W/ HCPCS (ALT 636 FOR OP/ED)

## 2024-06-01 PROCEDURE — 83735 ASSAY OF MAGNESIUM: CPT

## 2024-06-01 PROCEDURE — 1200000003 HC ONCOLOGY  ROOM WITH TELEMETRY DAILY

## 2024-06-01 RX ORDER — FUROSEMIDE 10 MG/ML
20 INJECTION INTRAMUSCULAR; INTRAVENOUS ONCE
Status: COMPLETED | OUTPATIENT
Start: 2024-06-01 | End: 2024-06-01

## 2024-06-01 RX ADMIN — OXYCODONE HYDROCHLORIDE 15 MG: 15 TABLET, FILM COATED, EXTENDED RELEASE ORAL at 20:06

## 2024-06-01 RX ADMIN — PANTOPRAZOLE SODIUM 40 MG: 40 TABLET, DELAYED RELEASE ORAL at 06:31

## 2024-06-01 RX ADMIN — ACETAMINOPHEN 975 MG: 325 TABLET ORAL at 08:56

## 2024-06-01 RX ADMIN — GABAPENTIN 300 MG: 300 CAPSULE ORAL at 08:55

## 2024-06-01 RX ADMIN — ENOXAPARIN SODIUM 40 MG: 100 INJECTION SUBCUTANEOUS at 08:57

## 2024-06-01 RX ADMIN — Medication 2000 UNITS: at 08:56

## 2024-06-01 RX ADMIN — DIPHENHYDRAMINE HYDROCHLORIDE 50 MG: 25 CAPSULE ORAL at 00:24

## 2024-06-01 RX ADMIN — LISINOPRIL 20 MG: 20 TABLET ORAL at 08:55

## 2024-06-01 RX ADMIN — LORATADINE 10 MG: 10 TABLET ORAL at 08:57

## 2024-06-01 RX ADMIN — Medication 5 MG: at 20:08

## 2024-06-01 RX ADMIN — METHOCARBAMOL 500 MG: 500 TABLET ORAL at 18:25

## 2024-06-01 RX ADMIN — DIPHENHYDRAMINE HYDROCHLORIDE 50 MG: 25 CAPSULE ORAL at 23:50

## 2024-06-01 RX ADMIN — METHOCARBAMOL 500 MG: 500 TABLET ORAL at 06:31

## 2024-06-01 RX ADMIN — METHOCARBAMOL 500 MG: 500 TABLET ORAL at 23:50

## 2024-06-01 RX ADMIN — GABAPENTIN 600 MG: 300 CAPSULE ORAL at 20:06

## 2024-06-01 RX ADMIN — Medication 5 MG: at 00:24

## 2024-06-01 RX ADMIN — METHOCARBAMOL 500 MG: 500 TABLET ORAL at 11:38

## 2024-06-01 RX ADMIN — SENNOSIDES 17.2 MG: 8.6 TABLET, FILM COATED ORAL at 20:06

## 2024-06-01 RX ADMIN — OXYCODONE HYDROCHLORIDE 15 MG: 15 TABLET, FILM COATED, EXTENDED RELEASE ORAL at 08:56

## 2024-06-01 RX ADMIN — METHOCARBAMOL 500 MG: 500 TABLET ORAL at 00:24

## 2024-06-01 RX ADMIN — ACETAMINOPHEN 975 MG: 325 TABLET ORAL at 15:27

## 2024-06-01 RX ADMIN — FUROSEMIDE 20 MG: 10 INJECTION, SOLUTION INTRAMUSCULAR; INTRAVENOUS at 11:38

## 2024-06-01 RX ADMIN — Medication 1 TABLET: at 08:56

## 2024-06-01 RX ADMIN — ACETAMINOPHEN 975 MG: 325 TABLET ORAL at 20:06

## 2024-06-01 RX ADMIN — OXYCODONE HYDROCHLORIDE 10 MG: 5 TABLET ORAL at 06:31

## 2024-06-01 RX ADMIN — GABAPENTIN 300 MG: 300 CAPSULE ORAL at 15:27

## 2024-06-01 ASSESSMENT — PAIN SCALES - GENERAL
PAINLEVEL_OUTOF10: 4
PAINLEVEL_OUTOF10: 5 - MODERATE PAIN
PAINLEVEL_OUTOF10: 0 - NO PAIN
PAINLEVEL_OUTOF10: 4

## 2024-06-01 ASSESSMENT — PAIN - FUNCTIONAL ASSESSMENT
PAIN_FUNCTIONAL_ASSESSMENT: 0-10

## 2024-06-01 ASSESSMENT — COGNITIVE AND FUNCTIONAL STATUS - GENERAL
WALKING IN HOSPITAL ROOM: TOTAL
TOILETING: A LOT
PERSONAL GROOMING: A LITTLE
HELP NEEDED FOR BATHING: A LOT
MOBILITY SCORE: 8
CLIMB 3 TO 5 STEPS WITH RAILING: TOTAL
DRESSING REGULAR UPPER BODY CLOTHING: A LOT
MOVING TO AND FROM BED TO CHAIR: TOTAL
MOVING FROM LYING ON BACK TO SITTING ON SIDE OF FLAT BED WITH BEDRAILS: A LOT
DAILY ACTIVITIY SCORE: 15
STANDING UP FROM CHAIR USING ARMS: TOTAL
DRESSING REGULAR LOWER BODY CLOTHING: A LOT
TURNING FROM BACK TO SIDE WHILE IN FLAT BAD: A LOT

## 2024-06-01 ASSESSMENT — PAIN DESCRIPTION - DESCRIPTORS: DESCRIPTORS: ACHING

## 2024-06-01 ASSESSMENT — PAIN DESCRIPTION - ORIENTATION: ORIENTATION: LOWER

## 2024-06-01 ASSESSMENT — PAIN DESCRIPTION - LOCATION: LOCATION: BACK

## 2024-06-01 NOTE — PROGRESS NOTES
Updated PT notes sent to  JO ANN Muñoz. Waiting on precert. TCC to continue to follow.       Ebonie Tyler RN

## 2024-06-01 NOTE — PROGRESS NOTES
Internal Medicine Daily Progress Note    Subjective     Interval events:  Reports pain is well controlled, about 4/10. Worse when he moves around. Used oxycodone IR 10 mg x3 and 15 mg x1 over past 24h, no PRN IV Dilaudid needed for breakthru pain.        Objective     Vitals:  Visit Vitals  BP 99/65 (BP Location: Left arm, Patient Position: Lying)   Pulse (!) 117   Temp 36.6 °C (97.9 °F) (Temporal)   Resp 20         Intake/Output Summary (Last 24 hours) at 6/1/2024 1431  Last data filed at 6/1/2024 1325  Gross per 24 hour   Intake 720 ml   Output 4275 ml   Net -3555 ml       Physical exam:  Constitutional: Well-developed male in no acute distress.  HEENT: Normocephalic, atraumatic.  Respiratory: CTA anteriorly. No wheezes, rales, or rhonchi. Normal respiratory effort on 1L NC.  Cardiovascular: RRR. No murmurs, gallops, or rubs.  Abdominal: Soft, nondistended, nontender to palpation. Bowel sounds present.  Neuro: A&Ox3  MSK: No LE edema bilaterally  Skin: Warm, dry. No rashes or wounds  Psych: Appropriate mood and affect    Medications:  acetaminophen, 975 mg, oral, TID  cholecalciferol, 2,000 Units, oral, Daily  enoxaparin, 40 mg, subcutaneous, q24h CHERI  gabapentin, 300 mg, oral, 2 times per day  gabapentin, 600 mg, oral, Nightly  lisinopril, 20 mg, oral, Daily  loratadine, 10 mg, oral, Daily  melatonin, 5 mg, oral, Daily  methocarbamol, 500 mg, oral, q6h CHERI  multivitamin with minerals, 1 tablet, oral, Daily  oxyCODONE ER, 15 mg, oral, q12h CHERI  pantoprazole, 40 mg, oral, Daily before breakfast  polyethylene glycol, 17 g, oral, BID  sennosides, 2 tablet, oral, BID           PRN medications: dextrose, dextrose, diphenhydrAMINE, glucagon, glucagon, HYDROmorphone, ketotifen, naloxone, oxyCODONE, oxyCODONE    Labs:      Results from last 72 hours   Lab Units 05/31/24  1755 05/31/24  0736 05/30/24  0835   WBC AUTO x10*3/uL 7.7 8.3 8.7   HEMOGLOBIN g/dL 9.4* 8.6* 7.8*   HEMATOCRIT % 29.6* 28.2* 25.2*   PLATELETS AUTO  x10*3/uL 471* 424 353   SODIUM mmol/L  --  133* 132*   POTASSIUM mmol/L  --  4.1 4.0   CHLORIDE mmol/L  --  94* 95*   CO2 mmol/L  --  30 29   BUN mg/dL  --  10 8   CREATININE mg/dL  --  0.71 0.68   GLUCOSE mg/dL  --  76 89   CALCIUM mg/dL  --  8.4* 8.2*   MAGNESIUM mg/dL  --  2.22 2.18   PHOSPHORUS mg/dL  --  4.1 3.3   ALBUMIN g/dL  --  2.9* 2.8*           Imaging:  No results found.       Assessment and Plan:  Altaf Parsons is a 53 y.o. male with PMHx of malignant thymoma (dx. 03/2016) w/ mets to bone and lung who presented with worsening LE weakness and pain, now s/p thoracolumbar decompression/fusion for malignant spinal cord compression on 5/25.    Updates 06/01/24:  - sinus tachycardia mildly improved with improved pain and transfusion  - will give small dose of lasix for LE edema  - pending dispo to acute rehab  - supportive onc recommends discharging to rehab with oxy 15 as PRN instead of combo oxy 10 and oxy 15 regimen    #Malignant spinal cord compression, s/p decompression on 5/25  - MRI (4/26) showing new enhancement of cauda equine nerve roots, concern for leptomeningeal spread  - new progressive weakness possibly related to this leptomeningeal spread of disease  - ortho performed thoracolumbar decompression and fusion on 5/25  Plan  - appreciate ortho recs:   - no steroids   - prevena with q8h outputs, hemovac removed 5/29  - PT/OT for dispo, patient has outpatient PT/OT  - Weight bearing as tolerated  - Continue pain control  gabapentin, tylenol     #Metastatic Malignant Thymoma, Type B2 (predominant lymphocytic population with scattered epithelial cells)  - July 2007: Massive left-sided hemothorax. Underwent left VATS drainage of massive hemothorax, pleural biopsy and core needle biopsy of lingular mass and decortication. Biopsies all negative. CT scan showed 7 cm lesion within mediastinum with a calcified rim. Followed with serial Cts  - Feb 2016: New enhancing soft tissue mass superior to calcified  structure with pericardial LN and basilar RLL nodule  - March 4, 2016: Diagnosed from left lower lobe of lung wedge resection and mediastinal fat biopsy  - Aug 1 2016- Sep 7 2016: 59.4 Gy proton therapy to resection bed  - May 2017: Left pleural nodule showing thymoma  - 8/4/17- Started 3 cycles of cyclophosphamide, adriamycin, and cisplatin (PAC-P/PAC/CAP regimen, Demi et al 2004)  - 11/2019: Left serratus anterior biopsy positive for thymoma  - 12/10/19- 12/31/19: SINDI mass and left serratus anterior mass, 45 CGE/15 fx, PROTONS   - Aug 2020: T12-L1 paraspinal mass showing thymoma  -Jan 2021: Spine tumor biopsy showing thymoma. Posterior spinal fusion T9-L1 with use of bilateral pedicle screw instrumentation, allograft and demineralized bone matrix fiber. 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 Progressed  -April 2022 - Finished RT of 30 Gy in 10 fractions from T4-T6, and L1-L3  - Dec 2022- April 2023: Started on single agent capecitabine with stable disease  - Sep 2023: Some somatostatin receptor disease noted by [employer] Dotat[employer] PET  -Sep 2023: Single agent everolimus started. Stable disease so far  - Dr. Rodriguez aware  Plan:  - discontinued everolimus as advised by Dr. Rodriguez     #Chronic left low back pain secondary to malignancy  - Consulted supportive oncology, appreciate recs   -oxycontin 15 mg BID, PRN oxy 10 or oxy 15 with IV dilaudid 0.4 mg as breakthrough  - Hold dexamethasone 4 mg daily  - continue gabapentin 300-300-600  - cont home methocarbimol 500mg Q6     #HTN  - At home was on lisinopril 20-hydrochlorothiazide 25 mg  - cont home lisinopril 20mg  - Can consider restarting hydrochlorothiazide 25 mg later in hospitalization      #Misc  - cont home Vit D, multivitamin, Protonix 40 mg  daily     F: PRN  E: prn  N: regular diet  A: PIV, Meza  DVT prophylaxis: Lovenox  Code status: Full code, confirmed on admission  NOK: Marilia (wife) #[phone]    Patient and plan discussed with attending physician.    Nga Rocha MD  PGY-1 Internal Medicine  [facility]

## 2024-06-02 LAB
ALBUMIN SERPL BCP-MCNC: 3 G/DL (ref 3.4–5)
ALP SERPL-CCNC: 77 U/L (ref 33–120)
ALT SERPL W P-5'-P-CCNC: 16 U/L (ref 10–52)
ANION GAP SERPL CALC-SCNC: 14 MMOL/L (ref 10–20)
APTT PPP: 41 SECONDS (ref 27–38)
AST SERPL W P-5'-P-CCNC: 19 U/L (ref 9–39)
BASOPHILS # BLD AUTO: 0.04 X10*3/UL (ref 0–0.1)
BASOPHILS NFR BLD AUTO: 0.5 %
BILIRUB DIRECT SERPL-MCNC: 0.1 MG/DL (ref 0–0.3)
BILIRUB SERPL-MCNC: 0.3 MG/DL (ref 0–1.2)
BUN SERPL-MCNC: 8 MG/DL (ref 6–23)
CALCIUM SERPL-MCNC: 8.8 MG/DL (ref 8.6–10.6)
CHLORIDE SERPL-SCNC: 95 MMOL/L (ref 98–107)
CO2 SERPL-SCNC: 31 MMOL/L (ref 21–32)
CREAT SERPL-MCNC: 0.62 MG/DL (ref 0.5–1.3)
EGFRCR SERPLBLD CKD-EPI 2021: >90 ML/MIN/1.73M*2
EOSINOPHIL # BLD AUTO: 0.35 X10*3/UL (ref 0–0.7)
EOSINOPHIL NFR BLD AUTO: 4.2 %
ERYTHROCYTE [DISTWIDTH] IN BLOOD BY AUTOMATED COUNT: 16.8 % (ref 11.5–14.5)
GLUCOSE SERPL-MCNC: 73 MG/DL (ref 74–99)
HCT VFR BLD AUTO: 29.5 % (ref 41–52)
HGB BLD-MCNC: 9.3 G/DL (ref 13.5–17.5)
IMM GRANULOCYTES # BLD AUTO: 0.09 X10*3/UL (ref 0–0.7)
IMM GRANULOCYTES NFR BLD AUTO: 1.1 % (ref 0–0.9)
INR PPP: 1.1 (ref 0.9–1.1)
LYMPHOCYTES # BLD AUTO: 0.79 X10*3/UL (ref 1.2–4.8)
LYMPHOCYTES NFR BLD AUTO: 9.4 %
MAGNESIUM SERPL-MCNC: 2.04 MG/DL (ref 1.6–2.4)
MCH RBC QN AUTO: 25.8 PG (ref 26–34)
MCHC RBC AUTO-ENTMCNC: 31.5 G/DL (ref 32–36)
MCV RBC AUTO: 82 FL (ref 80–100)
MONOCYTES # BLD AUTO: 1.16 X10*3/UL (ref 0.1–1)
MONOCYTES NFR BLD AUTO: 13.8 %
NEUTROPHILS # BLD AUTO: 5.95 X10*3/UL (ref 1.2–7.7)
NEUTROPHILS NFR BLD AUTO: 71 %
NRBC BLD-RTO: 0 /100 WBCS (ref 0–0)
PHOSPHATE SERPL-MCNC: 3.6 MG/DL (ref 2.5–4.9)
PLATELET # BLD AUTO: 636 X10*3/UL (ref 150–450)
POTASSIUM SERPL-SCNC: 4.1 MMOL/L (ref 3.5–5.3)
PROT SERPL-MCNC: 6.5 G/DL (ref 6.4–8.2)
PROTHROMBIN TIME: 12.6 SECONDS (ref 9.8–12.8)
RBC # BLD AUTO: 3.61 X10*6/UL (ref 4.5–5.9)
SODIUM SERPL-SCNC: 136 MMOL/L (ref 136–145)
WBC # BLD AUTO: 8.4 X10*3/UL (ref 4.4–11.3)

## 2024-06-02 PROCEDURE — 85610 PROTHROMBIN TIME: CPT

## 2024-06-02 PROCEDURE — 99233 SBSQ HOSP IP/OBS HIGH 50: CPT

## 2024-06-02 PROCEDURE — 2500000001 HC RX 250 WO HCPCS SELF ADMINISTERED DRUGS (ALT 637 FOR MEDICARE OP)

## 2024-06-02 PROCEDURE — 36415 COLL VENOUS BLD VENIPUNCTURE: CPT

## 2024-06-02 PROCEDURE — 83735 ASSAY OF MAGNESIUM: CPT

## 2024-06-02 PROCEDURE — 84100 ASSAY OF PHOSPHORUS: CPT

## 2024-06-02 PROCEDURE — 1200000003 HC ONCOLOGY  ROOM WITH TELEMETRY DAILY

## 2024-06-02 PROCEDURE — 85025 COMPLETE CBC W/AUTO DIFF WBC: CPT

## 2024-06-02 PROCEDURE — 2500000004 HC RX 250 GENERAL PHARMACY W/ HCPCS (ALT 636 FOR OP/ED)

## 2024-06-02 PROCEDURE — 80053 COMPREHEN METABOLIC PANEL: CPT

## 2024-06-02 PROCEDURE — 82248 BILIRUBIN DIRECT: CPT

## 2024-06-02 RX ADMIN — ENOXAPARIN SODIUM 40 MG: 100 INJECTION SUBCUTANEOUS at 08:42

## 2024-06-02 RX ADMIN — OXYCODONE HYDROCHLORIDE 15 MG: 5 TABLET ORAL at 15:03

## 2024-06-02 RX ADMIN — LORATADINE 10 MG: 10 TABLET ORAL at 08:38

## 2024-06-02 RX ADMIN — DIPHENHYDRAMINE HYDROCHLORIDE 50 MG: 25 CAPSULE ORAL at 21:04

## 2024-06-02 RX ADMIN — LISINOPRIL 20 MG: 20 TABLET ORAL at 08:38

## 2024-06-02 RX ADMIN — GABAPENTIN 300 MG: 300 CAPSULE ORAL at 08:38

## 2024-06-02 RX ADMIN — ACETAMINOPHEN 975 MG: 325 TABLET ORAL at 08:39

## 2024-06-02 RX ADMIN — Medication 1 TABLET: at 08:38

## 2024-06-02 RX ADMIN — Medication 2000 UNITS: at 08:39

## 2024-06-02 RX ADMIN — METHOCARBAMOL 500 MG: 500 TABLET ORAL at 11:46

## 2024-06-02 RX ADMIN — OXYCODONE HYDROCHLORIDE 15 MG: 15 TABLET, FILM COATED, EXTENDED RELEASE ORAL at 20:25

## 2024-06-02 RX ADMIN — OXYCODONE HYDROCHLORIDE 10 MG: 5 TABLET ORAL at 05:24

## 2024-06-02 RX ADMIN — SENNOSIDES 17.2 MG: 8.6 TABLET, FILM COATED ORAL at 08:38

## 2024-06-02 RX ADMIN — METHOCARBAMOL 500 MG: 500 TABLET ORAL at 17:20

## 2024-06-02 RX ADMIN — PANTOPRAZOLE SODIUM 40 MG: 40 TABLET, DELAYED RELEASE ORAL at 05:24

## 2024-06-02 RX ADMIN — ACETAMINOPHEN 975 MG: 325 TABLET ORAL at 14:14

## 2024-06-02 RX ADMIN — GABAPENTIN 300 MG: 300 CAPSULE ORAL at 14:14

## 2024-06-02 RX ADMIN — ACETAMINOPHEN 975 MG: 325 TABLET ORAL at 20:26

## 2024-06-02 RX ADMIN — OXYCODONE HYDROCHLORIDE 15 MG: 15 TABLET, FILM COATED, EXTENDED RELEASE ORAL at 08:38

## 2024-06-02 RX ADMIN — Medication 5 MG: at 20:25

## 2024-06-02 RX ADMIN — GABAPENTIN 600 MG: 300 CAPSULE ORAL at 20:26

## 2024-06-02 RX ADMIN — METHOCARBAMOL 500 MG: 500 TABLET ORAL at 05:24

## 2024-06-02 ASSESSMENT — COGNITIVE AND FUNCTIONAL STATUS - GENERAL
PERSONAL GROOMING: A LITTLE
MOVING FROM LYING ON BACK TO SITTING ON SIDE OF FLAT BED WITH BEDRAILS: A LOT
MOBILITY SCORE: 8
DRESSING REGULAR LOWER BODY CLOTHING: A LOT
TOILETING: A LOT
CLIMB 3 TO 5 STEPS WITH RAILING: TOTAL
CLIMB 3 TO 5 STEPS WITH RAILING: TOTAL
MOBILITY SCORE: 8
STANDING UP FROM CHAIR USING ARMS: TOTAL
WALKING IN HOSPITAL ROOM: TOTAL
WALKING IN HOSPITAL ROOM: TOTAL
MOVING TO AND FROM BED TO CHAIR: TOTAL
STANDING UP FROM CHAIR USING ARMS: TOTAL
DRESSING REGULAR LOWER BODY CLOTHING: A LOT
PERSONAL GROOMING: A LOT
TOILETING: A LOT
MOVING TO AND FROM BED TO CHAIR: TOTAL
HELP NEEDED FOR BATHING: A LOT
DAILY ACTIVITIY SCORE: 14
TURNING FROM BACK TO SIDE WHILE IN FLAT BAD: A LOT
DAILY ACTIVITIY SCORE: 14
EATING MEALS: A LITTLE
DRESSING REGULAR UPPER BODY CLOTHING: A LOT
HELP NEEDED FOR BATHING: A LOT
TURNING FROM BACK TO SIDE WHILE IN FLAT BAD: A LOT
MOVING FROM LYING ON BACK TO SITTING ON SIDE OF FLAT BED WITH BEDRAILS: A LOT
DRESSING REGULAR UPPER BODY CLOTHING: A LOT

## 2024-06-02 ASSESSMENT — PAIN SCALES - GENERAL
PAINLEVEL_OUTOF10: 8
PAINLEVEL_OUTOF10: 7
PAINLEVEL_OUTOF10: 5 - MODERATE PAIN
PAINLEVEL_OUTOF10: 4

## 2024-06-02 ASSESSMENT — PAIN - FUNCTIONAL ASSESSMENT
PAIN_FUNCTIONAL_ASSESSMENT: 0-10

## 2024-06-02 NOTE — PROGRESS NOTES
Internal Medicine Daily Progress Note    Subjective     Interval events:  Reports pain is well controlled today. Wants to get out of bed and wants to be turned.        Objective     Vitals:  Visit Vitals  /81 (BP Location: Left arm, Patient Position: Lying)   Pulse (!) 114   Temp 37.2 °C (99 °F) (Temporal)   Resp 18         Intake/Output Summary (Last 24 hours) at 6/2/2024 1254  Last data filed at 6/2/2024 0538  Gross per 24 hour   Intake 420 ml   Output 3050 ml   Net -2630 ml       Physical exam:  Constitutional: Well-developed male in no acute distress.  HEENT: Normocephalic, atraumatic.  Respiratory: CTA anteriorly. No wheezes, rales, or rhonchi. Normal respiratory effort on 1L NC.  Cardiovascular: RRR. No murmurs, gallops, or rubs.  Abdominal: Soft, nondistended, nontender to palpation. Bowel sounds present.  Neuro: A&Ox3  MSK: No LE edema bilaterally  Skin: Warm, dry. No rashes or wounds  Psych: Appropriate mood and affect    Medications:  acetaminophen, 975 mg, oral, TID  cholecalciferol, 2,000 Units, oral, Daily  enoxaparin, 40 mg, subcutaneous, q24h CHERI  gabapentin, 300 mg, oral, 2 times per day  gabapentin, 600 mg, oral, Nightly  lisinopril, 20 mg, oral, Daily  loratadine, 10 mg, oral, Daily  melatonin, 5 mg, oral, Daily  methocarbamol, 500 mg, oral, q6h CHERI  multivitamin with minerals, 1 tablet, oral, Daily  oxyCODONE ER, 15 mg, oral, q12h CHERI  pantoprazole, 40 mg, oral, Daily before breakfast  polyethylene glycol, 17 g, oral, BID  sennosides, 2 tablet, oral, BID           PRN medications: dextrose, dextrose, diphenhydrAMINE, glucagon, glucagon, HYDROmorphone, ketotifen, naloxone, oxyCODONE, oxyCODONE    Labs:      Results from last 72 hours   Lab Units 06/02/24  0857 06/01/24  1957 05/31/24  1755 05/31/24  0736   WBC AUTO x10*3/uL 8.4  --  7.7 8.3   HEMOGLOBIN g/dL 9.3*  --  9.4* 8.6*   HEMATOCRIT % 29.5*  --  29.6* 28.2*   PLATELETS AUTO x10*3/uL 636*  --  471* 424   INR  1.1  --   --   --   Sinusitis, Adult  Sinusitis is soreness and inflammation of your sinuses. Sinuses are hollow spaces in the bones around your face. Your sinuses are located:  · Around your eyes.  · In the middle of your forehead.  · Behind your nose.  · In your cheekbones.  Your sinuses and nasal passages are lined with a stringy fluid (mucus). Mucus normally drains out of your sinuses. When your nasal tissues become inflamed or swollen, the mucus can become trapped or blocked so air cannot flow through your sinuses. This allows bacteria, viruses, and funguses to grow, which leads to infection.  Sinusitis can develop quickly and last for 7-10 days (acute) or for more than 12 weeks (chronic). Sinusitis often develops after a cold.  CAUSES  This condition is caused by anything that creates swelling in the sinuses or stops mucus from draining, including:  · Allergies.  · Asthma.  · Bacterial or viral infection.  · Abnormally shaped bones between the nasal passages.  · Nasal growths that contain mucus (nasal polyps).  · Narrow sinus openings.  · Pollutants, such as chemicals or irritants in the air.  · A foreign object stuck in the nose.  · A fungal infection. This is rare.  RISK FACTORS  The following factors may make you more likely to develop this condition:  · Having allergies or asthma.  · Having had a recent cold or respiratory tract infection.  · Having structural deformities or blockages in your nose or sinuses.  · Having a weak immune system.  · Doing a lot of swimming or diving.  · Overusing nasal sprays.  · Smoking.  SYMPTOMS  The main symptoms of this condition are pain and a feeling of pressure around the affected sinuses. Other symptoms include:  · Upper toothache.  · Earache.  · Headache.  · Bad breath.  · Decreased sense of smell and taste.  · A cough that may get worse at night.  · Fatigue.  · Fever.  · Thick drainage from your nose. The drainage is often green and it may contain pus (purulent).  · Stuffy nose or    SODIUM mmol/L 136 138  --  133*   POTASSIUM mmol/L 4.1 4.0  --  4.1   CHLORIDE mmol/L 95* 97*  --  94*   CO2 mmol/L 31 31  --  30   BUN mg/dL 8 9  --  10   CREATININE mg/dL 0.62 0.67  --  0.71   GLUCOSE mg/dL 73* 80  --  76   CALCIUM mg/dL 8.8 8.6  --  8.4*   MAGNESIUM mg/dL 2.04 2.22  --  2.22   PHOSPHORUS mg/dL 3.6 4.0  --  4.1   ALBUMIN g/dL 3.0* 2.9*  --  2.9*   ALK PHOS U/L 77  --   --   --    ALT U/L 16  --   --   --    AST U/L 19  --   --   --    BILIRUBIN TOTAL mg/dL 0.3  --   --   --            Imaging:  No results found.       Assessment and Plan:  Altaf Parsons is a 53 y.o. male with PMHx of malignant thymoma (dx. 03/2016) w/ mets to bone and lung who presented with worsening LE weakness and pain, now s/p thoracolumbar decompression/fusion for malignant spinal cord compression on 5/25.    Updates 06/02/24:  - pending dispo to acute rehab  - improved edema with lasix  - remove arnett today, monitor urine output    #Malignant spinal cord compression, s/p decompression on 5/25  - MRI (4/26) showing new enhancement of cauda equine nerve roots, concern for leptomeningeal spread  - new progressive weakness possibly related to this leptomeningeal spread of disease  - ortho performed thoracolumbar decompression and fusion on 5/25  Plan  - appreciate ortho recs:   - no steroids   - prevena with q8h outputs, hemovac removed 5/29  - PT/OT for dispo, patient has outpatient PT/OT  - Weight bearing as tolerated  - Continue pain control  gabapentin, tylenol     #Metastatic Malignant Thymoma, Type B2 (predominant lymphocytic population with scattered epithelial cells)  - July 2007: Massive left-sided hemothorax. Underwent left VATS drainage of massive hemothorax, pleural biopsy and core needle biopsy of lingular mass and decortication. Biopsies all negative. CT scan showed 7 cm lesion within mediastinum with a calcified rim. Followed with serial Cts  - Feb 2016: New enhancing soft tissue mass superior to calcified  congestion.  · Postnasal drip. This is when extra mucus collects in the throat or back of the nose.  · Swelling and warmth over the affected sinuses.  · Sore throat.  · Sensitivity to light.  DIAGNOSIS  This condition is diagnosed based on symptoms, a medical history, and a physical exam. To find out if your condition is acute or chronic, your health care provider may:  · Look in your nose for signs of nasal polyps.  · Tap over the affected sinus to check for signs of infection.  · View the inside of your sinuses using an imaging device that has a light attached (endoscope).  If your health care provider suspects that you have chronic sinusitis, you may also:  · Be tested for allergies.  · Have a sample of mucus taken from your nose (nasal culture) and checked for bacteria.  · Have a mucus sample examined to see if your sinusitis is related to an allergy.  If your sinusitis does not respond to treatment and it lasts longer than 8 weeks, you may have an MRI or CT scan to check your sinuses. These scans also help to determine how severe your infection is.  In rare cases, a bone biopsy may be done to rule out more serious types of fungal sinus disease.  TREATMENT  Treatment for sinusitis depends on the cause and whether your condition is chronic or acute. If a virus is causing your sinusitis, your symptoms will go away on their own within 10 days. You may be given medicines to relieve your symptoms, including:  · Topical nasal decongestants. They shrink swollen nasal passages and let mucus drain from your sinuses.  · Antihistamines. These drugs block inflammation that is triggered by allergies. This can help to ease swelling in your nose and sinuses.  · Topical nasal corticosteroids. These are nasal sprays that ease inflammation and swelling in your nose and sinuses.  · Nasal saline washes. These rinses can help to get rid of thick mucus in your nose.  If your condition is caused by bacteria, you will be given an  antibiotic medicine. If your condition is caused by a fungus, you will be given an antifungal medicine.  Surgery may be needed to correct underlying conditions, such as narrow nasal passages. Surgery may also be needed to remove polyps.  HOME CARE INSTRUCTIONS  Medicines  · Take, use, or apply over-the-counter and prescription medicines only as told by your health care provider. These may include nasal sprays.  · If you were prescribed an antibiotic medicine, take it as told by your health care provider. Do not stop taking the antibiotic even if you start to feel better.  Hydrate and Humidify  · Drink enough water to keep your urine clear or pale yellow. Staying hydrated will help to thin your mucus.  · Use a cool mist humidifier to keep the humidity level in your home above 50%.  · Inhale steam for 10-15 minutes, 3-4 times a day or as told by your health care provider. You can do this in the bathroom while a hot shower is running.  · Limit your exposure to cool or dry air.  Rest  · Rest as much as possible.  · Sleep with your head raised (elevated).  · Make sure to get enough sleep each night.  General Instructions  · Apply a warm, moist washcloth to your face 3-4 times a day or as told by your health care provider. This will help with discomfort.  · Wash your hands often with soap and water to reduce your exposure to viruses and other germs. If soap and water are not available, use hand .  · Do not smoke. Avoid being around people who are smoking (secondhand smoke).  · Keep all follow-up visits as told by your health care provider. This is important.  SEEK MEDICAL CARE IF:  · You have a fever.  · Your symptoms get worse.  · Your symptoms do not improve within 10 days.  SEEK IMMEDIATE MEDICAL CARE IF:  · You have a severe headache.  · You have persistent vomiting.  · You have pain or swelling around your face or eyes.  · You have vision problems.  · You develop confusion.  · Your neck is stiff.  · You have  structure with pericardial LN and basilar RLL nodule  - March 4, 2016: Diagnosed from left lower lobe of lung wedge resection and mediastinal fat biopsy  - Aug 1 2016- Sep 7 2016: 59.4 Gy proton therapy to resection bed  - May 2017: Left pleural nodule showing thymoma  - 8/4/17- Started 3 cycles of cyclophosphamide, adriamycin, and cisplatin (PAC-P/PAC/CAP regimen, Demi et al 2004)  - 11/2019: Left serratus anterior biopsy positive for thymoma  - 12/10/19- 12/31/19: SINDI mass and left serratus anterior mass, 45 CGE/15 fx, PROTONS   - Aug 2020: T12-L1 paraspinal mass showing thymoma  -Jan 2021: Spine tumor biopsy showing thymoma. Posterior spinal fusion T9-L1 with use of bilateral pedicle screw instrumentation, allograft and demineralized bone matrix fiber. 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 Progressed  -April 2022 - Finished RT of 30 Gy in 10 fractions from T4-T6, and L1-L3  - Dec 2022- April 2023: Started on single agent capecitabine with stable disease  - Sep 2023: Some somatostatin receptor disease noted by [employer] Dotat[employer] PET  -Sep 2023: Single agent everolimus started. Stable disease so far  - Dr. Rodriguez aware  Plan:  - discontinued everolimus as advised by Dr. Rodriguez     #Chronic left low back pain secondary to malignancy  - Consulted supportive oncology, appreciate recs   -oxycontin 15 mg BID, PRN oxy 10 or oxy 15 with IV dilaudid 0.4 mg as breakthrough  - Hold dexamethasone 4 mg daily  - continue gabapentin 300-300-600  - cont home methocarbimol 500mg Q6     #HTN  - At home was on lisinopril 20-hydrochlorothiazide 25 mg  - cont home lisinopril 20mg  - Can consider restarting hydrochlorothiazide 25 mg later in hospitalization      #Misc  - cont home Vit D, multivitamin, Protonix 40 mg  daily     F: PRN  E: prn  N: regular diet  A: PIV, Meza  DVT prophylaxis: Lovenox  Code status: Full code, confirmed on admission  NOK: Marilia (wife) #[phone]    Patient and plan discussed with attending physician.     trouble breathing.     This information is not intended to replace advice given to you by your health care provider. Make sure you discuss any questions you have with your health care provider.     Document Released: 12/18/2006 Document Revised: 04/10/2017 Document Reviewed: 10/12/2016  [employer] Interactive Patient Education ©2017 [employer]    Cough, Adult  Coughing is a reflex that clears your throat and your airways. Coughing helps to heal and protect your lungs. It is normal to cough occasionally, but a cough that happens with other symptoms or lasts a long time may be a sign of a condition that needs treatment. A cough may last only 2-3 weeks (acute), or it may last longer than 8 weeks (chronic).  CAUSES  Coughing is commonly caused by:  · Breathing in substances that irritate your lungs.  · A viral or bacterial respiratory infection.  · Allergies.  · Asthma.  · Postnasal drip.  · Smoking.  · Acid backing up from the stomach into the esophagus (gastroesophageal reflux).  · Certain medicines.  · Chronic lung problems, including COPD (or rarely, lung cancer).  · Other medical conditions such as heart failure.  HOME CARE INSTRUCTIONS   Pay attention to any changes in your symptoms. Take these actions to help with your discomfort:  · Take medicines only as told by your health care provider.    If you were prescribed an antibiotic medicine, take it as told by your health care provider. Do not stop taking the antibiotic even if you start to feel better.    Talk with your health care provider before you take a cough suppressant medicine.  · Drink enough fluid to keep your urine clear or pale yellow.  · If the air is dry, use a cold steam vaporizer or humidifier in your bedroom or your home to help loosen secretions.  · Avoid anything that causes you to cough at work or at home.  · If your cough is worse at night, try sleeping in a semi-upright position.  · Avoid cigarette smoke. If you smoke, quit smoking. If you  need help quitting, ask your health care provider.  · Avoid caffeine.  · Avoid alcohol.  · Rest as needed.  SEEK MEDICAL CARE IF:   · You have new symptoms.  · You cough up pus.  · Your cough does not get better after 2-3 weeks, or your cough gets worse.  · You cannot control your cough with suppressant medicines and you are losing sleep.  · You develop pain that is getting worse or pain that is not controlled with pain medicines.  · You have a fever.  · You have unexplained weight loss.  · You have night sweats.  SEEK IMMEDIATE MEDICAL CARE IF:  · You cough up blood.  · You have difficulty breathing.  · Your heartbeat is very fast.     This information is not intended to replace advice given to you by your health care provider. Make sure you discuss any questions you have with your health care provider.     Document Released: 06/15/2012 Document Revised: 09/07/2016 Document Reviewed: 02/24/2016  [employer] Interactive Patient Education ©2017 [employer]

## 2024-06-02 NOTE — CARE PLAN
Problem: Pain - Adult  Goal: Verbalizes/displays adequate comfort level or baseline comfort level  Outcome: Progressing     Problem: Safety - Adult  Goal: Free from fall injury  Outcome: Progressing     Problem: Skin  Goal: Decreased wound size/increased tissue granulation at next dressing change  Outcome: Progressing  Goal: Participates in plan/prevention/treatment measures  Outcome: Progressing  Goal: Prevent/manage excess moisture  Outcome: Progressing  Goal: Prevent/minimize sheer/friction injuries  Outcome: Progressing  Goal: Promote/optimize nutrition  Outcome: Progressing  Goal: Promote skin healing  Outcome: Progressing   The patient's goals for the shift include Pain controlled, repositioned and turned as needed and get at least 6-8 hrs of rest/sleep during the shift.    The clinical goals for the shift include Pt pain to remain less than 7/10 thru shift.

## 2024-06-02 NOTE — CARE PLAN
The clinical goals for the shift include patient will rate pain less than a 7/10 throughout shift 6/2 at 1900    Problem: Pain - Adult  Goal: Verbalizes/displays adequate comfort level or baseline comfort level  Outcome: Progressing     Problem: Safety - Adult  Goal: Free from fall injury  Outcome: Progressing     Problem: Skin  Goal: Prevent/minimize sheer/friction injuries  Outcome: Progressing     Problem: Skin  Goal: Promote/optimize nutrition  Outcome: Progressing     Problem: Skin  Goal: Promote skin healing  Outcome: Progressing

## 2024-06-03 LAB
ALBUMIN SERPL BCP-MCNC: 2.9 G/DL (ref 3.4–5)
ANION GAP SERPL CALC-SCNC: 14 MMOL/L (ref 10–20)
BASOPHILS # BLD AUTO: 0.05 X10*3/UL (ref 0–0.1)
BASOPHILS NFR BLD AUTO: 0.6 %
BUN SERPL-MCNC: 9 MG/DL (ref 6–23)
CALCIUM SERPL-MCNC: 8.5 MG/DL (ref 8.6–10.6)
CHLORIDE SERPL-SCNC: 96 MMOL/L (ref 98–107)
CO2 SERPL-SCNC: 30 MMOL/L (ref 21–32)
CREAT SERPL-MCNC: 0.73 MG/DL (ref 0.5–1.3)
EGFRCR SERPLBLD CKD-EPI 2021: >90 ML/MIN/1.73M*2
EOSINOPHIL # BLD AUTO: 0.31 X10*3/UL (ref 0–0.7)
EOSINOPHIL NFR BLD AUTO: 3.6 %
ERYTHROCYTE [DISTWIDTH] IN BLOOD BY AUTOMATED COUNT: 16.7 % (ref 11.5–14.5)
FERRITIN SERPL-MCNC: 368 NG/ML (ref 20–300)
GLUCOSE SERPL-MCNC: 93 MG/DL (ref 74–99)
HCT VFR BLD AUTO: 30 % (ref 41–52)
HGB BLD-MCNC: 9.4 G/DL (ref 13.5–17.5)
IMM GRANULOCYTES # BLD AUTO: 0.16 X10*3/UL (ref 0–0.7)
IMM GRANULOCYTES NFR BLD AUTO: 1.9 % (ref 0–0.9)
IRON SATN MFR SERPL: 17 % (ref 25–45)
IRON SERPL-MCNC: 31 UG/DL (ref 35–150)
LYMPHOCYTES # BLD AUTO: 0.74 X10*3/UL (ref 1.2–4.8)
LYMPHOCYTES NFR BLD AUTO: 8.6 %
MAGNESIUM SERPL-MCNC: 2.09 MG/DL (ref 1.6–2.4)
MCH RBC QN AUTO: 25.4 PG (ref 26–34)
MCHC RBC AUTO-ENTMCNC: 31.3 G/DL (ref 32–36)
MCV RBC AUTO: 81 FL (ref 80–100)
MONOCYTES # BLD AUTO: 1.01 X10*3/UL (ref 0.1–1)
MONOCYTES NFR BLD AUTO: 11.8 %
NEUTROPHILS # BLD AUTO: 6.31 X10*3/UL (ref 1.2–7.7)
NEUTROPHILS NFR BLD AUTO: 73.5 %
NRBC BLD-RTO: 0.2 /100 WBCS (ref 0–0)
PHOSPHATE SERPL-MCNC: 4.1 MG/DL (ref 2.5–4.9)
PLATELET # BLD AUTO: 739 X10*3/UL (ref 150–450)
POTASSIUM SERPL-SCNC: 3.9 MMOL/L (ref 3.5–5.3)
RBC # BLD AUTO: 3.7 X10*6/UL (ref 4.5–5.9)
SODIUM SERPL-SCNC: 136 MMOL/L (ref 136–145)
TIBC SERPL-MCNC: 178 UG/DL (ref 240–445)
UIBC SERPL-MCNC: 147 UG/DL (ref 110–370)
WBC # BLD AUTO: 8.6 X10*3/UL (ref 4.4–11.3)

## 2024-06-03 PROCEDURE — 2500000001 HC RX 250 WO HCPCS SELF ADMINISTERED DRUGS (ALT 637 FOR MEDICARE OP)

## 2024-06-03 PROCEDURE — 83735 ASSAY OF MAGNESIUM: CPT

## 2024-06-03 PROCEDURE — 85025 COMPLETE CBC W/AUTO DIFF WBC: CPT

## 2024-06-03 PROCEDURE — 80069 RENAL FUNCTION PANEL: CPT

## 2024-06-03 PROCEDURE — 97530 THERAPEUTIC ACTIVITIES: CPT | Mod: GP

## 2024-06-03 PROCEDURE — 82728 ASSAY OF FERRITIN: CPT

## 2024-06-03 PROCEDURE — 2500000004 HC RX 250 GENERAL PHARMACY W/ HCPCS (ALT 636 FOR OP/ED)

## 2024-06-03 PROCEDURE — 97112 NEUROMUSCULAR REEDUCATION: CPT | Mod: GP

## 2024-06-03 PROCEDURE — 97110 THERAPEUTIC EXERCISES: CPT | Mod: GO | Performed by: OCCUPATIONAL THERAPIST

## 2024-06-03 PROCEDURE — 36415 COLL VENOUS BLD VENIPUNCTURE: CPT

## 2024-06-03 PROCEDURE — 82374 ASSAY BLOOD CARBON DIOXIDE: CPT

## 2024-06-03 PROCEDURE — 99221 1ST HOSP IP/OBS SF/LOW 40: CPT | Performed by: STUDENT IN AN ORGANIZED HEALTH CARE EDUCATION/TRAINING PROGRAM

## 2024-06-03 PROCEDURE — 97535 SELF CARE MNGMENT TRAINING: CPT | Mod: GO | Performed by: OCCUPATIONAL THERAPIST

## 2024-06-03 PROCEDURE — 99233 SBSQ HOSP IP/OBS HIGH 50: CPT

## 2024-06-03 PROCEDURE — 83550 IRON BINDING TEST: CPT

## 2024-06-03 PROCEDURE — 81450 HL NEO GSAP 5-50DNA/DNA&RNA: CPT

## 2024-06-03 PROCEDURE — 1200000003 HC ONCOLOGY  ROOM WITH TELEMETRY DAILY

## 2024-06-03 RX ORDER — DIPHENHYDRAMINE HCL 25 MG
25 CAPSULE ORAL ONCE
Status: COMPLETED | OUTPATIENT
Start: 2024-06-03 | End: 2024-06-03

## 2024-06-03 RX ADMIN — METHOCARBAMOL 500 MG: 500 TABLET ORAL at 23:50

## 2024-06-03 RX ADMIN — GABAPENTIN 300 MG: 300 CAPSULE ORAL at 08:25

## 2024-06-03 RX ADMIN — LORATADINE 10 MG: 10 TABLET ORAL at 08:26

## 2024-06-03 RX ADMIN — OXYCODONE HYDROCHLORIDE 15 MG: 15 TABLET, FILM COATED, EXTENDED RELEASE ORAL at 20:20

## 2024-06-03 RX ADMIN — ACETAMINOPHEN 975 MG: 325 TABLET ORAL at 08:26

## 2024-06-03 RX ADMIN — Medication 2000 UNITS: at 08:26

## 2024-06-03 RX ADMIN — OXYCODONE HYDROCHLORIDE 10 MG: 5 TABLET ORAL at 12:18

## 2024-06-03 RX ADMIN — ENOXAPARIN SODIUM 40 MG: 100 INJECTION SUBCUTANEOUS at 08:26

## 2024-06-03 RX ADMIN — OXYCODONE HYDROCHLORIDE 10 MG: 5 TABLET ORAL at 04:53

## 2024-06-03 RX ADMIN — METHOCARBAMOL 500 MG: 500 TABLET ORAL at 01:05

## 2024-06-03 RX ADMIN — DIPHENHYDRAMINE HYDROCHLORIDE 25 MG: 25 CAPSULE ORAL at 04:53

## 2024-06-03 RX ADMIN — OXYCODONE HYDROCHLORIDE 15 MG: 15 TABLET, FILM COATED, EXTENDED RELEASE ORAL at 08:25

## 2024-06-03 RX ADMIN — PANTOPRAZOLE SODIUM 40 MG: 40 TABLET, DELAYED RELEASE ORAL at 06:23

## 2024-06-03 RX ADMIN — GABAPENTIN 600 MG: 300 CAPSULE ORAL at 20:20

## 2024-06-03 RX ADMIN — Medication 1 TABLET: at 08:25

## 2024-06-03 RX ADMIN — ACETAMINOPHEN 975 MG: 325 TABLET ORAL at 14:06

## 2024-06-03 RX ADMIN — METHOCARBAMOL 500 MG: 500 TABLET ORAL at 12:18

## 2024-06-03 RX ADMIN — METHOCARBAMOL 500 MG: 500 TABLET ORAL at 17:20

## 2024-06-03 RX ADMIN — GABAPENTIN 300 MG: 300 CAPSULE ORAL at 14:06

## 2024-06-03 RX ADMIN — Medication 5 MG: at 20:20

## 2024-06-03 RX ADMIN — METHOCARBAMOL 500 MG: 500 TABLET ORAL at 06:23

## 2024-06-03 RX ADMIN — OXYCODONE HYDROCHLORIDE 15 MG: 5 TABLET ORAL at 01:05

## 2024-06-03 RX ADMIN — ACETAMINOPHEN 975 MG: 325 TABLET ORAL at 20:20

## 2024-06-03 RX ADMIN — LISINOPRIL 20 MG: 20 TABLET ORAL at 08:26

## 2024-06-03 ASSESSMENT — COGNITIVE AND FUNCTIONAL STATUS - GENERAL
PERSONAL GROOMING: A LITTLE
TOILETING: A LOT
WALKING IN HOSPITAL ROOM: TOTAL
MOBILITY SCORE: 6
DRESSING REGULAR UPPER BODY CLOTHING: A LOT
PERSONAL GROOMING: A LITTLE
STANDING UP FROM CHAIR USING ARMS: TOTAL
MOVING TO AND FROM BED TO CHAIR: TOTAL
TOILETING: A LOT
EATING MEALS: A LITTLE
MOBILITY SCORE: 7
STANDING UP FROM CHAIR USING ARMS: TOTAL
STANDING UP FROM CHAIR USING ARMS: TOTAL
TURNING FROM BACK TO SIDE WHILE IN FLAT BAD: TOTAL
MOVING TO AND FROM BED TO CHAIR: TOTAL
CLIMB 3 TO 5 STEPS WITH RAILING: TOTAL
TURNING FROM BACK TO SIDE WHILE IN FLAT BAD: TOTAL
WALKING IN HOSPITAL ROOM: TOTAL
HELP NEEDED FOR BATHING: A LOT
HELP NEEDED FOR BATHING: A LOT
CLIMB 3 TO 5 STEPS WITH RAILING: TOTAL
DAILY ACTIVITIY SCORE: 16
DRESSING REGULAR LOWER BODY CLOTHING: A LOT
MOVING FROM LYING ON BACK TO SITTING ON SIDE OF FLAT BED WITH BEDRAILS: TOTAL
TOILETING: A LOT
CLIMB 3 TO 5 STEPS WITH RAILING: TOTAL
HELP NEEDED FOR BATHING: A LOT
MOBILITY SCORE: 7
DAILY ACTIVITIY SCORE: 13
MOVING TO AND FROM BED TO CHAIR: TOTAL
TURNING FROM BACK TO SIDE WHILE IN FLAT BAD: TOTAL
DAILY ACTIVITIY SCORE: 18
DRESSING REGULAR LOWER BODY CLOTHING: A LOT
MOVING FROM LYING ON BACK TO SITTING ON SIDE OF FLAT BED WITH BEDRAILS: A LOT
DRESSING REGULAR UPPER BODY CLOTHING: A LITTLE
WALKING IN HOSPITAL ROOM: TOTAL
DRESSING REGULAR LOWER BODY CLOTHING: TOTAL
MOVING FROM LYING ON BACK TO SITTING ON SIDE OF FLAT BED WITH BEDRAILS: A LOT

## 2024-06-03 ASSESSMENT — PAIN - FUNCTIONAL ASSESSMENT
PAIN_FUNCTIONAL_ASSESSMENT: 0-10

## 2024-06-03 ASSESSMENT — PAIN SCALES - GENERAL
PAINLEVEL_OUTOF10: 5 - MODERATE PAIN
PAINLEVEL_OUTOF10: 4
PAINLEVEL_OUTOF10: 5 - MODERATE PAIN
PAINLEVEL_OUTOF10: 8
PAINLEVEL_OUTOF10: 0 - NO PAIN
PAINLEVEL_OUTOF10: 6
PAINLEVEL_OUTOF10: 5 - MODERATE PAIN
PAINLEVEL_OUTOF10: 3

## 2024-06-03 ASSESSMENT — ACTIVITIES OF DAILY LIVING (ADL): HOME_MANAGEMENT_TIME_ENTRY: 44

## 2024-06-03 NOTE — SIGNIFICANT EVENT
Rapid Response RN Note     06/03/24 0110   Onset Documentation   Rapid Response Initiated By Radar auto page   Location/Room [facility]  ([facility] 4011)   Pager Time [phone]   Arrival Time 0110   Event End Time 0113   Primary Reason for Call Radar auto page  (score 6)     RADAR score 6 due to the following VS: T 36.5 °Celsius; ; RR 18; /77; SPO2 93% on supplemental oxygen.     Reviewed above VS with bedside RN via phone.  Vital signs within patient's current trends.  SPO2 goal >/= 92%.    Staff to page rapid response for any concerns or acute change in condition/VS.

## 2024-06-03 NOTE — PROGRESS NOTES
05/29/24 1240   Discharge Planning   Who is requesting discharge planning? Provider   Home or Post Acute Services Post acute facilities (Rehab/SNF/etc)   Type of Post Acute Facility Services Rehab   Patient expects to be discharged to: Rehab   Does the patient need discharge transport arranged? Yes   RoundTrip coordination needed? Yes   Has discharge transport been arranged? No     5/29/24 @ 1240  Spoke with patient at bedside. He originally wanted [facility], but didn't realize it was in [place], and wants to stay closer to home, so his wife and mom can come visit. Referrals already sent to [facility] and [facility]. FOC is [facility]. PM&R consult completed today. Will continue to follow patient for his discharge needs.  Zoë Bolton RN TCC    5/30/24 @ 1130  Still waiting for [facility] to respond in [facility] if they can accept patient. Per team, patient is medically ready. Will continue to follow for updates.  UPDATE 1525:  [facility] unable to accept. Patient aware that [facility] can accept. He is agreeable. Precert requested to be started by facility.   Zoë Bolton RN TCC    6/3/24 @ 1152  Patient was approved by insurance to admit to [facility]. Per team, the patients platelets are low in this morning.  Facility updated. Patient made aware. He is hopeful for discharge tomorrow.  Zoë Bolton RN TCC

## 2024-06-03 NOTE — PROGRESS NOTES
Occupational Therapy    Occupational Therapy    Occupational Therapy Treatment    Name: Altaf Parsons  MRN: [id number]  : 1970  Date: 24  Room: [address] [place], [place] [address]      Time Calculation  Start Time: 907  Stop Time: 100  Time Calculation (min): 58 min    Assessment:  OT Assessment: Patient demonstrating improving balance and ability to use UEs during sitting this date. He is highly motivated for improvement and eager to participate.  Patient remains appropriate for continued OT in hospital and for high intensity therapy post discharge.  End of Session Communication: Bedside nurse  End of Session Patient Position: Bed, 4 rail up, Alarm off, not on at start of session (per patient request to maximize ability to reposition self in bed)  Plan:  Treatment Interventions: ADL retraining, Functional transfer training, Endurance training  OT Frequency: 3 times per week  OT Discharge Recommendations: Moderate intensity level of continued care  OT Recommended Transfer Status: Dependent  OT - OK to Discharge: Yes    Subjective   General:  OT Last Visit  OT Received On: 24  Co-Treatment: PT (to maximize ability to complete functional tasks on edge of bed and to improve sitting balance)  Prior to Session Communication: Bedside nurse  Patient Position Received: Alarm off, not on at start of session, Bed, 4 rail up   Precautions:  Medical Precautions: Fall precautions  Post-Surgical Precautions: Spinal precautions       Lines/Tubes/Drains: would vac       Cognition:  Overall Cognitive Status: Within Functional Limits  Orientation Level: Oriented X4    Pain Assessment: Patient reported some discomfort in back, Les and buttocks. Pain number not provided.        Objective   Activities of Daily Living:   Feeding  Feeding Level of Assistance: Setup  Feeding Comments: Patient participated in self feeding on EOB to improve ability to balance self while using single UE. He performed eating with fingers foods and fork.  Patient  required verbal cues and CGA<>min assist for balance.    Grooming  Grooming Level of Assistance:  (patient required assistance to remove wipes from package. He was able to  wipe face with assitance for balance.)       Toileting  Toileting Comments: Patient attempted to use urinal on edge of bed, but was unsuccessful due to positioning.      Bed mobility  Bed Mobility  Bed Mobility: Yes  Bed Mobility 1  Bed Mobility 1: Supine to sitting, Sitting to supine  Level of Assistance 1: Maximum assistance (of 2)  Bed Mobility 2  Bed Mobility  2: Rolling right, Rolling left  Level of Assistance 2: Maximum assistance (of 2)  Bed Mobility 3  Bed Mobility 3: Scooting (up in bed)  Level of Assistance 3: Dependent (x2)  Bed Mobility 4  Bed Mobility 4: Scooting (to edge of bed)  Level of Assistance 4: Maximum assistance (1-2 assist)          Balance:  Dynamic Sitting Balance  Dynamic Sitting-Balance Support:  (bilateral and single UE support)  Dynamic Sitting-Balance:  (assist levels fluctuating between SBA and min assist)  Static Sitting Balance  Static Sitting-Level of Assistance:  (SBA<>min assist seated on EOB)  Static Sitting-Comment/Number of Minutes: 40 minutes       Therapy/Activity:   Therapeutic Exercise  Therapeutic Exercise Performed: Yes (Patient particiapted in the following UE exercises to improve sitting balance and single UE use.)  Therapeutic Exercise Activity 1: curls x20 left UE and 10 right UE  Therapeutic Exercise Activity 2: shoulder flexion x20 left and right UE  Therapeutic Exercise Activity 3: alternating hand taps to opposite knee x10 each  Therapeutic Activity  Therapeutic Activity Performed:  (Patient participated in reaching with single UE to improve sitting balance while maintaining spine precautions. Sit balance assistance SBA<>min assist. Patient required verbal cues for activating core.)        Outcome Measures:  [facility] Daily Activity  Putting on and taking off regular lower body clothing:  Total  Bathing (including washing, rinsing, drying): A lot  Putting on and taking off regular upper body clothing: A lot  Toileting, which includes using toilet, bedpan or urinal: A lot  Taking care of personal grooming such as brushing teeth: A little  Eating Meals: A little  Daily Activity - Total Score: 13     Education Documentation  Body Mechanics, taught by Marilu Andrew OT at 6/3/2024 10:45 AM.  Learner: Patient  Readiness: Acceptance  Method: Explanation  Response: Verbalizes Understanding  Comment: spine precautions, bed mobility technique, UE exercises, balance technique, OT plan of care    Precautions, taught by Marilu Andrew OT at 6/3/2024 10:45 AM.  Learner: Patient  Readiness: Acceptance  Method: Explanation  Response: Verbalizes Understanding  Comment: spine precautions, bed mobility technique, UE exercises, balance technique, OT plan of care    ADL Training, taught by Marilu Andrew OT at 6/3/2024 10:45 AM.  Learner: Patient  Readiness: Acceptance  Method: Explanation  Response: Verbalizes Understanding  Comment: spine precautions, bed mobility technique, UE exercises, balance technique, OT plan of care          Goals:  Encounter Problems       Encounter Problems (Active)       ADLs       Patient will perform UB and LB bathing  with minimal assist  level of assistance and bedside commode. (Progressing)       Start:  05/28/24    Expected End:  06/18/24            Patient with complete lower body dressing with moderate assist level of assistance donning and doffing all LE clothes  with PRN adaptive equipment while edge of bed  (Progressing)       Start:  05/28/24    Expected End:  06/18/24            Patient will complete toileting including hygiene clothing management/hygiene with moderate assist level of assistance and bedside commode. (Progressing)       Start:  05/28/24    Expected End:  06/18/24            Patient will feed self with stand by assist level of assistance and verbal cues using  PRN adaptive equipment while seated EOB> (Progressing)       Start:  06/03/24    Expected End:  06/18/24                Patient will complete daily grooming tasks seated EOB with stand by assist level of assistance. (Progressing)       Start:  06/03/24    Expected End:  06/18/24                 Patient will complete upper body dressing with SBA while seated EOB. (Progressing)       Start:  06/03/24    Expected End:  06/18/24                   BALANCE       Pt will maintain dynamic sitting balance during ADL task with moderate assist level of assistance in order to demonstrate decreased risk of falling and improved postural control. (Progressing)       Start:  05/28/24    Expected End:  06/18/24               EXERCISE/STRENGTHENING       Patient will complete BUE exercises for 15 reps in order to improve strength and activity for ADL performance.  (Progressing)       Start:  05/28/24    Expected End:  06/18/24               TRANSFERS       Patient will perform bed mobility moderate assist level of assistance and bed rails in order to improve safety and independence with mobility (Progressing)       Start:  05/28/24    Expected End:  06/18/24 06/03/24 at 10:50 AM   Marilu Andrew, OT   [phone]

## 2024-06-03 NOTE — CONSULTS
Reason For Consult  thrombocytosis    History Of Present Illness  Altaf Parsons is a 53 y.o. male presenting with PMH of malignant thymoma (dx. 03/2016) w/ mets to bone and lung who presented with worsening LE weakness and pain.    Patient presented 5/24 progressive L leg numbness and weakness and worsening inability of L hip and knee flexion. He was admitted to [facility] in April with c/o inability to walk and lower extremity weakness. His evaluation included updated imaging, and he had a neurosurgery consultation and no surgery was recommended. He states he has been wheelchair bound for 2 weeks. He also recently saw Dr. Melendez outpatient on 5/22, and discussed possible advanced surgical intervention that would require a revision decompression in the thoracic spine, a decompression in the lumbar spine and extension of his fusion from the thoracic to the lumbar spine. The patient and his wife have decided to proceed with this intervention, and a plan was made to direct admit to the hospital to further expedite surgical planning on Saturday 5/25, however the patient came in early to the ED overnight for further pain control. He underwent thoracolumbar decompression/fusion for malignant spinal cord compression on 5/25. Over hospital course, noted to have worsenign thrombocytosis, for which Hematology is being consulted.     5/24 WBC 7.1 Hgb 14.3 MCV 75 plt 345  Plt increasing since 5/27, today 739    Pt denies any hx of blood disorders, bleeding, headaches, vision changes, new numbness or pain. Recovering from surgery with plan to dc to SNF soon.    Onc History:  -Diagnosed 3/2016 from lung resection, multiple lung/pleural nodules  -Spinal mass biopsied 8/2020 consistent with thymoma, again spinal bx in Jan 2021,      Staging:  T2N0M1     Current Mets + procedures:  Mediastinum, lung, paraspinal region T, L, S spine, left pleura, T12-L1 paraspinal region, left lateral aspect of the spinal canal spanning from at least  T11-S1.   Mets to the lungs s/p total pulmonary decortication, resection of anterior metastatic mass in the thymus, LLL & SINDI wedge resections, chemoradiation ([id number]), mets to the serratus anterior muscle (2019, s/p proton therapy), T12-L1 spine mets (s/p T9-L1 lami & fusion for decompression in 2021 and also s/p proton therapy, with progression, pt declined systemic therapy), s/p T-L spine palliative radiation 09/2021, s/p radiation to T4-6 & L1-L3 in 03/2022, currently on chemo      Prior Therapy:  1. Radiation Aug 2016 with cyclophosphamide, adriamycin, cisplatin on 8/14/17 x 3 cycles. Charged particle therapy 3/2018, 12/2019  2. Laminectomy + spinal fusion by ortho  3. Palliative radiation to spine completed 9/2021  4. Single agents alimta per [facility] guideliens on January 10, 2022.   Received 2 cycles in January 2022.  Progressive disease based on MRI in February 2022.  5. 10- Radiation therapy to 30 Gy in 10 fractions, from T4-T6 and L1-L3 completed in mid April 2022  6.11- 1- Dec 2022 - single agent capecitabine 500 mg tab, 4 tabs BID. Cycle - 21 days (two weeks on with one week holiday). He started (C4) on 4/25/23.     Past Medical History  He has a past medical history of Abnormal weight gain (12/08/2014), Hemothorax, Personal history of other diseases of the digestive system (09/10/2013), Personal history of other diseases of the digestive system (03/19/2015), Personal history of other diseases of the nervous system and sense organs (11/24/2020), Personal history of other endocrine, nutritional and metabolic disease, Personal history of other endocrine, nutritional and metabolic disease, Personal history of other specified conditions (03/14/2014), and Strain of unspecified muscle, fascia and tendon at shoulder and upper arm level, right arm, initial encounter (05/20/2014).    Surgical History  He has a past surgical history that includes Knee surgery (09/10/2013); Other surgical history (09/10/2013);   angio chest w IV contrast (2/9/2016); CT guided percutaneous biopsy muscle (11/13/2019); CT guided percutaneous biopsy muscle (8/13/2020); and CT guided pleura percutaneous biopsy (5/10/2017).     Social History  He reports that he has never smoked. He has never used smokeless tobacco. He reports that he does not currently use alcohol. He reports that he does not currently use drugs.    Family History  No family history on file.     Allergies  Morphine    Review of Systems  12 ROS neg     Physical Exam  Gen: awake, alert, in no acute distress  CV: tachycardic, no m/r/g  Pulm: CTAB, on 0.5L NC  Abd: soft, NT/ND, no HSM  Ext: no LE edema  Skin: warm and dry  Neuro: A&Ox4, moves all 4 extremities spontaneously      Last Recorded Vitals  Blood pressure 118/73, pulse 96, temperature 36.7 °C (98.1 °F), temperature source Temporal, resp. rate 18, height 1.829 m (6'), weight 101 kg (223 lb), SpO2 98%.    Relevant Results  Lab Results   Component Value Date    WBC 8.6 06/03/2024    HGB 9.4 (L) 06/03/2024    HCT 30.0 (L) 06/03/2024    MCV 81 06/03/2024     (H) 06/03/2024     Lab Results   Component Value Date    CREATININE 0.73 06/03/2024    BUN 9 06/03/2024     06/03/2024    K 3.9 06/03/2024    CL 96 (L) 06/03/2024    CO2 30 06/03/2024     Lab Results   Component Value Date    ALT 16 06/02/2024    AST 19 06/02/2024    ALKPHOS 77 06/02/2024    BILITOT 0.3 06/02/2024     CT-PE 5/27    IMPRESSION:  1. No evidence of acute pulmonary embolism to proximal segmental  level. Please note that, assessment of distal segmental and  subsegmental branches is limited and small peripheral emboli are not  entirely excluded.  2. Few nodular opacities within the posterior right upper lobe, which  may be infectious/inflammatory.  3. Postsurgical/posttreatment changes again noted in the medial and  lateral aspects of the left lung, similar to prior exam.  4. Correlate with diaphragmatic plication/repair.  5. Mild dilation of the  main pulmonary artery, measuring up to 3.4  cm, which may represent pulmonary hypertension.  6. Cholelithiasis without evidence of acute cholecystitis.  7. Additional chronic findings as above.     Assessment/Plan     53 y.o. male presenting with PMH of malignant thymoma (dx 03/2016, most recently on everolimus) w/ mets to bone and lung who presented with worsening LE weakness and pain, s/p thoracolumbar decompression/fusion for malignant spinal cord compression on 5/25. Hematology consulted for thrombocytosis.      Pt asymptomatic, recovering from spinal surgery.  5/24 WBC 7.1 Hgb 14.3 MCV 75 plt 345  Plt increasing since 5/27, today 739    Most likely thrombocytosis is reactive in the setting of recent surgery. On admission had microcytosis, so recommend checking iron studies to rule out THEO. If unrevealing, would send JAK2 panel to investigate a myeloproliferative disorder. Results of the latter can be followed up as outpatient and currently no interventions indicated for thrombocytosis.     Recommendations:  -Trend CBC  -Please send iron, TIBC, Tsat, ferritin  -If no iron deficiency, please send JAK2 mutation analysis  -Will set up fup as outpatient for results fup    Patient seen and discussed with Dr Snider.    Adina Gasca MD  Hematology Oncology fellow, PGY-5  Pager [phone]

## 2024-06-03 NOTE — PROGRESS NOTES
Physical Therapy    Physical Therapy Treatment    Patient Name: Altaf Parsons  MRN: [id number]  Today's Date: 6/3/2024  Time Calculation  Start Time: 0908  Stop Time: 1005  Time Calculation (min): 57 min    Assessment/Plan   PT Assessment  End of Session Communication: Bedside nurse  Assessment Comment: Continue to recommend HIGH intensity PT after DC.  End of Session Patient Position: Bed, 4 rail up, Alarm off, not on at start of session (4 rails up per pt request)  PT Plan  Inpatient/Swing Bed or Outpatient: Inpatient  PT Plan  Treatment/Interventions: Bed mobility, Transfer training, Balance training, Gait training, Neuromuscular re-education, Strengthening, Endurance training, Range of motion, Therapeutic exercise, Therapeutic activity, Positioning  PT Plan: Skilled PT  PT Frequency: 5 times per week  PT Discharge Recommendations: High intensity level of continued care  PT Recommended Transfer Status: Total assist  PT - OK to Discharge: Yes (Pateint could benefit from continued PT services at HIGH Intensity once medically cleared.)      General Visit Information:   PT  Visit  PT Received On: 06/03/24  Response to Previous Treatment: Patient with no complaints from previous session.  General  Co-Treatment: OT  Co-Treatment Reason: to safely progress functional mobility  Prior to Session Communication: Bedside nurse  Patient Position Received: Bed, 4 rail up, Alarm off, not on at start of session  General Comment: Pt supine in bed upon entry to room. Pt pleasant, cooperative and willing to work with PT. Noted wound vac.    Subjective   Precautions:  Precautions  Medical Precautions: Fall precautions  Post-Surgical Precautions: Spinal precautions  Vital Signs:       Objective   Pain:  Pain Assessment  Pain Assessment: 0-10  Pain Score: 0 - No pain  Cognition:  Cognition  Overall Cognitive Status: Within Functional Limits  Orientation Level: Oriented X4  Coordination:       Treatments:  Therapeutic  Exercise  Therapeutic Exercise Performed: Yes  Therapeutic Exercise Activity 1: x10 B heel raises; no movement noted in L ankle, trace palpated; AAROM B LAQ with hold at end for stretch with pt noted to lack full end collin ROM in knee extension in sitting    Therapeutic Activity  Therapeutic Activity Performed: Yes  Therapeutic Activity 1: pt performing supine <>sit with max assist x2 adn B rolling with max assist x2.    Balance/Neuromuscular Re-Education  Balance/Neuromuscular Re-Education Activity Performed: Yes  Balance/Neuromuscular Re-Education Activity 1: Pt sitting EOB for ~40 minutes with min-sba with verbal cuing provided throughout to maintain midline posture. Pt noted to have R lateral lean wihthout BUE supported. Pt performing B arm taps to target with cga. Pt performing BUE strengthening exercises with focus on maintaining upright midline posture. Pt completing alternating taps to opposite thigh with B hands rested on thigh with sba and verbal and tactile cuing to maintain midline posture.    Bed Mobility  Bed Mobility: Yes  Bed Mobility 1  Bed Mobility 1: Supine to sitting, Sitting to supine  Level of Assistance 1: Maximum assistance, Maximum verbal cues, Maximum tactile cues  Bed Mobility Comments 1: x2 assist; use of draw sheet with HOB partially elevated  Bed Mobility 2  Bed Mobility  2: Rolling right, Rolling left  Level of Assistance 2: Maximum assistance, Maximum verbal cues  Bed Mobility Comments 2: x2 assist  Bed Mobility 3  Bed Mobility 3: Scooting (boost)  Level of Assistance 3: Dependent  Bed Mobility 4  Bed Mobility 4: Scooting (to EOB)  Level of Assistance 4: Maximum assistance    Outcome Measures:  [facility] Basic Mobility  Turning from your back to your side while in a flat bed without using bedrails: Total  Moving from lying on your back to sitting on the side of a flat bed without using bedrails: Total  Moving to and from bed to chair (including a wheelchair): Total  Standing up from a  chair using your arms (e.g. wheelchair or bedside chair): Total  To walk in hospital room: Total  Climbing 3-5 steps with railing: Total  Basic Mobility - Total Score: 6    Education Documentation  Mobility Training, taught by Priyanka Andrew, PT at 6/3/2024  4:41 PM.  Learner: Patient  Readiness: Acceptance  Method: Explanation  Response: Needs Reinforcement    Education Comments  No comments found.      OP EDUCATION:       Encounter Problems       Encounter Problems (Active)       Balance       STG - Maintains dynamic sitting balance without upper extremity support >/= 5 min with SBA (Progressing)       Start:  05/26/24    Expected End:  06/09/24       INTERVENTIONS:  1. Practice sitting on the edge of a bed/mat with minimal support.  2. Educate patient about maintining total hip precautions while maintaining balance.  3. Educate patient about pressure relief.  4. Educate patient about use of assistive device.            Mobility       Pt will demonstrate >3/5 BLE strength to complete therapeutic exercise and participate in functional mobility.   (Progressing)       Start:  05/28/24    Expected End:  06/11/24            Pt will perform lateral transfers from bed <>chair or other surface with min assist.  (Not Progressing)       Start:  05/28/24    Expected End:  06/11/24               PT Transfers       STG - Transfer from bed to/from wheelchair using slide board with min A (Not Progressing)       Start:  05/26/24    Expected End:  06/09/24            STG - Patient will perform bed mobility with min A (Progressing)       Start:  05/26/24    Expected End:  06/09/24            STG - Patient will transfer sit to and from stand with mod A x 2 and RW (Not Progressing)       Start:  05/26/24    Expected End:  06/09/24               Pain - Adult            Priyanka Andrew, PT

## 2024-06-03 NOTE — PROGRESS NOTES
Internal Medicine Daily Progress Note    Subjective     Interval events:  NAEON. Feeling well this AM. Pain 3/10.        Objective     Vitals:  Visit Vitals  /82 (BP Location: Left arm, Patient Position: Lying)   Pulse (!) 116   Temp 36.9 °C (98.4 °F) (Temporal)   Resp 18         Intake/Output Summary (Last 24 hours) at 6/3/2024 1034  Last data filed at 6/3/2024 0831  Gross per 24 hour   Intake 50 ml   Output 1605 ml   Net -1555 ml       Physical exam:  Constitutional: Well-developed male in no acute distress.  HEENT: Normocephalic, atraumatic.  Respiratory: CTA anteriorly. No wheezes, rales, or rhonchi. Normal respiratory effort on RA.  Cardiovascular: RRR. No murmurs, gallops, or rubs.  Abdominal: Soft, nondistended, nontender to palpation. Bowel sounds present.  Neuro: A&Ox3  MSK: No LE edema bilaterally  Skin: Warm, dry. No rashes or wounds  Psych: Appropriate mood and affect    Medications:  acetaminophen, 975 mg, oral, TID  cholecalciferol, 2,000 Units, oral, Daily  enoxaparin, 40 mg, subcutaneous, q24h CHERI  gabapentin, 300 mg, oral, 2 times per day  gabapentin, 600 mg, oral, Nightly  lisinopril, 20 mg, oral, Daily  loratadine, 10 mg, oral, Daily  melatonin, 5 mg, oral, Daily  methocarbamol, 500 mg, oral, q6h CHERI  multivitamin with minerals, 1 tablet, oral, Daily  oxyCODONE ER, 15 mg, oral, q12h CHERI  pantoprazole, 40 mg, oral, Daily before breakfast  polyethylene glycol, 17 g, oral, BID  sennosides, 2 tablet, oral, BID           PRN medications: dextrose, dextrose, diphenhydrAMINE, glucagon, glucagon, HYDROmorphone, ketotifen, naloxone, oxyCODONE, oxyCODONE    Labs:      Results from last 72 hours   Lab Units 06/02/24  0857 06/01/24 1957 05/31/24  1755   WBC AUTO x10*3/uL 8.4  --  7.7   HEMOGLOBIN g/dL 9.3*  --  9.4*   HEMATOCRIT % 29.5*  --  29.6*   PLATELETS AUTO x10*3/uL 636*  --  471*   INR  1.1  --   --    SODIUM mmol/L 136 138  --    POTASSIUM mmol/L 4.1 4.0  --    CHLORIDE mmol/L 95* 97*  --     CO2 mmol/L 31 31  --    BUN mg/dL 8 9  --    CREATININE mg/dL 0.62 0.67  --    GLUCOSE mg/dL 73* 80  --    CALCIUM mg/dL 8.8 8.6  --    MAGNESIUM mg/dL 2.04 2.22  --    PHOSPHORUS mg/dL 3.6 4.0  --    ALBUMIN g/dL 3.0* 2.9*  --    ALK PHOS U/L 77  --   --    ALT U/L 16  --   --    AST U/L 19  --   --    BILIRUBIN TOTAL mg/dL 0.3  --   --            Imaging:  No results found.       Assessment and Plan:  Altaf Parsons is a 53 y.o. male with PMHx of malignant thymoma (dx. 03/2016) w/ mets to bone and lung who presented with worsening LE weakness and pain, now s/p thoracolumbar decompression/fusion for malignant spinal cord compression on 5/25.    Updates 06/03/24:  - pending dispo to acute rehab  - monitoring thrombocytosis, if persists today will consult benign heme    #Malignant spinal cord compression, s/p decompression on 5/25  - MRI (4/26) showing new enhancement of cauda equine nerve roots, concern for leptomeningeal spread  - new progressive weakness possibly related to this leptomeningeal spread of disease  - ortho performed thoracolumbar decompression and fusion on 5/25  Plan  - appreciate ortho recs:   - no steroids   - prevena with q8h outputs, hemovac removed 5/29  - PT/OT for dispo, patient has outpatient PT/OT  - Weight bearing as tolerated  - Continue pain control  gabapentin, tylenol     #Metastatic Malignant Thymoma, Type B2 (predominant lymphocytic population with scattered epithelial cells)  - July 2007: Massive left-sided hemothorax. Underwent left VATS drainage of massive hemothorax, pleural biopsy and core needle biopsy of lingular mass and decortication. Biopsies all negative. CT scan showed 7 cm lesion within mediastinum with a calcified rim. Followed with serial Cts  - Feb 2016: New enhancing soft tissue mass superior to calcified structure with pericardial LN and basilar RLL nodule  - March 4, 2016: Diagnosed from left lower lobe of lung wedge resection and mediastinal fat biopsy  - Aug 1  2016- Sep 7 2016: 59.4 Gy proton therapy to resection bed  - May 2017: Left pleural nodule showing thymoma  - 8/4/17- Started 3 cycles of cyclophosphamide, adriamycin, and cisplatin (PAC-P/PAC/CAP regimen, Demi et al 2004)  - 11/2019: Left serratus anterior biopsy positive for thymoma  - 12/10/19- 12/31/19: SINDI mass and left serratus anterior mass, 45 CGE/15 fx, PROTONS   - Aug 2020: T12-L1 paraspinal mass showing thymoma  -Jan 2021: Spine tumor biopsy showing thymoma. Posterior spinal fusion T9-L1 with use of bilateral pedicle screw instrumentation, allograft and demineralized bone matrix fiber. 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 Progressed  -April 2022 - Finished RT of 30 Gy in 10 fractions from T4-T6, and L1-L3  - Dec 2022- April 2023: Started on single agent capecitabine with stable disease  - Sep 2023: Some somatostatin receptor disease noted by [employer] PET  -Sep 2023: Single agent everolimus started. Stable disease so far  - Dr. Rodriguez aware  Plan:  - discontinued everolimus as advised by Dr. Rodriguez     #Chronic left low back pain secondary to malignancy  - Consulted supportive oncology, appreciate recs   -oxycontin 15 mg BID, PRN oxy 10 or oxy 15 with IV dilaudid 0.4 mg as breakthrough  - Hold dexamethasone 4 mg daily  - continue gabapentin 300-300-600  - cont home methocarbimol 500mg Q6     #HTN  - At home was on lisinopril 20-hydrochlorothiazide 25 mg  - cont home lisinopril 20mg  - Can consider restarting hydrochlorothiazide 25 mg later in hospitalization      #Misc  - cont home Vit D, multivitamin, Protonix 40 mg daily     F: PRN  E: prn  N: regular diet  A: PIV, Meza  DVT prophylaxis: Lovenox  Code status: Full code, confirmed on admission  NOK: Marilia (wife)  #[phone]    Patient and plan discussed with attending physician.    Nga Rocha MD  IM PGY-1  [facility] Oncology Service

## 2024-06-03 NOTE — CARE PLAN
Problem: Pain - Adult  Goal: Verbalizes/displays adequate comfort level or baseline comfort level  Outcome: Progressing     Problem: Safety - Adult  Goal: Free from fall injury  Outcome: Progressing     Problem: Skin  Goal: Decreased wound size/increased tissue granulation at next dressing change  Outcome: Progressing  Flowsheets (Taken 6/3/2024 0554)  Decreased wound size/increased tissue granulation at next dressing change: Promote sleep for wound healing  Goal: Participates in plan/prevention/treatment measures  Outcome: Progressing  Flowsheets (Taken 6/3/2024 0554)  Participates in plan/prevention/treatment measures: Elevate heels  Goal: Prevent/manage excess moisture  Outcome: Progressing  Flowsheets (Taken 6/3/2024 0554)  Prevent/manage excess moisture: Cleanse incontinence/protect with barrier cream  Goal: Prevent/minimize sheer/friction injuries  Outcome: Progressing  Flowsheets (Taken 6/3/2024 0554)  Prevent/minimize sheer/friction injuries: Use pull sheet  Goal: Promote/optimize nutrition  Outcome: Progressing  Flowsheets (Taken 6/3/2024 0554)  Promote/optimize nutrition: Monitor/record intake including meals  Goal: Promote skin healing  Outcome: Progressing  Flowsheets (Taken 6/3/2024 0554)  Promote skin healing: Assess skin/pad under line(s)/device(s)       The clinical goals for the shift include pt will rate pain less than 7/10 throughout shift    Over the shift, the patient remained safe and free from injury. Pain in back, medicated PRN. Vitals stable throughout shift . No acute events overnight.

## 2024-06-04 ENCOUNTER — DOCUMENTATION (OUTPATIENT)
Dept: CARE COORDINATION | Facility: CLINIC | Age: 54
End: 2024-06-04
Payer: COMMERCIAL

## 2024-06-04 VITALS
TEMPERATURE: 97.9 F | SYSTOLIC BLOOD PRESSURE: 109 MMHG | DIASTOLIC BLOOD PRESSURE: 67 MMHG | WEIGHT: 223 LBS | HEART RATE: 114 BPM | HEIGHT: 72 IN | OXYGEN SATURATION: 98 % | BODY MASS INDEX: 30.2 KG/M2 | RESPIRATION RATE: 18 BRPM

## 2024-06-04 LAB
ALBUMIN SERPL BCP-MCNC: 3 G/DL (ref 3.4–5)
ANION GAP SERPL CALC-SCNC: 15 MMOL/L (ref 10–20)
BASOPHILS # BLD AUTO: 0.05 X10*3/UL (ref 0–0.1)
BASOPHILS NFR BLD AUTO: 0.5 %
BUN SERPL-MCNC: 10 MG/DL (ref 6–23)
CALCIUM SERPL-MCNC: 8.9 MG/DL (ref 8.6–10.6)
CHLORIDE SERPL-SCNC: 96 MMOL/L (ref 98–107)
CO2 SERPL-SCNC: 30 MMOL/L (ref 21–32)
CREAT SERPL-MCNC: 0.71 MG/DL (ref 0.5–1.3)
EGFRCR SERPLBLD CKD-EPI 2021: >90 ML/MIN/1.73M*2
EOSINOPHIL # BLD AUTO: 0.2 X10*3/UL (ref 0–0.7)
EOSINOPHIL NFR BLD AUTO: 2.2 %
ERYTHROCYTE [DISTWIDTH] IN BLOOD BY AUTOMATED COUNT: 17 % (ref 11.5–14.5)
GLUCOSE SERPL-MCNC: 99 MG/DL (ref 74–99)
HCT VFR BLD AUTO: 31.6 % (ref 41–52)
HGB BLD-MCNC: 9.7 G/DL (ref 13.5–17.5)
IMM GRANULOCYTES # BLD AUTO: 0.13 X10*3/UL (ref 0–0.7)
IMM GRANULOCYTES NFR BLD AUTO: 1.4 % (ref 0–0.9)
LYMPHOCYTES # BLD AUTO: 0.74 X10*3/UL (ref 1.2–4.8)
LYMPHOCYTES NFR BLD AUTO: 8.1 %
MAGNESIUM SERPL-MCNC: 2.1 MG/DL (ref 1.6–2.4)
MCH RBC QN AUTO: 25.5 PG (ref 26–34)
MCHC RBC AUTO-ENTMCNC: 30.7 G/DL (ref 32–36)
MCV RBC AUTO: 83 FL (ref 80–100)
MONOCYTES # BLD AUTO: 1.08 X10*3/UL (ref 0.1–1)
MONOCYTES NFR BLD AUTO: 11.8 %
NEUTROPHILS # BLD AUTO: 6.97 X10*3/UL (ref 1.2–7.7)
NEUTROPHILS NFR BLD AUTO: 76 %
NRBC BLD-RTO: 0 /100 WBCS (ref 0–0)
PHOSPHATE SERPL-MCNC: 4 MG/DL (ref 2.5–4.9)
PLATELET # BLD AUTO: 821 X10*3/UL (ref 150–450)
POTASSIUM SERPL-SCNC: 4 MMOL/L (ref 3.5–5.3)
RBC # BLD AUTO: 3.81 X10*6/UL (ref 4.5–5.9)
SODIUM SERPL-SCNC: 137 MMOL/L (ref 136–145)
WBC # BLD AUTO: 9.2 X10*3/UL (ref 4.4–11.3)

## 2024-06-04 PROCEDURE — 2500000004 HC RX 250 GENERAL PHARMACY W/ HCPCS (ALT 636 FOR OP/ED)

## 2024-06-04 PROCEDURE — 36415 COLL VENOUS BLD VENIPUNCTURE: CPT

## 2024-06-04 PROCEDURE — 2500000001 HC RX 250 WO HCPCS SELF ADMINISTERED DRUGS (ALT 637 FOR MEDICARE OP)

## 2024-06-04 PROCEDURE — 80069 RENAL FUNCTION PANEL: CPT

## 2024-06-04 PROCEDURE — 83735 ASSAY OF MAGNESIUM: CPT

## 2024-06-04 PROCEDURE — 99239 HOSP IP/OBS DSCHRG MGMT >30: CPT

## 2024-06-04 PROCEDURE — 85025 COMPLETE CBC W/AUTO DIFF WBC: CPT

## 2024-06-04 RX ORDER — GABAPENTIN 600 MG/1
600 TABLET ORAL NIGHTLY
Start: 2024-06-04

## 2024-06-04 RX ORDER — HYDROMORPHONE HYDROCHLORIDE 1 MG/ML
0.4 INJECTION, SOLUTION INTRAMUSCULAR; INTRAVENOUS; SUBCUTANEOUS
Start: 2024-06-04

## 2024-06-04 RX ORDER — POLYETHYLENE GLYCOL 3350 17 G/17G
17 POWDER, FOR SOLUTION ORAL 2 TIMES DAILY
Start: 2024-06-04

## 2024-06-04 RX ORDER — DIPHENHYDRAMINE HCL 50 MG
50 CAPSULE ORAL NIGHTLY PRN
Start: 2024-06-04

## 2024-06-04 RX ORDER — ACETAMINOPHEN 325 MG/1
975 TABLET ORAL 3 TIMES DAILY
Start: 2024-06-04

## 2024-06-04 RX ORDER — OXYCODONE HYDROCHLORIDE 15 MG/1
15 TABLET, FILM COATED, EXTENDED RELEASE ORAL EVERY 12 HOURS SCHEDULED
Start: 2024-06-04

## 2024-06-04 RX ORDER — KETOTIFEN FUMARATE 0.35 MG/ML
1 SOLUTION/ DROPS OPHTHALMIC 2 TIMES DAILY PRN
Start: 2024-06-04

## 2024-06-04 RX ORDER — NALOXONE HYDROCHLORIDE 0.4 MG/ML
0.2 INJECTION, SOLUTION INTRAMUSCULAR; INTRAVENOUS; SUBCUTANEOUS AS NEEDED
Start: 2024-06-04

## 2024-06-04 RX ORDER — GABAPENTIN 300 MG/1
CAPSULE ORAL
Start: 2024-06-04

## 2024-06-04 RX ORDER — ENOXAPARIN SODIUM 100 MG/ML
40 INJECTION SUBCUTANEOUS
Start: 2024-06-05

## 2024-06-04 RX ORDER — OXYCODONE HYDROCHLORIDE 15 MG/1
15 TABLET ORAL
Start: 2024-06-04

## 2024-06-04 RX ORDER — LORATADINE 10 MG/1
10 TABLET ORAL DAILY
Start: 2024-06-05

## 2024-06-04 RX ORDER — ACETAMINOPHEN 500 MG
5 TABLET ORAL DAILY
Start: 2024-06-04

## 2024-06-04 RX ORDER — FERROUS SULFATE 325(65) MG
65 TABLET ORAL EVERY OTHER DAY
Status: DISCONTINUED | OUTPATIENT
Start: 2024-06-05 | End: 2024-06-04 | Stop reason: HOSPADM

## 2024-06-04 RX ORDER — SENNOSIDES 8.6 MG/1
2 TABLET ORAL 2 TIMES DAILY
Start: 2024-06-04

## 2024-06-04 RX ORDER — FERROUS SULFATE 325(65) MG
65 TABLET ORAL EVERY OTHER DAY
Start: 2024-06-05

## 2024-06-04 RX ADMIN — Medication 2000 UNITS: at 08:39

## 2024-06-04 RX ADMIN — ACETAMINOPHEN 975 MG: 325 TABLET ORAL at 08:38

## 2024-06-04 RX ADMIN — DIPHENHYDRAMINE HYDROCHLORIDE 50 MG: 25 CAPSULE ORAL at 04:21

## 2024-06-04 RX ADMIN — OXYCODONE HYDROCHLORIDE 15 MG: 5 TABLET ORAL at 01:50

## 2024-06-04 RX ADMIN — GABAPENTIN 300 MG: 300 CAPSULE ORAL at 08:39

## 2024-06-04 RX ADMIN — ENOXAPARIN SODIUM 40 MG: 100 INJECTION SUBCUTANEOUS at 08:39

## 2024-06-04 RX ADMIN — LORATADINE 10 MG: 10 TABLET ORAL at 08:39

## 2024-06-04 RX ADMIN — LISINOPRIL 20 MG: 20 TABLET ORAL at 08:38

## 2024-06-04 RX ADMIN — OXYCODONE HYDROCHLORIDE 15 MG: 15 TABLET, FILM COATED, EXTENDED RELEASE ORAL at 08:39

## 2024-06-04 RX ADMIN — Medication 1 TABLET: at 08:39

## 2024-06-04 RX ADMIN — PANTOPRAZOLE SODIUM 40 MG: 40 TABLET, DELAYED RELEASE ORAL at 06:07

## 2024-06-04 RX ADMIN — METHOCARBAMOL 500 MG: 500 TABLET ORAL at 06:07

## 2024-06-04 ASSESSMENT — PAIN SCALES - GENERAL
PAINLEVEL_OUTOF10: 4
PAINLEVEL_OUTOF10: 7
PAINLEVEL_OUTOF10: 3

## 2024-06-04 ASSESSMENT — PAIN - FUNCTIONAL ASSESSMENT
PAIN_FUNCTIONAL_ASSESSMENT: 0-10
PAIN_FUNCTIONAL_ASSESSMENT: 0-10

## 2024-06-04 NOTE — PROGRESS NOTES
Chart reviewed:  Admitted to [facility] via ED on 5.24, underwent decompression/fusion while IP.  Planned transfer today to [facility] for continued care.  Will close COLTEN episode at this time; may follow up at [place] from Rehab if indicated.

## 2024-06-04 NOTE — CARE PLAN
Problem: Pain - Adult  Goal: Verbalizes/displays adequate comfort level or baseline comfort level  Outcome: Progressing     Problem: Safety - Adult  Goal: Free from fall injury  Outcome: Progressing     Problem: Skin  Goal: Decreased wound size/increased tissue granulation at next dressing change  Outcome: Progressing  Flowsheets (Taken 6/4/2024 0639)  Decreased wound size/increased tissue granulation at next dressing change: Promote sleep for wound healing  Goal: Participates in plan/prevention/treatment measures  Outcome: Progressing  Flowsheets (Taken 6/4/2024 0639)  Participates in plan/prevention/treatment measures: Elevate heels  Goal: Prevent/manage excess moisture  Outcome: Progressing  Flowsheets (Taken 6/4/2024 0639)  Prevent/manage excess moisture: Cleanse incontinence/protect with barrier cream  Goal: Prevent/minimize sheer/friction injuries  Outcome: Progressing  Flowsheets (Taken 6/4/2024 0639)  Prevent/minimize sheer/friction injuries: Use pull sheet  Goal: Promote/optimize nutrition  Outcome: Progressing  Flowsheets (Taken 6/4/2024 0639)  Promote/optimize nutrition: Monitor/record intake including meals  Goal: Promote skin healing  Outcome: Progressing  Flowsheets (Taken 6/4/2024 0639)  Promote skin healing: Assess skin/pad under line(s)/device(s)       The clinical goals for the shift include pt will rate pain less than 7/10 throughout shift    Over the shift, the patient remained safe and free from injury. Had pain in back, medicated PRN. Vitals stable throughout shift. No acute events overnight.

## 2024-06-04 NOTE — DISCHARGE SUMMARY
Discharge Summary    Admission date: 5/24/2024  Discharge date: 6/4/2024  Attending physician at discharge: Dr. Margaret Schumacher    Admission diagnosis:  Lower leg weakness  Back pain  Metastatic thymoma    Discharge diagnosis:  Malignant cord compression s/p thoracolumbar decompression laminectomy  Metastatic thymoma  Symptomatic blood loss anemia  Thrombocytosis    Discharge disposition:  Acute rehab    Vitals:  Visit Vitals  /67 (BP Location: Left arm, Patient Position: Lying)   Pulse (!) 114   Temp 36.6 °C (97.9 °F) (Temporal)   Resp 18       Physical exam:  Constitutional: Well-developed male in no acute distress.  HEENT: Normocephalic, atraumatic.  Respiratory: CTA anteriorly. No wheezes, rales, or rhonchi. Normal respiratory effort on RA.  Cardiovascular: RRR. No murmurs, gallops, or rubs.  Abdominal: Soft, nondistended, nontender to palpation. Bowel sounds present.  Neuro: A&Ox3  MSK: No LE edema bilaterally  Skin: Warm, dry. No rashes or wounds  Psych: Appropriate mood and affect    Discharge medications:     Your medication list        START taking these medications        Instructions Last Dose Given Next Dose Due   acetaminophen 325 mg tablet  Commonly known as: Tylenol      Take 3 tablets (975 mg) by mouth 3 times a day.       diphenhydrAMINE 50 mg capsule  Commonly known as: BENADryl      Take 1 capsule (50 mg) by mouth as needed at bedtime for sleep.       enoxaparin 40 mg/0.4 mL syringe  Commonly known as: Lovenox  Start taking on: June 5, 2024      Inject 0.4 mL (40 mg) under the skin once every 24 hours.       ferrous sulfate (325 mg ferrous sulfate) tablet  Start taking on: June 5, 2024      Take 1 tablet by mouth every other day. Do not fill before June 5, 2024.       HYDROmorphone 1 mg/mL injection  Commonly known as: Dilaudid      Infuse 0.4 mL (0.4 mg) into a venous catheter every 3 hours if needed for other (breakthru).       ketotifen 0.025 % (0.035 %) ophthalmic solution  Commonly known as:  Zaditor      Administer 1 drop into both eyes 2 times a day as needed (eye itching).       loratadine 10 mg tablet  Commonly known as: Claritin  Start taking on: June 5, 2024      Take 1 tablet (10 mg) by mouth once daily.       melatonin 5 mg tablet      Take 1 tablet (5 mg) by mouth once daily.       naloxone 0.4 mg/mL injection  Commonly known as: Narcan      Infuse 0.5 mL (0.2 mg) into a venous catheter if needed for respiratory depression (Once PRN patient is unarousable, and respiratory rate less than 8).       oxyCODONE ER 15 mg 12 hr tablet  Commonly known as: OxyCONTIN  Replaces: oxyCODONE 10 mg immediate release tablet      Take 1 tablet (15 mg) by mouth every 12 hours. Do not crush, chew, or split.       oxyCODONE 15 mg immediate release tablet  Commonly known as: Roxicodone      Take 1 tablet (15 mg) by mouth every 3 hours if needed for severe pain (7 - 10) or moderate pain (4 - 6).       polyethylene glycol 17 gram packet  Commonly known as: Glycolax, Miralax      Take 17 g by mouth 2 times a day.       sennosides 8.6 mg tablet  Commonly known as: Senokot      Take 2 tablets (17.2 mg) by mouth 2 times a day.              CHANGE how you take these medications        Instructions Last Dose Given Next Dose Due   gabapentin 300 mg capsule  Commonly known as: Neurontin  What changed:   how much to take  how to take this  when to take this  additional instructions      300 mg in AM       gabapentin 600 mg tablet  Commonly known as: Neurontin  What changed: You were already taking a medication with the same name, and this prescription was added. Make sure you understand how and when to take each.      Take 1 tablet (600 mg) by mouth once daily at bedtime.              CONTINUE taking these medications        Instructions Last Dose Given Next Dose Due   lisinopril 20 mg tablet      Take 1 tablet (20 mg) by mouth once daily.       MULTIPLE VITAMINS ORAL           pantoprazole 40 mg EC tablet  Commonly known as:  ProtoNix      Take 1 tablet (40 mg) by mouth once daily in the morning. Take before meals for 10 days. Do not crush, chew, or split.    For stomach protection while taking steroids.       VITAMIN D3 ORAL                  STOP taking these medications      dexAMETHasone 4 mg tablet  Commonly known as: Decadron        everolimus 10 mg tablet  Commonly known as: Afinitor        FISH OIL ORAL        methocarbamol 500 mg tablet  Commonly known as: Robaxin        NON FORMULARY        OMEGA-3 FLAXSEED OIL ORAL        oxyCODONE 10 mg immediate release tablet  Commonly known as: Roxicodone  Replaced by: oxyCODONE ER 15 mg 12 hr tablet                  Where to Get Your Medications        Information about where to get these medications is not yet available    Ask your nurse or doctor about these medications  acetaminophen 325 mg tablet  diphenhydrAMINE 50 mg capsule  enoxaparin 40 mg/0.4 mL syringe  ferrous sulfate (325 mg ferrous sulfate) tablet  gabapentin 300 mg capsule  gabapentin 600 mg tablet  HYDROmorphone 1 mg/mL injection  ketotifen 0.025 % (0.035 %) ophthalmic solution  loratadine 10 mg tablet  melatonin 5 mg tablet  naloxone 0.4 mg/mL injection  oxyCODONE 15 mg immediate release tablet  oxyCODONE ER 15 mg 12 hr tablet  polyethylene glycol 17 gram packet  sennosides 8.6 mg tablet           Hospital course:  Altaf Parsons is a 53 y.o. male with PMHx of malignant thymoma (dx. 03/2016) w/ mets to bone and lung that presents to [facility] ED on 5/23 with increasing weakness on right side with inability to bear weight on it. He also has had progressive weakness of left leg and hip with no movement. Vitals on admission  showed he was afebrile and hemodynamically stable. Admission labs within normal limits. His last MRI lumbar spine performed on 4/26 demonstrated epidural tumor extension at L4, L5, and S1 with compression of thecal sac, combined with enhancement of cauda equina and conus. It also identified tumor extending into  epidural space from T4-T12.  Due to prior history of radiation, he was seen by Dr. Melendez on 5/22 and was recommended to be admitted to oncology with orthopedics consulting. He was taken back for a T5-7 laminectomy and fusion to T4 on 5/25. Surgery went well with no complications. Post-op he was started on a PCA pump for pain control. Supportive oncology consulted for pain management. Was able to be transitioned from PCA to a PO regimen on 5/28. Post-op course was complicated by persistent sinus tachycardia that was felt to be related to pain and relative anemia (pre-op Hgb was around 14 and post-op reached 7.8 on 5/30). He received 1 unit RBCs on 5/31. Ortho spine continued to follow the patient and removed his Hemovac on 5/29. Prevena will remain in place to acute rehab. In the last five days of admission patient developed gradually worsening thrombocytosis. Benign hematology was consulted who believed the thrombocytosis to be likely reactive in the setting of recent surgery though wanted to rule out JAK2 mutation and MPN panel and follow up in the outpatient setting. Patient was hemodynamically stable and appropriate for discharge to acute rehabilitation on 6/4.    To follow up:  - Ortho follow up on 7/3 to manage Prevena, follow post op course   - Oncology follow up (has appt with Dr. Rodriguez tomorrow, will be rescheduled)  - Benign hematology to follow up thrombocytosis and JAK2 and MPN results

## 2024-06-04 NOTE — CARE PLAN
The patient's goals for the shift include Pain controlled, repositioned and turned as needed and get at least 6-8 hrs of rest/sleep during the shift.    The clinical goals for the shift include pt will rate pain less than 7/10 throughout shift      Problem: Safety - Adult  Goal: Free from fall injury  Outcome: Progressing     Problem: Pain - Adult  Goal: Verbalizes/displays adequate comfort level or baseline comfort level  Outcome: Progressing     Problem: Skin  Goal: Decreased wound size/increased tissue granulation at next dressing change  Outcome: Progressing

## 2024-06-04 NOTE — SIGNIFICANT EVENT
Rapid Response RN Note    RADAR score 6 due to the following VS: T 36.6 °Celsius; ; RR 18; /71; SPO2 92%.     Reviewed above VS with bedside RN via phone.  Vital signs within patient's current trends.  No acute change in condition.  No interventions by rapid response team indicated at this time.    Staff to page rapid response for any concerns or acute change in condition/VS.

## 2024-06-04 NOTE — PROGRESS NOTES
05/29/24 1240   Discharge Planning   Who is requesting discharge planning? Provider   Home or Post Acute Services Post acute facilities (Rehab/SNF/etc)   Type of Post Acute Facility Services Rehab   Patient expects to be discharged to: Rehab   Does the patient need discharge transport arranged? Yes   RoundTrip coordination needed? Yes   Has discharge transport been arranged? No     5/29/24 @ 1240  Spoke with patient at bedside. He originally wanted [facility], but didn't realize it was in [place], and wants to stay closer to home, so his wife and mom can come visit. Referrals already sent to [facility] and [facility]. FOC is [facility]. PM&R consult completed today. Will continue to follow patient for his discharge needs.  Zoë Bolton RN [facility]    5/30/24 @ 1130  Still waiting for [facility] to respond in [facility] if they can accept patient. Per team, patient is medically ready. Will continue to follow for updates.  UPDATE 1525:  [facility] unable to accept. Patient aware that [facility] can accept. He is agreeable. Precert requested to be started by facility.   Zoë Bolton RN [facility]    6/3/24 @ 1152  Patient was approved by insurance to admit to [facility]. Per team, the patients platelets are low in this morning.  Facility updated. Patient made aware. He is hopeful for discharge tomorrow.  Zoë Bolton RN [facility]    6/4/24 @ 3577  Patient to discharge to [facility] today.  [facility] team state he is medically ready for discharge. PAUL Briceno RN made aware and given report number. Patient will dc with his wound vac.  The S Vehicle you requested for Altaf H. in unit/room [facility] [address] on 06/04/2024 is scheduled to arrive at 2:00pm EDT! [facility] is handling this ride and you can contact them at [phone].  Zoë Bolton RN [facility]

## 2024-06-04 NOTE — NURSING NOTE
Patient was discharged to rehab facility. Iv was removed. Discharge papers were sent with patient. Belongings were sent with patient. Report to be called. Danelle ocasio rn

## 2024-06-05 ENCOUNTER — LAB REQUISITION (OUTPATIENT)
Dept: LAB | Facility: HOSPITAL | Age: 54
End: 2024-06-05

## 2024-06-05 ENCOUNTER — APPOINTMENT (OUTPATIENT)
Dept: HEMATOLOGY/ONCOLOGY | Facility: HOSPITAL | Age: 54
End: 2024-06-05
Payer: COMMERCIAL

## 2024-06-05 DIAGNOSIS — Z51.89 ENCOUNTER FOR OTHER SPECIFIED AFTERCARE: ICD-10-CM

## 2024-06-05 LAB
APPEARANCE UR: CLEAR
BILIRUB UR STRIP.AUTO-MCNC: NEGATIVE MG/DL
COLOR UR: YELLOW
GLUCOSE UR STRIP.AUTO-MCNC: NORMAL MG/DL
KETONES UR STRIP.AUTO-MCNC: ABNORMAL MG/DL
LEUKOCYTE ESTERASE UR QL STRIP.AUTO: NEGATIVE
MUCOUS THREADS #/AREA URNS AUTO: NORMAL /LPF
NITRITE UR QL STRIP.AUTO: NEGATIVE
PH UR STRIP.AUTO: 7 [PH]
PROT UR STRIP.AUTO-MCNC: ABNORMAL MG/DL
RBC # UR STRIP.AUTO: NEGATIVE /UL
RBC #/AREA URNS AUTO: NORMAL /HPF
SP GR UR STRIP.AUTO: 1.02
SQUAMOUS #/AREA URNS AUTO: NORMAL /HPF
UROBILINOGEN UR STRIP.AUTO-MCNC: ABNORMAL MG/DL
WBC #/AREA URNS AUTO: NORMAL /HPF

## 2024-06-05 PROCEDURE — 81001 URINALYSIS AUTO W/SCOPE: CPT

## 2024-06-05 PROCEDURE — 87086 URINE CULTURE/COLONY COUNT: CPT

## 2024-06-06 ENCOUNTER — HOSPITAL ENCOUNTER (OUTPATIENT)
Dept: RADIOLOGY | Facility: CLINIC | Age: 54
Discharge: HOME | End: 2024-06-06
Payer: COMMERCIAL

## 2024-06-06 ENCOUNTER — APPOINTMENT (OUTPATIENT)
Dept: ORTHOPEDIC SURGERY | Facility: CLINIC | Age: 54
End: 2024-06-06
Payer: COMMERCIAL

## 2024-06-06 LAB — BACTERIA UR CULT: NORMAL

## 2024-06-10 LAB
LAB AP ASR DISCLAIMER: NORMAL
LABORATORY COMMENT REPORT: NORMAL
PATH REPORT.FINAL DX SPEC: NORMAL
PATH REPORT.GROSS SPEC: NORMAL
PATH REPORT.RELEVANT HX SPEC: NORMAL
PATH REPORT.TOTAL CANCER: NORMAL

## 2024-06-11 ENCOUNTER — LAB REQUISITION (OUTPATIENT)
Dept: LAB | Facility: HOSPITAL | Age: 54
End: 2024-06-11
Payer: COMMERCIAL

## 2024-06-11 DIAGNOSIS — Z51.89 ENCOUNTER FOR OTHER SPECIFIED AFTERCARE: ICD-10-CM

## 2024-06-11 LAB
ANION GAP SERPL CALC-SCNC: 17 MMOL/L (ref 10–20)
BASOPHILS # BLD AUTO: 0.03 X10*3/UL (ref 0–0.1)
BASOPHILS NFR BLD AUTO: 0.3 %
BUN SERPL-MCNC: 19 MG/DL (ref 6–23)
CALCIUM SERPL-MCNC: 8.3 MG/DL (ref 8.6–10.3)
CHLORIDE SERPL-SCNC: 97 MMOL/L (ref 98–107)
CO2 SERPL-SCNC: 26 MMOL/L (ref 21–32)
CREAT SERPL-MCNC: 0.78 MG/DL (ref 0.5–1.3)
EGFRCR SERPLBLD CKD-EPI 2021: >90 ML/MIN/1.73M*2
EOSINOPHIL # BLD AUTO: 0.04 X10*3/UL (ref 0–0.7)
EOSINOPHIL NFR BLD AUTO: 0.3 %
ERYTHROCYTE [DISTWIDTH] IN BLOOD BY AUTOMATED COUNT: 17.8 % (ref 11.5–14.5)
GLUCOSE SERPL-MCNC: 67 MG/DL (ref 74–99)
HCT VFR BLD AUTO: 32.5 % (ref 41–52)
HGB BLD-MCNC: 9.4 G/DL (ref 13.5–17.5)
IMM GRANULOCYTES # BLD AUTO: 0.1 X10*3/UL (ref 0–0.7)
IMM GRANULOCYTES NFR BLD AUTO: 0.9 % (ref 0–0.9)
LYMPHOCYTES # BLD AUTO: 0.99 X10*3/UL (ref 1.2–4.8)
LYMPHOCYTES NFR BLD AUTO: 8.5 %
MCH RBC QN AUTO: 25.2 PG (ref 26–34)
MCHC RBC AUTO-ENTMCNC: 28.9 G/DL (ref 32–36)
MCV RBC AUTO: 87 FL (ref 80–100)
MONOCYTES # BLD AUTO: 1.19 X10*3/UL (ref 0.1–1)
MONOCYTES NFR BLD AUTO: 10.2 %
NEUTROPHILS # BLD AUTO: 9.32 X10*3/UL (ref 1.2–7.7)
NEUTROPHILS NFR BLD AUTO: 79.8 %
NRBC BLD-RTO: 0.2 /100 WBCS (ref 0–0)
PLATELET # BLD AUTO: 885 X10*3/UL (ref 150–450)
POTASSIUM SERPL-SCNC: 5.1 MMOL/L (ref 3.5–5.3)
RBC # BLD AUTO: 3.73 X10*6/UL (ref 4.5–5.9)
SODIUM SERPL-SCNC: 135 MMOL/L (ref 136–145)
WBC # BLD AUTO: 11.7 X10*3/UL (ref 4.4–11.3)

## 2024-06-11 PROCEDURE — 80048 BASIC METABOLIC PNL TOTAL CA: CPT

## 2024-06-11 PROCEDURE — 85025 COMPLETE CBC W/AUTO DIFF WBC: CPT

## 2024-06-12 LAB
ELECTRONICALLY SIGNED BY: NORMAL
MYELOID NGS RESULTS: NORMAL

## 2024-06-13 ENCOUNTER — DOCUMENTATION (OUTPATIENT)
Dept: HEMATOLOGY/ONCOLOGY | Facility: HOSPITAL | Age: 54
End: 2024-06-13

## 2024-06-13 NOTE — PROGRESS NOTES
6/13/24 1345  Received referral for this patient from inpatient heme team. Patient was seen for thrombocytosis in the 6-700 range. The thought was that this was surgery related but a JAK2 was done to r/o a myeloid malignancy. This came back as negative. However, patient had labs on 6/11 and his PLT continue to rise (8 to 900s), he has anemia with hgb 8 to 9 and now his wbc is slightly elevated. I reached out to Kenny Waggoner and he will see patient. I have a call out to patient to get scheduled. RAMONA Sawyer

## 2024-06-14 ENCOUNTER — SPECIALTY PHARMACY (OUTPATIENT)
Dept: PHARMACY | Facility: CLINIC | Age: 54
End: 2024-06-14

## 2024-06-14 DIAGNOSIS — D75.839 THROMBOCYTOSIS: ICD-10-CM

## 2024-06-14 LAB
ATRIAL RATE: 140 BPM
P AXIS: -6 DEGREES
PR INTERVAL: 128 MS
Q ONSET: 229 MS
QRS COUNT: 23 BEATS
QRS DURATION: 124 MS
QT INTERVAL: 344 MS
QTC CALCULATION(BAZETT): 525 MS
QTC FREDERICIA: 456 MS
R AXIS: 145 DEGREES
T AXIS: 12 DEGREES
T OFFSET: 401 MS
VENTRICULAR RATE: 140 BPM

## 2024-06-14 NOTE — PROGRESS NOTES
6/14/24 3165  Spoke with patient. He needs very specific days in order to arrange transport for appointments. He is scheduled for a FUV with Dr. Rodriguez on 6/26. He says he was unaware of this appointment and will need to see if he can arrange transport but if he can't, he will call me back to reschedule. For now, patient will see Dr. Caban on 6/26/24 for thrombocytosis with PLT in the 900's. RAMONA Sawyer

## 2024-06-18 ENCOUNTER — LAB REQUISITION (OUTPATIENT)
Dept: LAB | Facility: HOSPITAL | Age: 54
End: 2024-06-18
Payer: COMMERCIAL

## 2024-06-18 DIAGNOSIS — Z51.89 ENCOUNTER FOR OTHER SPECIFIED AFTERCARE: ICD-10-CM

## 2024-06-18 LAB
ANION GAP SERPL CALC-SCNC: 13 MMOL/L (ref 10–20)
BUN SERPL-MCNC: 10 MG/DL (ref 6–23)
CALCIUM SERPL-MCNC: 8.4 MG/DL (ref 8.6–10.3)
CHLORIDE SERPL-SCNC: 96 MMOL/L (ref 98–107)
CO2 SERPL-SCNC: 31 MMOL/L (ref 21–32)
CREAT SERPL-MCNC: 0.67 MG/DL (ref 0.5–1.3)
EGFRCR SERPLBLD CKD-EPI 2021: >90 ML/MIN/1.73M*2
ERYTHROCYTE [DISTWIDTH] IN BLOOD BY AUTOMATED COUNT: 18 % (ref 11.5–14.5)
GLUCOSE SERPL-MCNC: 85 MG/DL (ref 74–99)
HCT VFR BLD AUTO: 35 % (ref 41–52)
HGB BLD-MCNC: 10.4 G/DL (ref 13.5–17.5)
MCH RBC QN AUTO: 24.6 PG (ref 26–34)
MCHC RBC AUTO-ENTMCNC: 29.7 G/DL (ref 32–36)
MCV RBC AUTO: 83 FL (ref 80–100)
NRBC BLD-RTO: 0 /100 WBCS (ref 0–0)
PLATELET # BLD AUTO: 587 X10*3/UL (ref 150–450)
POTASSIUM SERPL-SCNC: 4.6 MMOL/L (ref 3.5–5.3)
RBC # BLD AUTO: 4.22 X10*6/UL (ref 4.5–5.9)
SODIUM SERPL-SCNC: 135 MMOL/L (ref 136–145)
WBC # BLD AUTO: 7.7 X10*3/UL (ref 4.4–11.3)

## 2024-06-18 PROCEDURE — 80048 BASIC METABOLIC PNL TOTAL CA: CPT

## 2024-06-18 PROCEDURE — 85027 COMPLETE CBC AUTOMATED: CPT

## 2024-06-21 ENCOUNTER — TELEPHONE (OUTPATIENT)
Dept: ADMISSION | Facility: HOSPITAL | Age: 54
End: 2024-06-21
Payer: COMMERCIAL

## 2024-06-21 ENCOUNTER — TELEPHONE (OUTPATIENT)
Dept: HEMATOLOGY/ONCOLOGY | Facility: CLINIC | Age: 54
End: 2024-06-21

## 2024-06-21 NOTE — TELEPHONE ENCOUNTER
Patient calling from  Rehab in [place] s/ hospital stay and surgery.  Plan to be discharged Monday. Requesting refill for medication: Oxycontin ER 20mg every 12 hrs and Oxycodone 15mg every 3 hrs.  Patient states he is taking this during stay.      Patient also inquiring about upcoming FUV with Dr. Rodriguez - wants to know if appointment can be changed to virtual but advised patient he also has new patient visit with Dr. Caban at 11. Advised to keep both appointments as in person. Patient worried about finding a ride to appointments as he is unable to drive/walk - advised to call back if unable to get transportation.

## 2024-06-23 ENCOUNTER — DOCUMENTATION (OUTPATIENT)
Dept: HOME HEALTH SERVICES | Facility: HOME HEALTH | Age: 54
End: 2024-06-23
Payer: COMMERCIAL

## 2024-06-23 ENCOUNTER — HOME HEALTH ADMISSION (OUTPATIENT)
Dept: HOME HEALTH SERVICES | Facility: HOME HEALTH | Age: 54
End: 2024-06-23
Payer: COMMERCIAL

## 2024-06-23 NOTE — HH CARE COORDINATION
Home Care received a Referral from  [facility]  for Nursing, Physical Therapy, and Occupational Therapy. DISCHARGE IS 6/24/2024  We have processed the referral for a Start of Care on 24-48 HOURS POST DISCHARGE .     If you have any questions or concerns, please feel free to contact us at [phone]. Follow the prompts, enter your five digit zip code, and you will be directed to your care team on EAST 1.

## 2024-06-25 DIAGNOSIS — D75.838 REACTIVE THROMBOCYTOSIS: Primary | ICD-10-CM

## 2024-06-25 DIAGNOSIS — C37 MALIGNANT THYMOMA (MULTI): Primary | ICD-10-CM

## 2024-06-25 DIAGNOSIS — Z98.1 STATUS POST LAMINECTOMY WITH SPINAL FUSION: ICD-10-CM

## 2024-06-25 RX ORDER — OXYCODONE HYDROCHLORIDE 15 MG/1
15 TABLET ORAL
Qty: 120 TABLET | Refills: 0 | Status: SHIPPED | OUTPATIENT
Start: 2024-06-25

## 2024-06-25 RX ORDER — OXYCODONE HYDROCHLORIDE 15 MG/1
15 TABLET, FILM COATED, EXTENDED RELEASE ORAL EVERY 12 HOURS SCHEDULED
Qty: 60 TABLET | Refills: 0 | Status: SHIPPED | OUTPATIENT
Start: 2024-06-25

## 2024-06-25 NOTE — TELEPHONE ENCOUNTER
Received secure chat back from Carla Meza she is working on getting dosages for his Rx's that need placed with pharmacist on team. She is aware he needs these meds placed today.

## 2024-06-25 NOTE — TELEPHONE ENCOUNTER
Please place two Oxycodone Rx's in previous note when you can since patient said almost out of meds.

## 2024-06-25 NOTE — TELEPHONE ENCOUNTER
I called Altaf to let him know scripts were placed. He said thank you and if has any issues will let Dr. Rodriguez know tomorrow morning at virtual visit.

## 2024-06-25 NOTE — TELEPHONE ENCOUNTER
I called Altaf and told him Dr. Caban's appointment can be canceled and to be seen by Jodie Javier. The referral was placed by Dr. Caban. Transferred him to scheduling to cancel appt. Tomorrow with Dr. Caban and schedule a Benign Yaya appt. I will route another message to Dr. Rodriguez's team since patient needs the medication refills done by Dr. Rodriguez for his Oxycodone ER 15mg and his Oxycodone 15mg immediate release. He isn't followed by [facility] as Carla asked in previous message. [facility] is where to send prescriptions.

## 2024-06-25 NOTE — TELEPHONE ENCOUNTER
Sent secure chat to Dr. Rodriguez's team as a reminder to place the Oxycodone Rx's needed for this patient to [facility] in [facility].

## 2024-06-25 NOTE — TELEPHONE ENCOUNTER
Altaf called back and wanted to know if appointments for Dr. Rodriguez and Dr. Caban could be virtual for tomorrow? I did see Carla said Dr. Rodriguez's can so I will change this in system. I will message Dr. Caban's team on here also to see if his can be virtual since it is a NP visit.  I told him most likely can't change since it is a NP visit so he said he will have to cancel if can't be changed to virtual since just got out of rehab yesterday. He also is requesting the medications in previous note to be filled to [facility] in [place] by Dr. Rodriguez's team.  He isn't followed by [facility] per pt. Messaged team.

## 2024-06-25 NOTE — TELEPHONE ENCOUNTER
Sent secure chat to Dr. Caban team to ask about appointment tomorrow if can be changed to virtual since NP visit?

## 2024-06-26 ENCOUNTER — TELEMEDICINE (OUTPATIENT)
Dept: HEMATOLOGY/ONCOLOGY | Facility: HOSPITAL | Age: 54
End: 2024-06-26
Payer: COMMERCIAL

## 2024-06-26 ENCOUNTER — APPOINTMENT (OUTPATIENT)
Dept: HEMATOLOGY/ONCOLOGY | Facility: HOSPITAL | Age: 54
End: 2024-06-26
Payer: COMMERCIAL

## 2024-06-26 ENCOUNTER — HOME CARE VISIT (OUTPATIENT)
Dept: HOME HEALTH SERVICES | Facility: HOME HEALTH | Age: 54
End: 2024-06-26
Payer: COMMERCIAL

## 2024-06-26 ENCOUNTER — TELEPHONE (OUTPATIENT)
Dept: HEMATOLOGY/ONCOLOGY | Facility: HOSPITAL | Age: 54
End: 2024-06-26
Payer: COMMERCIAL

## 2024-06-26 ENCOUNTER — TELEPHONE (OUTPATIENT)
Dept: PALLIATIVE MEDICINE | Facility: HOSPITAL | Age: 54
End: 2024-06-26
Payer: COMMERCIAL

## 2024-06-26 DIAGNOSIS — D75.838 REACTIVE THROMBOCYTOSIS: ICD-10-CM

## 2024-06-26 DIAGNOSIS — D75.839 THROMBOCYTOSIS: ICD-10-CM

## 2024-06-26 DIAGNOSIS — M48.062 NEUROGENIC CLAUDICATION DUE TO LUMBAR SPINAL STENOSIS: ICD-10-CM

## 2024-06-26 DIAGNOSIS — M48.062 LUMBAR STENOSIS WITH NEUROGENIC CLAUDICATION: ICD-10-CM

## 2024-06-26 DIAGNOSIS — C37 THYMUS CANCER (MULTI): ICD-10-CM

## 2024-06-26 PROCEDURE — G0299 HHS/HOSPICE OF RN EA 15 MIN: HCPCS

## 2024-06-26 PROCEDURE — 99215 OFFICE O/P EST HI 40 MIN: CPT | Performed by: INTERNAL MEDICINE

## 2024-06-26 NOTE — TELEPHONE ENCOUNTER
Patient called and states he was supposed to have virtual visit this AM.  Advised previous nurse attempted to call patient to advise of appointment change to 115pm.  Patient agreeable to time change.  No additional questions at this time.

## 2024-06-26 NOTE — TELEPHONE ENCOUNTER
This RN called patient about virtual visit with Dr. Rodriguez. No answer at this time. RN left voicemail stating that per Dr. Rodriguez he will get on virtual call at 1:15 pm. RN provided call back number in case patient had any questions about this change in schedule.

## 2024-06-26 NOTE — TELEPHONE ENCOUNTER
Phone call to patient to schedule visit with palliative care per oncology team and inpatient supportive oncology team (patient use to follow with Gely Dunbar CNP in 2022). I called patient and left VM asking for a call back to schedule. I will call patient back later this week if no return call.   Statement Selected

## 2024-06-27 ENCOUNTER — HOME CARE VISIT (OUTPATIENT)
Dept: HOME HEALTH SERVICES | Facility: HOME HEALTH | Age: 54
End: 2024-06-27
Payer: COMMERCIAL

## 2024-06-27 ENCOUNTER — TELEPHONE (OUTPATIENT)
Dept: ADMISSION | Facility: HOSPITAL | Age: 54
End: 2024-06-27
Payer: COMMERCIAL

## 2024-06-27 VITALS — HEART RATE: 114 BPM | TEMPERATURE: 99.2 F | SYSTOLIC BLOOD PRESSURE: 122 MMHG | DIASTOLIC BLOOD PRESSURE: 74 MMHG

## 2024-06-27 PROCEDURE — G0151 HHCP-SERV OF PT,EA 15 MIN: HCPCS

## 2024-06-27 SDOH — HEALTH STABILITY: PHYSICAL HEALTH: EXERCISE COMMENTS: RECLINED AP QS GS. INS PT AND CG ON CALF STRETCH

## 2024-06-27 ASSESSMENT — ENCOUNTER SYMPTOMS
LOWEST PAIN SEVERITY IN PAST 24 HOURS: 4/10
APPETITE LEVEL: FAIR
LOWER EXTREMITY EDEMA: 1
PAIN LOCATION - PAIN SEVERITY: 4/10
PAIN: 1
HIGHEST PAIN SEVERITY IN PAST 24 HOURS: 7/10
PAIN LOCATION - PAIN SEVERITY: 5/10
PAIN: 1
PERSON REPORTING PAIN: PATIENT
PAIN LOCATION: BACK
CHANGE IN APPETITE: UNCHANGED
MUSCLE WEAKNESS: 1
PAIN LOCATION: BACK
PERSON REPORTING PAIN: PATIENT

## 2024-06-27 ASSESSMENT — ACTIVITIES OF DAILY LIVING (ADL)
OASIS_M1830: 05
ENTERING_EXITING_HOME: TWO PERSON
AMBULATION_DISTANCE/DURATION_TOLERATED: NONAMB

## 2024-06-27 NOTE — TELEPHONE ENCOUNTER
Jared called stating that the pt's Oxycontin 15mg needs a prior auth. Number to call is [phone]. Member ID #: [id number]. Jared asked to be notified when complete. Message sent to the team and [facility] to initiate the PA.

## 2024-07-02 ENCOUNTER — HOME CARE VISIT (OUTPATIENT)
Dept: HOME HEALTH SERVICES | Facility: HOME HEALTH | Age: 54
End: 2024-07-02
Payer: COMMERCIAL

## 2024-07-02 VITALS
SYSTOLIC BLOOD PRESSURE: 122 MMHG | HEART RATE: 96 BPM | OXYGEN SATURATION: 97 % | TEMPERATURE: 98.3 F | DIASTOLIC BLOOD PRESSURE: 70 MMHG | RESPIRATION RATE: 18 BRPM

## 2024-07-02 PROCEDURE — G0152 HHCP-SERV OF OT,EA 15 MIN: HCPCS

## 2024-07-02 ASSESSMENT — ACTIVITIES OF DAILY LIVING (ADL)
TOILETING: MODERATE ASSIST
DRESSING_UB_CURRENT_FUNCTION: INDEPENDENT
BATHING_CURRENT_FUNCTION: MODERATE ASSIST
BATHING ASSESSED: 1
GROOMING_WITHIN_DEFINED_LIMITS: 1
TOILETING: 1
FEEDING_WITHIN_DEFINED_LIMITS: 1
DRESSING_LB_CURRENT_FUNCTION: MODERATE ASSIST

## 2024-07-02 ASSESSMENT — ENCOUNTER SYMPTOMS
COUGH: 0
FEVER: 0
HEADACHES: 0
DENIES PAIN: 1
VISUAL CHANGE: 0
SWOLLEN GLANDS: 0
MYALGIAS: 0
FATIGUE: 0
HIGHEST PAIN SEVERITY IN PAST 24 HOURS: 10/10
WEAKNESS: 1
VOMITING: 0
CHILLS: 0
SUBJECTIVE PAIN PROGRESSION: WAXING AND WANING
PERSON REPORTING PAIN: PATIENT
LOWEST PAIN SEVERITY IN PAST 24 HOURS: 5/10
NAUSEA: 0
ANOREXIA: 0
VERTIGO: 0
JOINT SWELLING: 0
PAIN SEVERITY GOAL: 0/10
NECK PAIN: 0
CHANGE IN BOWEL HABIT: 0
ABDOMINAL PAIN: 0
NUMBNESS: 0
SORE THROAT: 0
DIAPHORESIS: 0
ARTHRALGIAS: 0

## 2024-07-02 ASSESSMENT — PAIN SCALES - PAIN ASSESSMENT IN ADVANCED DEMENTIA (PAINAD)
TOTALSCORE: 0
FACIALEXPRESSION: 0 - SMILING OR INEXPRESSIVE.
BODYLANGUAGE: 0 - RELAXED.
BODYLANGUAGE: 0
NEGVOCALIZATION: 0 - NONE.
CONSOLABILITY: 0
FACIALEXPRESSION: 0
BREATHING: 0
CONSOLABILITY: 0 - NO NEED TO CONSOLE.
NEGVOCALIZATION: 0

## 2024-07-02 NOTE — TELEPHONE ENCOUNTER
Received email to me and Dr. Caban from a fellow Adina Gasca to let Dr. Caban know if he wants patient scheduled with benign Heme then have Denise Pena help schedule with Dr. Polanco, Dr. Millan, Dr Wiley. I forwarded email to Denise Pena and asked if she can reach out and get patient scheduled.

## 2024-07-02 NOTE — PROGRESS NOTES
Patient ID: Altaf Parsons is a 53 y.o. male.    DIAGNOSIS     Malignant thymoma. Type B2 (predominant lymphocytic population with scattered epithelial cells). Date of diagnosis is March 4, 2016 from a left lower lobe of the lung wedge resection and mediastinal fat biopsy.A left pleural nodule was biopsied in May  2017 showing thymoma. A left serratus anterior biopsy in November 2019 showed thymoma. A biopsy from a T12-L1 paraspinal mass on August 19, 2020 shows again thymoma. A spine tumor biopsy in January 2021 again shows thymoma.        STAGING     T2 N0 M1 (metastases to the lungs bilaterally)        CURRENT SITES OF DISEASE     Mediastinum, lung, parapinal region T, L, S spine, left pleura, T12-L1 paraspinal region, left lateral aspect of the spinal canal spanning from at least T11-S1. Findings likely  represent epidural tumoral extension as seen on MRI thoracic and lumbar spine 06/26/2021. Interval worsening/development of widespread osseous metastasis noted throughout the thoracic and lumbar spine with ventral epidural tumoral extension from the T6  level through the L4 level. Canal stenosis secondary to epidural tumor appears to be more severe at the T8 level through the T10 level. At the T8-9 level there appears to be mild flattening of the contour of the   cord. Recent MRI T/L spine (11/11/22) demonstrates  increased/new tumor extension across multiple spinal levels, particularly at T9-T11 and L1-L2 with marked stenosis at L1-L2.        MOLECULAR GENOMICS     PD-L1 22C3 FDA (KEYTRUDA) for Gastric/GEA: CPS>/=1 (PD-L1 EXPRESSION) Combined Positive Score: 80   TEST: Solid Focus Tumor DNA Panel SPECIMEN: FFPE, Left Serratus Anterior, Biopsy, [id number] A DISEASE DIAGNOSIS: Thyoma Estimated Tumor Content: 30% COLLECTION DATE: 11/13/2019 RECEIVED DATE: 08/11/2020 REPORT DATE: 08/14/2020 MICROSATELLITE STATUS: Microsatellite  Stable (CHERYL) DISEASE ASSOCIATED GENOMIC FINDINGS: None         PRIOR THERAPY     1-left  video-assisted thoracic surgery extensive lysis of adhesions and total pulmonary decortication, lower lobe wedge, exploration of the mediastinum converted to  thoracotomy, resection of anterior mediastinal mass and thymus, upper lobe wedge. Per surgery there is residual disease.  2-external beam radiation therapy August 1, 2016- Sept 17, 2016  5940 CGyE using 180 CGyE daily to surgical site   3- cyclophosphamide, adriamycin, cisplatin on 8/14/17 x 3 cycles      4- 3/19/18-4/17/18: recurrent left paraspinal T12-L1, 50 CGE/20 fx PROTONS Had progression but declined systemic therapy.      5- 12/10/19- 12/31/19: SINDI mass and left serratus anterior mass, 45 CGE/15 fx, PROTONS      6- Stenosis from T11-L1 secondary to large intraspinal extradural mass at the T12-L1 level/neoplasm. OPERATION/PROCEDURE: 1. Posterior spinal fusion T9-L1 with use of bilateral  pedicle screw instrumentation, allograft, and demineralized bone matrix fiber. 2. T12 laminectomy with laminotomy of L1 and T11 to decompress T11-L1 levels and remove the intraspinal extradural neoplasm at the T12-L1 segment.      7- On 8/4/21, he saw ortho spine surgery (Dr. Delgado) who did not recommend further surgery.      8- palliative radiation to the T-L/S1 spine: 30 Gy in 10 daily fractions, completed 9/27/21.     9- Single agents alimta per NCCN guideliens on January 10, 2022.   Received 2 cycles in JanuaryJanuary 2022.  Progressive disease based on MRI in February 2022     10- Radiation therapy to 30 Gy in 10 fractions, from T4-T6 and L1-L3 completed in mid April 2022 11- Dec 2022 - single agent capecitabine 500 mg tab, 4 tabs BID. Cycle - 21 days (two weeks on with one week holiday).  Patient initially underdosed himself with cycle #1 despite clear instructions.  He took cycle 2 at full dose on January 26, 2023.   Delay in C3 due to insurance issues with plan to resume again in April 2023.  Cycle 3 4/4/23 - 4/17 with one week off ending 4/24.  Cycle 4 starts  4/25/23.   He  started (C4) on 4/25/23.  Stable disease as best response     12- Single agent Afinitor per NCCN guidelines, started 9/28/2023, documented stable disease on imaging end of November 2023.  Progression in spine May 2024.  Stopped therapy May 2024    13- Thoracic decompression T5-T9 with laminectomies and spinal cord decompression and tumor debulking.  Posterior spine fusion T4-T9 with pedicle screw instrumentation by Dr. Kory Dodd on May 25, 2024      CURRENT THERAPY     1- supportive oncology/pain management     2- PET/NET scan to assess for somatostatin receptor status and consideration of octreotide based therapy in the. Test positive. 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 He underwent LEFT VATS DRAINAGE OF MASSIVE HEMOTHORAX, PLEURAL BIOPSY, AND CORE NEEDLE  BIOPSY OF LINGULAR MASS AND DECORTICATION on July 18, 2007. Biopsies were all negative. He was also seen on CT scan to have an approximately 7 cm lesion within the mediastinum with a calcified rim. He was followed serially with CT scans of the chest through  2009. He was seen later in follow-up in 2016. MRI of the chest in Feb 2016 showed a new enhancing soft tissue mass immediately superior to the previously described calcified cystic structure. Also a new enhancing left pericardial lymph node and left basilar  pulmonary nodule were seen. CT scan of the chest also showed a right lower lobe nodule. He was taken to the operating room on March 4, 2016 where the necrotic calcified mass was removed showing only necrotic debris. Pathology favored a necrotic tumor  although no viable cancer cells were seen. The separate lesion within the  mediastinum was shown to be a malignant thymoma. Wedge resection of the left sided pulmonary lesion also showed malignant thymoma. Underwent gross resection of the anterior mediastinal  mass at that time, Proton beam radiation to his surgical bed.  He had recurrence in 2017 and received 3 cycles of systemic therapy complicated by nausea and vomiting.He was last seen by medical oncology in early 2018 and since then has not wanted any  systemic therapy and is followed with radiation oncology only. During this time he has had recurrent bronchospasm, left anterior serratus muscle, multiple recurrences of his T12-L1 region requiring 2 separate courses of radiation therapy as well as a  surgical decompression laminectomy and fusion and most recently has had further progression and is agreed to come back to the medical oncology.         PAST MEDICAL HISTORY     1-hypertension  2-spontaneous hemothorax on the left side in 2007  3-GERD  4-Achilles tendon repair  5-chronic back pain from work related back injury        SOCIAL HISTORY      Patient lives in [place]. Has 2 children age 6 and 13. 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 He has never smoked. Does not drink alcohol. He has been on disability now for a couple of months.  He has been off work since August 27, 2021.        CURRENT MEDS     per list        ALLERGIES     Denies any drug allergies        FAMILY HISTORY     No family history of cancer.    Subjective    Cancer  Associated symptoms include weakness. Pertinent negatives include no abdominal pain, anorexia, arthralgias, change in bowel habit, chest pain, chills, congestion, coughing, diaphoresis, fatigue, fever, headaches, joint swelling, myalgias, nausea, neck pain, numbness, rash, sore throat, swollen glands, urinary symptoms, vertigo, visual change or vomiting.     Patient remains somewhat paraplegic he states that.  He went to rehabilitation on June 4, 2024 after his spine surgery that was done on May 25,  2024.  He went home back a few days ago on June 25.  He states that he has good control over his urine, good control over his bowel movements has feeling in his leg legs but they are weak and he cannot walk on his own.  He had some rehab he still cannot stand up on his own.  He has hired a home physical therapist working on his muscle strength.  He is taking pain medication through our palliative care service,    Objective    BSA: There is no height or weight on file to calculate BSA.  There were no vitals taken for this visit.     Physical Exam  Not performed virtual visit      Performance Status:  Symptomatic; in bed >50% of the day      Assessment/Plan     This is a gentleman with type B2 malignant thymoma dating back to March 2016 now 8 years out since his diagnosis.  His main problem has been recurrent disease within the thoracic and lumbar vertebral bodies with spinal cord compression.  He has had multiple interventions at the spinal cord level including radiation therapy in 2018, spine surgery in January 2021, further spine radiation in September 2021, further spine radiation in April 2022 and most recently further spine surgery in May 2024.  Unfortunately he now has evidence of paraparesis due to his spinal cord compression.  He has received 4 lines of systemic therapy as well.  He most recently progressed on single agent Afinitor.     He is now recovering from his surgery and doing physical therapy.  We will bring him back in a month or 2 to discuss the use of single agent octreotide.      Cancer Staging   Neoplasm of thymus  Staging form: Thymus, AJCC 8th Edition  - Clinical stage from 3/4/2016: Stage SHONDA (cT2, cN0, cM1a) - Signed by Justo Rodriguez MD on 11/22/2023      Oncology History   Neoplasm of thymus   3/4/2016 Cancer Staged    Staging form: Thymus, AJCC 8th Edition, Clinical stage from 3/4/2016: Stage SHONDA (cT2, cN0, cM1a) - Signed by Justo Rodriguez MD on 11/22/2023     10/6/2023 Initial Diagnosis     Neoplasm of thymus     Malignant thymoma (Multi)   9/28/2023 -  Chemotherapy    Everolimus, 28 Day Cycles      10/6/2023 Initial Diagnosis    Malignant thymoma (CMS/HCC)          Justo Rodriguez MD  Professor of Medicine and Oncology  [facility]  Vandana Hewitt Chair in Lung Cancer  Radha Lacey Endowed Director  [facility]  Thoracic Oncology  [facility]

## 2024-07-03 ENCOUNTER — HOME CARE VISIT (OUTPATIENT)
Dept: HOME HEALTH SERVICES | Facility: HOME HEALTH | Age: 54
End: 2024-07-03
Payer: COMMERCIAL

## 2024-07-03 ENCOUNTER — APPOINTMENT (OUTPATIENT)
Dept: ORTHOPEDIC SURGERY | Facility: CLINIC | Age: 54
End: 2024-07-03
Payer: COMMERCIAL

## 2024-07-03 ENCOUNTER — HOSPITAL ENCOUNTER (OUTPATIENT)
Dept: RADIOLOGY | Facility: CLINIC | Age: 54
Discharge: HOME | End: 2024-07-03
Payer: COMMERCIAL

## 2024-07-03 VITALS — SYSTOLIC BLOOD PRESSURE: 112 MMHG | DIASTOLIC BLOOD PRESSURE: 64 MMHG | TEMPERATURE: 97.9 F

## 2024-07-03 DIAGNOSIS — C79.51 METASTATIC CANCER TO SPINE (MULTI): ICD-10-CM

## 2024-07-03 PROCEDURE — 72070 X-RAY EXAM THORAC SPINE 2VWS: CPT | Performed by: RADIOLOGY

## 2024-07-03 PROCEDURE — 72020 X-RAY EXAM OF SPINE 1 VIEW: CPT

## 2024-07-03 PROCEDURE — G0151 HHCP-SERV OF PT,EA 15 MIN: HCPCS

## 2024-07-03 PROCEDURE — 99024 POSTOP FOLLOW-UP VISIT: CPT | Performed by: ORTHOPAEDIC SURGERY

## 2024-07-03 RX ORDER — OXYCODONE AND ACETAMINOPHEN 5; 325 MG/1; MG/1
1 TABLET ORAL EVERY 4 HOURS PRN
Qty: 42 TABLET | Refills: 0 | Status: SHIPPED | OUTPATIENT
Start: 2024-07-03 | End: 2024-07-10

## 2024-07-03 ASSESSMENT — ENCOUNTER SYMPTOMS
HIGHEST PAIN SEVERITY IN PAST 24 HOURS: 10/10
PAIN LOCATION - PAIN SEVERITY: 7/10
PAIN: 1
LOWEST PAIN SEVERITY IN PAST 24 HOURS: 7/10
PERSON REPORTING PAIN: PATIENT
PAIN LOCATION: BACK

## 2024-07-03 NOTE — LETTER
July 3, 2024     Darius Meza MD  [address] [facility] [address] [facility] [id number]    Patient: Altaf Parsons   YOB: 1970   Date of Visit: 7/3/2024       Dear Dr. Darius Meza MD:    Thank you for referring Altaf Parsons to me for evaluation. Below are my notes for this consultation.  If you have questions, please do not hesitate to call me. I look forward to following your patient along with you.       Sincerely,     Kory Melendez MD      CC: No Recipients  ______________________________________________________________________________________    Calin returns nearly 6 weeks from surgery.  As noted, he has a history of recurrent metastatic thymoma to his thoracic spine.  He presented with near complete paraparesis.  He underwent a thoracic decompression and fusion with extension to a prior fusion mass.  He is now at home.  He spent time at rehab.  He notes improvement in his pain.  He is using oxycodone every 6-8 hours.    He does note some improvement in his left leg function.    His incision is healed.  His sutures are removed today.    He has left hip flexion 2/5 left ankle dorsiflexion 2/5 and left ankle plantarflexion 2/5.  His right lower extremity strength is unchanged from prior to surgery.    X-rays show maintenance of alignment.    Stable course.  He is due to see Dr. Rodriguez in the near future to discuss adjuvant therapy.  He is able to go through immunotherapy or chemotherapy or radiation if indicated.    I will prescribe additional oxycodone for him to use.    I will plan to see him back in 6 weeks.    ** Dictated with voice recognition software and not immediately reviewed for errors in grammar and/or spelling **

## 2024-07-03 NOTE — PROGRESS NOTES
Calin returns nearly 6 weeks from surgery.  As noted, he has a history of recurrent metastatic thymoma to his thoracic spine.  He presented with near complete paraparesis.  He underwent a thoracic decompression and fusion with extension to a prior fusion mass.  He is now at home.  He spent time at rehab.  He notes improvement in his pain.  He is using oxycodone every 6-8 hours.    He does note some improvement in his left leg function.    His incision is healed.  His sutures are removed today.    He has left hip flexion 2/5 left ankle dorsiflexion 2/5 and left ankle plantarflexion 2/5.  His right lower extremity strength is unchanged from prior to surgery.    X-rays show maintenance of alignment.    Stable course.  He is due to see Dr. Rodriguez in the near future to discuss adjuvant therapy.  He is able to go through immunotherapy or chemotherapy or radiation if indicated.    I will prescribe additional oxycodone for him to use.    I will plan to see him back in 6 weeks.    ** Dictated with voice recognition software and not immediately reviewed for errors in grammar and/or spelling **

## 2024-07-05 ENCOUNTER — HOME CARE VISIT (OUTPATIENT)
Dept: HOME HEALTH SERVICES | Facility: HOME HEALTH | Age: 54
End: 2024-07-05
Payer: COMMERCIAL

## 2024-07-08 DIAGNOSIS — Z98.1 STATUS POST LAMINECTOMY WITH SPINAL FUSION: ICD-10-CM

## 2024-07-08 RX ORDER — GABAPENTIN 600 MG/1
600 TABLET ORAL NIGHTLY
Qty: 30 TABLET | Refills: 1 | Status: SHIPPED | OUTPATIENT
Start: 2024-07-08

## 2024-07-08 NOTE — TELEPHONE ENCOUNTER
"Attempted to call pt re \"ongoing nerve pain\" x 4 days that was left with refill request.   No answer- VM left requesting pt return call to discuss ongoing pain issues.   "

## 2024-07-09 ENCOUNTER — HOME CARE VISIT (OUTPATIENT)
Dept: HOME HEALTH SERVICES | Facility: HOME HEALTH | Age: 54
End: 2024-07-09
Payer: COMMERCIAL

## 2024-07-10 ENCOUNTER — DOCUMENTATION (OUTPATIENT)
Dept: HEMATOLOGY/ONCOLOGY | Facility: HOSPITAL | Age: 54
End: 2024-07-10
Payer: COMMERCIAL

## 2024-07-10 ENCOUNTER — HOME CARE VISIT (OUTPATIENT)
Dept: HOME HEALTH SERVICES | Facility: HOME HEALTH | Age: 54
End: 2024-07-10
Payer: COMMERCIAL

## 2024-07-10 VITALS
OXYGEN SATURATION: 97 % | HEART RATE: 114 BPM | RESPIRATION RATE: 18 BRPM | SYSTOLIC BLOOD PRESSURE: 116 MMHG | DIASTOLIC BLOOD PRESSURE: 64 MMHG | TEMPERATURE: 98 F

## 2024-07-10 PROCEDURE — G0152 HHCP-SERV OF OT,EA 15 MIN: HCPCS

## 2024-07-10 ASSESSMENT — PAIN SCALES - PAIN ASSESSMENT IN ADVANCED DEMENTIA (PAINAD)
FACIALEXPRESSION: 0
BREATHING: 0
NEGVOCALIZATION: 0
FACIALEXPRESSION: 0 - SMILING OR INEXPRESSIVE.
TOTALSCORE: 0
NEGVOCALIZATION: 0 - NONE.
BODYLANGUAGE: 0 - RELAXED.
CONSOLABILITY: 0 - NO NEED TO CONSOLE.
CONSOLABILITY: 0
BODYLANGUAGE: 0

## 2024-07-10 ASSESSMENT — ACTIVITIES OF DAILY LIVING (ADL)
TOILETING: MINIMUM ASSIST
DRESSING_LB_CURRENT_FUNCTION: MINIMUM ASSIST
TOILETING: 1

## 2024-07-10 ASSESSMENT — ENCOUNTER SYMPTOMS
DENIES PAIN: 1
PERSON REPORTING PAIN: PATIENT

## 2024-07-10 NOTE — PROGRESS NOTES
RN received OPERS paperwork from patient's spouse on 7/10/2024 at 4:45 that needed to be completed by Dr. Rodriguez. RN informed wife that Dr. Rodriguez will be out until mid July and would be able to sign paperwork once he is back. Spouse verbalized understanding of this and RN stated she will give phone call to patient once information has been completed. No other questions at this time

## 2024-07-11 ENCOUNTER — HOME CARE VISIT (OUTPATIENT)
Dept: HOME HEALTH SERVICES | Facility: HOME HEALTH | Age: 54
End: 2024-07-11
Payer: COMMERCIAL

## 2024-07-11 VITALS
TEMPERATURE: 98.2 F | OXYGEN SATURATION: 98 % | RESPIRATION RATE: 18 BRPM | HEART RATE: 112 BPM | DIASTOLIC BLOOD PRESSURE: 54 MMHG | SYSTOLIC BLOOD PRESSURE: 108 MMHG

## 2024-07-11 VITALS — TEMPERATURE: 99.5 F

## 2024-07-11 PROCEDURE — G0299 HHS/HOSPICE OF RN EA 15 MIN: HCPCS

## 2024-07-11 PROCEDURE — G0151 HHCP-SERV OF PT,EA 15 MIN: HCPCS

## 2024-07-11 ASSESSMENT — ENCOUNTER SYMPTOMS: DENIES PAIN: 1

## 2024-07-16 ENCOUNTER — TELEPHONE (OUTPATIENT)
Dept: HEMATOLOGY/ONCOLOGY | Facility: HOSPITAL | Age: 54
End: 2024-07-16
Payer: COMMERCIAL

## 2024-07-16 NOTE — TELEPHONE ENCOUNTER
Pt called regarding follow up visit with Dr. Rodriguez. Per RN Coordinator for provider, there are available times on 7/22 and 7/29 for follow up. Pt stated he wanted ask his wife first as she wants to be there. Will call back to schedule, stated he has the [facility] phone number.

## 2024-07-17 ENCOUNTER — HOME CARE VISIT (OUTPATIENT)
Dept: HOME HEALTH SERVICES | Facility: HOME HEALTH | Age: 54
End: 2024-07-17
Payer: COMMERCIAL

## 2024-07-17 ENCOUNTER — HOSPITAL ENCOUNTER (INPATIENT)
Facility: HOSPITAL | Age: 54
LOS: 2 days | Discharge: HOME HEALTH | End: 2024-07-19
Attending: STUDENT IN AN ORGANIZED HEALTH CARE EDUCATION/TRAINING PROGRAM | Admitting: NURSE PRACTITIONER
Payer: COMMERCIAL

## 2024-07-17 ENCOUNTER — APPOINTMENT (OUTPATIENT)
Dept: RADIOLOGY | Facility: HOSPITAL | Age: 54
End: 2024-07-17
Payer: COMMERCIAL

## 2024-07-17 ENCOUNTER — NURSE TRIAGE (OUTPATIENT)
Dept: ADMISSION | Facility: HOSPITAL | Age: 54
End: 2024-07-17
Payer: COMMERCIAL

## 2024-07-17 VITALS
OXYGEN SATURATION: 98 % | TEMPERATURE: 98.4 F | RESPIRATION RATE: 18 BRPM | DIASTOLIC BLOOD PRESSURE: 78 MMHG | HEART RATE: 90 BPM | SYSTOLIC BLOOD PRESSURE: 122 MMHG

## 2024-07-17 DIAGNOSIS — N30.00 ACUTE CYSTITIS WITHOUT HEMATURIA: ICD-10-CM

## 2024-07-17 DIAGNOSIS — M54.9 BACK PAIN WITH RADIATION: Primary | ICD-10-CM

## 2024-07-17 DIAGNOSIS — C79.49 CANCER WITH LEPTOMENINGEAL SPREAD (MULTI): ICD-10-CM

## 2024-07-17 DIAGNOSIS — Z98.1 STATUS POST LAMINECTOMY WITH SPINAL FUSION: ICD-10-CM

## 2024-07-17 DIAGNOSIS — D49.2 THORACIC SPINE TUMOR: ICD-10-CM

## 2024-07-17 DIAGNOSIS — I10 PRIMARY HYPERTENSION: ICD-10-CM

## 2024-07-17 DIAGNOSIS — R53.1 WEAKNESS: ICD-10-CM

## 2024-07-17 LAB
APPEARANCE UR: ABNORMAL
BACTERIA #/AREA URNS AUTO: ABNORMAL /HPF
BASOPHILS # BLD MANUAL: 0.08 X10*3/UL (ref 0–0.1)
BASOPHILS NFR BLD MANUAL: 0.9 %
BILIRUB UR STRIP.AUTO-MCNC: NEGATIVE MG/DL
BURR CELLS BLD QL SMEAR: ABNORMAL
COLOR UR: ABNORMAL
DACRYOCYTES BLD QL SMEAR: ABNORMAL
EOSINOPHIL # BLD MANUAL: 0.57 X10*3/UL (ref 0–0.7)
EOSINOPHIL NFR BLD MANUAL: 6.1 %
ERYTHROCYTE [DISTWIDTH] IN BLOOD BY AUTOMATED COUNT: 20.3 % (ref 11.5–14.5)
ERYTHROCYTE [SEDIMENTATION RATE] IN BLOOD BY WESTERGREN METHOD: 97 MM/H (ref 0–20)
GLUCOSE UR STRIP.AUTO-MCNC: NORMAL MG/DL
HCT VFR BLD AUTO: 32.3 % (ref 41–52)
HGB BLD-MCNC: 10.3 G/DL (ref 13.5–17.5)
IMM GRANULOCYTES # BLD AUTO: 0.08 X10*3/UL (ref 0–0.7)
IMM GRANULOCYTES NFR BLD AUTO: 0.8 % (ref 0–0.9)
KETONES UR STRIP.AUTO-MCNC: NEGATIVE MG/DL
LEUKOCYTE ESTERASE UR QL STRIP.AUTO: ABNORMAL
LYMPHOCYTES # BLD MANUAL: 1.07 X10*3/UL (ref 1.2–4.8)
LYMPHOCYTES NFR BLD MANUAL: 11.4 %
MCH RBC QN AUTO: 24.3 PG (ref 26–34)
MCHC RBC AUTO-ENTMCNC: 31.9 G/DL (ref 32–36)
MCV RBC AUTO: 76 FL (ref 80–100)
MONOCYTES # BLD MANUAL: 0.83 X10*3/UL (ref 0.1–1)
MONOCYTES NFR BLD MANUAL: 8.8 %
NEUTS SEG # BLD MANUAL: 6.84 X10*3/UL (ref 1.2–7)
NEUTS SEG NFR BLD MANUAL: 72.8 %
NITRITE UR QL STRIP.AUTO: NEGATIVE
NRBC BLD-RTO: 0 /100 WBCS (ref 0–0)
OVALOCYTES BLD QL SMEAR: ABNORMAL
PH UR STRIP.AUTO: 6.5 [PH]
PLATELET # BLD AUTO: 612 X10*3/UL (ref 150–450)
PROT UR STRIP.AUTO-MCNC: ABNORMAL MG/DL
RBC # BLD AUTO: 4.23 X10*6/UL (ref 4.5–5.9)
RBC # UR STRIP.AUTO: ABNORMAL /UL
RBC #/AREA URNS AUTO: >20 /HPF
RBC MORPH BLD: ABNORMAL
SP GR UR STRIP.AUTO: 1.02
TARGETS BLD QL SMEAR: ABNORMAL
TOTAL CELLS COUNTED BLD: 114
UROBILINOGEN UR STRIP.AUTO-MCNC: NORMAL MG/DL
WBC # BLD AUTO: 9.4 X10*3/UL (ref 4.4–11.3)
WBC #/AREA URNS AUTO: >50 /HPF
WBC CLUMPS #/AREA URNS AUTO: ABNORMAL /HPF

## 2024-07-17 PROCEDURE — 99222 1ST HOSP IP/OBS MODERATE 55: CPT | Performed by: NURSE PRACTITIONER

## 2024-07-17 PROCEDURE — 36415 COLL VENOUS BLD VENIPUNCTURE: CPT | Performed by: NURSE PRACTITIONER

## 2024-07-17 PROCEDURE — 2500000001 HC RX 250 WO HCPCS SELF ADMINISTERED DRUGS (ALT 637 FOR MEDICARE OP): Performed by: NURSE PRACTITIONER

## 2024-07-17 PROCEDURE — 72072 X-RAY EXAM THORAC SPINE 3VWS: CPT

## 2024-07-17 PROCEDURE — 1210000001 HC SEMI-PRIVATE ROOM DAILY

## 2024-07-17 PROCEDURE — 85007 BL SMEAR W/DIFF WBC COUNT: CPT

## 2024-07-17 PROCEDURE — 86140 C-REACTIVE PROTEIN: CPT

## 2024-07-17 PROCEDURE — 2500000004 HC RX 250 GENERAL PHARMACY W/ HCPCS (ALT 636 FOR OP/ED)

## 2024-07-17 PROCEDURE — 72100 X-RAY EXAM L-S SPINE 2/3 VWS: CPT

## 2024-07-17 PROCEDURE — 2500000004 HC RX 250 GENERAL PHARMACY W/ HCPCS (ALT 636 FOR OP/ED): Performed by: NURSE PRACTITIONER

## 2024-07-17 PROCEDURE — G0157 HHC PT ASSISTANT EA 15: HCPCS | Mod: CQ

## 2024-07-17 PROCEDURE — 82374 ASSAY BLOOD CARBON DIOXIDE: CPT | Performed by: NURSE PRACTITIONER

## 2024-07-17 PROCEDURE — 85652 RBC SED RATE AUTOMATED: CPT

## 2024-07-17 PROCEDURE — 87086 URINE CULTURE/COLONY COUNT: CPT

## 2024-07-17 PROCEDURE — 99285 EMERGENCY DEPT VISIT HI MDM: CPT | Mod: 25

## 2024-07-17 PROCEDURE — 81001 URINALYSIS AUTO W/SCOPE: CPT

## 2024-07-17 PROCEDURE — 96376 TX/PRO/DX INJ SAME DRUG ADON: CPT

## 2024-07-17 PROCEDURE — 36415 COLL VENOUS BLD VENIPUNCTURE: CPT

## 2024-07-17 PROCEDURE — 96374 THER/PROPH/DIAG INJ IV PUSH: CPT

## 2024-07-17 PROCEDURE — 99285 EMERGENCY DEPT VISIT HI MDM: CPT | Performed by: STUDENT IN AN ORGANIZED HEALTH CARE EDUCATION/TRAINING PROGRAM

## 2024-07-17 PROCEDURE — 85027 COMPLETE CBC AUTOMATED: CPT

## 2024-07-17 RX ORDER — HYDROMORPHONE HYDROCHLORIDE 1 MG/ML
0.5 INJECTION, SOLUTION INTRAMUSCULAR; INTRAVENOUS; SUBCUTANEOUS ONCE
Status: COMPLETED | OUTPATIENT
Start: 2024-07-17 | End: 2024-07-17

## 2024-07-17 RX ORDER — ACETAMINOPHEN 500 MG
10 TABLET ORAL NIGHTLY PRN
Status: DISCONTINUED | OUTPATIENT
Start: 2024-07-17 | End: 2024-07-17

## 2024-07-17 RX ORDER — POLYETHYLENE GLYCOL 3350 17 G/17G
17 POWDER, FOR SOLUTION ORAL DAILY PRN
Status: DISCONTINUED | OUTPATIENT
Start: 2024-07-17 | End: 2024-07-17

## 2024-07-17 RX ORDER — KETOROLAC TROMETHAMINE 15 MG/ML
15 INJECTION, SOLUTION INTRAMUSCULAR; INTRAVENOUS ONCE
Status: COMPLETED | OUTPATIENT
Start: 2024-07-17 | End: 2024-07-17

## 2024-07-17 RX ORDER — METOPROLOL TARTRATE 50 MG/1
50 TABLET ORAL
Status: DISCONTINUED | OUTPATIENT
Start: 2024-07-17 | End: 2024-07-18

## 2024-07-17 RX ORDER — FERROUS SULFATE 325(65) MG
65 TABLET ORAL EVERY OTHER DAY
Status: DISCONTINUED | OUTPATIENT
Start: 2024-07-18 | End: 2024-07-19 | Stop reason: HOSPADM

## 2024-07-17 RX ORDER — HYDROMORPHONE HYDROCHLORIDE 1 MG/ML
0.6 INJECTION, SOLUTION INTRAMUSCULAR; INTRAVENOUS; SUBCUTANEOUS
Status: DISCONTINUED | OUTPATIENT
Start: 2024-07-17 | End: 2024-07-18

## 2024-07-17 RX ORDER — HYDROMORPHONE HYDROCHLORIDE 1 MG/ML
0.2 INJECTION, SOLUTION INTRAMUSCULAR; INTRAVENOUS; SUBCUTANEOUS
Status: DISCONTINUED | OUTPATIENT
Start: 2024-07-17 | End: 2024-07-17

## 2024-07-17 RX ORDER — METOPROLOL TARTRATE 25 MG/1
2 TABLET, FILM COATED ORAL
COMMUNITY
Start: 2024-06-24 | End: 2024-07-19 | Stop reason: HOSPADM

## 2024-07-17 RX ORDER — CETIRIZINE HYDROCHLORIDE 10 MG/1
10 TABLET ORAL DAILY
Status: DISCONTINUED | OUTPATIENT
Start: 2024-07-18 | End: 2024-07-18

## 2024-07-17 RX ORDER — CIPROFLOXACIN 500 MG/1
500 TABLET ORAL ONCE
Status: DISCONTINUED | OUTPATIENT
Start: 2024-07-17 | End: 2024-07-17

## 2024-07-17 RX ORDER — CEFTRIAXONE 1 G/50ML
1 INJECTION, SOLUTION INTRAVENOUS EVERY 24 HOURS
Status: DISCONTINUED | OUTPATIENT
Start: 2024-07-17 | End: 2024-07-19 | Stop reason: HOSPADM

## 2024-07-17 RX ORDER — OXYCODONE HYDROCHLORIDE 5 MG/1
5 TABLET ORAL EVERY 6 HOURS PRN
Status: DISCONTINUED | OUTPATIENT
Start: 2024-07-17 | End: 2024-07-17

## 2024-07-17 RX ORDER — GABAPENTIN 600 MG/1
600 TABLET ORAL NIGHTLY
Status: DISCONTINUED | OUTPATIENT
Start: 2024-07-17 | End: 2024-07-18

## 2024-07-17 RX ORDER — ACETAMINOPHEN 650 MG/1
650 SUPPOSITORY RECTAL EVERY 4 HOURS PRN
Status: DISCONTINUED | OUTPATIENT
Start: 2024-07-17 | End: 2024-07-18

## 2024-07-17 RX ORDER — LISINOPRIL AND HYDROCHLOROTHIAZIDE 20; 25 MG/1; MG/1
1 TABLET ORAL
COMMUNITY
Start: 2024-07-03 | End: 2024-07-19 | Stop reason: HOSPADM

## 2024-07-17 RX ORDER — CHOLECALCIFEROL (VITAMIN D3) 25 MCG
2000 TABLET ORAL DAILY
Status: DISCONTINUED | OUTPATIENT
Start: 2024-07-18 | End: 2024-07-19 | Stop reason: HOSPADM

## 2024-07-17 RX ORDER — ACETAMINOPHEN 160 MG/5ML
650 SOLUTION ORAL EVERY 4 HOURS PRN
Status: DISCONTINUED | OUTPATIENT
Start: 2024-07-17 | End: 2024-07-18

## 2024-07-17 RX ORDER — FUROSEMIDE 20 MG/1
20 TABLET ORAL DAILY
Status: DISCONTINUED | OUTPATIENT
Start: 2024-07-18 | End: 2024-07-18

## 2024-07-17 RX ORDER — PANTOPRAZOLE SODIUM 40 MG/1
40 TABLET, DELAYED RELEASE ORAL DAILY
Status: DISCONTINUED | OUTPATIENT
Start: 2024-07-18 | End: 2024-07-19 | Stop reason: HOSPADM

## 2024-07-17 RX ORDER — ACETAMINOPHEN 325 MG/1
650 TABLET ORAL EVERY 4 HOURS PRN
Status: DISCONTINUED | OUTPATIENT
Start: 2024-07-17 | End: 2024-07-18

## 2024-07-17 RX ORDER — HEPARIN SODIUM 5000 [USP'U]/ML
5000 INJECTION, SOLUTION INTRAVENOUS; SUBCUTANEOUS EVERY 8 HOURS
Status: DISCONTINUED | OUTPATIENT
Start: 2024-07-17 | End: 2024-07-18

## 2024-07-17 RX ORDER — HYDROMORPHONE HYDROCHLORIDE 1 MG/ML
1 INJECTION, SOLUTION INTRAMUSCULAR; INTRAVENOUS; SUBCUTANEOUS ONCE
Status: COMPLETED | OUTPATIENT
Start: 2024-07-17 | End: 2024-07-17

## 2024-07-17 ASSESSMENT — PAIN DESCRIPTION - DESCRIPTORS: DESCRIPTORS: STABBING

## 2024-07-17 ASSESSMENT — PAIN - FUNCTIONAL ASSESSMENT: PAIN_FUNCTIONAL_ASSESSMENT: 0-10

## 2024-07-17 ASSESSMENT — ENCOUNTER SYMPTOMS
BACK PAIN: 1
FEVER: 0
PERSON REPORTING PAIN: PATIENT
NAUSEA: 0
ABDOMINAL PAIN: 0
DYSURIA: 0
CONSTIPATION: 0
LOWEST PAIN SEVERITY IN PAST 24 HOURS: 8/10
FLANK PAIN: 0
PAIN: 1
HEMATURIA: 0
VOMITING: 0
PALPITATIONS: 0
DIARRHEA: 0
CHILLS: 0
HIGHEST PAIN SEVERITY IN PAST 24 HOURS: 8/10
FREQUENCY: 0
SHORTNESS OF BREATH: 0

## 2024-07-17 ASSESSMENT — PAIN DESCRIPTION - PAIN TYPE: TYPE: ACUTE PAIN

## 2024-07-17 ASSESSMENT — PAIN DESCRIPTION - LOCATION: LOCATION: BACK

## 2024-07-17 ASSESSMENT — PAIN SCALES - GENERAL: PAINLEVEL_OUTOF10: 10 - WORST POSSIBLE PAIN

## 2024-07-17 NOTE — ED TRIAGE NOTES
Pt arrived by EMS from home with severe back pain, pt had tumor removed off spine and a fusion . Here today for uncontrolled pain, at baseline since tumor removal pt is non ambulatory , pain is left side of back and radiates up the back. Pt states he took his home prescribed pain medication today appx 2 hrs ago and no relief. Alert and oriented x4

## 2024-07-17 NOTE — TELEPHONE ENCOUNTER
Patient called and states he is having severe nerve pain in back.  Pain is severe 10/10.  Started 2 days ago.  Pain is sharp and aching.  Taking OxyContin 15mg BID, oxycodone 5mg every 3-4 hrs, Aleve BID.  Heat and cold make pain worse.  No fever, no SOB, no chest pain.  Eating decreased, drinking fluids. Crying in pain during call.

## 2024-07-17 NOTE — TELEPHONE ENCOUNTER
RN called patient, per Carla to go to ED or [facility] tomorrow.  Patient states he doesn't want to go to ED due to long wait, doesn't want to wait until tomorrow for [facility]. he wants to speak with Carla.  Secure chat sent to Carla to request to call patient.

## 2024-07-18 ENCOUNTER — HOME CARE VISIT (OUTPATIENT)
Dept: HOME HEALTH SERVICES | Facility: HOME HEALTH | Age: 54
End: 2024-07-18
Payer: COMMERCIAL

## 2024-07-18 ENCOUNTER — APPOINTMENT (OUTPATIENT)
Dept: CARDIOLOGY | Facility: HOSPITAL | Age: 54
End: 2024-07-18
Payer: COMMERCIAL

## 2024-07-18 ENCOUNTER — TELEPHONE (OUTPATIENT)
Dept: ADMISSION | Facility: HOSPITAL | Age: 54
End: 2024-07-18

## 2024-07-18 ENCOUNTER — APPOINTMENT (OUTPATIENT)
Dept: RADIOLOGY | Facility: HOSPITAL | Age: 54
End: 2024-07-18
Payer: COMMERCIAL

## 2024-07-18 LAB
ANION GAP SERPL CALC-SCNC: 14 MMOL/L (ref 10–20)
BUN SERPL-MCNC: 11 MG/DL (ref 6–23)
CALCIUM SERPL-MCNC: 9.2 MG/DL (ref 8.6–10.6)
CHLORIDE SERPL-SCNC: 96 MMOL/L (ref 98–107)
CO2 SERPL-SCNC: 31 MMOL/L (ref 21–32)
CREAT SERPL-MCNC: 0.52 MG/DL (ref 0.5–1.3)
CRP SERPL-MCNC: 12.86 MG/DL
EGFRCR SERPLBLD CKD-EPI 2021: >90 ML/MIN/1.73M*2
GLUCOSE SERPL-MCNC: 85 MG/DL (ref 74–99)
POTASSIUM SERPL-SCNC: 3.9 MMOL/L (ref 3.5–5.3)
SODIUM SERPL-SCNC: 137 MMOL/L (ref 136–145)

## 2024-07-18 PROCEDURE — 97162 PT EVAL MOD COMPLEX 30 MIN: CPT | Mod: GP

## 2024-07-18 PROCEDURE — 2500000001 HC RX 250 WO HCPCS SELF ADMINISTERED DRUGS (ALT 637 FOR MEDICARE OP): Performed by: INTERNAL MEDICINE

## 2024-07-18 PROCEDURE — 93005 ELECTROCARDIOGRAM TRACING: CPT

## 2024-07-18 PROCEDURE — 2500000004 HC RX 250 GENERAL PHARMACY W/ HCPCS (ALT 636 FOR OP/ED): Performed by: INTERNAL MEDICINE

## 2024-07-18 PROCEDURE — 93970 EXTREMITY STUDY: CPT | Performed by: RADIOLOGY

## 2024-07-18 PROCEDURE — 2500000004 HC RX 250 GENERAL PHARMACY W/ HCPCS (ALT 636 FOR OP/ED): Performed by: STUDENT IN AN ORGANIZED HEALTH CARE EDUCATION/TRAINING PROGRAM

## 2024-07-18 PROCEDURE — 2500000001 HC RX 250 WO HCPCS SELF ADMINISTERED DRUGS (ALT 637 FOR MEDICARE OP): Performed by: NURSE PRACTITIONER

## 2024-07-18 PROCEDURE — 2500000004 HC RX 250 GENERAL PHARMACY W/ HCPCS (ALT 636 FOR OP/ED): Performed by: NURSE PRACTITIONER

## 2024-07-18 PROCEDURE — 1100000001 HC PRIVATE ROOM DAILY

## 2024-07-18 PROCEDURE — 99232 SBSQ HOSP IP/OBS MODERATE 35: CPT | Performed by: INTERNAL MEDICINE

## 2024-07-18 PROCEDURE — 97530 THERAPEUTIC ACTIVITIES: CPT | Mod: GP

## 2024-07-18 PROCEDURE — 93970 EXTREMITY STUDY: CPT

## 2024-07-18 RX ORDER — HYDROMORPHONE HYDROCHLORIDE 1 MG/ML
0.6 INJECTION, SOLUTION INTRAMUSCULAR; INTRAVENOUS; SUBCUTANEOUS
Status: DISCONTINUED | OUTPATIENT
Start: 2024-07-18 | End: 2024-07-18

## 2024-07-18 RX ORDER — ACETAMINOPHEN 325 MG/1
650 TABLET ORAL EVERY 8 HOURS
Status: DISCONTINUED | OUTPATIENT
Start: 2024-07-18 | End: 2024-07-19 | Stop reason: HOSPADM

## 2024-07-18 RX ORDER — OXYCODONE HYDROCHLORIDE 5 MG/1
5 TABLET ORAL EVERY 6 HOURS PRN
Status: DISCONTINUED | OUTPATIENT
Start: 2024-07-18 | End: 2024-07-18

## 2024-07-18 RX ORDER — KETOROLAC TROMETHAMINE 15 MG/ML
15 INJECTION, SOLUTION INTRAMUSCULAR; INTRAVENOUS ONCE
Status: COMPLETED | OUTPATIENT
Start: 2024-07-18 | End: 2024-07-18

## 2024-07-18 RX ORDER — LISINOPRIL 20 MG/1
20 TABLET ORAL DAILY
Status: ON HOLD | COMMUNITY
End: 2024-07-19

## 2024-07-18 RX ORDER — GABAPENTIN 300 MG/1
300 CAPSULE ORAL 3 TIMES DAILY
Status: DISCONTINUED | OUTPATIENT
Start: 2024-07-18 | End: 2024-07-19 | Stop reason: HOSPADM

## 2024-07-18 RX ORDER — DIPHENHYDRAMINE HCL 25 MG
25 CAPSULE ORAL EVERY 6 HOURS PRN
Status: DISCONTINUED | OUTPATIENT
Start: 2024-07-18 | End: 2024-07-19 | Stop reason: HOSPADM

## 2024-07-18 RX ORDER — LISINOPRIL 10 MG/1
10 TABLET ORAL DAILY
Status: DISCONTINUED | OUTPATIENT
Start: 2024-07-18 | End: 2024-07-19 | Stop reason: HOSPADM

## 2024-07-18 RX ORDER — ENOXAPARIN SODIUM 100 MG/ML
40 INJECTION SUBCUTANEOUS EVERY 24 HOURS
Status: DISCONTINUED | OUTPATIENT
Start: 2024-07-18 | End: 2024-07-19 | Stop reason: HOSPADM

## 2024-07-18 RX ORDER — METOPROLOL TARTRATE 25 MG/1
12.5 TABLET, FILM COATED ORAL
Status: DISCONTINUED | OUTPATIENT
Start: 2024-07-18 | End: 2024-07-18

## 2024-07-18 RX ORDER — HYDROMORPHONE HYDROCHLORIDE 2 MG/1
2 TABLET ORAL EVERY 4 HOURS PRN
Status: DISCONTINUED | OUTPATIENT
Start: 2024-07-18 | End: 2024-07-19

## 2024-07-18 RX ORDER — HYDROMORPHONE HYDROCHLORIDE 4 MG/1
4 TABLET ORAL EVERY 4 HOURS PRN
Status: DISCONTINUED | OUTPATIENT
Start: 2024-07-18 | End: 2024-07-18

## 2024-07-18 RX ORDER — METOPROLOL TARTRATE 25 MG/1
25 TABLET, FILM COATED ORAL 2 TIMES DAILY
Status: DISCONTINUED | OUTPATIENT
Start: 2024-07-18 | End: 2024-07-19 | Stop reason: HOSPADM

## 2024-07-18 RX ORDER — FUROSEMIDE 20 MG/1
10 TABLET ORAL DAILY
Status: DISCONTINUED | OUTPATIENT
Start: 2024-07-19 | End: 2024-07-19 | Stop reason: HOSPADM

## 2024-07-18 ASSESSMENT — COGNITIVE AND FUNCTIONAL STATUS - GENERAL
TURNING FROM BACK TO SIDE WHILE IN FLAT BAD: A LOT
MOVING FROM LYING ON BACK TO SITTING ON SIDE OF FLAT BED WITH BEDRAILS: A LOT
STANDING UP FROM CHAIR USING ARMS: TOTAL
WALKING IN HOSPITAL ROOM: TOTAL
CLIMB 3 TO 5 STEPS WITH RAILING: TOTAL
DRESSING REGULAR UPPER BODY CLOTHING: A LOT
DRESSING REGULAR LOWER BODY CLOTHING: A LOT
CLIMB 3 TO 5 STEPS WITH RAILING: TOTAL
HELP NEEDED FOR BATHING: A LOT
TOILETING: A LOT
MOVING FROM LYING ON BACK TO SITTING ON SIDE OF FLAT BED WITH BEDRAILS: A LOT
PERSONAL GROOMING: A LOT
MOVING TO AND FROM BED TO CHAIR: A LOT
DAILY ACTIVITIY SCORE: 14
MOBILITY SCORE: 9
STANDING UP FROM CHAIR USING ARMS: TOTAL
MOVING TO AND FROM BED TO CHAIR: A LOT
MOBILITY SCORE: 9
WALKING IN HOSPITAL ROOM: TOTAL
TURNING FROM BACK TO SIDE WHILE IN FLAT BAD: A LOT

## 2024-07-18 ASSESSMENT — PAIN SCALES - WONG BAKER: WONGBAKER_NUMERICALRESPONSE: HURTS WHOLE LOT

## 2024-07-18 ASSESSMENT — PAIN SCALES - GENERAL
PAINLEVEL_OUTOF10: 7
PAINLEVEL_OUTOF10: 8
PAINLEVEL_OUTOF10: 9
PAINLEVEL_OUTOF10: 7

## 2024-07-18 ASSESSMENT — ACTIVITIES OF DAILY LIVING (ADL)
LACK_OF_TRANSPORTATION: NO
ADL_ASSISTANCE: INDEPENDENT

## 2024-07-18 ASSESSMENT — PAIN - FUNCTIONAL ASSESSMENT: PAIN_FUNCTIONAL_ASSESSMENT: 0-10

## 2024-07-18 ASSESSMENT — PAIN DESCRIPTION - LOCATION: LOCATION: BACK

## 2024-07-18 NOTE — CONSULTS
ORTHOPAEDIC CONSULTATION     Subjective   History Of Present Illness  Altaf Parsons is a 54 y.o. male (metastatic thymoma w thoracic mets) s/p thoracic compression + fusion in 2021 and T5-7 laminectomy and extension of spinal instrumented fusion to T5 on 5/25 w Dr Melendez. Now w isolated neuropathic thoracolumbar BP, reports previous ep. Denies n/t, worsening weakness.     Orthopaedic Problems/Injuries:  Low back pain postop    Location: Painful at TL junction  Duration: Pain has been persistent since 2d  Severity: 8 /10  Worsened by movement/Palpation, improved with rest and pain medication  Open/Closed: closed, NVI: at baseline  Associated symptoms: no associated numbness/tingling/ changes in weakness    Other Injuries: none  NPO: no    Past medical history: per HPI; no history of blood clots  Past surgical history: per HPI, rest reviewed in EMR  Allergies: Morphine  Medications:  per EMR  Social History: denies IVDU  Family History:  Non-contributory to this patient's acute surgical issue.    Review of Systems: 12 point ROS negative unless stated in HPI    Past Medical History  He has a past medical history of Abnormal weight gain (12/08/2014), Hemothorax, Personal history of other diseases of the digestive system (09/10/2013), Personal history of other diseases of the digestive system (03/19/2015), Personal history of other diseases of the nervous system and sense organs (11/24/2020), Personal history of other endocrine, nutritional and metabolic disease, Personal history of other endocrine, nutritional and metabolic disease, Personal history of other specified conditions (03/14/2014), and Strain of unspecified muscle, fascia and tendon at shoulder and upper arm level, right arm, initial encounter (05/20/2014).    Surgical History  He has a past surgical history that includes Knee surgery (09/10/2013); Other surgical history (09/10/2013); MR angio chest w IV contrast (2/9/2016); CT guided percutaneous biopsy  muscle (11/13/2019); CT guided percutaneous biopsy muscle (8/13/2020); and CT guided pleura percutaneous biopsy (5/10/2017).     Social History  He reports that he has never smoked. He has never used smokeless tobacco. He reports that he does not currently use alcohol. He reports that he does not currently use drugs.    Family History  No family history on file.     Allergies  Morphine    Review of Systems  12 point ROS negative unless stated in HPI          Objective   Physical Exam  General: Lying comfortably in bed, no acute distress  HEENT: EOMI, NC/AT  CV: RRR by peripheral pulses  Resp: Normal WOB  MSK: See below. Gross motor in tact.  Neurologic: AOx3  Skin: No rash  Psych: Mood appropriate  Spine Exam:  No tenderness to palpation over cervical, thoracic, or lumbar spine  No bruising, swelling, or erythema    L1: SILT       L2: SILT      Hip flexors 2/5 Left; 2/5 Right  L3: SILT      Knee extension 2/5 Left; 4/5 Right  L4: SILT      Tib Ant. (Dorsiflexion) 3/5 Left; 4/5 Right  L5: SILT      EHL 3/5 Left; 5/5 Right  S1: SILT      Plantar flexion 2/5 Left; 5/5 Right     Last Recorded Vitals  Blood pressure (!) 134/95, pulse (!) 118, temperature 37.1 °C (98.8 °F), resp. rate 19, SpO2 94%.    Relevant Results  Results for orders placed or performed during the hospital encounter of 07/17/24 (from the past 24 hour(s))   CBC and Auto Differential   Result Value Ref Range    WBC 9.4 4.4 - 11.3 x10*3/uL    nRBC 0.0 0.0 - 0.0 /100 WBCs    RBC 4.23 (L) 4.50 - 5.90 x10*6/uL    Hemoglobin 10.3 (L) 13.5 - 17.5 g/dL    Hematocrit 32.3 (L) 41.0 - 52.0 %    MCV 76 (L) 80 - 100 fL    MCH 24.3 (L) 26.0 - 34.0 pg    MCHC 31.9 (L) 32.0 - 36.0 g/dL    RDW 20.3 (H) 11.5 - 14.5 %    Platelets 612 (H) 150 - 450 x10*3/uL    Immature Granulocytes %, Automated 0.8 0.0 - 0.9 %    Immature Granulocytes Absolute, Automated 0.08 0.00 - 0.70 x10*3/uL   Sedimentation rate, automated   Result Value Ref Range    Sedimentation Rate 97 (H) 0 - 20  mm/h   Manual Differential   Result Value Ref Range    Neutrophils %, Manual 72.8 40.0 - 80.0 %    Lymphocytes %, Manual 11.4 13.0 - 44.0 %    Monocytes %, Manual 8.8 2.0 - 10.0 %    Eosinophils %, Manual 6.1 0.0 - 6.0 %    Basophils %, Manual 0.9 0.0 - 2.0 %    Seg Neutrophils Absolute, Manual 6.84 1.20 - 7.00 x10*3/uL    Lymphocytes Absolute, Manual 1.07 (L) 1.20 - 4.80 x10*3/uL    Monocytes Absolute, Manual 0.83 0.10 - 1.00 x10*3/uL    Eosinophils Absolute, Manual 0.57 0.00 - 0.70 x10*3/uL    Basophils Absolute, Manual 0.08 0.00 - 0.10 x10*3/uL    Total Cells Counted 114     RBC Morphology See Below     Target Cells Few     Ovalocytes Few     Teardrop Cells Few     Lm Cells Few    Urinalysis with Reflex Culture and Microscopic   Result Value Ref Range    Color, Urine Light-Orange (N) Light-Yellow, Yellow, Dark-Yellow    Appearance, Urine Ex.Turbid (N) Clear    Specific Gravity, Urine 1.016 1.005 - 1.035    pH, Urine 6.5 5.0, 5.5, 6.0, 6.5, 7.0, 7.5, 8.0    Protein, Urine 30 (1+) (A) NEGATIVE, 10 (TRACE), 20 (TRACE) mg/dL    Glucose, Urine Normal Normal mg/dL    Blood, Urine 0.5 (2+) (A) NEGATIVE    Ketones, Urine NEGATIVE NEGATIVE mg/dL    Bilirubin, Urine NEGATIVE NEGATIVE    Urobilinogen, Urine Normal Normal mg/dL    Nitrite, Urine NEGATIVE NEGATIVE    Leukocyte Esterase, Urine 500 Katie/µL (A) NEGATIVE   Microscopic Only, Urine   Result Value Ref Range    WBC, Urine >50 (A) 1-5, NONE /HPF    WBC Clumps, Urine MANY Reference range not established. /HPF    RBC, Urine >20 (A) NONE, 1-2, 3-5 /HPF    Bacteria, Urine 4+ (A) NONE SEEN /HPF   C-reactive protein   Result Value Ref Range    C-Reactive Protein 12.86 (H) <1.00 mg/dL   Basic metabolic panel   Result Value Ref Range    Glucose 85 74 - 99 mg/dL    Sodium 137 136 - 145 mmol/L    Potassium 3.9 3.5 - 5.3 mmol/L    Chloride 96 (L) 98 - 107 mmol/L    Bicarbonate 31 21 - 32 mmol/L    Anion Gap 14 10 - 20 mmol/L    Urea Nitrogen 11 6 - 23 mg/dL    Creatinine 0.52  0.50 - 1.30 mg/dL    eGFR >90 >60 mL/min/1.73m*2    Calcium 9.2 8.6 - 10.6 mg/dL       Images:  XR T/L spine show hardware in good alignment, no evidence of losing or other acute injury.          Assessment:  53M (metastatic thymoma w thoracic mets) s/p thoracic compression + fusion in 2021 and T5-7 laminectomy and extension of spinal instrumented fusion to T5 on 5/25 w Dr Melendez. Now w isolated neuropathic thoracolumbar BP, reports previous ep. Denies n/t, worsening weakness. LLE 2/5 HF/KE/PF, 3/5 DF/EHL. RLE 2/5 HF, 4/5 KE/DF, 5/5 PF/EHL. Similar to recent clinic visit. WBC 9.4, ESR 97, CRP 12.86.    Plan:  - No acute orthopaedic surgical intervention  - Recommend medicine admission for pain control, PT/OT  - WBAT BLE  - Okay for diet from orthopedic perspective  - okay for DVT ppx from orthopedic perspective  - Patient to follow up in clinic with Dr. Melendez at regularly scheduled appt on 8/14.    Patient seen within 30 minutes of page.    Consult to be discussed with attending, Dr. Melendez.    Aliza Tan MD, PGY-2  Orthopaedic Surgery  On-call: [phone]  Epic Chat Preferred    This patient will be followed peripherally by the Orthopaedic Spine service. Please page or Epic Chat the corresponding residents below with questions or concerns.     Ortho Spine Service (Epic Chat Preferred)  First call: Kenna Can MD PGY-2  Second call: Maurice Staton MD PGY-4    6pm-6am M-F, Holidays, and weekends page Ortho on-call [phone] with urgent questions/concerns.

## 2024-07-18 NOTE — ED PROVIDER NOTES
HPI   Chief Complaint   Patient presents with    Back Pain       HPI  Patient is a 54-year-old past medical history of metastatic thymoma presenting with back pain.  Patient said on May 25 he had a tumor removed from his spine.  The trauma caused patient to have mobility issue.  Since the procedure, he has been slowly progressing.  Patient said he is not in physical therapy and has been doing well.  Unfortunately, patient started feeling sharp pains 2 days ago and has worsening.  Patient that he has experienced this before but not to this extent.  Patient denies traumatic event, fever, nausea and vomiting.      Patient History   Past Medical History:   Diagnosis Date    Abnormal weight gain 12/08/2014    Abnormal weight gain    Hemothorax     Hemothorax    Personal history of other diseases of the digestive system 09/10/2013    History of gastritis    Personal history of other diseases of the digestive system 03/19/2015    History of esophageal reflux    Personal history of other diseases of the nervous system and sense organs 11/24/2020    History of sleep apnea    Personal history of other endocrine, nutritional and metabolic disease     History of hypercholesterolemia    Personal history of other endocrine, nutritional and metabolic disease     History of hypothyroidism    Personal history of other specified conditions 03/14/2014    History of chest pain    Strain of unspecified muscle, fascia and tendon at shoulder and upper arm level, right arm, initial encounter 05/20/2014    Right shoulder strain     Past Surgical History:   Procedure Laterality Date    CT GUIDED PERCUTANEOUS BIOPSY MUSCLE  11/13/2019    [facility]    CT GUIDED PERCUTANEOUS BIOPSY MUSCLE  8/13/2020    [place] GUID[place]CUTANEOUS [place]OPSY MUS[place] [facility] [place]    CT GUIDED PLEURA PERCUTANEOUS BIOPSY  5/10/2017    CT GUIDED [place] [facility]CUTANEOUS [facility]SY [facility]    KNEE SURGERY   09/10/2013    Knee Surgery    MR CHEST ANGIO W IV CONTRAST  2/9/2016    MR CHEST ANGIO W IV CONTRAST 2/9/2016 [facility]    OTHER SURGICAL HISTORY  09/10/2013    History Of Prior Surgery     No family history on file.  Social History     Tobacco Use    Smoking status: Never    Smokeless tobacco: Never   Vaping Use    Vaping status: Never Used   Substance Use Topics    Alcohol use: Not Currently    Drug use: Not Currently       Physical Exam   ED Triage Vitals [07/17/24 1746]   Temperature Heart Rate Respirations BP   36.8 °C (98.2 °F) (!) 108 19 (!) 144/95      Pulse Ox Temp Source Heart Rate Source Patient Position   99 % Oral Monitor Lying      BP Location FiO2 (%)     Right arm --       Physical Exam  Constitutional:       Appearance: Normal appearance.   Cardiovascular:      Rate and Rhythm: Normal rate and regular rhythm.   Pulmonary:      Effort: Pulmonary effort is normal.      Breath sounds: Normal breath sounds.   Abdominal:      General: Bowel sounds are normal.      Palpations: Abdomen is soft.   Musculoskeletal:      Comments: Bilateral weakness of the lower extremity.  Point tenderness in the lumbar region.  No erythema, no rashes and no obvious deformity   Neurological:      Mental Status: He is alert.           ED Course & MDM   Diagnoses as of 07/19/24 1608   Back pain with radiation       Medical Decision Making  Patient is a 54-year-old past medical history of metastatic thymoma presenting with back pain. The differential diagnoses considered for this patient were back abscess, acute pain versus return of the spine cancer.  At bedside, patient is hemodynamically stable and was in acute pain.  Patient was given 1 mg of Dilaudid. On physical exam, patient had bilateral weakness of the lower extremity. He also had severe midline tenderness in the lumbar region. Patient declined an MRI.The orthopedics team were consulted. Due to x-ray images of patient thoracic and lumbar spine. They were  not concerned for any osteomyelitis or abscess so they wanted patient admitted to the medicine team for pain control. Patient urinalysis was positive for UTI so patient was started on ciproflaxicin. Patient was then admitted to the medicine team for pain control.    Procedure  Procedures     Toby Roman MD  Resident  07/19/24 8738

## 2024-07-18 NOTE — PROGRESS NOTES
Left message for patient to advise Vaccinator prior to getting Covid vaccine due to going to ER recently for her throat closing . Pharmacy Medication History Review    Altaf Parsons is a 54 y.o. male admitted for Back pain with radiation. Pharmacy reviewed the patient's [id number] medications and allergies for accuracy.    The list below reflects the updated PTA list. Comments regarding how patient may be taking medications differently can be found in the Admit Orders Activity  Prior to Admission Medications   Prescriptions Last Dose Informant   acetaminophen (Tylenol) 325 mg tablet 7/17/2024 Self   Sig: Take 3 tablets (975 mg) by mouth 3 times a day.   cholecalciferol, vitamin D3, (VITAMIN D3 ORAL) 7/17/2024 Self   Sig: Take 1 tablet by mouth once daily.   diphenhydrAMINE (BENADryl) 50 mg capsule 7/17/2024 Self   Sig: Take 1 capsule (50 mg) by mouth as needed at bedtime for sleep.   ferrous sulfate, 325 mg ferrous sulfate, tablet 7/17/2024 Self   Sig: Take 1 tablet by mouth every other day. Do not fill before June 5, 2024.   furosemide (Lasix) 20 mg tablet 7/17/2024 at patient request refills Self   Sig: Take 0.5 tablets (10 mg) by mouth once daily.   gabapentin (Neurontin) 600 mg tablet 7/17/2024 Self   Sig: Take 1 tablet (600 mg) by mouth once daily at bedtime.   ketotifen (Zaditor) 0.025 % (0.035 %) ophthalmic solution Not Taking Self   Sig: Administer 1 drop into both eyes 2 times a day as needed (eye itching).   Patient not taking: Reported on 7/18/2024   lisinopriL-hydrochlorothiazide 20-25 mg tablet 7/17/2024 Self   Sig: Take 1 tablet by mouth early in the morning..   loratadine (Claritin) 10 mg tablet Not Taking Self   Sig: Take 1 tablet (10 mg) by mouth once daily.   Patient not taking: Reported on 7/18/2024   melatonin 5 mg tablet Not Taking Self   Sig: Take 1 tablet (5 mg) by mouth once daily.   Patient not taking: Reported on 7/18/2024   methocarbamol (Robaxin) 500 mg tablet 7/17/2024 Self   Sig: Take 1 tablet (500 mg) by mouth every 6 hours if needed for muscle spasms.   metoprolol tartrate (Lopressor) 25 mg tablet  7/17/2024 Self   Sig: Take 2 tablet (50 mg) by mouth twice daily   .patient taking differently: patient takes 1 tablet by mouth once daily    naloxone (Narcan) 0.4 mg/mL injection Unknown at patient request refills Self   Sig: Infuse 0.5 mL (0.2 mg) into a venous catheter if needed for respiratory depression (Once PRN patient is unarousable, and respiratory rate less than 8).   oxyCODONE (Roxicodone) 15 mg immediate release tablet Unknown Self   Sig: Take 1 tablet (15 mg) by mouth every 3 hours if needed (pain).   Patient taking differently: Take 1 tablet (15 mg) by mouth every 3 hours if needed (severe pain).   oxyCODONE ER (OxyCONTIN) 15 mg 12 hr tablet 7/17/2024 Self   Sig: Take 1 tablet (15 mg) by mouth every 12 hours. Do not crush, chew, or split.   oxyCODONE-acetaminophen (Percocet) 5-325 mg tablet Not Taking    Sig: Take 1 tablet by mouth every 4 hours if needed for severe pain (7 - 10) for up to 7 days.   Patient not taking: Reported on 7/18/2024   pantoprazole (ProtoNix) 40 mg EC tablet     Sig: Take 1 tablet (40 mg) by mouth once daily in the morning. Take before meals for 10 days. Do not crush, chew, or split.    For stomach protection while taking steroids.   Patient not taking: Reported on 5/24/2024   pantoprazole sodium (PROTONIX ORAL) Past Week at patient request refills Self   Sig: Take 40 mg by mouth early in the morning. Indications: heartburn   polyethylene glycol (Glycolax, Miralax) 17 gram packet Not Taking Self   Sig: Take 17 g by mouth 2 times a day.   Patient not taking: Reported on 7/18/2024   sennosides (Senokot) 8.6 mg tablet 7/17/2024 Self   Sig: Take 2 tablets (17.2 mg) by mouth 2 times a day.      Facility-Administered Medications: None        The list below reflects the updated allergy list. Please review each documented allergy for additional clarification and justification.  Allergies  Reviewed by Marquez Anglin on 7/18/2024        Severity Reactions Comments    Morphine Medium  Hallucinations Pt reports hallucinations previously with morpine            Patient accepts M2B at discharge. Pharmacy has been updated to [facility].    Sources used to complete the med history include   Patient interview   Epic dispense history  Epic allergy list   OARRS ( yes )   6/4/2024 oncology discharge summary ( [facility] )     Medications ADDED:  N/A   Medications CHANGED:  Patient prescribed metoprolol tartrate 25 mg 2 tablets twice daily - patient takes differently - patient takes metoprolol tartrate 25 mg 1 tablet by mouth once daily   Patient states that he takes furosemide 20 mg differently than prescribed - patient prescribed  1 tablet by mouth once daily - patient takes furosemide 20 mg 1/2 tablet by mouth prn blood pressure above 110  Medications REMOVED:   Ketotifen 0.025% ophthalmic solution   Loratadine 10 mg   Melatonin 5 mg   Oral multi-vitamin  Polyethylene glycol 17 gram packet      Below are additional concerns with the patient's PTA list.  Patient is a good historian knows medication name dose and frequency   Patient request refills for - pantoprazole 40 mg - naloxone 0.4 mg/ ml injection - furosemide 20 mg   Patient states that he takes furosemide 20 mg differently than prescribed - patient prescribed  1 tablet by mouth once daily - patient takes furosemide 20 mg 1/2 tablet by mouth prn blood pressure above 110  OARRS   7/8/2024 gabapentin 600 mg ds: 30 qty 30  7/3/2024 oxycodone-acetaminophen 5-325 mg ds: 7 qty: 42   6/30/2024 oxycodone 15 mg ds: 30 qty: 60  6/27/2024 oxycodone 15 mg ds: 15 qty: 120     Marquez Anglin [facility]  Transitions of Care Pharmacy Technician  [facility]  Please reach out via [employer] Secure Chat for questions, or if no response call [phone] or [employer] “MedRec”

## 2024-07-18 NOTE — H&P
History Of Present Illness  Altaf Parsons is a 54 y.o. male with a past medical history of malignant thymoma (dx. 03/2016) w/ mets to bone and lung with recent tumor removal spinal fusion (May 25) who reported to the ED with uncontrolled back pain. He reports the pain is 10/10 mostly in his left lower back and radiating up his left side. He took his home pain medication around 2 hours prior to admit without relief. He denies any fever, chills, chest pain, shortness of breath, numbness or tingling. He does endorse some intermittent dysuria.      Past Medical History  Past Medical History:   Diagnosis Date    Abnormal weight gain 12/08/2014    Abnormal weight gain    Hemothorax     Hemothorax    Personal history of other diseases of the digestive system 09/10/2013    History of gastritis    Personal history of other diseases of the digestive system 03/19/2015    History of esophageal reflux    Personal history of other diseases of the nervous system and sense organs 11/24/2020    History of sleep apnea    Personal history of other endocrine, nutritional and metabolic disease     History of hypercholesterolemia    Personal history of other endocrine, nutritional and metabolic disease     History of hypothyroidism    Personal history of other specified conditions 03/14/2014    History of chest pain    Strain of unspecified muscle, fascia and tendon at shoulder and upper arm level, right arm, initial encounter 05/20/2014    Right shoulder strain       Surgical History  Past Surgical History:   Procedure Laterality Date    CT GUIDED PERCUTANEOUS BIOPSY MUSCLE  11/13/2019    CT GUIDED PERCUTANEOUS [facility]OPSY MUSCLE [facility]    CT GUIDED PERCUTANEOUS BIOPSY MUSCLE  8/13/2020    CT GUIDED PERCUTANEOUS BIOPSY MUSCLE [facility]2020 [facility]    CT GUIDED PLEURA PERCUTANEOUS BIOPSY  5/10/2017    CT GUIDED PLEURA PERCUTANEOUS BIOPSY [facility]10/2017 [facility]    KNEE SURGERY  09/10/2013    Knee Surgery    MR CHEST  ANGIO W IV CONTRAST  2/9/2016    MR CHEST ANGIO W IV CONTRAST 2/9/2016 [facility] ANCILLARY LEGACY    OTHER SURGICAL HISTORY  09/10/2013    History Of Prior Surgery        Social History  He reports that he has never smoked. He has never used smokeless tobacco. He reports that he does not currently use alcohol. He reports that he does not currently use drugs.    Family History  No family history on file.     Allergies  Morphine    Review of Systems   Constitutional:  Negative for chills and fever.   Respiratory:  Negative for shortness of breath.    Cardiovascular:  Negative for chest pain and palpitations.   Gastrointestinal:  Negative for abdominal pain, constipation, diarrhea, nausea and vomiting.   Genitourinary:  Negative for dysuria, flank pain, frequency, hematuria and urgency.   Musculoskeletal:  Positive for back pain.   All other systems reviewed and are negative.       Physical Exam  Vitals reviewed.   Constitutional:       General: He is in acute distress.   HENT:      Head: Normocephalic and atraumatic.   Cardiovascular:      Rate and Rhythm: Normal rate and regular rhythm.      Heart sounds: Normal heart sounds.   Pulmonary:      Effort: Pulmonary effort is normal.      Breath sounds: Normal air entry.   Abdominal:      General: Bowel sounds are normal.      Palpations: Abdomen is soft.      Tenderness: There is no abdominal tenderness.   Musculoskeletal:         General: No deformity.   Skin:     General: Skin is warm and dry.   Neurological:      General: No focal deficit present.      Mental Status: He is alert and oriented to person, place, and time.      Motor: Weakness present.      Comments: Baseline BLE weakness   Psychiatric:         Mood and Affect: Mood normal.         Behavior: Behavior normal.          Last Recorded Vitals  Blood pressure (!) 144/95, pulse (!) 108, temperature 36.8 °C (98.2 °F), temperature source Oral, resp. rate 19, SpO2 97%.    Relevant Results      Lab Results   Component  Value Date    WBC 9.4 07/17/2024    HGB 10.3 (L) 07/17/2024    HCT 32.3 (L) 07/17/2024    MCV 76 (L) 07/17/2024     (H) 07/17/2024     XR lumbar spine 2-3 views, XR thoracic spine 3 views  Narrative: Interpreted By:  Lyla Snowden,   STUDY:  XR LUMBAR SPINE 2-3 VIEWS; XR THORACIC SPINE 3 VIEWS;  7/17/2024 8:37  pm      INDICATION:  Signs/Symptoms:Post op.      COMPARISON:  07/03/2024 thoracic spine radiograph. 08/04/2021 thoracic spine  radiographs      ACCESSION NUMBER(S):  [id number]; [id number]      ORDERING CLINICIAN:  LYLA العلي      FINDINGS:  Single AP view of the thoracic and lumbar spine were obtained.      There are postoperative changes of a T4-T9 posterior thoracic fusion  and T5-T9 and laminectomies. There is no perihardware lucency.      There are also postsurgical changes of T9-L1 thoracolumbar fusion  with unchanged perihardware lucency involving the bilateral L1 screws  (left > right). This also appears similar to 08/04/2021.      Vertebral body heights are maintained. No bone lesions are      There is similar left hilar fullness likely relating to  lymphadenopathy adjacent atelectasis.      Impression: Please see above.          MACRO:  None.      Signed by: Lyla Snowden 7/17/2024 8:49 PM  Dictation workstation:   [id number]    ED Medication Administration from 07/17/2024 1718 to 07/17/2024 2322         Date/Time Order Dose Route Action Action by     07/17/2024 1901 EDT HYDROmorphone (Dilaudid) injection 1 mg 1 mg intravenous Given KAM Ramos     07/17/2024 2109 EDT HYDROmorphone (Dilaudid) injection 0.5 mg 0.5 mg intravenous Given PAMELA Araya     07/17/2024 2205 EDT ciprofloxacin (Cipro) tablet 500 mg 500 mg oral Not Given KAM Ramos     07/17/2024 2225 EDT cefTRIAXone (Rocephin) 1 g in dextrose (iso) IV 50 mL 1 g intravenous New Bag KAM Ramos     07/17/2024 2244 EDT heparin (porcine) injection 5,000 Units 5,000 Units subcutaneous Given PAMELA Araya     07/17/2024  2244 EDT ketorolac (Toradol) injection 15 mg 15 mg intravenous Given PAMELA Araya     07/17/2024 2316 EDT oxyCODONE (Roxicodone) immediate release tablet 5 mg 5 mg oral Given PAMELA Araya               Assessment/Plan   Principal Problem:    Back pain with radiation      #Acute on chronic lower back pain  #Malignant thymoma w/mets to spine  -Falls precautions  -Consult pain management in AM  -Ortho evaluated patient in ED (awaiting recs)  -Oxycodone, Dilaudid for pain control  -Trial 15mg Toradol once pending BMP    #UTI  -Cipro PO started in ED, DC  -Rocephin IV  -Follow cultures    #HTN  -Hypertensive in ED, likely 2/2 acute pain  -Continue home medications    #Microcytic anemia  -Appears stable  -Follow       RAFI Be-CNP

## 2024-07-18 NOTE — PROGRESS NOTES
Transitional Care Coordination Progress Note:  Patient discussed during interdisciplinary rounds.   Team members present: FRANCIE BATISTA  Plan per Medical/Surgical team: back pain  Payor:  Employee Medical Plan  Discharge disposition: pt/ot pending  Potential Barriers: none  ADOD: 1-2 days  PT/OT pending. Will continue to monitor for discharge planning needs.     Alejandra MEIERN, RN  Transitional Care Coordinator (TCC)  [phone]

## 2024-07-18 NOTE — TELEPHONE ENCOUNTER
Pt states that he is currently in patient with plans for dc tomorrow.   Pt is requesting team send script for dilaudid for pain management to [facility] [address] in [place].    Attempted to advise pt to request assistance from in patient team and Per pt - in patient team advised that oncology will need to manage ongoing dilaudid needs.

## 2024-07-18 NOTE — PROGRESS NOTES
Altaf Parsons is a 54 y.o. male on day 1 of admission presenting with Back pain with radiation.      Subjective   No events over night.     Reports back pain.   Able to pass urine  No c/o fever or chills.   Reports that oxycodone is not working and wants to try oral dilaudid tablets.        Objective     Last Recorded Vitals  BP (!) 142/97 (BP Location: Right arm, Patient Position: Lying)   Pulse (!) 119   Temp 37 °C (98.6 °F) (Temporal)   Resp 20   SpO2 97%   Intake/Output last 3 Shifts:    Intake/Output Summary (Last 24 hours) at 7/18/2024 1720  Last data filed at 7/18/2024 1500  Gross per 24 hour   Intake --   Output 700 ml   Net -700 ml       Admission Weight       Daily Weight  06/26/24 : (P) 100 kg (221 lb)    Image Results  XR lumbar spine 2-3 views, XR thoracic spine 3 views  Narrative: Interpreted By:  Lyla Snowden,   STUDY:  XR LUMBAR SPINE 2-3 VIEWS; XR THORACIC SPINE 3 VIEWS;  7/17/2024 8:37  pm      INDICATION:  Signs/Symptoms:Post op.      COMPARISON:  07/03/2024 thoracic spine radiograph. 08/04/2021 thoracic spine  radiographs      ACCESSION NUMBER(S):  [id number]; [id number]      ORDERING CLINICIAN:  LYLA العلي      FINDINGS:  Single AP view of the thoracic and lumbar spine were obtained.      There are postoperative changes of a T4-T9 posterior thoracic fusion  and T5-T9 and laminectomies. There is no perihardware lucency.      There are also postsurgical changes of T9-L1 thoracolumbar fusion  with unchanged perihardware lucency involving the bilateral L1 screws  (left > right). This also appears similar to 08/04/2021.      Vertebral body heights are maintained. No bone lesions are      There is similar left hilar fullness likely relating to  lymphadenopathy adjacent atelectasis.      Impression: Please see above.          MACRO:  None.      Signed by: Lyla Snowden 7/17/2024 8:49 PM  Dictation workstation:   [id number]      Physical Exam  Constitutional:       Comments:  Comfortable at rest  Oriented x 3    HENT:      Head: Normocephalic.      Nose: Nose normal.   Eyes:      Extraocular Movements: Extraocular movements intact.      Pupils: Pupils are equal, round, and reactive to light.   Cardiovascular:      Rate and Rhythm: Normal rate.      Heart sounds: Normal heart sounds. No murmur heard.  Pulmonary:      Effort: No respiratory distress.      Breath sounds: Normal breath sounds. No wheezing.   Abdominal:      General: There is no distension.      Palpations: Abdomen is soft.      Tenderness: There is no abdominal tenderness.   Musculoskeletal:      Cervical back: Normal range of motion.      Comments: Arms movement normal    LE:  Power about 2/5 on both sides.   No sensory deficits.    Skin:     General: Skin is warm.   Neurological:      Mental Status: He is alert and oriented to person, place, and time.      Comments: Bilateral LE weakness,    Psychiatric:         Mood and Affect: Mood normal.         Relevant Results  Scheduled medications  acetaminophen, 650 mg, oral, q8h  cefTRIAXone, 1 g, intravenous, q24h  cholecalciferol, 2,000 Units, oral, Daily  ferrous sulfate (325 mg ferrous sulfate), 65 mg of iron, oral, Every other day  [START ON 7/19/2024] furosemide, 10 mg, oral, Daily  gabapentin, 300 mg, oral, TID  heparin (porcine), 5,000 Units, subcutaneous, q8h  lisinopril, 10 mg, oral, Daily  metoprolol tartrate, 12.5 mg, oral, q12h CHERI  pantoprazole, 40 mg, oral, Daily      Continuous medications     PRN medications  PRN medications: diphenhydrAMINE, HYDROmorphone    Results for orders placed or performed during the hospital encounter of 07/17/24 (from the past 24 hour(s))   CBC and Auto Differential   Result Value Ref Range    WBC 9.4 4.4 - 11.3 x10*3/uL    nRBC 0.0 0.0 - 0.0 /100 WBCs    RBC 4.23 (L) 4.50 - 5.90 x10*6/uL    Hemoglobin 10.3 (L) 13.5 - 17.5 g/dL    Hematocrit 32.3 (L) 41.0 - 52.0 %    MCV 76 (L) 80 - 100 fL    MCH 24.3 (L) 26.0 - 34.0 pg    MCHC 31.9 (L)  32.0 - 36.0 g/dL    RDW 20.3 (H) 11.5 - 14.5 %    Platelets 612 (H) 150 - 450 x10*3/uL    Immature Granulocytes %, Automated 0.8 0.0 - 0.9 %    Immature Granulocytes Absolute, Automated 0.08 0.00 - 0.70 x10*3/uL   Sedimentation rate, automated   Result Value Ref Range    Sedimentation Rate 97 (H) 0 - 20 mm/h   Manual Differential   Result Value Ref Range    Neutrophils %, Manual 72.8 40.0 - 80.0 %    Lymphocytes %, Manual 11.4 13.0 - 44.0 %    Monocytes %, Manual 8.8 2.0 - 10.0 %    Eosinophils %, Manual 6.1 0.0 - 6.0 %    Basophils %, Manual 0.9 0.0 - 2.0 %    Seg Neutrophils Absolute, Manual 6.84 1.20 - 7.00 x10*3/uL    Lymphocytes Absolute, Manual 1.07 (L) 1.20 - 4.80 x10*3/uL    Monocytes Absolute, Manual 0.83 0.10 - 1.00 x10*3/uL    Eosinophils Absolute, Manual 0.57 0.00 - 0.70 x10*3/uL    Basophils Absolute, Manual 0.08 0.00 - 0.10 x10*3/uL    Total Cells Counted 114     RBC Morphology See Below     Target Cells Few     Ovalocytes Few     Teardrop Cells Few     Lm Cells Few    Urinalysis with Reflex Culture and Microscopic   Result Value Ref Range    Color, Urine Light-Orange (N) Light-Yellow, Yellow, Dark-Yellow    Appearance, Urine Ex.Turbid (N) Clear    Specific Gravity, Urine 1.016 1.005 - 1.035    pH, Urine 6.5 5.0, 5.5, 6.0, 6.5, 7.0, 7.5, 8.0    Protein, Urine 30 (1+) (A) NEGATIVE, 10 (TRACE), 20 (TRACE) mg/dL    Glucose, Urine Normal Normal mg/dL    Blood, Urine 0.5 (2+) (A) NEGATIVE    Ketones, Urine NEGATIVE NEGATIVE mg/dL    Bilirubin, Urine NEGATIVE NEGATIVE    Urobilinogen, Urine Normal Normal mg/dL    Nitrite, Urine NEGATIVE NEGATIVE    Leukocyte Esterase, Urine 500 Katie/µL (A) NEGATIVE   Microscopic Only, Urine   Result Value Ref Range    WBC, Urine >50 (A) 1-5, NONE /HPF    WBC Clumps, Urine MANY Reference range not established. /HPF    RBC, Urine >20 (A) NONE, 1-2, 3-5 /HPF    Bacteria, Urine 4+ (A) NONE SEEN /HPF   C-reactive protein   Result Value Ref Range    C-Reactive Protein 12.86 (H)  <1.00 mg/dL   Basic metabolic panel   Result Value Ref Range    Glucose 85 74 - 99 mg/dL    Sodium 137 136 - 145 mmol/L    Potassium 3.9 3.5 - 5.3 mmol/L    Chloride 96 (L) 98 - 107 mmol/L    Bicarbonate 31 21 - 32 mmol/L    Anion Gap 14 10 - 20 mmol/L    Urea Nitrogen 11 6 - 23 mg/dL    Creatinine 0.52 0.50 - 1.30 mg/dL    eGFR >90 >60 mL/min/1.73m*2    Calcium 9.2 8.6 - 10.6 mg/dL       Assessment/Plan        Principal Problem:    Back pain with radiation    54 y.o. 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     #Acute on chronic lower back pain  #Malignant thymoma w/mets to spine  -Falls precautions  -Ortho evaluated patient in ED, no intervention, pain control,   -He thinks oxycodone is not controlling and wants to try oral dilaudid  Tylenol 650 mg TID  Gabapentin 300 mg TID  Dilaudid 2 mg Q 4 hrs PRN,      #UTI  UA positive for leukocytes  -Continue Rocephin IV  -Follow cultures     #HTN  -Hypertensive in ED, likely 2/2 acute pain  -Continue Lisinopril 10 mg, Metoprolol 12.5 mg BID, ( he reported taking reduced dose at home)     #Chronic  anemia  HB  stable  -Follow     Prophylaxis: S/c Lovenox.     Dispo: Improvement in pain. Aim for home tomorrow with home PT/OT.       Jatin Manning MD

## 2024-07-18 NOTE — PROGRESS NOTES
07/18/24 1217   Discharge Planning   Living Arrangements Spouse/significant other;Children   Support Systems Spouse/significant other   Assistance Needed Resumed home care. Pt requires assistance with ADLs.   Type of Residence Private residence   Do you have animals or pets at home? No   Who is requesting discharge planning? Patient   Home or Post Acute Services In home services   Type of Home Care Services Home nursing visits;Home OT;Home PT   Expected Discharge Disposition HH Services   Does the patient need discharge transport arranged? No  (Wife will provide.)   Financial Resource Strain   How hard is it for you to pay for the very basics like food, housing, medical care, and heating? Not hard   Housing Stability   In the last 12 months, was there a time when you were not able to pay the mortgage or rent on time? N   At any time in the past 12 months, were you homeless or living in a shelter (including now)? N   Transportation Needs   In the past 12 months, has lack of transportation kept you from medical appointments or from getting medications? no   In the past 12 months, has lack of transportation kept you from meetings, work, or from getting things needed for daily living? No   Patient Choice   Patient / Family choosing to utilize agency / facility established prior to hospitalization Yes     Assessment Note:  Met with pt and introduced myself as care coordinator and member of the Care Transitions team for discharge planning.   Pt feels safe at home with support of family.  Pt was recently discharged from [facility]. Pt's wife provides transport to [facility].  Pt's address, phone number and contact information was verified.  Pt does not have any other questions/concerns at this time.     Previous Home Care: [facility] for RN, PT and OT (per [facility], currently only PT and OT).  DME: Wheelchair, chair lift, shower chair and hospital bed ([facility]). Pt c/o he is not currently sleeping in the bed due to c/o  back pain.  Pt called the [employer] company for assistance and they requested for pt to have his physician send an order for a gel overlay mattress and a bed trapeze.  This RN contacted Dr. Rodriguez's outpatient team via secure chat to request the prescription.    Pharmacy: [facility]  Rachel: Deepti  PCP:   Dr. Darius Meza (last visit 2024)    Demi JAMISON, RN-BC  Transitional Care Coordinator (TCC)  [phone]

## 2024-07-18 NOTE — PROGRESS NOTES
Physical Therapy    Physical Therapy Evaluation & Treatment    Patient Name: Altaf Parsons  MRN: [id number]  Today's Date: 7/18/2024   Time Calculation  Start Time: 1404  Stop Time: 1439  Time Calculation (min): 35 min    Assessment/Plan   PT Assessment  Medical Staff Made Aware: Yes  End of Session Communication: Bedside nurse  Assessment Comment: Patient is a 53yo M presenting with back pain. Patient has been uses a slide board to transfer at home, and was receiving home PT. Patient reports no new DME or acute PT needs, continue with low intensity therapy.  End of Session Patient Position: Alarm off, not on at start of session (sitting EOB, RN aware and in room)   IP OR SWING BED PT PLAN  Inpatient or Swing Bed: Inpatient  PT Plan  PT Plan: PT Eval only  PT Eval Only Reason: No acute PT needs identified  PT Frequency: PT eval only  PT Discharge Recommendations: No further acute PT, Low intensity level of continued care  PT - OK to Discharge: Yes      Subjective     General Visit Information:  General  Reason for Referral: back pain  Past Medical History Relevant to Rehab: metastatic thymoma w thoracic mets) s/p thoracic compression + fusion in 2021 and T5-7 laminectomy and extension of spinal instrumented fusion to T5 on 5/25 w Dr Melendez  Prior to Session Communication: Bedside nurse  Patient Position Received: Bed, 3 rail up, Alarm off, not on at start of session (sitting EOB)  General Comment: pt sitting EOB, RN cleared. pt has pillows behind back for support.  Home Living:  Home Living  Type of Home: House  Lives With: Spouse (14 and 20yo children)  Home Adaptive Equipment: Walker rolling or standard, Wheelchair-manual, Hospital bed (BSC, shower chair)  Home Layout:  (has been staying on first floor and has recliner)  Home Access:  (2 ANGELA but has stair lift)  Prior Level of Function:  Prior Function Per Pt/Caregiver Report  Level of [place]: Independent with ADLs and functional transfers, Independent with  homemaking with ambulation  ADL Assistance: Independent  Homemaking Assistance: Independent  Ambulatory Assistance: Independent  Prior Function Comments: Patient reports he was receiving home PT, uses slide board to transfer from bed<> chair/wheelchair or toilet.  Precautions:  Precautions  Medical Precautions: Fall precautions      Objective   Pain:  Pain Assessment  Pain Assessment: 0-10  0-10 (Numeric) Pain Score: 7  Cognition:  Cognition  Overall Cognitive Status: Within Functional Limits    General Assessments:  Activity Tolerance  Endurance: Tolerates 10 - 20 min exercise with multiple rests    Sensation  Light Touch:  (N/T bilateral UEs)       Static Sitting Balance  Static Sitting-Level of Assistance: Close supervision (with pillows propped.)  Static Sitting-Comment/Number of Minutes: sat EOB for 10 min  Functional Assessments:  Bed Mobility  Bed Mobility: Yes  Bed Mobility 1  Bed Mobility 1: Supine to sitting, Sitting to supine  Level of Assistance 1: Moderate assistance  Bed Mobility Comments 1: assist with BLEs, HOB elevated maximally  Bed Mobility 2  Bed Mobility  2: Scooting  Level of Assistance 2: Close supervision  Bed Mobility Comments 2: lateral scoots along EOB L/R  Bed Mobility 3  Bed Mobility 3: Rolling left, Rolling right  Level of Assistance 3: Moderate assistance  Bed Mobility Comments 3: assist with LEs; rolled bilaterally in bed  Bed Mobility 4  Bed Mobility 4:  (boosting in bed)  Level of Assistance 4: Close supervision  Bed Mobility Comments 4: bed [place] and pt able to use bed rails and BUE to pull self up    Transfers  Transfer: No    Ambulation/Gait Training  Ambulation/Gait Training Performed: No  Extremity/Trunk Assessments:  RLE   RLE : Within Functional Limits  LLE   LLE : Within Functional Limits  Treatments:  Therapeutic Activity  Therapeutic Activity Performed: Yes  Therapeutic Activity 1: pt with increased pain and required increased time for postioning in bed. pt was  sitting EOB at start of session but wanting to return to supine. in supine, pt with increased pain and attempted to prop with pillow for low back, also used rolled blanket but no relief. pt rolled in bed multiple times to adjust positioning. pt unable to find relief and ulitmately returned to sitting EOB with pillow propped up.  Outcome Measures:  [facility] Basic Mobility  Turning from your back to your side while in a flat bed without using bedrails: A lot  Moving from lying on your back to sitting on the side of a flat bed without using bedrails: A lot  Moving to and from bed to chair (including a wheelchair): A lot  Standing up from a chair using your arms (e.g. wheelchair or bedside chair): Total  To walk in hospital room: Total  Climbing 3-5 steps with railing: Total  Basic Mobility - Total Score: 9    Encounter Problems       Encounter Problems (Active)       Pain - Adult              Education Documentation  No documentation found.  Education Comments  No comments found.

## 2024-07-18 NOTE — TELEPHONE ENCOUNTER
Does he have a palliaitve care provider?  I had problems with his scripts after last hospital discharge.  I never started dilaudid...I think the palliative care team inpatient should communicate with outpatient regarding his pain meds.  Carla

## 2024-07-18 NOTE — PROGRESS NOTES
Prevention Rounds    Yuval score: 15    Prevention interventions currently in place: Discussed with bedside RN, patient is turning at least every 2 hours independently and with assistance, some preventative mepilex border foams are in place, patients skin is clean, dry,  and free of stool or urine, the patients nutrition status is being monitored by the bedside RN staff and primary care team, and patient has an EHOB air mattress overlay added to the support surface.     Prevention interventions recommended: On bony prominences at risk for pressure injury please apply Mepilex border foams for protection.    Check skin under Mepilex dressings every shift and place wound consult if injury occurs.   Continue to turn and reposition patient at least every 2 hours.    Elevate heels off the bed surface at all times.   Keep patient skin clean by washing with warm wipes or soap and water as needed and pat dry.    If it is necessary apply [employer] barrier ointment with each clean up and as needed.     Plan: PI prevention discussed with bedside nurse.    While inpatient, Secure chat with questions or if condition changes. For urgent communications please [place] care [place] at [phone].      Thank you,  Christina Livingston RN, CWON

## 2024-07-19 ENCOUNTER — APPOINTMENT (OUTPATIENT)
Dept: RADIOLOGY | Facility: HOSPITAL | Age: 54
End: 2024-07-19
Payer: COMMERCIAL

## 2024-07-19 ENCOUNTER — PHARMACY VISIT (OUTPATIENT)
Dept: PHARMACY | Facility: CLINIC | Age: 54
End: 2024-07-19
Payer: COMMERCIAL

## 2024-07-19 VITALS
RESPIRATION RATE: 16 BRPM | SYSTOLIC BLOOD PRESSURE: 134 MMHG | DIASTOLIC BLOOD PRESSURE: 91 MMHG | HEART RATE: 116 BPM | TEMPERATURE: 97.9 F | OXYGEN SATURATION: 96 %

## 2024-07-19 PROCEDURE — 2500000001 HC RX 250 WO HCPCS SELF ADMINISTERED DRUGS (ALT 637 FOR MEDICARE OP): Performed by: NURSE PRACTITIONER

## 2024-07-19 PROCEDURE — RXMED WILLOW AMBULATORY MEDICATION CHARGE

## 2024-07-19 PROCEDURE — 99239 HOSP IP/OBS DSCHRG MGMT >30: CPT | Performed by: INTERNAL MEDICINE

## 2024-07-19 PROCEDURE — 71275 CT ANGIOGRAPHY CHEST: CPT | Mod: RCN

## 2024-07-19 PROCEDURE — 2550000001 HC RX 255 CONTRASTS: Performed by: INTERNAL MEDICINE

## 2024-07-19 PROCEDURE — 2500000001 HC RX 250 WO HCPCS SELF ADMINISTERED DRUGS (ALT 637 FOR MEDICARE OP): Performed by: INTERNAL MEDICINE

## 2024-07-19 RX ORDER — GABAPENTIN 300 MG/1
300 CAPSULE ORAL 3 TIMES DAILY
Qty: 90 CAPSULE | Refills: 2 | Status: SHIPPED | OUTPATIENT
Start: 2024-07-19 | End: 2024-08-02 | Stop reason: SDUPTHER

## 2024-07-19 RX ORDER — HYDROMORPHONE HYDROCHLORIDE 2 MG/1
3 TABLET ORAL EVERY 4 HOURS PRN
Qty: 30 TABLET | Refills: 0 | Status: SHIPPED | OUTPATIENT
Start: 2024-07-19 | End: 2024-07-22 | Stop reason: SDUPTHER

## 2024-07-19 RX ORDER — METOPROLOL TARTRATE 25 MG/1
25 TABLET, FILM COATED ORAL 2 TIMES DAILY
Qty: 60 TABLET | Refills: 2 | Status: SHIPPED | OUTPATIENT
Start: 2024-07-19 | End: 2024-08-18

## 2024-07-19 RX ORDER — LISINOPRIL 10 MG/1
10 TABLET ORAL DAILY
Qty: 30 TABLET | Refills: 2 | Status: SHIPPED | OUTPATIENT
Start: 2024-07-19 | End: 2024-08-18

## 2024-07-19 RX ORDER — POLYETHYLENE GLYCOL 3350 17 G/17G
17 POWDER, FOR SOLUTION ORAL DAILY PRN
Qty: 510 G | Refills: 2 | Status: SHIPPED | OUTPATIENT
Start: 2024-07-19

## 2024-07-19 RX ORDER — CIPROFLOXACIN 500 MG/1
500 TABLET ORAL 2 TIMES DAILY
Qty: 10 TABLET | Refills: 0 | Status: SHIPPED | OUTPATIENT
Start: 2024-07-19 | End: 2024-07-24

## 2024-07-19 RX ORDER — HYDROMORPHONE HYDROCHLORIDE 2 MG/1
3 TABLET ORAL EVERY 4 HOURS PRN
Status: DISCONTINUED | OUTPATIENT
Start: 2024-07-19 | End: 2024-07-19 | Stop reason: HOSPADM

## 2024-07-19 RX ORDER — SENNOSIDES 8.6 MG/1
1 TABLET ORAL 2 TIMES DAILY
Qty: 60 TABLET | Refills: 2 | Status: SHIPPED | OUTPATIENT
Start: 2024-07-19 | End: 2024-08-18

## 2024-07-19 ASSESSMENT — PAIN SCALES - GENERAL
PAINLEVEL_OUTOF10: 6
PAINLEVEL_OUTOF10: 9
PAINLEVEL_OUTOF10: 9
PAINLEVEL_OUTOF10: 7
PAINLEVEL_OUTOF10: 7
PAINLEVEL_OUTOF10: 8
PAINLEVEL_OUTOF10: 0 - NO PAIN
PAINLEVEL_OUTOF10: 9
PAINLEVEL_OUTOF10: 9
PAINLEVEL_OUTOF10: 0 - NO PAIN

## 2024-07-19 ASSESSMENT — PAIN SCALES - WONG BAKER
WONGBAKER_NUMERICALRESPONSE: HURTS WHOLE LOT
WONGBAKER_NUMERICALRESPONSE: HURTS WHOLE LOT

## 2024-07-19 ASSESSMENT — PAIN - FUNCTIONAL ASSESSMENT
PAIN_FUNCTIONAL_ASSESSMENT: 0-10

## 2024-07-19 ASSESSMENT — COGNITIVE AND FUNCTIONAL STATUS - GENERAL
MOBILITY SCORE: 9
DRESSING REGULAR LOWER BODY CLOTHING: A LOT
DRESSING REGULAR UPPER BODY CLOTHING: A LOT
MOVING TO AND FROM BED TO CHAIR: A LOT
STANDING UP FROM CHAIR USING ARMS: TOTAL
WALKING IN HOSPITAL ROOM: TOTAL
PERSONAL GROOMING: A LOT
CLIMB 3 TO 5 STEPS WITH RAILING: TOTAL
TOILETING: A LOT
HELP NEEDED FOR BATHING: A LOT
DAILY ACTIVITIY SCORE: 14
TURNING FROM BACK TO SIDE WHILE IN FLAT BAD: A LOT
MOVING FROM LYING ON BACK TO SITTING ON SIDE OF FLAT BED WITH BEDRAILS: A LOT

## 2024-07-19 ASSESSMENT — PAIN DESCRIPTION - LOCATION
LOCATION: BACK
LOCATION: BACK

## 2024-07-19 NOTE — CARE PLAN
The patient's goals for the shift include Patient will remain safe and free from injury    The clinical goals for the shift include patient to have adequate pain control    Over the shift, the patient did not make progress toward the following goals. Barriers to progression include still having pain but dose increased .

## 2024-07-19 NOTE — NURSING NOTE
Patient discharged home, iv removed discharge instructions explained, fu appt discussed, meds discussed. Pt verbalized understanding, waiting on Meds to bed and wife to pick pt up  Aditi Edmond RN

## 2024-07-19 NOTE — DISCHARGE SUMMARY
Discharge Diagnosis  Back pain with radiation    Issues Requiring Follow-Up  Got follow up with oncology next week.   PCP follow up for health maintenance.     Discharge Meds     Your medication list        START taking these medications        Instructions Last Dose Given Next Dose Due   ciprofloxacin 500 mg tablet  Commonly known as: Cipro      Take 1 tablet (500 mg) by mouth 2 times a day for 5 days.       gabapentin 300 mg capsule  Commonly known as: Neurontin  Replaces: gabapentin 600 mg tablet      Take 1 capsule (300 mg) by mouth 3 times a day.       HYDROmorphone 2 mg tablet  Commonly known as: Dilaudid      Take 1.5 tablets (3 mg) by mouth every 4 hours if needed for severe pain (7 - 10).              CHANGE how you take these medications        Instructions Last Dose Given Next Dose Due   lisinopril 10 mg tablet  What changed:   medication strength  how much to take      Take 1 tablet (10 mg) by mouth once daily. Reported taking 10 mg daily at home.       metoprolol tartrate 25 mg tablet  Commonly known as: Lopressor  What changed:   how much to take  when to take this      Take 1 tablet (25 mg) by mouth 2 times a day.       polyethylene glycol 17 gram/dose powder  Commonly known as: Glycolax, Miralax  What changed:   medication strength  when to take this  reasons to take this      Take 1 capful (17 g) mixed in 4-8 ounces  by mouth once daily as needed for constipation.       sennosides 8.6 mg tablet  Commonly known as: Senokot  What changed: how much to take      Take 1 tablet (8.6 mg) by mouth 2 times a day.              CONTINUE taking these medications        Instructions Last Dose Given Next Dose Due   acetaminophen 325 mg tablet  Commonly known as: Tylenol      Take 3 tablets (975 mg) by mouth 3 times a day.       diphenhydrAMINE 50 mg capsule  Commonly known as: BENADryl      Take 1 capsule (50 mg) by mouth as needed at bedtime for sleep.       ferrous sulfate (325 mg ferrous sulfate) tablet       Take 1 tablet by mouth every other day. Do not fill before June 5, 2024.       furosemide 20 mg tablet  Commonly known as: Lasix           methocarbamol 500 mg tablet  Commonly known as: Robaxin           naloxone 0.4 mg/mL injection  Commonly known as: Narcan      Infuse 0.5 mL (0.2 mg) into a venous catheter if needed for respiratory depression (Once PRN patient is unarousable, and respiratory rate less than 8).       pantoprazole 40 mg EC tablet  Commonly known as: ProtoNix      Take 1 tablet (40 mg) by mouth once daily in the morning. Take before meals for 10 days. Do not crush, chew, or split.    For stomach protection while taking steroids.       VITAMIN D3 ORAL                  STOP taking these medications      gabapentin 600 mg tablet  Commonly known as: Neurontin  Replaced by: gabapentin 300 mg capsule        lisinopriL-hydrochlorothiazide 20-25 mg tablet        oxyCODONE 15 mg immediate release tablet  Commonly known as: Roxicodone        oxyCODONE ER 15 mg 12 hr tablet  Commonly known as: OxyCONTIN        oxyCODONE-acetaminophen 5-325 mg tablet  Commonly known as: Percocet               ASK your doctor about these medications        Instructions Last Dose Given Next Dose Due   melatonin 5 mg tablet      Take 1 tablet (5 mg) by mouth once daily.                 Where to Get Your Medications        These medications were sent to [facility]  [address], [address], [place] [address]      Hours: 8AM to 6PM Mon-Fri, 8AM to 4PM Sat, 9AM to 1PM Sun Phone: [phone]   ciprofloxacin 500 mg tablet  gabapentin 300 mg capsule  HYDROmorphone 2 mg tablet  lisinopril 10 mg tablet  metoprolol tartrate 25 mg tablet  polyethylene glycol 17 gram/dose powder  sennosides 8.6 mg tablet         Test Results Pending At Discharge  Pending Labs       Order Current Status    Extra Urine Gray Tube Collected (07/17/24 1943)    Urinalysis with Reflex Culture and Microscopic In process    Urine Culture  Preliminary result            Hospital Course   54 y.o. male with a PMH of malignant thymoma (dx. 03/2016) w/ mets to spine and lung with recent spinal fusion (T4 to T9, T9 to L1) (May 25) who was presented to the [facility] ED with c/o uncontrolled back pain.      #Acute on chronic lower back pain  #Malignant thymoma w/mets to spine  -Falls precautions  -Ortho evaluated patient in ED, no intervention, and advised pain control,   He reported oxycodone is not controlling the pain and wanted to try oral dilaudid.   Continue Tylenol 650 mg TID  Gabapentin 300 mg TID  Dilaudid 2 to 3  mg Q 4 hrs PRN for severe pain,   Got follow up with oncology next week.      #UTI  UA positive for leukocytes  Rx with Rocephin x 2 days  Advised oral Cipro x 5 days.      #HTN  -Continue Lisinopril 10 mg, Metoprolol 25 mg BID, ( he reported taking reduced dose at home)  Lasix 10 mg daily.      #Chronic  anemia  HB  stable    Tachycardia:  Reports chronic tachycardia  US showed no DVT  Refused CT chest, he reported that he has chronic tachycardia, and investigated previously,   No Sob, No new chest pain.   On Metoprolol 25 mg BID.     PT advised low intensity therapy.   Discharged home in a stable condition, resumed C.  Discharge day management time > 30 minutes.       Pertinent Physical Exam At Time of Discharge  Vitals:    07/19/24 0517   BP: (!) 134/91   Pulse: (!) 116   Resp: 16   Temp: 36.6 °C (97.9 °F)   SpO2: 96%       Physical Exam  Constitutional:       Appearance: Normal appearance.   HENT:      Head: Normocephalic.      Nose: Nose normal.   Eyes:      Extraocular Movements: Extraocular movements intact.      Pupils: Pupils are equal, round, and reactive to light.   Cardiovascular:      Rate and Rhythm: Regular rhythm.      Heart sounds: Normal heart sounds. No murmur heard.     Comments: Chronic tachycardia.   Pulmonary:      Effort: No respiratory distress.      Breath sounds: Normal breath sounds. No wheezing.   Abdominal:       General: There is no distension.      Palpations: Abdomen is soft.      Tenderness: There is no abdominal tenderness.   Musculoskeletal:         General: Normal range of motion.      Cervical back: Normal range of motion.   Skin:     General: Skin is warm.   Neurological:      Mental Status: He is alert.      Comments: Alert and oriented x 3  Upper extremity power normal    Lower extremity, Power 2/5.      Psychiatric:         Mood and Affect: Mood normal.         Outpatient Follow-Up  Future Appointments   Date Time Provider Department Center   7/22/2024 To Be Determined Chantal Johnson PTA [facility]   7/22/2024 To Be Determined Chery Petit, RAMONA [facility]   7/22/2024  2:40 PM Justo Rodriguez MD [id number]   7/23/2024 To Be Determined Mary Jo Molina, OT [facility]   7/24/2024 To Be Determined Miles Cao, PT [facility]   8/14/2024 10:20 AM Kory Melendez MD [id number] [facility]         Jatin Manning MD

## 2024-07-19 NOTE — PROGRESS NOTES
Patient to discharge home today with  HC.  HC confirmed resumption of care within 24-48hrs. Orders written by MD for gel overlay and bed trapeze. Spoke with Lily at [facility] and she asked that orders be faxed to them at [phone]. Orders, facesheet and clinicals faxed with confirmation. Patient updated on above and in agreement. Patient states wife to provide transport home.   Alejandra TORO, RN  Transitional Care Coordinator (TCC)  [phone]

## 2024-07-19 NOTE — CARE PLAN
The patient's goals for the shift include Patient will remain safe and free from injury    The clinical goals for the shift include Patient pain will be managed      Problem: Pain - Adult  Goal: Verbalizes/displays adequate comfort level or baseline comfort level  Outcome: Progressing     Problem: Safety - Adult  Goal: Free from fall injury  Outcome: Progressing     Problem: Discharge Planning  Goal: Discharge to home or other facility with appropriate resources  Outcome: Progressing     Problem: Chronic Conditions and Co-morbidities  Goal: Patient's chronic conditions and co-morbidity symptoms are monitored and maintained or improved  Outcome: Progressing     Problem: Skin  Goal: Prevent/minimize sheer/friction injuries  Outcome: Progressing  Goal: Decreased wound size/increased tissue granulation at next dressing change  Outcome: Progressing  Goal: Participates in plan/prevention/treatment measures  Outcome: Progressing  Goal: Prevent/manage excess moisture  Outcome: Progressing  Goal: Promote/optimize nutrition  Outcome: Progressing  Goal: Promote skin healing  Outcome: Progressing     Problem: Pain  Goal: Takes deep breaths with improved pain control throughout the shift  Outcome: Progressing  Goal: Turns in bed with improved pain control throughout the shift  Outcome: Progressing  Goal: Walks with improved pain control throughout the shift  Outcome: Progressing  Goal: Performs ADL's with improved pain control throughout shift  Outcome: Progressing  Goal: Participates in PT with improved pain control throughout the shift  Outcome: Progressing  Goal: Free from opioid side effects throughout the shift  Outcome: Progressing  Goal: Free from acute confusion related to pain meds throughout the shift  Outcome: Progressing

## 2024-07-20 LAB — BACTERIA UR CULT: ABNORMAL

## 2024-07-20 RX ORDER — AMOXICILLIN AND CLAVULANATE POTASSIUM 875; 125 MG/1; MG/1
1 TABLET, FILM COATED ORAL 2 TIMES DAILY
Qty: 10 TABLET | Refills: 0 | Status: SHIPPED | OUTPATIENT
Start: 2024-07-20 | End: 2024-07-25

## 2024-07-20 NOTE — SIGNIFICANT EVENT
Urine cultures from 7/17, grew E coli, sensitive to Augmentin, resistant to Cipro.     I called the patient and informed him the results.   Advised to discontinue the Ciprofloxacin and take 5 day course of Augmentin.     Augmentin prescription is sent to [facility], in [place].     Jatin Manning MD

## 2024-07-21 ASSESSMENT — ENCOUNTER SYMPTOMS
CHANGE IN APPETITE: UNCHANGED
PAIN: 1
APPETITE LEVEL: GOOD
PERSON REPORTING PAIN: PATIENT
LOWER EXTREMITY EDEMA: 1
PAIN LOCATION: BACK
PAIN LOCATION - PAIN SEVERITY: 3/10
MUSCLE WEAKNESS: 1

## 2024-07-22 ENCOUNTER — HOME CARE VISIT (OUTPATIENT)
Dept: HOME HEALTH SERVICES | Facility: HOME HEALTH | Age: 54
End: 2024-07-22

## 2024-07-22 ENCOUNTER — PATIENT OUTREACH (OUTPATIENT)
Dept: CARE COORDINATION | Facility: CLINIC | Age: 54
End: 2024-07-22
Payer: COMMERCIAL

## 2024-07-22 ENCOUNTER — HOME CARE VISIT (OUTPATIENT)
Dept: HOME HEALTH SERVICES | Facility: HOME HEALTH | Age: 54
End: 2024-07-22
Payer: COMMERCIAL

## 2024-07-22 ENCOUNTER — OFFICE VISIT (OUTPATIENT)
Dept: HEMATOLOGY/ONCOLOGY | Facility: HOSPITAL | Age: 54
End: 2024-07-22
Payer: COMMERCIAL

## 2024-07-22 VITALS
HEART RATE: 103 BPM | DIASTOLIC BLOOD PRESSURE: 78 MMHG | RESPIRATION RATE: 22 BRPM | OXYGEN SATURATION: 94 % | SYSTOLIC BLOOD PRESSURE: 120 MMHG | TEMPERATURE: 98.2 F

## 2024-07-22 DIAGNOSIS — C37 THYMUS CANCER (MULTI): ICD-10-CM

## 2024-07-22 DIAGNOSIS — C79.49 CANCER WITH LEPTOMENINGEAL SPREAD (MULTI): ICD-10-CM

## 2024-07-22 PROCEDURE — 3074F SYST BP LT 130 MM HG: CPT | Performed by: INTERNAL MEDICINE

## 2024-07-22 PROCEDURE — 99215 OFFICE O/P EST HI 40 MIN: CPT | Performed by: INTERNAL MEDICINE

## 2024-07-22 PROCEDURE — 3078F DIAST BP <80 MM HG: CPT | Performed by: INTERNAL MEDICINE

## 2024-07-22 RX ORDER — HYDROMORPHONE HYDROCHLORIDE 2 MG/1
3 TABLET ORAL EVERY 4 HOURS PRN
Qty: 90 TABLET | Refills: 0 | Status: SHIPPED | OUTPATIENT
Start: 2024-07-22

## 2024-07-22 ASSESSMENT — PAIN SCALES - GENERAL: PAINLEVEL: 7

## 2024-07-23 ENCOUNTER — HOME CARE VISIT (OUTPATIENT)
Dept: HOME HEALTH SERVICES | Facility: HOME HEALTH | Age: 54
End: 2024-07-23
Payer: COMMERCIAL

## 2024-07-23 DIAGNOSIS — C37 THYMUS CANCER (MULTI): Primary | ICD-10-CM

## 2024-07-23 ASSESSMENT — ENCOUNTER SYMPTOMS
ANOREXIA: 0
NUMBNESS: 0
ARTHRALGIAS: 0
NECK PAIN: 0
JOINT SWELLING: 0
ABDOMINAL PAIN: 0
MYALGIAS: 0
FEVER: 0
CHILLS: 0
VOMITING: 0
WEAKNESS: 1
DIAPHORESIS: 0
VISUAL CHANGE: 0
SWOLLEN GLANDS: 0
FATIGUE: 1
CHANGE IN BOWEL HABIT: 0
HEADACHES: 0
SORE THROAT: 0
NAUSEA: 0
VERTIGO: 0
COUGH: 0

## 2024-07-23 NOTE — PROGRESS NOTES
Patient ID: Altaf Parsons is a 54 y.o. male.    DIAGNOSIS     Malignant thymoma. Type B2 (predominant lymphocytic population with scattered epithelial cells). Date of diagnosis is March 4, 2016 from a left lower lobe of the lung wedge resection and mediastinal fat biopsy.A left pleural nodule was biopsied in May  2017 showing thymoma. A left serratus anterior biopsy in November 2019 showed thymoma. A biopsy from a T12-L1 paraspinal mass on August 19, 2020 shows again thymoma. A spine tumor biopsy in January 2021 again shows thymoma.        STAGING     T2 N0 M1 (metastases to the lungs bilaterally)        CURRENT SITES OF DISEASE     Mediastinum, lung, parapinal region T, L, S spine, left pleura, T12-L1 paraspinal region, left lateral aspect of the spinal canal spanning from at least T11-S1. Findings likely  represent epidural tumoral extension as seen on MRI thoracic and lumbar spine 06/26/2021. Interval worsening/development of widespread osseous metastasis noted throughout the thoracic and lumbar spine with ventral epidural tumoral extension from the T6  level through the L4 level. Canal stenosis secondary to epidural tumor appears to be more severe at the T8 level through the T10 level. At the T8-9 level there appears to be mild flattening of the contour of the   cord. Recent MRI T/L spine (11/11/22) demonstrates  increased/new tumor extension across multiple spinal levels, particularly at T9-T11 and L1-L2 with marked stenosis at L1-L2.        MOLECULAR GENOMICS     PD-L1 22C3 FDA (KEYTRUDA) for Gastric/GEA: CPS>/=1 (PD-L1 EXPRESSION) Combined Positive Score: 80   TEST: Solid Focus Tumor DNA Panel SPECIMEN: FFPE, Left Serratus Anterior, Biopsy, [id number] A DISEASE DIAGNOSIS: Thyoma Estimated Tumor Content: 30% COLLECTION DATE: 11/13/2019 RECEIVED DATE: 08/11/2020 REPORT DATE: 08/14/2020 MICROSATELLITE STATUS: Microsatellite  Stable (CHERYL) DISEASE ASSOCIATED GENOMIC FINDINGS: None         PRIOR THERAPY     1-left  video-assisted thoracic surgery extensive lysis of adhesions and total pulmonary decortication, lower lobe wedge, exploration of the mediastinum converted to  thoracotomy, resection of anterior mediastinal mass and thymus, upper lobe wedge. Per surgery there is residual disease.  2-external beam radiation therapy August 1, 2016- Sept 17, 2016  5940 CGyE using 180 CGyE daily to surgical site   3- cyclophosphamide, adriamycin, cisplatin on 8/14/17 x 3 cycles      4- 3/19/18-4/17/18: recurrent left paraspinal T12-L1, 50 CGE/20 fx PROTONS Had progression but declined systemic therapy.      5- 12/10/19- 12/31/19: SINDI mass and left serratus anterior mass, 45 CGE/15 fx, PROTONS      6- Stenosis from T11-L1 secondary to large intraspinal extradural mass at the T12-L1 level/neoplasm. OPERATION/PROCEDURE: 1. Posterior spinal fusion T9-L1 with use of bilateral  pedicle screw instrumentation, allograft, and demineralized bone matrix fiber. 2. T12 laminectomy with laminotomy of L1 and T11 to decompress T11-L1 levels and remove the intraspinal extradural neoplasm at the T12-L1 segment.      7- On 8/4/21, he saw ortho spine surgery (Dr. Delgado) who did not recommend further surgery.      8- palliative radiation to the T-L/S1 spine: 30 Gy in 10 daily fractions, completed 9/27/21.     9- Single agents alimta per NCCN guideliens on January 10, 2022.   Received 2 cycles in JanuaryJanuary 2022.  Progressive disease based on MRI in February 2022     10- Radiation therapy to 30 Gy in 10 fractions, from T4-T6 and L1-L3 completed in mid April 2022 11- Dec 2022 - single agent capecitabine 500 mg tab, 4 tabs BID. Cycle - 21 days (two weeks on with one week holiday).  Patient initially underdosed himself with cycle #1 despite clear instructions.  He took cycle 2 at full dose on January 26, 2023.   Delay in C3 due to insurance issues with plan to resume again in April 2023.  Cycle 3 4/4/23 - 4/17 with one week off ending 4/24.  Cycle 4 starts  4/25/23.   He  started (C4) on 4/25/23.  Stable disease as best response     12- Single agent Afinitor per NCCN guidelines, started 9/28/2023, documented stable disease on imaging end of November 2023.  Progression in spine May 2024.  Stopped therapy May 2024     13- Thoracic decompression T5-T9 with laminectomies and spinal cord decompression and tumor debulking.  Posterior spine fusion T4-T9 with pedicle screw instrumentation by Dr. Kory Dodd on May 25, 2024        CURRENT THERAPY     1- supportive oncology/pain management     2- PET/NET scan to assess for somatostatin receptor status and consideration of octreotide based therapy in the. Test positive. plan to initiate octreotide  July 2024         CURRENT ONCOLOGICAL PROBLEMS     1- Paraparesis from spinal cord involvement with thymoma, s/p surgery most latest May 2024  2- Secondary thrombocytosis and high CRP since his back surgery May 2024        HISTORY OF PRESENT ILLNESS:     This is a 54 gentleman who presented in July 2007 with a massive left-sided hemothorax. He underwent LEFT VATS DRAINAGE OF MASSIVE HEMOTHORAX, PLEURAL BIOPSY, AND CORE NEEDLE  BIOPSY OF LINGULAR MASS AND DECORTICATION on July 18, 2007. Biopsies were all negative. He was also seen on CT scan to have an approximately 7 cm lesion within the mediastinum with a calcified rim. He was followed serially with CT scans of the chest through  2009. He was seen later in follow-up in 2016. MRI of the chest in Feb 2016 showed a new enhancing soft tissue mass immediately superior to the previously described calcified cystic structure. Also a new enhancing left pericardial lymph node and left basilar  pulmonary nodule were seen. CT scan of the chest also showed a right lower lobe nodule. He was taken to the operating room on March 4, 2016 where the necrotic calcified mass was removed showing only necrotic debris. Pathology favored a necrotic tumor  although no viable cancer cells were seen. The  separate lesion within the mediastinum was shown to be a malignant thymoma. Wedge resection of the left sided pulmonary lesion also showed malignant thymoma. Underwent gross resection of the anterior mediastinal  mass at that time, Proton beam radiation to his surgical bed.  He had recurrence in 2017 and received 3 cycles of systemic therapy complicated by nausea and vomiting.He was last seen by medical oncology in early 2018 and since then has not wanted any  systemic therapy and is followed with radiation oncology only. During this time he has had recurrent bronchospasm, left anterior serratus muscle, multiple recurrences of his T12-L1 region requiring 2 separate courses of radiation therapy as well as a  surgical decompression laminectomy and fusion and most recently has had further progression and is agreed to come back to the medical oncology.         PAST MEDICAL HISTORY     1-hypertension  2-spontaneous hemothorax on the left side in 2007  3-GERD  4-Achilles tendon repair  5-chronic back pain from work related back injury        SOCIAL HISTORY      Patient lives in [place]. Has 2 children. 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 He has never smoked. Does not drink alcohol. On disability.  He has been off work since August 27, 2021.        CURRENT MEDS     per list        ALLERGIES     Denies any drug allergies        FAMILY HISTORY     No family history of cancer.    Subjective    Cancer  Associated symptoms include fatigue and weakness. Pertinent negatives include no abdominal pain, anorexia, arthralgias, change in bowel habit, chest pain, chills, congestion, coughing, diaphoresis, fever, headaches, joint swelling, myalgias, nausea, neck pain, numbness, rash, sore throat, swollen glands, urinary symptoms, vertigo, visual change or vomiting.     I had last had a phone conversation with the patient on June 26.  He was seen by his spine surgeon on July 3 and has follow-up with him in August.  Was in the  emergency room on July 17 with severe back pain.  He is not returning phone calls from our pain management and palliative care team.  His main problem remains that he is paraplegic, cannot walk on his own.  He does have control over urine and stools he tells me.    Objective    BSA: There is no height or weight on file to calculate BSA.  /78 (BP Location: Right arm, Patient Position: Sitting, BP Cuff Size: Adult)   Pulse 103   Temp 36.8 °C (98.2 °F) (Temporal)   Resp 22   SpO2 94%      Physical Exam  Constitutional:       General: He is not in acute distress.     Appearance: Normal appearance. He is not ill-appearing, toxic-appearing or diaphoretic.   HENT:      Nose: No congestion or rhinorrhea.      Mouth/Throat:      Pharynx: No oropharyngeal exudate or posterior oropharyngeal erythema.   Eyes:      General: No scleral icterus.     Conjunctiva/sclera: Conjunctivae normal.   Cardiovascular:      Rate and Rhythm: Normal rate and regular rhythm.      Pulses: Normal pulses.      Heart sounds: Normal heart sounds. No murmur heard.     No friction rub. No gallop.   Pulmonary:      Effort: Pulmonary effort is normal. No respiratory distress.      Breath sounds: Normal breath sounds. No stridor. No wheezing, rhonchi or rales.   Chest:      Chest wall: No tenderness.   Abdominal:      General: Abdomen is flat. Bowel sounds are normal. There is no distension.      Palpations: Abdomen is soft. There is no mass.      Tenderness: There is no abdominal tenderness. There is no guarding or rebound.   Musculoskeletal:      Cervical back: No tenderness.      Right lower leg: No edema.      Left lower leg: No edema.   Lymphadenopathy:      Cervical: No cervical adenopathy.   Skin:     General: Skin is warm.      Coloration: Skin is not jaundiced or pale.      Findings: No bruising or erythema.   Neurological:      Mental Status: He is oriented to person, place, and time.      Motor: Weakness present.      Comments:  Paraplegia   Psychiatric:         Mood and Affect: Mood normal.         Behavior: Behavior normal.         Performance Status:  Symptomatic; in bed >50% of the day     Latest Reference Range & Units 07/17/24 23:48   GLUCOSE 74 - 99 mg/dL 85   SODIUM 136 - 145 mmol/L 137   POTASSIUM 3.5 - 5.3 mmol/L 3.9   CHLORIDE 98 - 107 mmol/L 96 (L)   Bicarbonate 21 - 32 mmol/L 31   Anion Gap 10 - 20 mmol/L 14   Blood Urea Nitrogen 6 - 23 mg/dL 11   Creatinine 0.50 - 1.30 mg/dL 0.52   EGFR >60 mL/min/1.73m*2 >90   Calcium 8.6 - 10.6 mg/dL 9.2   C-Reactive Protein <1.00 mg/dL 12.86 (H)   (L): Data is abnormally low  (H): Data is abnormally high    Assessment/Plan     This is a very nice 54-year-old gentleman with metastatic malignant thymoma.  He was diagnosed in 2016 now 8-1/2 years out since his diagnosis.  His main site of disease has been his spinal cord and vertebral bodies.  He has now become a paraplegic from cord compression.  He recently had surgery in May 2024 but has yet not had any recovery of his lower extremity [place].  He does retain function of urine and bowel movements.  He continues to have physical therapy.  His main problem is a significant back pain.  He may need a repeat MRI of the spine in the near future.  I reached out to pain management and palliative care again they will be contacting him.  But they tell me that they have tried to contact him over the past few weeks but he has not returned their call.  They will try and reach out to him again.  Furthermore we talked about a further systemic treatment.  Given the fact that he was somatostatin receptor positive based on nuclear imaging we propose starting him on monthly octreotide.  He has agreed to this.      Cancer Staging   Neoplasm of thymus  Staging form: Thymus, AJCC 8th Edition  - Clinical stage from 3/4/2016: Stage SHONDA (cT2, cN0, cM1a) - Signed by Justo Rodriguez MD on 11/22/2023      Oncology History   Neoplasm of thymus   3/4/2016 Cancer  Staged    Staging form: Thymus, AJCC 8th Edition, Clinical stage from 3/4/2016: Stage SHONDA (cT2, cN0, cM1a) - Signed by Justo Rodriguez MD on 11/22/2023     10/6/2023 Initial Diagnosis    Neoplasm of thymus     Malignant thymoma (Multi)   9/28/2023 -  Chemotherapy    Everolimus, 28 Day Cycles      10/6/2023 Initial Diagnosis    Malignant thymoma (CMS/HCC)                   Justo Rodriguez MD

## 2024-07-24 ENCOUNTER — HOME CARE VISIT (OUTPATIENT)
Dept: HOME HEALTH SERVICES | Facility: HOME HEALTH | Age: 54
End: 2024-07-24
Payer: COMMERCIAL

## 2024-07-24 VITALS
OXYGEN SATURATION: 95 % | DIASTOLIC BLOOD PRESSURE: 86 MMHG | RESPIRATION RATE: 24 BRPM | SYSTOLIC BLOOD PRESSURE: 124 MMHG | HEART RATE: 106 BPM | TEMPERATURE: 98.4 F

## 2024-07-24 PROCEDURE — G0299 HHS/HOSPICE OF RN EA 15 MIN: HCPCS

## 2024-07-24 SDOH — ECONOMIC STABILITY: HOUSING INSECURITY: HOME SAFETY: PROVIDED NEW HH FOLDER

## 2024-07-24 ASSESSMENT — ENCOUNTER SYMPTOMS
PAIN LOCATION - PAIN SEVERITY: 5/10
PAIN LOCATION - PAIN QUALITY: ACHY
LIMITED RANGE OF MOTION: 1
PAIN: 1
NAUSEA: 1
APPETITE LEVEL: GOOD
PERSON REPORTING PAIN: PATIENT
HIGHEST PAIN SEVERITY IN PAST 24 HOURS: 7/10
FATIGUE: 1
LAST BOWEL MOVEMENT: 67045
PAIN LOCATION - PAIN DURATION: HOURS
DEPRESSED MOOD: 1
BOWEL PATTERN NORMAL: 1
LOSS OF SENSATION IN FEET: 0
PAIN SEVERITY GOAL: 0/10
CHANGE IN APPETITE: UNCHANGED
MUSCLE WEAKNESS: 1
LOWEST PAIN SEVERITY IN PAST 24 HOURS: 5/10
STOOL FREQUENCY: LESS THAN DAILY
FATIGUES EASILY: 1
OCCASIONAL FEELINGS OF UNSTEADINESS: 1
LOWER EXTREMITY EDEMA: 1
DEPRESSION: 1
PAIN LOCATION: BACK
SUBJECTIVE PAIN PROGRESSION: GRADUALLY IMPROVING
PAIN LOCATION - PAIN FREQUENCY: FREQUENT

## 2024-07-24 ASSESSMENT — ACTIVITIES OF DAILY LIVING (ADL)
BATHING_CURRENT_FUNCTION: MAXIMUM ASSIST
USING THE TELPHONE: INDEPENDENT
CURRENT_FUNCTION: TWO PERSON
HOUSEKEEPING ASSESSED: 1
SHOPPING ASSESSED: 1
LIGHT HOUSEKEEPING: DEPENDENT
TOILETING: MODERATE ASSIST
LAUNDRY: DEPENDENT
DRESSING_LB_CURRENT_FUNCTION: MAXIMUM ASSIST
TOILETING: 1
PHYSICAL TRANSFERS ASSESSED: 1
GROOMING ASSESSED: 1
AMBULATION ASSISTANCE: 1
TRANSPORTATION: DEPENDENT
FEEDING: SUPERVISION
ORAL_CARE_CURRENT_FUNCTION: NEEDS ASSISTANCE
DRESSING_UB_CURRENT_FUNCTION: MODERATE ASSIST
TOILETING: ONE PERSON
BATHING_CURRENT_FUNCTION: ONE PERSON
ORAL_CARE_ASSESSED: 1
GROOMING_CURRENT_FUNCTION: MODERATE ASSIST
AMBULATION ASSISTANCE: NON-AMBULATORY
BATHING ASSESSED: 1
LAUNDRY ASSESSED: 1
PREPARING MEALS: DEPENDENT
FEEDING ASSESSED: 1
SHOPPING: DEPENDENT
TELEPHONE USE ASSESSED: 1
TRANSPORTATION ASSESSED: 1
ENTERING_EXITING_HOME: MAXIMUM ASSIST
CURRENT_FUNCTION: MAXIMUM ASSIST
OASIS_M1830: 03

## 2024-07-25 ENCOUNTER — HOME CARE VISIT (OUTPATIENT)
Dept: HOME HEALTH SERVICES | Facility: HOME HEALTH | Age: 54
End: 2024-07-25
Payer: COMMERCIAL

## 2024-07-26 ENCOUNTER — TELEPHONE (OUTPATIENT)
Dept: HEMATOLOGY/ONCOLOGY | Facility: HOSPITAL | Age: 54
End: 2024-07-26

## 2024-07-26 ENCOUNTER — APPOINTMENT (OUTPATIENT)
Dept: HOME HEALTH SERVICES | Facility: HOME HEALTH | Age: 54
End: 2024-07-26
Payer: COMMERCIAL

## 2024-07-26 ENCOUNTER — HOME CARE VISIT (OUTPATIENT)
Dept: HOME HEALTH SERVICES | Facility: HOME HEALTH | Age: 54
End: 2024-07-26
Payer: COMMERCIAL

## 2024-07-26 VITALS — SYSTOLIC BLOOD PRESSURE: 114 MMHG | HEART RATE: 104 BPM | TEMPERATURE: 98.8 F | DIASTOLIC BLOOD PRESSURE: 78 MMHG

## 2024-07-26 PROCEDURE — G0151 HHCP-SERV OF PT,EA 15 MIN: HCPCS

## 2024-07-26 SDOH — HEALTH STABILITY: PHYSICAL HEALTH
EXERCISE COMMENTS: SITTING LAQ AP  SUPINE AP QS GS HEEL SLIDES AND HIP ABD ASSISTED. FAMILT OT ASSIST AND PERFROM CALF STRETCH

## 2024-07-26 ASSESSMENT — ENCOUNTER SYMPTOMS
PAIN: 1
HIGHEST PAIN SEVERITY IN PAST 24 HOURS: 7/10
PERSON REPORTING PAIN: PATIENT
LOWEST PAIN SEVERITY IN PAST 24 HOURS: 5/10
PAIN LOCATION - PAIN SEVERITY: 6/10

## 2024-07-26 NOTE — TELEPHONE ENCOUNTER
This RN called patient in regards to OPERS disability paperwork. No answer at this time. RN left message informing him that paperwork is signed and completed [facility]. RN provided callback number in case he had questions. RN will follow up to confirm that message has been received.

## 2024-07-29 ENCOUNTER — TELEPHONE (OUTPATIENT)
Dept: HEMATOLOGY/ONCOLOGY | Facility: HOSPITAL | Age: 54
End: 2024-07-29
Payer: COMMERCIAL

## 2024-07-29 ENCOUNTER — HOME CARE VISIT (OUTPATIENT)
Dept: HOME HEALTH SERVICES | Facility: HOME HEALTH | Age: 54
End: 2024-07-29
Payer: COMMERCIAL

## 2024-07-29 ENCOUNTER — SOCIAL WORK (OUTPATIENT)
Dept: CASE MANAGEMENT | Facility: HOSPITAL | Age: 54
End: 2024-07-29
Payer: COMMERCIAL

## 2024-07-29 ENCOUNTER — SPECIALTY PHARMACY (OUTPATIENT)
Dept: PHARMACY | Facility: CLINIC | Age: 54
End: 2024-07-29

## 2024-07-29 ENCOUNTER — APPOINTMENT (OUTPATIENT)
Dept: HEMATOLOGY/ONCOLOGY | Facility: HOSPITAL | Age: 54
End: 2024-07-29
Payer: COMMERCIAL

## 2024-07-29 ENCOUNTER — PHARMACY VISIT (OUTPATIENT)
Dept: PHARMACY | Facility: CLINIC | Age: 54
End: 2024-07-29
Payer: COMMERCIAL

## 2024-07-29 DIAGNOSIS — C37 MALIGNANT THYMOMA (MULTI): Primary | ICD-10-CM

## 2024-07-29 PROCEDURE — RXMED WILLOW AMBULATORY MEDICATION CHARGE

## 2024-07-29 RX ORDER — DIPHENHYDRAMINE HYDROCHLORIDE 50 MG/ML
50 INJECTION INTRAMUSCULAR; INTRAVENOUS AS NEEDED
Status: CANCELLED | OUTPATIENT
Start: 2024-07-31

## 2024-07-29 RX ORDER — EPINEPHRINE 0.3 MG/.3ML
0.3 INJECTION SUBCUTANEOUS EVERY 5 MIN PRN
Status: CANCELLED | OUTPATIENT
Start: 2024-07-31

## 2024-07-29 RX ORDER — ALBUTEROL SULFATE 0.83 MG/ML
3 SOLUTION RESPIRATORY (INHALATION) AS NEEDED
Status: CANCELLED | OUTPATIENT
Start: 2024-07-31

## 2024-07-29 RX ORDER — FAMOTIDINE 10 MG/ML
20 INJECTION INTRAVENOUS ONCE AS NEEDED
Status: CANCELLED | OUTPATIENT
Start: 2024-07-31

## 2024-07-29 NOTE — TELEPHONE ENCOUNTER
Called patient and left VM for patient on cell phone to let him know his Octreotide injection is scheduled for 7/31 at 330 in Room 33 (infusion)  Patient was instructed to call back if he has any questions.

## 2024-07-29 NOTE — DOCUMENTATION CLARIFICATION NOTE
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    Question: Please clarify if a relationship exists between back pain and malignant thymoma w/spinal mets    When answering this query, please exercise your independent professional judgment. The fact that a question is being asked, does not imply that any particular answer is desired or expected.    The patient's clinical indicators include:  Clinical Information: ?54 y.o. male with a PMH of malignant thymoma -dx. 2016- w/ mets to spine and lung with recent spinal fusion -T4 to T9, T9 to L1- May 25, who was [facility] to [facility] ED with c/o uncontrolled back pain? from  [facility]    Clinical Indicators:  ED note ?metastatic thymoma presenting with back pain? ?May 25 he had a tumor removed from his spine?     Ortho consult ?metastatic thymoma w thoracic mets s/p thoracic compression and fusion in  and  T5-7 laminectomy and extension of spinal instrumented fusion to T5 on ? ?Now w isolated neuropathic thoracolumbar BP? ?Painful at TL junction?     DCS ?Acute on chronic lower back pain? ?Malignant thymoma w/mets to spine? ?He reported oxycodone is not controlling the pain and wanted to try oral dilaudid? ?Continue Tylenol 650 mg TID, Gabapentin 300 mg TID, Dilaudid 2 to 3  mg Q 4 hrs PRN for severe pain?   DCS indicates to stop previous Roxicodone, Oxycontin, Percocet.      "Lumbar Thoracic spine xray \"There are postoperative changes of a T4-T9 posterior thoracic fusion and T5-T9 and laminectomies. There is no perihardware lucency.  There are also postsurgical changes of T9-L1 thoracolumbar fusion with unchanged perihardware lucency involving the bilateral L1 screws -left greater than right. This also appears similar to 08/04/2021.  Vertebral body heights are maintained. No bone lesions are\"    Treatment: Stop previous home roxicodone, oxycontin, and percocet. Start dilaudid and adjusted neurontin. Monitor imaging. Monitor pain.    Risk Factors: Malignant thymoma w/mets to spine. Spinal tumor removed 5/25.  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

## 2024-07-29 NOTE — TELEPHONE ENCOUNTER
"Altaf calls today to request a status update on his \"care home paperwork.\"    Unclear if this is the same thing as the OPERS paperwork faxed by [employer] Partner on 7/26. Will clarify with team.    He also is requesting a refill on his Hydromorphone 2mg to be sent to [facility] at [address] [place] [address] in [place].     Message sent to team.  "

## 2024-07-30 ENCOUNTER — PATIENT OUTREACH (OUTPATIENT)
Dept: CARE COORDINATION | Facility: CLINIC | Age: 54
End: 2024-07-30
Payer: COMMERCIAL

## 2024-07-30 ENCOUNTER — HOME CARE VISIT (OUTPATIENT)
Dept: HOME HEALTH SERVICES | Facility: HOME HEALTH | Age: 54
End: 2024-07-30
Payer: COMMERCIAL

## 2024-07-30 PROCEDURE — G0152 HHCP-SERV OF OT,EA 15 MIN: HCPCS

## 2024-07-30 ASSESSMENT — PAIN SCALES - PAIN ASSESSMENT IN ADVANCED DEMENTIA (PAINAD)
NEGVOCALIZATION: 0 - NONE.
BODYLANGUAGE: 0
FACIALEXPRESSION: 0
BODYLANGUAGE: 0 - RELAXED.
NEGVOCALIZATION: 0
TOTALSCORE: 0
CONSOLABILITY: 0
BREATHING: 0
CONSOLABILITY: 0 - NO NEED TO CONSOLE.
FACIALEXPRESSION: 0 - SMILING OR INEXPRESSIVE.

## 2024-07-30 ASSESSMENT — ENCOUNTER SYMPTOMS
PAIN SEVERITY GOAL: 0/10
PERSON REPORTING PAIN: PATIENT
HIGHEST PAIN SEVERITY IN PAST 24 HOURS: 8/10
PAIN: 1
SUBJECTIVE PAIN PROGRESSION: GRADUALLY IMPROVING
LOWEST PAIN SEVERITY IN PAST 24 HOURS: 4/10

## 2024-07-30 ASSESSMENT — ACTIVITIES OF DAILY LIVING (ADL)
BATHING ASSESSED: 1
GROOMING_WITHIN_DEFINED_LIMITS: 1
DRESSING_LB_CURRENT_FUNCTION: MINIMUM ASSIST
FEEDING_WITHIN_DEFINED_LIMITS: 1
BATHING_CURRENT_FUNCTION: MINIMUM ASSIST
TOILETING: MINIMUM ASSIST
TOILETING: 1

## 2024-07-30 NOTE — TELEPHONE ENCOUNTER
This RN placed call to patient in regards to message received about paperwork and pain medication. RN left message on patient's voicemail stating that paperwork has been faxed to company twice and has received fax confirmation that it has been received. RN also inquired about the request for pain medication. Per chart review, the provider noticed that patient were prescribed medication on 7/22 and wanted to confirm how many pills he had left at this time. No answer at this time. RN will call back to attempt to speak directly with patient.

## 2024-07-30 NOTE — TELEPHONE ENCOUNTER
Called Altaf back to let him know that the paperwork was completed and faxed over. Also clarified Dilaudid refill request. Pt states that he does not need a Dilaudid refill. Rather he needs a refill on long-acting Oxycodone - he has four pills left.     Team updated.

## 2024-07-30 NOTE — PROGRESS NOTES
Medications  Medications reviewed with patient/caregiver?: Yes (7/30/2024  2:39 PM)  Is the patient having any side effects they believe may be caused by any medication additions or changes?: No (7/30/2024  2:39 PM)  Does the patient have all medications ordered at discharge?: Yes (7/30/2024  2:39 PM)  Care Management Interventions: No intervention needed (7/30/2024  2:39 PM)    Appointments  Does the patient have a primary care provider?: Yes (7/30/2024  2:39 PM)    Self Management  What is the home health agency?: [facility] (7/30/2024  2:39 PM)  Has home health visited the patient within 72 hours of discharge?: Yes (7/30/2024  2:39 PM)    Patient Teaching  Care Management Interventions: Reviewed instructions with patient (7/30/2024  2:39 PM)  What is the patient's perception of their health status since discharge?: Same (7/30/2024  2:39 PM)    Wrap Up  Wrap Up Additional Comments: Keyshawn reports he has good supports, is in communication with the [facility] oncology team, has all of his meds and multiple scheduled appts including palliative care. He is also receiving home PT/OT visits.  Will sign off (7/30/2024  2:39 PM)    Steffi Nelson RN [facility]  [phone]

## 2024-07-31 ENCOUNTER — HOME CARE VISIT (OUTPATIENT)
Dept: HOME HEALTH SERVICES | Facility: HOME HEALTH | Age: 54
End: 2024-07-31
Payer: COMMERCIAL

## 2024-07-31 ENCOUNTER — DOCUMENTATION (OUTPATIENT)
Dept: PHYSICAL THERAPY | Facility: CLINIC | Age: 54
End: 2024-07-31
Payer: COMMERCIAL

## 2024-07-31 VITALS
OXYGEN SATURATION: 95 % | HEART RATE: 100 BPM | TEMPERATURE: 98.8 F | RESPIRATION RATE: 18 BRPM | DIASTOLIC BLOOD PRESSURE: 80 MMHG | SYSTOLIC BLOOD PRESSURE: 104 MMHG

## 2024-07-31 PROCEDURE — G0157 HHC PT ASSISTANT EA 15: HCPCS | Mod: CQ

## 2024-07-31 ASSESSMENT — ENCOUNTER SYMPTOMS
PAIN: 1
PAIN: BACK PAIN
PERSON REPORTING PAIN: PATIENT
LOWEST PAIN SEVERITY IN PAST 24 HOURS: 5/10
HIGHEST PAIN SEVERITY IN PAST 24 HOURS: 7/10

## 2024-07-31 NOTE — PROGRESS NOTES
Discharge Summary    Name: Altaf Parsons  MRN: [id number]  : 1970  Date: 24    Discharge Summary: PT    Therapy Summary: He was rapidly physically declining since discovery of spinal tumor, no more visits scheduled, outpatient PT was likely not appropriate anymore based on his function and difficulty getting to therapy.

## 2024-08-01 ENCOUNTER — HOME CARE VISIT (OUTPATIENT)
Dept: HOME HEALTH SERVICES | Facility: HOME HEALTH | Age: 54
End: 2024-08-01
Payer: COMMERCIAL

## 2024-08-01 ENCOUNTER — TELEPHONE (OUTPATIENT)
Dept: HEMATOLOGY/ONCOLOGY | Facility: HOSPITAL | Age: 54
End: 2024-08-01
Payer: COMMERCIAL

## 2024-08-01 VITALS
SYSTOLIC BLOOD PRESSURE: 104 MMHG | OXYGEN SATURATION: 100 % | RESPIRATION RATE: 18 BRPM | TEMPERATURE: 98.9 F | DIASTOLIC BLOOD PRESSURE: 80 MMHG | HEART RATE: 100 BPM

## 2024-08-01 PROCEDURE — G0157 HHC PT ASSISTANT EA 15: HCPCS | Mod: CQ

## 2024-08-01 ASSESSMENT — ENCOUNTER SYMPTOMS
HIGHEST PAIN SEVERITY IN PAST 24 HOURS: 8/10
PERSON REPORTING PAIN: PATIENT
PAIN: 1
LOWEST PAIN SEVERITY IN PAST 24 HOURS: 7/10

## 2024-08-01 NOTE — TELEPHONE ENCOUNTER
Called and left VM on both patients and wife's phone regarding rescheduling of injection down in infusion.  Left instructions he is to go down after his appt with Palliative Medicine.  Phone number was left to call back if he has questions

## 2024-08-02 ENCOUNTER — OFFICE VISIT (OUTPATIENT)
Dept: PALLIATIVE MEDICINE | Facility: HOSPITAL | Age: 54
End: 2024-08-02
Payer: COMMERCIAL

## 2024-08-02 ENCOUNTER — INFUSION (OUTPATIENT)
Dept: HEMATOLOGY/ONCOLOGY | Facility: HOSPITAL | Age: 54
End: 2024-08-02
Payer: COMMERCIAL

## 2024-08-02 VITALS
TEMPERATURE: 98.6 F | OXYGEN SATURATION: 98 % | HEART RATE: 104 BPM | DIASTOLIC BLOOD PRESSURE: 84 MMHG | SYSTOLIC BLOOD PRESSURE: 141 MMHG | RESPIRATION RATE: 20 BRPM

## 2024-08-02 DIAGNOSIS — G89.3 CANCER ASSOCIATED PAIN: Primary | ICD-10-CM

## 2024-08-02 DIAGNOSIS — Z98.1 STATUS POST LAMINECTOMY WITH SPINAL FUSION: ICD-10-CM

## 2024-08-02 DIAGNOSIS — C37 MALIGNANT THYMOMA (MULTI): ICD-10-CM

## 2024-08-02 DIAGNOSIS — G89.3 CANCER ASSOCIATED PAIN: ICD-10-CM

## 2024-08-02 DIAGNOSIS — C79.49 CANCER WITH LEPTOMENINGEAL SPREAD (MULTI): ICD-10-CM

## 2024-08-02 DIAGNOSIS — R11.0 NAUSEA: ICD-10-CM

## 2024-08-02 DIAGNOSIS — K59.03 CONSTIPATION DUE TO PAIN MEDICATION THERAPY: ICD-10-CM

## 2024-08-02 DIAGNOSIS — Z51.5 PALLIATIVE CARE ENCOUNTER: Primary | ICD-10-CM

## 2024-08-02 PROCEDURE — 2500000004 HC RX 250 GENERAL PHARMACY W/ HCPCS (ALT 636 FOR OP/ED): Mod: JZ | Performed by: INTERNAL MEDICINE

## 2024-08-02 PROCEDURE — 99215 OFFICE O/P EST HI 40 MIN: CPT | Performed by: NURSE PRACTITIONER

## 2024-08-02 PROCEDURE — 1036F TOBACCO NON-USER: CPT | Performed by: NURSE PRACTITIONER

## 2024-08-02 PROCEDURE — 3079F DIAST BP 80-89 MM HG: CPT | Performed by: NURSE PRACTITIONER

## 2024-08-02 PROCEDURE — 3077F SYST BP >= 140 MM HG: CPT | Performed by: NURSE PRACTITIONER

## 2024-08-02 RX ORDER — MORPHINE SULFATE 15 MG/1
15 TABLET, FILM COATED, EXTENDED RELEASE ORAL 2 TIMES DAILY
Qty: 60 TABLET | Refills: 0 | Status: SHIPPED | OUTPATIENT
Start: 2024-08-02 | End: 2024-09-01

## 2024-08-02 RX ORDER — CYCLOBENZAPRINE HCL 10 MG
10 TABLET ORAL ONCE
Status: ACTIVE | OUTPATIENT
Start: 2024-08-02

## 2024-08-02 RX ORDER — DIPHENHYDRAMINE HYDROCHLORIDE 50 MG/ML
50 INJECTION INTRAMUSCULAR; INTRAVENOUS AS NEEDED
OUTPATIENT
Start: 2024-08-30

## 2024-08-02 RX ORDER — EPINEPHRINE 0.3 MG/.3ML
0.3 INJECTION SUBCUTANEOUS EVERY 5 MIN PRN
OUTPATIENT
Start: 2024-08-30

## 2024-08-02 RX ORDER — FAMOTIDINE 10 MG/ML
20 INJECTION INTRAVENOUS ONCE AS NEEDED
OUTPATIENT
Start: 2024-08-30

## 2024-08-02 RX ORDER — METHOCARBAMOL 750 MG/1
750 TABLET, FILM COATED ORAL 4 TIMES DAILY PRN
Qty: 120 TABLET | Refills: 2 | Status: SHIPPED | OUTPATIENT
Start: 2024-08-02 | End: 2024-10-31

## 2024-08-02 RX ORDER — GABAPENTIN 600 MG/1
600 TABLET ORAL 3 TIMES DAILY
Qty: 90 CAPSULE | Refills: 2 | Status: SHIPPED | OUTPATIENT
Start: 2024-08-02 | End: 2024-09-01

## 2024-08-02 RX ORDER — METHOCARBAMOL 500 MG/1
750 TABLET, FILM COATED ORAL ONCE
Status: DISCONTINUED | OUTPATIENT
Start: 2024-08-02 | End: 2024-08-02 | Stop reason: ALTCHOICE

## 2024-08-02 RX ORDER — ALBUTEROL SULFATE 0.83 MG/ML
3 SOLUTION RESPIRATORY (INHALATION) AS NEEDED
OUTPATIENT
Start: 2024-08-30

## 2024-08-02 RX ORDER — ACETAMINOPHEN 500 MG
1000 TABLET ORAL EVERY 8 HOURS PRN
Qty: 180 TABLET | Refills: 1 | Status: SHIPPED | OUTPATIENT
Start: 2024-08-02

## 2024-08-02 RX ORDER — NALOXONE HYDROCHLORIDE 4 MG/.1ML
4 SPRAY NASAL AS NEEDED
Qty: 2 EACH | Refills: 11 | Status: SHIPPED | OUTPATIENT
Start: 2024-08-02 | End: 2025-08-02

## 2024-08-02 ASSESSMENT — PAIN SCALES - GENERAL: PAINLEVEL: 10-WORST PAIN EVER

## 2024-08-02 ASSESSMENT — PATIENT HEALTH QUESTIONNAIRE - PHQ9
1. LITTLE INTEREST OR PLEASURE IN DOING THINGS: NOT AT ALL
SUM OF ALL RESPONSES TO PHQ9 QUESTIONS 1 AND 2: 0
2. FEELING DOWN, DEPRESSED OR HOPELESS: NOT AT ALL

## 2024-08-02 NOTE — PROGRESS NOTES
Altaf Parsons is a 54 y.o. male who presents in pain via wheelchair for Octreotide injection    He is on the following chemotherapy regimen:     Treatment Plans       Octreotide injection        Since his last visit, he has been doing fair.  Overall, he states his energy level is stable.  His appetite & hydration status has been unchanged. He reports sciatica pain on his left lower extremity which was managed with cold pack application per patient's request.      Patient tolerated infusion well and has been educated with the overall therapy plan. Questions or concerns addressed by provider during his clinic appointment. AVS, lab results & future appointment provided. Patient declined to wait for change in muscle relaxer medication. States he will take his pain medication at home. Patient discharged via wheelchair with his wife.     Next octreotide due & scheduled to be given 8/28/2024.    Reviewed and approved by FLACO RASMUSSEN on 8/2/24 at 12:12 PM.

## 2024-08-02 NOTE — PROGRESS NOTES
SUPPORTIVE AND PALLIATIVE ONCOLOGY CONSULT - OUTPATIENT FOLLOW UP      SERVICE DATE: 8/2/2024    Referred by:  Justo Rodriguez MD  Medical Oncologist: MD Rachelle Sinclair MD Ali W Bseiso, MD   Radiation Oncologist: No care team member to display  Primary Physician: Darius Meza  [phone]    REASON FOR CONSULT/CHIEF CONSULT COMPLAINT: Pain management and Introduction to Supportive and Palliative Oncology Services    Subjective   HISTORY OF PRESENT ILLNESS: Altaf Parsons is a 54 y.o. 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 He is paraplegic from cord compression. S/p thoracic decompression and fusion with extension to a prior fusion mass. He may need MRI in near future. Oncology has consulted both pain management and supportive oncology for back pain. There is discussion about starting octreotide.     Additional PMHx  hypertension  spontaneous hemothorax on the left side in 2007  GERD  Achilles tendon repair  chronic back pain from work related back injury    Symptom Assessment:    Pain:very much     Left lower back  Intermittent   Radiating to left hip  Aching   Spasms at times     Current regimen:   Robaxin 500mg QID  Hydromorphone 3mg 2-3 times per day  Gabapentin 300mg TID  Tylenol 1g TID PRN    Numbness or tingling in hands/feet/other: none  Headache: none  Lack of appetite: none  Weight loss: none  Pain in mouth/swallowing: none  Dry mouth: none  Taste changes: none  Nausea/early satiety: a little when in pain   Vomiting: none  Constipation: a little  Diarrhea: a little after senna  Lack of energy: none  Difficulty sleeping: none  Anxiety: none  Depression: none  Shortness of breath: none  Other: none    Information obtained from: interview of patient  ______________________________________________________________________     Oncology History   Neoplasm of thymus   3/4/2016 Cancer Staged    Staging form: Thymus, AJCC 8th Edition, Clinical  stage from 3/4/2016: Stage SHONDA (cT2, cN0, cM1a) - Signed by Justo Rodriguez MD on 11/22/2023     10/6/2023 Initial Diagnosis    Neoplasm of thymus     Malignant thymoma (Multi)   9/28/2023 - 1/12/2024 Chemotherapy    Everolimus, 28 Day Cycles      10/6/2023 Initial Diagnosis    Malignant thymoma (CMS/HCC)         Past Medical History:   Diagnosis Date    Abnormal weight gain 12/08/2014    Abnormal weight gain    Hemothorax     Hemothorax    Personal history of other diseases of the digestive system 09/10/2013    History of gastritis    Personal history of other diseases of the digestive system 03/19/2015    History of esophageal reflux    Personal history of other diseases of the nervous system and sense organs 11/24/2020    History of sleep apnea    Personal history of other endocrine, nutritional and metabolic disease     History of hypercholesterolemia    Personal history of other endocrine, nutritional and metabolic disease     History of hypothyroidism    Personal history of other specified conditions 03/14/2014    History of chest pain    Strain of unspecified muscle, fascia and tendon at shoulder and upper arm level, right arm, initial encounter 05/20/2014    Right shoulder strain     Past Surgical History:   Procedure Laterality Date    CT GUIDED PERCUTANEOUS BIOPSY MUSCLE  11/13/2019    CT GUIDED PERCUTANEOUS BIOPSY MUSCLE 11/13/2019 [facility] LEGAC[facility]    CT GUIDED PERCUTANEOUS BIOPSY MUSCLE  8/13/2020    CT GUIDED PERCUTANEOUS BIOPSY MUSCLE 8/13/2020 [facility] AIB LEGACY    CT GUIDED PLEURA PERCUTANEOUS BIOPSY  5/10/2017    CT GUIDED PLEURA PERCUTANEOUS BIOPSY 5/10/2017 [facility] AIB LEGACY    KNEE SURGERY  09/10/2013    Knee Surgery    MR CHEST ANGIO W IV CONTRAST  2/9/2016    MR CHEST ANGIO W IV CONTRAST 2/9/2016 [facility] ANCILLARY LEGACY    OTHER SURGICAL HISTORY  09/10/2013    History Of Prior Surgery     No family history on file.     SOCIAL HISTORY  . 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   Wife is CRNA at .  Social  History:  reports that he has never smoked. He has never used smokeless tobacco. He reports that he does not currently use alcohol. He reports that he does not currently use drugs.    REVIEW OF SYSTEMS  Review of systems negative unless noted in HPI.       Objective     Current Outpatient Medications   Medication Instructions    acetaminophen (TYLENOL) 975 mg, oral, 3 times daily    cholecalciferol, vitamin D3, (VITAMIN D3 ORAL) 1 tablet, oral, Daily, <BR>    diphenhydrAMINE (BENADRYL) 50 mg, oral, Nightly PRN    ferrous sulfate, 325 mg ferrous sulfate, tablet 1 tablet, oral, Every other day    furosemide (Lasix) 20 mg tablet 0.5 tablets, oral, Daily    gabapentin (NEURONTIN) 300 mg, oral, 3 times daily    HYDROmorphone (DILAUDID) 3 mg, oral, Every 4 hours PRN    lisinopril 10 mg, oral, Daily, Reported taking 10 mg daily at home.    melatonin 5 mg, oral, Daily    methocarbamol (ROBAXIN) 500 mg, oral, Every 6 hours PRN    metoprolol tartrate (LOPRESSOR) 25 mg, oral, 2 times daily    naloxone (NARCAN) 0.2 mg, intravenous, As needed    pantoprazole (PROTONIX) 40 mg, oral, Daily before breakfast, Do not crush, chew, or split.<BR><BR>For stomach protection while taking steroids.    polyethylene glycol (Glycolax, Miralax) 17 gram/dose powder Take 1 capful (17 g) mixed in 4-8 ounces  by mouth once daily as needed for constipation.    SandoSTATIN LAR [place] 30 mg, intramuscular, Every 28 days    senna 8.6 mg, oral, 2 times daily       Allergies:   Allergies   Allergen Reactions    Morphine Hallucinations     Pt reports hallucinations previously with morpine                Creatinine   Date Value Ref Range Status   07/17/2024 0.52 0.50 - 1.30 mg/dL Final     AST   Date Value Ref Range Status   06/05/2024 23 9 - 39 U/L Final     ALT   Date Value Ref Range Status   06/05/2024 21 10 - 52 U/L Final     Comment:     Patients treated with Sulfasalazine may generate falsely decreased results for ALT.     Hemoglobin   Date Value Ref  Range Status   07/17/2024 10.3 (L) 13.5 - 17.5 g/dL Final     Platelets   Date Value Ref Range Status   07/17/2024 612 (H) 150 - 450 x10*3/uL Final       PHYSICAL EXAMINATION  Vital Signs:       7/22/2024    11:54 AM 7/22/2024     3:07 PM 7/24/2024    10:16 AM 7/26/2024    12:55 PM 7/31/2024     9:05 AM 8/1/2024     1:10 PM 8/2/2024     9:18 AM   Vitals   Systolic 124 120 124 114 104 104 141   Diastolic 60 78 86 78 80 80 84   Heart Rate 86 103 106 104 100 100 104   Temp 36.7 °C (98 °F) 36.8 °C (98.2 °F) 36.9 °C (98.4 °F) 37.1 °C (98.8 °F) 37.1 °C (98.8 °F) 37.2 °C (98.9 °F) 37 °C (98.6 °F)   Resp 18 22 24  18 18 20   Height (in)       --   Weight (lb)  --     --   Visit Report Report Report     Report          Physical Exam  HENT:      Head: Normocephalic and atraumatic.      Mouth/Throat:      Pharynx: Oropharynx is clear.   Eyes:      Extraocular Movements: Extraocular movements intact.      Conjunctiva/sclera: Conjunctivae normal.   Pulmonary:      Effort: Pulmonary effort is normal.   Abdominal:      General: Abdomen is flat.   Skin:     General: Skin is warm and dry.   Neurological:      General: No focal deficit present.      Mental Status: He is alert. Mental status is at baseline.   Psychiatric:         Mood and Affect: Mood normal.         Thought Content: Thought content normal.         ASSESSMENT/PLAN    Pain  Pain is: cancer related pain  Type: somatic and neuropathic  Pain control: sub-optimally controlled  Increase Robaxin to 750mg QID  Increase hydromorphone to 4mg q4 PRN  Start MsContin 15mg BID  Increase gabapentin to  600mg TID- titration schedule given to patient  Tylenol 1g TID PRN (max 3g per day discussed)   Pain Medicine referral per oncology - patient declines     Opioid Use  Medication Management:   - OARRS report reviewed with no aberrant behavior; consistent with  prescriptions/records and patient history  - MED 36.  Overdose Risk Score 520.   This has been discussed with patient.   - We  will continue to closely monitor the patient for signs of prescription misuse including UDS, OARRS review and subjective reports at each visit.  - No concurrent benzodiazepine use   - I am a provider who either is or has consulted and collaborated with a provider certified in Hospice and Palliative Medicine and have conducted a face-face visit and examination for this patient.  - Routine Urine Drug Screen complete at next in person visit.   - Controlled Substance Agreement complete at next in person visit.   - Specifically discussed that controlled substance prescriptions will only be provided by our group as outlined in the completed agreement  - Prescribed naloxone 8/2/24  - Red Flags: none     Nausea   Intermittent nausea without vomiting related to pain   Patient declined antiemetic Rx    Constipation   At risk for constipation related to opioids  Continue miralax 17g- advised daily   Continue senna 1 tab BID - may need to increase to 2 tab BID (if no bm in 2 days)     Introduction to Supportive and Palliative Oncology:  Spoke with patient    Introduced the role and philosophy of Supportive and Palliative oncology in the evaluation and management of symptoms during cancer treatment      Medical Decision Making/Goals of Care/Advance Care Planning:  Patient's current clinical condition, including diagnosis, prognosis, and management plan, and goals of care were discussed.   Life limiting disease: metastatic malignancy  Goals: symptom control and cancer directed therapy    Advance Directives  Existence of Advance Directives:No - not interested  Decision maker: Surrogate decision maker is wife.     Next Follow-Up Visit:  Return to clinic in 2 weeks VV    Signature and billing  Thank you for allowing us to participate in the care of this patient. Recommendations will be communicated back to the consulting service by way of shared electronic medical record or face-to-face.    Medical complexity was moderate level due  to due to complexity of problems, extensive data review, and high risk of management/treatment.  Time was spent on the following: Prep Time, Time Directly with Patient/Family/Caregiver, Documentation Time. Total time spent: 45 min      DATA   Diagnostic tests and information reviewed for today's visit:  Most recent labs and imaging results, Medications       Plan of Care discussed with: Patient and RN      SIGNATURE: RAFI Chavira-CNP    Contact information:  Supportive and Palliative Oncology  Monday-Friday 8 AM-5 PM  Phone:  [phone], press option #5, then option #1.   Or Epic Secure Chat

## 2024-08-02 NOTE — PATIENT INSTRUCTIONS
Please increase gabapentin with the 300mg capsules per below  1 capsule in the morning, 1 in the afternoon, 2 in the evening for 3 days then  1 capsule in the morning, 2 in the afternoon, and 3 in the evening for 3 days then start new prescription for 600mg three times daily     Max dose of tylenol per day is 3000mg     Increase hydromorphone to 4mg every 4 hours as needed

## 2024-08-06 ENCOUNTER — HOME CARE VISIT (OUTPATIENT)
Dept: HOME HEALTH SERVICES | Facility: HOME HEALTH | Age: 54
End: 2024-08-06
Payer: COMMERCIAL

## 2024-08-06 VITALS
SYSTOLIC BLOOD PRESSURE: 130 MMHG | TEMPERATURE: 97.9 F | DIASTOLIC BLOOD PRESSURE: 82 MMHG | RESPIRATION RATE: 16 BRPM | HEART RATE: 90 BPM | OXYGEN SATURATION: 100 %

## 2024-08-06 PROCEDURE — G0157 HHC PT ASSISTANT EA 15: HCPCS | Mod: CQ

## 2024-08-06 PROCEDURE — G0300 HHS/HOSPICE OF LPN EA 15 MIN: HCPCS

## 2024-08-07 ENCOUNTER — APPOINTMENT (OUTPATIENT)
Dept: HOME HEALTH SERVICES | Facility: HOME HEALTH | Age: 54
End: 2024-08-07
Payer: COMMERCIAL

## 2024-08-07 ASSESSMENT — ENCOUNTER SYMPTOMS
CHANGE IN APPETITE: UNCHANGED
LAST BOWEL MOVEMENT: 67057
APPETITE LEVEL: GOOD
PAIN: 1
MUSCLE WEAKNESS: 1

## 2024-08-08 ENCOUNTER — HOME CARE VISIT (OUTPATIENT)
Dept: HOME HEALTH SERVICES | Facility: HOME HEALTH | Age: 54
End: 2024-08-08
Payer: COMMERCIAL

## 2024-08-08 VITALS
RESPIRATION RATE: 18 BRPM | HEART RATE: 94 BPM | DIASTOLIC BLOOD PRESSURE: 76 MMHG | TEMPERATURE: 98.9 F | OXYGEN SATURATION: 98 % | SYSTOLIC BLOOD PRESSURE: 110 MMHG

## 2024-08-08 PROCEDURE — G0157 HHC PT ASSISTANT EA 15: HCPCS | Mod: CQ

## 2024-08-08 ASSESSMENT — ENCOUNTER SYMPTOMS
PAIN: 1
HIGHEST PAIN SEVERITY IN PAST 24 HOURS: 6/10
LOWEST PAIN SEVERITY IN PAST 24 HOURS: 6/10
PAIN: BACK

## 2024-08-13 ENCOUNTER — HOME CARE VISIT (OUTPATIENT)
Dept: HOME HEALTH SERVICES | Facility: HOME HEALTH | Age: 54
End: 2024-08-13
Payer: COMMERCIAL

## 2024-08-13 PROCEDURE — G0157 HHC PT ASSISTANT EA 15: HCPCS | Mod: CQ

## 2024-08-13 ASSESSMENT — ENCOUNTER SYMPTOMS
PERSON REPORTING PAIN: PATIENT
PAIN: 1
LOWEST PAIN SEVERITY IN PAST 24 HOURS: 6/10
PAIN: BACK
HIGHEST PAIN SEVERITY IN PAST 24 HOURS: 6/10

## 2024-08-14 ENCOUNTER — APPOINTMENT (OUTPATIENT)
Dept: ORTHOPEDIC SURGERY | Facility: CLINIC | Age: 54
End: 2024-08-14
Payer: COMMERCIAL

## 2024-08-14 ENCOUNTER — APPOINTMENT (OUTPATIENT)
Dept: RADIOLOGY | Facility: CLINIC | Age: 54
End: 2024-08-14
Payer: COMMERCIAL

## 2024-08-14 DIAGNOSIS — C79.51 METASTATIC CANCER TO SPINE (MULTI): ICD-10-CM

## 2024-08-14 PROCEDURE — 1036F TOBACCO NON-USER: CPT | Performed by: ORTHOPAEDIC SURGERY

## 2024-08-14 PROCEDURE — 99024 POSTOP FOLLOW-UP VISIT: CPT | Performed by: ORTHOPAEDIC SURGERY

## 2024-08-14 ASSESSMENT — PAIN DESCRIPTION - DESCRIPTORS: DESCRIPTORS: ACHING

## 2024-08-14 ASSESSMENT — PAIN - FUNCTIONAL ASSESSMENT: PAIN_FUNCTIONAL_ASSESSMENT: 0-10

## 2024-08-14 ASSESSMENT — PAIN SCALES - GENERAL: PAINLEVEL_OUTOF10: 5 - MODERATE PAIN

## 2024-08-14 NOTE — PROGRESS NOTES
Altaf is 3 months from surgery and he is making some progress.  He is standing with physical therapy.  His bladder function is working.    He has noted some active motion in his legs.  Again preoperatively he effectively had no motor function.    He did have a recent trip to the emergency department.  While working with a therapist they put a strap across to his low back and that really aggravated things.  He had an evaluation in the ED that was unremarkable.    On exam, his incision is healed.  He has right hip flexion 2/5 right ankle dorsiflexion 2/5 and right knee extension 3/5.  This is significantly improved.  On the left he is slightly weaker in hip flexion knee extension and ankle dorsiflexion.    X-rays show maintenance of alignment.    Stable course with some slight interval neurologic improvement following revision thoracic decompression and fusion for recurrent malignant thymoma metastatic disease.    He is going to continue with an active therapy program.    His therapist have recommended the use of several assistive devices and we will write a prescription for these, including an electric stand-up medical walker and a foldable rollator walker and a [employer].    I think he would benefit from the evaluation of one of our physical medicine and rehab specialist with a specialty in spinal cord injury.  Again he was a near complete thoracic paraparetic with recurrent metastatic disease.    I would like him to see my colleague, Dr. Jacquelin Hurtado, for a more comprehensive evaluation program.    ** Dictated with voice recognition software and not immediately reviewed for errors in grammar and/or spelling **

## 2024-08-15 ENCOUNTER — APPOINTMENT (OUTPATIENT)
Dept: PRIMARY CARE | Facility: CLINIC | Age: 54
End: 2024-08-15
Payer: COMMERCIAL

## 2024-08-15 ENCOUNTER — HOME CARE VISIT (OUTPATIENT)
Dept: HOME HEALTH SERVICES | Facility: HOME HEALTH | Age: 54
End: 2024-08-15
Payer: COMMERCIAL

## 2024-08-15 VITALS
DIASTOLIC BLOOD PRESSURE: 64 MMHG | OXYGEN SATURATION: 98 % | HEART RATE: 102 BPM | RESPIRATION RATE: 18 BRPM | TEMPERATURE: 98.3 F | SYSTOLIC BLOOD PRESSURE: 122 MMHG

## 2024-08-15 PROCEDURE — G0157 HHC PT ASSISTANT EA 15: HCPCS | Mod: CQ

## 2024-08-16 ENCOUNTER — HOME CARE VISIT (OUTPATIENT)
Dept: HOME HEALTH SERVICES | Facility: HOME HEALTH | Age: 54
End: 2024-08-16
Payer: COMMERCIAL

## 2024-08-16 ENCOUNTER — TELEMEDICINE (OUTPATIENT)
Dept: PALLIATIVE MEDICINE | Facility: HOSPITAL | Age: 54
End: 2024-08-16
Payer: COMMERCIAL

## 2024-08-16 DIAGNOSIS — C79.49 CANCER WITH LEPTOMENINGEAL SPREAD (MULTI): ICD-10-CM

## 2024-08-16 PROCEDURE — G0152 HHCP-SERV OF OT,EA 15 MIN: HCPCS

## 2024-08-16 PROCEDURE — 99214 OFFICE O/P EST MOD 30 MIN: CPT | Performed by: NURSE PRACTITIONER

## 2024-08-16 RX ORDER — HYDROMORPHONE HYDROCHLORIDE 2 MG/1
2-4 TABLET ORAL EVERY 4 HOURS PRN
Qty: 180 TABLET | Refills: 0 | Status: SHIPPED | OUTPATIENT
Start: 2024-08-16 | End: 2024-08-31

## 2024-08-16 ASSESSMENT — ACTIVITIES OF DAILY LIVING (ADL)
TOILETING: 1
BATHING ASSESSED: 1
DRESSING_LB_CURRENT_FUNCTION: CONTACT GUARD ASSIST
BATHING_CURRENT_FUNCTION: CONTACT GUARD ASSIST
TOILETING: CONTACT GUARD ASSIST

## 2024-08-16 ASSESSMENT — PAIN SCALES - PAIN ASSESSMENT IN ADVANCED DEMENTIA (PAINAD)
BODYLANGUAGE: 0 - RELAXED.
TOTALSCORE: 0
BODYLANGUAGE: 0
BREATHING: 0
FACIALEXPRESSION: 0
NEGVOCALIZATION: 0 - NONE.
NEGVOCALIZATION: 0
CONSOLABILITY: 0 - NO NEED TO CONSOLE.
FACIALEXPRESSION: 0 - SMILING OR INEXPRESSIVE.
CONSOLABILITY: 0

## 2024-08-16 ASSESSMENT — ENCOUNTER SYMPTOMS
PERSON REPORTING PAIN: PATIENT
HIGHEST PAIN SEVERITY IN PAST 24 HOURS: 4/10
PAIN SEVERITY GOAL: 0/10
PAIN: 1
LOWEST PAIN SEVERITY IN PAST 24 HOURS: 1/10
SUBJECTIVE PAIN PROGRESSION: GRADUALLY IMPROVING

## 2024-08-16 NOTE — PROGRESS NOTES
SUPPORTIVE AND PALLIATIVE ONCOLOGY CONSULT - OUTPATIENT FOLLOW UP    Virtual or Telephone Consent     An interactive audio and video telecommunication system which permits real time communications between the patient (at the originating site) and provider (at the distant site) was utilized to provide this telehealth service.   Verbal consent was requested and obtained from Altaf Parsons on this date, 8/26/24 for a telehealth visit.        SERVICE DATE: 8/16/2024    Referred by:  Justo Rodriguez MD  Medical Oncologist: MD Rachelle Sinclair MD Ali W Bseiso, MD   Radiation Oncologist: No care team member to display  Primary Physician: Darius Meza  [phone]    REASON FOR CONSULT/CHIEF CONSULT COMPLAINT: Pain management and Introduction to Supportive and Palliative Oncology Services    Subjective   HISTORY OF PRESENT ILLNESS: Altaf Parsons is a 54 y.o. 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 He is paraplegic from cord compression. S/p thoracic decompression and fusion with extension to a prior fusion mass. He may need MRI in near future. Oncology has consulted both pain management and supportive oncology for back pain. There is discussion about starting octreotide.     Additional PMHx  hypertension  spontaneous hemothorax on the left side in 2007  GERD  Achilles tendon repair  chronic back pain from work related back injury    8/16: Followed up with ortho who recommended physical medicine and rehab specialist with a specialty in spinal cord injury. Pain better controlled. No other symptom complaints today.     Symptom Assessment:    Pain:a little    Left lower back  Intermittent   Radiating to left hip  Aching   Spasms at times     Numbness or tingling in hands/feet/other: none  Headache: none  Lack of appetite: none  Weight loss: none  Pain in mouth/swallowing: none  Dry mouth: none  Taste changes: none  Nausea/early satiety: none  Vomiting:  none  Constipation: none  Diarrhea: none   Lack of energy: none  Difficulty sleeping: none  Anxiety: none  Depression: none  Shortness of breath: none  Other: none    Information obtained from: interview of patient  ______________________________________________________________________     Oncology History   Neoplasm of thymus   3/4/2016 Cancer Staged    Staging form: Thymus, AJCC 8th Edition, Clinical stage from 3/4/2016: Stage SHONDA (cT2, cN0, cM1a) - Signed by Justo Rodriguez MD on 11/22/2023     10/6/2023 Initial Diagnosis    Neoplasm of thymus     Malignant thymoma (Multi)   9/28/2023 - 1/12/2024 Chemotherapy    Everolimus, 28 Day Cycles      10/6/2023 Initial Diagnosis    Malignant thymoma (CMS/HCC)         Past Medical History:   Diagnosis Date    Abnormal weight gain 12/08/2014    Abnormal weight gain    Hemothorax     Hemothorax    Personal history of other diseases of the digestive system 09/10/2013    History of gastritis    Personal history of other diseases of the digestive system 03/19/2015    History of esophageal reflux    Personal history of other diseases of the nervous system and sense organs 11/24/2020    History of sleep apnea    Personal history of other endocrine, nutritional and metabolic disease     History of hypercholesterolemia    Personal history of other endocrine, nutritional and metabolic disease     History of hypothyroidism    Personal history of other specified conditions 03/14/2014    History of chest pain    Strain of unspecified muscle, fascia and tendon at shoulder and upper arm level, right arm, initial encounter 05/20/2014    Right shoulder strain     Past Surgical History:   Procedure Laterality Date    CT GUIDED PERCUTANEOUS BIOPSY MUSCLE  11/13/2019    CT GUIDED PERCUTANEOUS BIOPSY MUSCLE 11/13/2019 [facility]    CT GUIDED PERCUTANEOUS BIOPSY MUSCLE  8/13/2020    CT GUIDED PERCUTANEOUS BIOPSY MUSCLE 8/13/2020 [facility]    CT GUIDED PLEURA PERCUTANEOUS BIOPSY   5/10/2017    CT GUIDED PLEURA PERCUTANEOUS BIOPSY 5/10/2017 [facility] LEGACY    KNEE SURGERY  09/10/2013    Knee Surgery    MR CHEST ANGIO W IV CONTRAST  2/9/2016    MR CHEST ANGIO W IV CONTRAST 2/9/2016 [facility] ANCILLARY LEGACY    OTHER SURGICAL HISTORY  09/10/2013    History Of Prior Surgery     No family history on file.     SOCIAL HISTORY  . 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   Wife is CRNA at .  Social History:  reports that he has never smoked. He has never used smokeless tobacco. He reports that he does not currently use alcohol. He reports that he does not currently use drugs.    REVIEW OF SYSTEMS  Review of systems negative unless noted in HPI.       Objective     Current Outpatient Medications   Medication Instructions    acetaminophen (TYLENOL) 1,000 mg, oral, Every 8 hours PRN    cholecalciferol, vitamin D3, (VITAMIN D3 ORAL) 1 tablet, oral, Daily, <BR>    diphenhydrAMINE (BENADRYL) 50 mg, oral, Nightly PRN    ferrous sulfate, 325 mg ferrous sulfate, tablet 1 tablet, oral, Every other day    furosemide (Lasix) 20 mg tablet 0.5 tablets, oral, Daily    gabapentin (NEURONTIN) 600 mg, oral, 3 times daily    HYDROmorphone (DILAUDID) 3 mg, oral, Every 4 hours PRN    lisinopril 10 mg, oral, Daily, Reported taking 10 mg daily at home.    melatonin 5 mg, oral, Daily    methocarbamol (ROBAXIN) 750 mg, oral, 4 times daily PRN    metoprolol tartrate (LOPRESSOR) 25 mg, oral, 2 times daily    morphine CR (MS CONTIN) 15 mg, oral, 2 times daily, Do not crush, chew, or split.    naloxone (NARCAN) 4 mg, nasal, As needed, May repeat every 2-3 minutes if needed, alternating nostrils, until medical assistance becomes available.    pantoprazole (PROTONIX) 40 mg, oral, Daily before breakfast, Do not crush, chew, or split.<BR><BR>For stomach protection while taking steroids.    polyethylene glycol (Glycolax, Miralax) 17 gram/dose powder Take 1 capful (17 g) mixed in 4-8 ounces  by mouth once daily as needed for constipation.     SandoSTATIN LAR [place] 30 mg, intramuscular, Every 28 days    senna 8.6 mg, oral, 2 times daily       Allergies:   Allergies   Allergen Reactions    Morphine Hallucinations     Pt reports hallucinations previously with morpine                Creatinine   Date Value Ref Range Status   07/17/2024 0.52 0.50 - 1.30 mg/dL Final     AST   Date Value Ref Range Status   06/05/2024 23 9 - 39 U/L Final     ALT   Date Value Ref Range Status   06/05/2024 21 10 - 52 U/L Final     Comment:     Patients treated with Sulfasalazine may generate falsely decreased results for ALT.     Hemoglobin   Date Value Ref Range Status   07/17/2024 10.3 (L) 13.5 - 17.5 g/dL Final     Platelets   Date Value Ref Range Status   07/17/2024 612 (H) 150 - 450 x10*3/uL Final       PHYSICAL EXAMINATION  Vital Signs:   No VS due to VV    PE   Voice clear   Thoughts coherent     ASSESSMENT/PLAN    Pain  Pain is: cancer related pain  Type: somatic and neuropathic  Pain control: sub-optimally controlled  Continue Robaxin 750mg QID  Continue hydromorphone 2-4mg q4 PRN  Continue MsContin 15mg BID  Continue gabapentin  600mg TID  Tylenol 1g TID PRN (max 3g per day discussed)   Pain Medicine referral per oncology - patient declines     Opioid Use  Medication Management:   - OARRS report reviewed with no aberrant behavior; consistent with  prescriptions/records and patient history  - MED 36.  Overdose Risk Score 520.   This has been discussed with patient.   - We will continue to closely monitor the patient for signs of prescription misuse including UDS, OARRS review and subjective reports at each visit.  - No concurrent benzodiazepine use   - I am a provider who either is or has consulted and collaborated with a provider certified in Hospice and Palliative Medicine and have conducted a face-face visit and examination for this patient.  - Routine Urine Drug Screen complete at next in person visit.   - Controlled Substance Agreement complete at next in person  visit.   - Specifically discussed that controlled substance prescriptions will only be provided by our group as outlined in the completed agreement  - Prescribed naloxone 8/2/24  - Red Flags: none     Nausea   Intermittent nausea without vomiting related to pain   Patient declined antiemetic Rx    Constipation   At risk for constipation related to opioids  Continue miralax 17g- advised daily   Continue senna 1 tab BID - may need to increase to 2 tab BID (if no bm in 2 days)     Introduction to Supportive and Palliative Oncology:  Spoke with patient    Introduced the role and philosophy of Supportive and Palliative oncology in the evaluation and management of symptoms during cancer treatment      Medical Decision Making/Goals of Care/Advance Care Planning:  Patient's current clinical condition, including diagnosis, prognosis, and management plan, and goals of care were discussed.   Life limiting disease: metastatic malignancy  Goals: symptom control and cancer directed therapy    Advance Directives  Existence of Advance Directives:No - not interested  Decision maker: Surrogate decision maker is wife.     Next Follow-Up Visit:  Return to clinic in 4 weeks VV    Signature and billing  Thank you for allowing us to participate in the care of this patient. Recommendations will be communicated back to the consulting service by way of shared electronic medical record or face-to-face.    Medical complexity was moderate level due to due to complexity of problems, extensive data review, and high risk of management/treatment.  Time was spent on the following: Prep Time, Time Directly with Patient/Family/Caregiver, Documentation Time. Total time spent: 20 min      DATA   Diagnostic tests and information reviewed for today's visit:  Most recent labs and imaging results, Medications       Plan of Care discussed with: Patient and RN    Some elements copied from Supportive Oncology note on 8/2/24, the elements have been updated and  all reflect current decision making from today, 9/26/24.      SIGNATURE: Aurea Marion, APRN-CNP    Contact information:  Supportive and Palliative Oncology  Monday-Friday 8 AM-5 PM  Phone:  [phone], press option #5, then option #1.   Or Epic Secure Chat

## 2024-08-19 ENCOUNTER — APPOINTMENT (OUTPATIENT)
Dept: HOME HEALTH SERVICES | Facility: HOME HEALTH | Age: 54
End: 2024-08-19
Payer: COMMERCIAL

## 2024-08-19 ENCOUNTER — HOME CARE VISIT (OUTPATIENT)
Dept: HOME HEALTH SERVICES | Facility: HOME HEALTH | Age: 54
End: 2024-08-19
Payer: COMMERCIAL

## 2024-08-19 VITALS — DIASTOLIC BLOOD PRESSURE: 80 MMHG | SYSTOLIC BLOOD PRESSURE: 105 MMHG | TEMPERATURE: 98 F

## 2024-08-19 PROCEDURE — G0151 HHCP-SERV OF PT,EA 15 MIN: HCPCS

## 2024-08-19 ASSESSMENT — ENCOUNTER SYMPTOMS
PAIN LOCATION: BACK
PERSON REPORTING PAIN: PATIENT
SUBJECTIVE PAIN PROGRESSION: UNCHANGED
PAIN: 1

## 2024-08-20 ENCOUNTER — HOME CARE VISIT (OUTPATIENT)
Dept: HOME HEALTH SERVICES | Facility: HOME HEALTH | Age: 54
End: 2024-08-20
Payer: COMMERCIAL

## 2024-08-21 ENCOUNTER — SPECIALTY PHARMACY (OUTPATIENT)
Dept: PHARMACY | Facility: CLINIC | Age: 54
End: 2024-08-21

## 2024-08-21 ENCOUNTER — PHARMACY VISIT (OUTPATIENT)
Dept: PHARMACY | Facility: CLINIC | Age: 54
End: 2024-08-21
Payer: COMMERCIAL

## 2024-08-21 PROCEDURE — RXMED WILLOW AMBULATORY MEDICATION CHARGE

## 2024-08-22 ENCOUNTER — HOME CARE VISIT (OUTPATIENT)
Dept: HOME HEALTH SERVICES | Facility: HOME HEALTH | Age: 54
End: 2024-08-22
Payer: COMMERCIAL

## 2024-08-22 ENCOUNTER — APPOINTMENT (OUTPATIENT)
Dept: PRIMARY CARE | Facility: CLINIC | Age: 54
End: 2024-08-22
Payer: COMMERCIAL

## 2024-08-28 ENCOUNTER — OFFICE VISIT (OUTPATIENT)
Dept: HEMATOLOGY/ONCOLOGY | Facility: HOSPITAL | Age: 54
End: 2024-08-28
Payer: COMMERCIAL

## 2024-08-28 ENCOUNTER — APPOINTMENT (OUTPATIENT)
Dept: HEMATOLOGY/ONCOLOGY | Facility: HOSPITAL | Age: 54
End: 2024-08-28
Payer: COMMERCIAL

## 2024-08-28 ENCOUNTER — INFUSION (OUTPATIENT)
Dept: HEMATOLOGY/ONCOLOGY | Facility: HOSPITAL | Age: 54
End: 2024-08-28
Payer: COMMERCIAL

## 2024-08-28 VITALS
HEIGHT: 72 IN | DIASTOLIC BLOOD PRESSURE: 64 MMHG | RESPIRATION RATE: 17 BRPM | HEART RATE: 94 BPM | TEMPERATURE: 97 F | OXYGEN SATURATION: 96 % | SYSTOLIC BLOOD PRESSURE: 105 MMHG | BODY MASS INDEX: 29.86 KG/M2 | WEIGHT: 220.46 LBS

## 2024-08-28 DIAGNOSIS — G89.3 CANCER ASSOCIATED PAIN: ICD-10-CM

## 2024-08-28 DIAGNOSIS — I10 PRIMARY HYPERTENSION: ICD-10-CM

## 2024-08-28 DIAGNOSIS — C79.49 CANCER WITH LEPTOMENINGEAL SPREAD (MULTI): ICD-10-CM

## 2024-08-28 DIAGNOSIS — C37 MALIGNANT THYMOMA (MULTI): ICD-10-CM

## 2024-08-28 DIAGNOSIS — C37 MALIGNANT THYMOMA (MULTI): Primary | ICD-10-CM

## 2024-08-28 PROCEDURE — 3074F SYST BP LT 130 MM HG: CPT | Performed by: CLINICAL NURSE SPECIALIST

## 2024-08-28 PROCEDURE — 96372 THER/PROPH/DIAG INJ SC/IM: CPT

## 2024-08-28 PROCEDURE — 2500000004 HC RX 250 GENERAL PHARMACY W/ HCPCS (ALT 636 FOR OP/ED): Mod: JZ | Performed by: CLINICAL NURSE SPECIALIST

## 2024-08-28 PROCEDURE — 3078F DIAST BP <80 MM HG: CPT | Performed by: CLINICAL NURSE SPECIALIST

## 2024-08-28 PROCEDURE — 99214 OFFICE O/P EST MOD 30 MIN: CPT | Performed by: CLINICAL NURSE SPECIALIST

## 2024-08-28 PROCEDURE — 3008F BODY MASS INDEX DOCD: CPT | Performed by: CLINICAL NURSE SPECIALIST

## 2024-08-28 RX ORDER — FAMOTIDINE 10 MG/ML
20 INJECTION INTRAVENOUS ONCE AS NEEDED
OUTPATIENT
Start: 2024-09-25

## 2024-08-28 RX ORDER — ALBUTEROL SULFATE 0.83 MG/ML
3 SOLUTION RESPIRATORY (INHALATION) AS NEEDED
OUTPATIENT
Start: 2024-09-25

## 2024-08-28 RX ORDER — DIPHENHYDRAMINE HYDROCHLORIDE 50 MG/ML
50 INJECTION INTRAMUSCULAR; INTRAVENOUS AS NEEDED
OUTPATIENT
Start: 2024-09-25

## 2024-08-28 RX ORDER — MORPHINE SULFATE 15 MG/1
15 TABLET, FILM COATED, EXTENDED RELEASE ORAL 2 TIMES DAILY
Qty: 60 TABLET | Refills: 0 | Status: SHIPPED | OUTPATIENT
Start: 2024-08-28 | End: 2024-09-27

## 2024-08-28 RX ORDER — LISINOPRIL 10 MG/1
10 TABLET ORAL DAILY
Qty: 90 TABLET | Refills: 3 | Status: SHIPPED | OUTPATIENT
Start: 2024-08-28 | End: 2025-08-28

## 2024-08-28 RX ORDER — EPINEPHRINE 0.3 MG/.3ML
0.3 INJECTION SUBCUTANEOUS EVERY 5 MIN PRN
OUTPATIENT
Start: 2024-09-25

## 2024-08-28 RX ORDER — HYDROMORPHONE HYDROCHLORIDE 2 MG/1
2-4 TABLET ORAL EVERY 4 HOURS PRN
Qty: 180 TABLET | Refills: 0 | Status: SHIPPED | OUTPATIENT
Start: 2024-08-28 | End: 2024-09-12

## 2024-08-28 ASSESSMENT — ENCOUNTER SYMPTOMS
ANOREXIA: 0
NUMBNESS: 0
DIAPHORESIS: 0
ARTHRALGIAS: 0
WEAKNESS: 1
NAUSEA: 0
SORE THROAT: 0
VERTIGO: 0
NECK PAIN: 0
FATIGUE: 1
JOINT SWELLING: 0
ABDOMINAL PAIN: 0
CHANGE IN BOWEL HABIT: 0
VOMITING: 0
CHILLS: 0
VISUAL CHANGE: 0
HEADACHES: 0
COUGH: 0
FEVER: 0
SWOLLEN GLANDS: 0
MYALGIAS: 0

## 2024-08-28 ASSESSMENT — PAIN SCALES - GENERAL: PAINLEVEL: 5

## 2024-08-28 NOTE — PROGRESS NOTES
Patient ID: Altaf Parsons is a 54 y.o. male.    DIAGNOSIS     Malignant thymoma. Type B2 (predominant lymphocytic population with scattered epithelial cells). Date of diagnosis is March 4, 2016 from a left lower lobe of the lung wedge resection and mediastinal fat biopsy.A left pleural nodule was biopsied in May  2017 showing thymoma. A left serratus anterior biopsy in November 2019 showed thymoma. A biopsy from a T12-L1 paraspinal mass on August 19, 2020 shows again thymoma. A spine tumor biopsy in January 2021 again shows thymoma.        STAGING     T2 N0 M1 (metastases to the lungs bilaterally)        CURRENT SITES OF DISEASE     Mediastinum, lung, parapinal region T, L, S spine, left pleura, T12-L1 paraspinal region, left lateral aspect of the spinal canal spanning from at least T11-S1. Findings likely  represent epidural tumoral extension as seen on MRI thoracic and lumbar spine 06/26/2021. Interval worsening/development of widespread osseous metastasis noted throughout the thoracic and lumbar spine with ventral epidural tumoral extension from the T6  level through the L4 level. Canal stenosis secondary to epidural tumor appears to be more severe at the T8 level through the T10 level. At the T8-9 level there appears to be mild flattening of the contour of the   cord. Recent MRI T/L spine (11/11/22) demonstrates  increased/new tumor extension across multiple spinal levels, particularly at T9-T11 and L1-L2 with marked stenosis at L1-L2.        MOLECULAR GENOMICS     PD-L1 22C3 FDA (KEYTRUDA) for Gastric/GEA: CPS>/=1 (PD-L1 EXPRESSION) Combined Positive Score: 80   TEST: Solid Focus Tumor DNA Panel SPECIMEN: FFPE, Left Serratus Anterior, Biopsy, [id number] A DISEASE DIAGNOSIS: Thyoma Estimated Tumor Content: 30% COLLECTION DATE: 11/13/2019 RECEIVED DATE: 08/11/2020 REPORT DATE: 08/14/2020 MICROSATELLITE STATUS: Microsatellite  Stable (CHERYL) DISEASE ASSOCIATED GENOMIC FINDINGS: None         PRIOR THERAPY     1-left  video-assisted thoracic surgery extensive lysis of adhesions and total pulmonary decortication, lower lobe wedge, exploration of the mediastinum converted to  thoracotomy, resection of anterior mediastinal mass and thymus, upper lobe wedge. Per surgery there is residual disease.  2-external beam radiation therapy August 1, 2016- Sept 17, 2016  5940 CGyE using 180 CGyE daily to surgical site   3- cyclophosphamide, adriamycin, cisplatin on 8/14/17 x 3 cycles      4- 3/19/18-4/17/18: recurrent left paraspinal T12-L1, 50 CGE/20 fx PROTONS Had progression but declined systemic therapy.      5- 12/10/19- 12/31/19: SINDI mass and left serratus anterior mass, 45 CGE/15 fx, PROTONS      6- Stenosis from T11-L1 secondary to large intraspinal extradural mass at the T12-L1 level/neoplasm. OPERATION/PROCEDURE: 1. Posterior spinal fusion T9-L1 with use of bilateral  pedicle screw instrumentation, allograft, and demineralized bone matrix fiber. 2. T12 laminectomy with laminotomy of L1 and T11 to decompress T11-L1 levels and remove the intraspinal extradural neoplasm at the T12-L1 segment.      7- On 8/4/21, he saw ortho spine surgery (Dr. Delgado) who did not recommend further surgery.      8- palliative radiation to the T-L/S1 spine: 30 Gy in 10 daily fractions, completed 9/27/21.     9- Single agents alimta per NCCN guideliens on January 10, 2022.   Received 2 cycles in JanuaryJanuary 2022.  Progressive disease based on MRI in February 2022     10- Radiation therapy to 30 Gy in 10 fractions, from T4-T6 and L1-L3 completed in mid April 2022 11- Dec 2022 - single agent capecitabine 500 mg tab, 4 tabs BID. Cycle - 21 days (two weeks on with one week holiday).  Patient initially underdosed himself with cycle #1 despite clear instructions.  He took cycle 2 at full dose on January 26, 2023.   Delay in C3 due to insurance issues with plan to resume again in April 2023.  Cycle 3 4/4/23 - 4/17 with one week off ending 4/24.  Cycle 4 starts  4/25/23.   He  started (C4) on 4/25/23.  Stable disease as best response     12- Single agent Afinitor per NCCN guidelines, started 9/28/2023, documented stable disease on imaging end of November 2023.  Progression in spine May 2024.  Stopped therapy May 2024     13- Thoracic decompression T5-T9 with laminectomies and spinal cord decompression and tumor debulking.  Posterior spine fusion T4-T9 with pedicle screw instrumentation by Dr. Kory Dodd on May 25, 2024        CURRENT THERAPY     1- supportive oncology/pain management     2- PET/NET scan to assess for somatostatin receptor status and consideration of octreotide based therapy in the. Test positive. plan to initiate octreotide  July 2024         CURRENT ONCOLOGICAL PROBLEMS     1- Paraparesis from spinal cord involvement with thymoma, s/p surgery most latest May 2024  2- Secondary thrombocytosis and high CRP since his back surgery May 2024        HISTORY OF PRESENT ILLNESS:     This is a 54 gentleman who presented in July 2007 with a massive left-sided hemothorax. He underwent LEFT VATS DRAINAGE OF MASSIVE HEMOTHORAX, PLEURAL BIOPSY, AND CORE NEEDLE  BIOPSY OF LINGULAR MASS AND DECORTICATION on July 18, 2007. Biopsies were all negative. He was also seen on CT scan to have an approximately 7 cm lesion within the mediastinum with a calcified rim. He was followed serially with CT scans of the chest through  2009. He was seen later in follow-up in 2016. MRI of the chest in Feb 2016 showed a new enhancing soft tissue mass immediately superior to the previously described calcified cystic structure. Also a new enhancing left pericardial lymph node and left basilar  pulmonary nodule were seen. CT scan of the chest also showed a right lower lobe nodule. He was taken to the operating room on March 4, 2016 where the necrotic calcified mass was removed showing only necrotic debris. Pathology favored a necrotic tumor  although no viable cancer cells were seen. The  separate lesion within the mediastinum was shown to be a malignant thymoma. Wedge resection of the left sided pulmonary lesion also showed malignant thymoma. Underwent gross resection of the anterior mediastinal  mass at that time, Proton beam radiation to his surgical bed.  He had recurrence in 2017 and received 3 cycles of systemic therapy complicated by nausea and vomiting.He was last seen by medical oncology in early 2018 and since then has not wanted any  systemic therapy and is followed with radiation oncology only. During this time he has had recurrent bronchospasm, left anterior serratus muscle, multiple recurrences of his T12-L1 region requiring 2 separate courses of radiation therapy as well as a  surgical decompression laminectomy and fusion and most recently has had further progression and is agreed to come back to the medical oncology.         PAST MEDICAL HISTORY     1-hypertension  2-spontaneous hemothorax on the left side in 2007  3-GERD  4-Achilles tendon repair  5-chronic back pain from work related back injury        SOCIAL HISTORY      Patient lives in [place]. Has 2 children. 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 He has never smoked. Does not drink alcohol. On disability.  He has been off work since August 27, 2021.        CURRENT MEDS     per list        ALLERGIES     Denies any drug allergies        FAMILY HISTORY     No family history of cancer.    Subjective    Today I am meeting with Altaf after his hospitalization and surgery.  He is recovering slowly from his surgery and is hoping to at least stand again- recall he is now in a wheel chair.  Pain is still a struggle- he plans to meet with palliative care team again today.  His family now has aides to help him when his wife is working.  He is fearful about having to get another MRI as he knows he cannot lie flat for it.  He started octriotide a month ago and overall feels better than he did- he is here for next injection today.  No side  "effects noted.      Cancer  Associated symptoms include fatigue and weakness. Pertinent negatives include no abdominal pain, anorexia, arthralgias, change in bowel habit, chest pain, chills, congestion, coughing, diaphoresis, fever, headaches, joint swelling, myalgias, nausea, neck pain, numbness, rash, sore throat, swollen glands, urinary symptoms, vertigo, visual change or vomiting.       Objective    BSA: 2.25 meters squared  /64 (BP Location: Right arm, Patient Position: Sitting)   Pulse 94   Temp 36.1 °C (97 °F) (Temporal)   Resp 17   Ht 1.829 m (6' 0.01\")   Wt 100 kg (220 lb 7.4 oz)   SpO2 96%   BMI 29.89 kg/m²      Physical Exam  Constitutional:       General: He is not in acute distress.     Appearance: Normal appearance. He is not ill-appearing, toxic-appearing or diaphoretic.   HENT:      Nose: No congestion or rhinorrhea.      Mouth/Throat:      Pharynx: No oropharyngeal exudate or posterior oropharyngeal erythema.   Eyes:      General: No scleral icterus.     Conjunctiva/sclera: Conjunctivae normal.   Cardiovascular:      Rate and Rhythm: Normal rate and regular rhythm.      Pulses: Normal pulses.      Heart sounds: Normal heart sounds. No murmur heard.     No friction rub. No gallop.   Pulmonary:      Effort: Pulmonary effort is normal. No respiratory distress.      Breath sounds: Normal breath sounds. No stridor. No wheezing, rhonchi or rales.   Chest:      Chest wall: No tenderness.   Abdominal:      General: Abdomen is flat. Bowel sounds are normal. There is no distension.      Palpations: Abdomen is soft. There is no mass.      Tenderness: There is no abdominal tenderness. There is no guarding or rebound.   Musculoskeletal:      Cervical back: No tenderness.      Right lower leg: No edema.      Left lower leg: No edema.   Lymphadenopathy:      Cervical: No cervical adenopathy.   Skin:     General: Skin is warm.      Coloration: Skin is not jaundiced or pale.      Findings: No bruising " or erythema.   Neurological:      Mental Status: He is oriented to person, place, and time.      Motor: Weakness present.      Comments: Paraplegia   Psychiatric:         Mood and Affect: Mood normal.         Behavior: Behavior normal.       Performance Status:  Symptomatic; in bed >50% of the day    (Assessment/Plan     This is a very nice 54-year-old gentleman with metastatic malignant thymoma.  He was diagnosed in 2016 now 8-1/2 years out since his diagnosis.  His main site of disease has been his spinal cord and vertebral bodies.  He has now become a paraplegic from cord compression.  He recently had surgery in May 2024 but has yet not had any recovery of his lower extremity [place].  He does retain function of urine and bowel movements.  He continues to have physical therapy.  His main problem is a significant back pain.  He may need a repeat MRI of the spine in the near future, however he knows he will not be able to lie flat for it at this time.  His pain now better managed with the help of palliative care.    Given the fact that he was somatostatin receptor positive based on nuclear imaging, we started him on octreotide a month ago.  He is working with Pt and OT to try to gain function.  Overall he is better today than he was a month ago.  We will continue octreotide and will see him back in a month, then will try to figure out if he can tolerate a scan for evaluation of treatment response.         Cancer Staging   Neoplasm of thymus  Staging form: Thymus, AJCC 8th Edition  - Clinical stage from 3/4/2016: Stage SHONDA (cT2, cN0, cM1a) - Signed by Justo Rodriguez MD on 11/22/2023      Oncology History   Neoplasm of thymus   3/4/2016 Cancer Staged    Staging form: Thymus, AJCC 8th Edition, Clinical stage from 3/4/2016: Stage SHONDA (cT2, cN0, cM1a) - Signed by Justo Rodriguez MD on 11/22/2023     10/6/2023 Initial Diagnosis    Neoplasm of thymus     Malignant thymoma (Multi)   9/28/2023 - 1/12/2024 Chemotherapy     Everolimus, 28 Day Cycles      10/6/2023 Initial Diagnosis    Malignant thymoma (CMS/HCC)                   Carla Meza, APRN-CNS

## 2024-08-28 NOTE — TELEPHONE ENCOUNTER
OARRS report reviewed and reflects  prescription history, no aberrancy noted. Per OARRS, patient last filled 8 day supply of hydromorphone on 8/17, morphine er 15mg on 8/2. Per last visit with Aurea Marion patient to continue dilaudid 2-4mg q4h prn, mser 15mg BID. Patient with follow up visit scheduled with Aurea on 9/11. Patient updated that medication will be sent to [facility]. Refill request routed to provider.    Patient made  aware that gabapentin, tylenol, and robaxin have refills available on file at his pharmacy, he is aware to call the pharmacy to have those filled.

## 2024-08-28 NOTE — PROGRESS NOTES
Patient presents to infusion appointment from clinic in stable condition. Octreotide injection tolerated without incident. Discharged in stable condition.

## 2024-08-29 ENCOUNTER — APPOINTMENT (OUTPATIENT)
Dept: PRIMARY CARE | Facility: CLINIC | Age: 54
End: 2024-08-29
Payer: COMMERCIAL

## 2024-09-11 ENCOUNTER — TELEMEDICINE (OUTPATIENT)
Dept: PALLIATIVE MEDICINE | Facility: CLINIC | Age: 54
End: 2024-09-11
Payer: COMMERCIAL

## 2024-09-11 DIAGNOSIS — K59.03 CONSTIPATION DUE TO PAIN MEDICATION THERAPY: ICD-10-CM

## 2024-09-11 DIAGNOSIS — Z79.891 ENCOUNTER FOR MONITORING OPIOID MAINTENANCE THERAPY: Primary | ICD-10-CM

## 2024-09-11 DIAGNOSIS — Z51.81 ENCOUNTER FOR MONITORING OPIOID MAINTENANCE THERAPY: Primary | ICD-10-CM

## 2024-09-11 DIAGNOSIS — G89.3 CANCER RELATED PAIN: ICD-10-CM

## 2024-09-11 DIAGNOSIS — Z98.1 STATUS POST LAMINECTOMY WITH SPINAL FUSION: ICD-10-CM

## 2024-09-11 PROCEDURE — 99213 OFFICE O/P EST LOW 20 MIN: CPT | Performed by: NURSE PRACTITIONER

## 2024-09-11 RX ORDER — ACETAMINOPHEN 500 MG
1000 TABLET ORAL EVERY 8 HOURS PRN
Qty: 180 TABLET | Refills: 1 | Status: SHIPPED | OUTPATIENT
Start: 2024-09-11

## 2024-09-11 NOTE — PROGRESS NOTES
SUPPORTIVE AND PALLIATIVE ONCOLOGY CONSULT - OUTPATIENT FOLLOW UP    Virtual or Telephone Consent     An interactive audio and video telecommunication system which permits real time communications between the patient (at the originating site) and provider (at the distant site) was utilized to provide this telehealth service.   Verbal consent was requested and obtained from Altaf Parsons on this date, 9/11/24 for a telehealth visit.          SERVICE DATE: 9/11/2024    Referred by:  Justo Rodriguez MD  Medical Oncologist: MD Rachelle Sinclair MD Ali W Bseiso, MD   Radiation Oncologist: No care team member to display  Primary Physician: Darius Meza  [phone]    REASON FOR CONSULT/CHIEF CONSULT COMPLAINT: Pain management and Introduction to Supportive and Palliative Oncology Services    Subjective   HISTORY OF PRESENT ILLNESS: Altaf Parsons is a 54 y.o. 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 He is paraplegic from cord compression. S/p thoracic decompression and fusion with extension to a prior fusion mass. He may need MRI in near future. Oncology has consulted both pain management and supportive oncology for back pain. There is discussion about starting octreotide.     Additional PMHx  hypertension  spontaneous hemothorax on the left side in 2007  GERD  Achilles tendon repair  chronic back pain from work related back injury    8/16: Followed up with ortho who recommended physical medicine and rehab specialist with a specialty in spinal cord injury. Pain better controlled. No other symptom complaints today.     9/11/24: Pain well controlled at this time. Regular Bms. Sleeping more throughout the day. This started a few days ago. He is wondering if it is medication related.     Symptom Assessment:    Pain:a little    Left lower back  Intermittent   Radiating to left hip  Aching   Spasms at times     Numbness or tingling in hands/feet/other: none  Lack  of appetite: none  Nausea/early satiety: none  Vomiting: none  Constipation: none  Diarrhea: none   Lack of energy: a little  Difficulty sleeping: none  Anxiety: none  Depression: none  Shortness of breath: none  Other: none    Information obtained from: interview of patient  ______________________________________________________________________     Oncology History   Neoplasm of thymus   3/4/2016 Cancer Staged    Staging form: Thymus, AJCC 8th Edition, Clinical stage from 3/4/2016: Stage SHONDA (cT2, cN0, cM1a) - Signed by Justo Rodriguez MD on 11/22/2023     10/6/2023 Initial Diagnosis    Neoplasm of thymus     Malignant thymoma (Multi)   9/28/2023 - 1/12/2024 Chemotherapy    Everolimus, 28 Day Cycles      10/6/2023 Initial Diagnosis    Malignant thymoma (CMS/HCC)         Past Medical History:   Diagnosis Date    Abnormal weight gain 12/08/2014    Abnormal weight gain    Hemothorax     Hemothorax    Personal history of other diseases of the digestive system 09/10/2013    History of gastritis    Personal history of other diseases of the digestive system 03/19/2015    History of esophageal reflux    Personal history of other diseases of the nervous system and sense organs 11/24/2020    History of sleep apnea    Personal history of other endocrine, nutritional and metabolic disease     History of hypercholesterolemia    Personal history of other endocrine, nutritional and metabolic disease     History of hypothyroidism    Personal history of other specified conditions 03/14/2014    History of chest pain    Strain of unspecified muscle, fascia and tendon at shoulder and upper arm level, right arm, initial encounter 05/20/2014    Right shoulder strain     Past Surgical History:   Procedure Laterality Date    CT GUIDED PERCUTANEOUS BIOPSY MUSCLE  11/13/2019    CT GUIDED PERCUTANEOUS BIOPSY MUSCLE 11/13/2019 [facility]IB LEGAC[facility]    CT GUIDED PERCUTANEOUS BIOPSY MUSCLE  8/13/2020    CT GUIDED PERCUTANEOUS BIOPSY MUSCLE 8/13/2020  [facility] [place] [facility]IB LEGAC[facility]    CT GUIDED PLEURA PERCUTANEOUS BIOPSY  5/10/2017    CT GUIDED PLEURA PERCUTANEOUS BIOPSY 5/10/2017 [facility] [place] AIB LEGACY    KNEE SURGERY  09/10/2013    Knee Surgery    MR CHEST ANGIO W IV CONTRAST  2/9/2016    MR CHEST ANGIO W IV CONTRAST 2/9/2016 [facility] [place] ANCILLARY LEGACY    OTHER SURGICAL HISTORY  09/10/2013    History Of Prior Surgery     No family history on file.     SOCIAL HISTORY  . 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   Wife is CRNA at .  Social History:  reports that he has never smoked. He has never used smokeless tobacco. He reports that he does not currently use alcohol. He reports that he does not currently use drugs.    REVIEW OF SYSTEMS  Review of systems negative unless noted in HPI.       Objective     Current Outpatient Medications   Medication Instructions    acetaminophen (TYLENOL) 1,000 mg, oral, Every 8 hours PRN    cholecalciferol, vitamin D3, (VITAMIN D3 ORAL) 1 tablet, oral, Daily, <BR>    diphenhydrAMINE (BENADRYL) 50 mg, oral, Nightly PRN    ferrous sulfate, 325 mg ferrous sulfate, tablet 1 tablet, oral, Every other day    furosemide (Lasix) 20 mg tablet 0.5 tablets, oral, Daily    gabapentin (NEURONTIN) 600 mg, oral, 3 times daily    HYDROmorphone (DILAUDID) 2-4 mg, oral, Every 4 hours PRN    lisinopril 10 mg, oral, Daily, Reported taking 10 mg daily at home.    melatonin 5 mg, oral, Daily    methocarbamol (ROBAXIN) 750 mg, oral, 4 times daily PRN    metoprolol tartrate (LOPRESSOR) 25 mg, oral, 2 times daily    morphine CR (MS CONTIN) 15 mg, oral, 2 times daily, Do not crush, chew, or split.    naloxone (NARCAN) 4 mg, nasal, As needed, May repeat every 2-3 minutes if needed, alternating nostrils, until medical assistance becomes available.    pantoprazole (PROTONIX) 40 mg, oral, Daily before breakfast, Do not crush, chew, or split.<BR><BR>For stomach protection while taking steroids.    polyethylene glycol (Glycolax, Miralax) 17 gram/dose powder Take 1 capful (17 g) mixed in 4-8  ounces  by mouth once daily as needed for constipation.    SandoSTATIN LAR [place] 30 mg, intramuscular, Every 28 days       Allergies:   Allergies   Allergen Reactions    Morphine Hallucinations     Pt reports hallucinations previously with morpine                Creatinine   Date Value Ref Range Status   07/17/2024 0.52 0.50 - 1.30 mg/dL Final     AST   Date Value Ref Range Status   06/05/2024 23 9 - 39 U/L Final     ALT   Date Value Ref Range Status   06/05/2024 21 10 - 52 U/L Final     Comment:     Patients treated with Sulfasalazine may generate falsely decreased results for ALT.     Hemoglobin   Date Value Ref Range Status   07/17/2024 10.3 (L) 13.5 - 17.5 g/dL Final     Platelets   Date Value Ref Range Status   07/17/2024 612 (H) 150 - 450 x10*3/uL Final       PHYSICAL EXAMINATION  Vital Signs:   No VS due to VV    PE   Voice clear   Thoughts coherent     ASSESSMENT/PLAN    Pain  Pain is: cancer related pain  Type: somatic and neuropathic  Pain control: sub-optimally controlled  Continue Robaxin 750mg QID PRN - he is currently scheduling QID, I advised cutting back to PRN to see if this helps with day time sleeping   Continue hydromorphone 2-4mg q4 PRN - (4mg 1-3 times per day on average)  Continue MsContin 15mg BID  Continue gabapentin  600mg TID  Tylenol 1g TID PRN (max 3g per day discussed)   Pain Medicine referral per oncology - patient declines     Opioid Use  Medication Management:   - OARRS report reviewed with no aberrant behavior; consistent with  prescriptions/records and patient history  - MED 62.  Overdose Risk Score 520.   This has been discussed with patient.   - We will continue to closely monitor the patient for signs of prescription misuse including UDS, OARRS review and subjective reports at each visit.  - No concurrent benzodiazepine use   - I am a provider who either is or has consulted and collaborated with a provider certified in Hospice and Palliative Medicine and have conducted a  face-face visit and examination for this patient.  - Routine Urine Drug Screen complete at next in person visit.   - Controlled Substance Agreement complete at next in person visit.   - Specifically discussed that controlled substance prescriptions will only be provided by our group as outlined in the completed agreement  - Prescribed naloxone 8/2/24  - Red Flags: none    Constipation   At risk for constipation related to opioids  Continue miralax 17g QOD  Continue senna 1 tab BID - may need to increase to 2 tab BID (if no bm in 2 days)       Medical Decision Making/Goals of Care/Advance Care Planning:  Patient's current clinical condition, including diagnosis, prognosis, and management plan, and goals of care were discussed.   Life limiting disease: metastatic malignancy  Goals: symptom control and cancer directed therapy    Advance Directives  Existence of Advance Directives:No - not interested  Decision maker: Surrogate decision maker is wife.     Next Follow-Up Visit:  Return to clinic in 4 weeks VV, then in person in November for UDS/CSA    Signature and billing  Thank you for allowing us to participate in the care of this patient. Recommendations will be communicated back to the consulting service by way of shared electronic medical record or face-to-face.    Medical complexity was moderate level due to due to complexity of problems, extensive data review, and high risk of management/treatment.  Time was spent on the following: Prep Time, Time Directly with Patient/Family/Caregiver, Documentation Time. Total time spent: 20 min      DATA   Diagnostic tests and information reviewed for today's visit:  Most recent labs and imaging results, Medications       Plan of Care discussed with: Patient and RN    Some elements copied from Supportive Oncology note on 8/16/24 the elements have been updated and all reflect current decision making from today, 9/11/24.      SIGNATURE: RAFI Chavira-CNP    Contact  information:  Supportive and Palliative Oncology  Monday-Friday 8 AM-5 PM  Phone:  [phone], press option #5, then option #1.   Or Epic Secure Chat

## 2024-09-18 ENCOUNTER — SPECIALTY PHARMACY (OUTPATIENT)
Dept: PHARMACY | Facility: CLINIC | Age: 54
End: 2024-09-18

## 2024-09-18 ENCOUNTER — PHARMACY VISIT (OUTPATIENT)
Dept: PHARMACY | Facility: CLINIC | Age: 54
End: 2024-09-18
Payer: COMMERCIAL

## 2024-09-18 PROCEDURE — RXMED WILLOW AMBULATORY MEDICATION CHARGE

## 2024-09-19 ENCOUNTER — TELEMEDICINE (OUTPATIENT)
Dept: PRIMARY CARE | Facility: CLINIC | Age: 54
End: 2024-09-19
Payer: COMMERCIAL

## 2024-09-19 DIAGNOSIS — I10 PRIMARY HYPERTENSION: ICD-10-CM

## 2024-09-19 DIAGNOSIS — N34.2 INFECTIVE URETHRITIS: Primary | ICD-10-CM

## 2024-09-19 PROCEDURE — 99441 PR PHYS/QHP TELEPHONE EVALUATION 5-10 MIN: CPT | Performed by: INTERNAL MEDICINE

## 2024-09-19 RX ORDER — SULFAMETHOXAZOLE AND TRIMETHOPRIM 800; 160 MG/1; MG/1
1 TABLET ORAL 2 TIMES DAILY
Qty: 20 TABLET | Refills: 0 | Status: SHIPPED | OUTPATIENT
Start: 2024-09-19 | End: 2024-09-29

## 2024-09-19 RX ORDER — LISINOPRIL 10 MG/1
10 TABLET ORAL DAILY
Qty: 90 TABLET | Refills: 3 | Status: SHIPPED | OUTPATIENT
Start: 2024-09-19 | End: 2025-09-19

## 2024-09-19 NOTE — PROGRESS NOTES
Subjective   Patient ID: Altaf Parsons is a 54 y.o. male who presents for No chief complaint on file..    HPI   I had a virtual visit with Mr. Quentin medrano and telephone conversation.  I was able to see him on the screen.  He has symptoms of UTI which include symptoms of dysuria frequent urination but no hematuria.  He wants medication to treat that.  Also needs a refill on his blood pressure medication.  No fever no chills no abdominal or flank pain.  He had urine infection in the past feels exactly like it does now.  Agreed to give him an antibiotic and to also refill his lisinopril 10 mg a day ,the antibiotic is Bactrim DS for 10 days  Review of Systems  Refill medication for hypertension and treat UTI.  Objective   There were no vitals taken for this visit.    Physical Exam  No physical examination was done.  Assessment/Plan   Diagnoses and all orders for this visit:  Infective urethritis  -     sulfamethoxazole-trimethoprim (Bactrim DS) 800-160 mg tablet; Take 1 tablet by mouth 2 times a day for 10 days.  Primary hypertension  -     lisinopril 10 mg tablet; Take 1 tablet (10 mg) by mouth once daily. Reported taking 10 mg daily at home.

## 2024-09-25 ENCOUNTER — APPOINTMENT (OUTPATIENT)
Dept: HEMATOLOGY/ONCOLOGY | Facility: HOSPITAL | Age: 54
End: 2024-09-25
Payer: COMMERCIAL

## 2024-09-27 ENCOUNTER — APPOINTMENT (OUTPATIENT)
Dept: HEMATOLOGY/ONCOLOGY | Facility: HOSPITAL | Age: 54
End: 2024-09-27
Payer: COMMERCIAL

## 2024-09-30 ENCOUNTER — OFFICE VISIT (OUTPATIENT)
Dept: HEMATOLOGY/ONCOLOGY | Facility: HOSPITAL | Age: 54
End: 2024-09-30
Payer: COMMERCIAL

## 2024-09-30 ENCOUNTER — INFUSION (OUTPATIENT)
Dept: HEMATOLOGY/ONCOLOGY | Facility: HOSPITAL | Age: 54
End: 2024-09-30
Payer: COMMERCIAL

## 2024-09-30 ENCOUNTER — LAB (OUTPATIENT)
Dept: LAB | Facility: HOSPITAL | Age: 54
End: 2024-09-30
Payer: COMMERCIAL

## 2024-09-30 VITALS
OXYGEN SATURATION: 97 % | RESPIRATION RATE: 22 BRPM | TEMPERATURE: 97.9 F | SYSTOLIC BLOOD PRESSURE: 118 MMHG | HEART RATE: 110 BPM | DIASTOLIC BLOOD PRESSURE: 63 MMHG

## 2024-09-30 DIAGNOSIS — C37 MALIGNANT THYMOMA (MULTI): ICD-10-CM

## 2024-09-30 DIAGNOSIS — G89.3 CANCER ASSOCIATED PAIN: ICD-10-CM

## 2024-09-30 DIAGNOSIS — C79.49 CANCER WITH LEPTOMENINGEAL SPREAD (MULTI): ICD-10-CM

## 2024-09-30 DIAGNOSIS — C37 MALIGNANT THYMOMA (MULTI): Primary | ICD-10-CM

## 2024-09-30 LAB
ALBUMIN SERPL BCP-MCNC: 3.9 G/DL (ref 3.4–5)
ALP SERPL-CCNC: 64 U/L (ref 33–120)
ALT SERPL W P-5'-P-CCNC: 8 U/L (ref 10–52)
ANION GAP SERPL CALC-SCNC: 14 MMOL/L (ref 10–20)
AST SERPL W P-5'-P-CCNC: 12 U/L (ref 9–39)
BASOPHILS # BLD AUTO: 0.05 X10*3/UL (ref 0–0.1)
BASOPHILS NFR BLD AUTO: 0.6 %
BILIRUB SERPL-MCNC: 0.4 MG/DL (ref 0–1.2)
BUN SERPL-MCNC: 12 MG/DL (ref 6–23)
CALCIUM SERPL-MCNC: 9.7 MG/DL (ref 8.6–10.3)
CHLORIDE SERPL-SCNC: 91 MMOL/L (ref 98–107)
CHOLEST SERPL-MCNC: 187 MG/DL (ref 0–199)
CHOLESTEROL/HDL RATIO: 4.1
CO2 SERPL-SCNC: 32 MMOL/L (ref 21–32)
CREAT SERPL-MCNC: 0.73 MG/DL (ref 0.5–1.3)
EGFRCR SERPLBLD CKD-EPI 2021: >90 ML/MIN/1.73M*2
EOSINOPHIL # BLD AUTO: 0.16 X10*3/UL (ref 0–0.7)
EOSINOPHIL NFR BLD AUTO: 2.1 %
ERYTHROCYTE [DISTWIDTH] IN BLOOD BY AUTOMATED COUNT: 18.9 % (ref 11.5–14.5)
GLUCOSE SERPL-MCNC: 101 MG/DL (ref 74–99)
HCT VFR BLD AUTO: 36.6 % (ref 41–52)
HDLC SERPL-MCNC: 45.6 MG/DL
HGB BLD-MCNC: 11.1 G/DL (ref 13.5–17.5)
IMM GRANULOCYTES # BLD AUTO: 0.05 X10*3/UL (ref 0–0.7)
IMM GRANULOCYTES NFR BLD AUTO: 0.6 % (ref 0–0.9)
LDLC SERPL CALC-MCNC: 121 MG/DL
LYMPHOCYTES # BLD AUTO: 0.98 X10*3/UL (ref 1.2–4.8)
LYMPHOCYTES NFR BLD AUTO: 12.6 %
MCH RBC QN AUTO: 24.1 PG (ref 26–34)
MCHC RBC AUTO-ENTMCNC: 30.3 G/DL (ref 32–36)
MCV RBC AUTO: 80 FL (ref 80–100)
MONOCYTES # BLD AUTO: 0.91 X10*3/UL (ref 0.1–1)
MONOCYTES NFR BLD AUTO: 11.7 %
NEUTROPHILS # BLD AUTO: 5.61 X10*3/UL (ref 1.2–7.7)
NEUTROPHILS NFR BLD AUTO: 72.4 %
NON HDL CHOLESTEROL: 141 MG/DL (ref 0–149)
NRBC BLD-RTO: 0 /100 WBCS (ref 0–0)
PLATELET # BLD AUTO: 659 X10*3/UL (ref 150–450)
POTASSIUM SERPL-SCNC: 4.2 MMOL/L (ref 3.5–5.3)
PROT SERPL-MCNC: 7.9 G/DL (ref 6.4–8.2)
RBC # BLD AUTO: 4.6 X10*6/UL (ref 4.5–5.9)
SODIUM SERPL-SCNC: 133 MMOL/L (ref 136–145)
TRIGL SERPL-MCNC: 100 MG/DL (ref 0–149)
VLDL: 20 MG/DL (ref 0–40)
WBC # BLD AUTO: 7.8 X10*3/UL (ref 4.4–11.3)

## 2024-09-30 PROCEDURE — 1036F TOBACCO NON-USER: CPT | Performed by: CLINICAL NURSE SPECIALIST

## 2024-09-30 PROCEDURE — 99215 OFFICE O/P EST HI 40 MIN: CPT | Performed by: CLINICAL NURSE SPECIALIST

## 2024-09-30 PROCEDURE — 80061 LIPID PANEL: CPT | Performed by: INTERNAL MEDICINE

## 2024-09-30 PROCEDURE — 3074F SYST BP LT 130 MM HG: CPT | Performed by: CLINICAL NURSE SPECIALIST

## 2024-09-30 PROCEDURE — 96372 THER/PROPH/DIAG INJ SC/IM: CPT

## 2024-09-30 PROCEDURE — 85025 COMPLETE CBC W/AUTO DIFF WBC: CPT

## 2024-09-30 PROCEDURE — 3078F DIAST BP <80 MM HG: CPT | Performed by: CLINICAL NURSE SPECIALIST

## 2024-09-30 PROCEDURE — 80053 COMPREHEN METABOLIC PANEL: CPT

## 2024-09-30 PROCEDURE — 2500000004 HC RX 250 GENERAL PHARMACY W/ HCPCS (ALT 636 FOR OP/ED): Mod: JZ | Performed by: CLINICAL NURSE SPECIALIST

## 2024-09-30 PROCEDURE — 99215 OFFICE O/P EST HI 40 MIN: CPT | Mod: 25 | Performed by: CLINICAL NURSE SPECIALIST

## 2024-09-30 RX ORDER — MORPHINE SULFATE 15 MG/1
15 TABLET, FILM COATED, EXTENDED RELEASE ORAL 2 TIMES DAILY
Qty: 60 TABLET | Refills: 0 | Status: SHIPPED | OUTPATIENT
Start: 2024-09-30 | End: 2024-10-30

## 2024-09-30 RX ORDER — HYDROMORPHONE HYDROCHLORIDE 2 MG/1
2-4 TABLET ORAL EVERY 4 HOURS PRN
Qty: 180 TABLET | Refills: 0 | Status: SHIPPED | OUTPATIENT
Start: 2024-09-30 | End: 2024-10-15

## 2024-09-30 RX ORDER — EPINEPHRINE 0.3 MG/.3ML
0.3 INJECTION SUBCUTANEOUS EVERY 5 MIN PRN
OUTPATIENT
Start: 2024-10-28

## 2024-09-30 RX ORDER — DIPHENHYDRAMINE HYDROCHLORIDE 50 MG/ML
50 INJECTION INTRAMUSCULAR; INTRAVENOUS AS NEEDED
OUTPATIENT
Start: 2024-10-28

## 2024-09-30 RX ORDER — ALBUTEROL SULFATE 0.83 MG/ML
3 SOLUTION RESPIRATORY (INHALATION) AS NEEDED
OUTPATIENT
Start: 2024-10-28

## 2024-09-30 RX ORDER — FAMOTIDINE 10 MG/ML
20 INJECTION INTRAVENOUS ONCE AS NEEDED
OUTPATIENT
Start: 2024-10-28

## 2024-09-30 RX ORDER — GABAPENTIN 600 MG/1
600 TABLET ORAL 3 TIMES DAILY
Qty: 90 TABLET | Refills: 0 | Status: SHIPPED | OUTPATIENT
Start: 2024-09-30 | End: 2024-10-30

## 2024-09-30 ASSESSMENT — ENCOUNTER SYMPTOMS
DIAPHORESIS: 0
JOINT SWELLING: 0
HEADACHES: 0
FEVER: 0
MYALGIAS: 0
VOMITING: 0
FATIGUE: 1
ABDOMINAL PAIN: 0
SORE THROAT: 0
VISUAL CHANGE: 0
WEAKNESS: 1
VERTIGO: 0
NUMBNESS: 0
CHILLS: 0
NECK PAIN: 0
ARTHRALGIAS: 0
COUGH: 0
CHANGE IN BOWEL HABIT: 0
NAUSEA: 0
ANOREXIA: 0
SWOLLEN GLANDS: 0

## 2024-09-30 ASSESSMENT — PAIN SCALES - GENERAL: PAINLEVEL: 5

## 2024-09-30 NOTE — TELEPHONE ENCOUNTER
Patient of Aurea Dongagandeep last filled dilaudid  2mg,15 d supply on 9/29, mscontin 15mg on 8/31 and gabapentin 600mg on 9/1. Patient out of state in [place] requesting refills, pended to provider to be sent to [facility]. Pharmacy. FUV scheduled on 10/16  OARRS report reviewed and reflects  prescription history, no aberrancy noted. Per OARRS, patient last filled 15 day supply dilaudid 2mg on 8/29, mscontin 15mg on 8/31 and 600mg gabapentin on9/1. Per last visit with Aurea Marion patient to continue dilaudid 2-4mg q4h prn, mscontin 15mg BID, and gabapenting 600mg TID. Patient with follow up visit scheduled with Aurea on 10/16, informed patient we can only do virtual visits when he is in the [facility]. Patient updated that medication will be sent to [facility]. Refill request routed to provider.

## 2024-09-30 NOTE — PROGRESS NOTES
Patient ID: Altaf Parsons is a 54 y.o. male.    DIAGNOSIS     Malignant thymoma. Type B2 (predominant lymphocytic population with scattered epithelial cells). Date of diagnosis is March 4, 2016 from a left lower lobe of the lung wedge resection and mediastinal fat biopsy.A left pleural nodule was biopsied in May  2017 showing thymoma. A left serratus anterior biopsy in November 2019 showed thymoma. A biopsy from a T12-L1 paraspinal mass on August 19, 2020 shows again thymoma. A spine tumor biopsy in January 2021 again shows thymoma.        STAGING     T2 N0 M1 (metastases to the lungs bilaterally)        CURRENT SITES OF DISEASE     Mediastinum, lung, parapinal region T, L, S spine, left pleura, T12-L1 paraspinal region, left lateral aspect of the spinal canal spanning from at least T11-S1. Findings likely  represent epidural tumoral extension as seen on MRI thoracic and lumbar spine 06/26/2021. Interval worsening/development of widespread osseous metastasis noted throughout the thoracic and lumbar spine with ventral epidural tumoral extension from the T6  level through the L4 level. Canal stenosis secondary to epidural tumor appears to be more severe at the T8 level through the T10 level. At the T8-9 level there appears to be mild flattening of the contour of the   cord. Recent MRI T/L spine (11/11/22) demonstrates  increased/new tumor extension across multiple spinal levels, particularly at T9-T11 and L1-L2 with marked stenosis at L1-L2.        MOLECULAR GENOMICS     PD-L1 22C3 FDA (KEYTRUDA) for Gastric/GEA: CPS>/=1 (PD-L1 EXPRESSION) Combined Positive Score: 80   TEST: Solid Focus Tumor DNA Panel SPECIMEN: FFPE, Left Serratus Anterior, Biopsy, [id number] DISEASE DIAGNOSIS: Thyoma Estimated Tumor Content: 30% COLLECTION DATE: 11/13/2019 RECEIVED DATE: 08/11/2020 REPORT DATE: 08/14/2020 MICROSATELLITE STATUS: Microsatellite  Stable (CHERYL) DISEASE ASSOCIATED GENOMIC FINDINGS: None         PRIOR THERAPY     1-left  video-assisted thoracic surgery extensive lysis of adhesions and total pulmonary decortication, lower lobe wedge, exploration of the mediastinum converted to  thoracotomy, resection of anterior mediastinal mass and thymus, upper lobe wedge. Per surgery there is residual disease.  2-external beam radiation therapy August 1, 2016- Sept 17, 2016  5940 CGyE using 180 CGyE daily to surgical site   3- cyclophosphamide, adriamycin, cisplatin on 8/14/17 x 3 cycles      4- 3/19/18-4/17/18: recurrent left paraspinal T12-L1, 50 CGE/20 fx PROTONS Had progression but declined systemic therapy.      5- 12/10/19- 12/31/19: SINDI mass and left serratus anterior mass, 45 CGE/15 fx, PROTONS      6- Stenosis from T11-L1 secondary to large intraspinal extradural mass at the T12-L1 level/neoplasm. OPERATION/PROCEDURE: 1. Posterior spinal fusion T9-L1 with use of bilateral  pedicle screw instrumentation, allograft, and demineralized bone matrix fiber. 2. T12 laminectomy with laminotomy of L1 and T11 to decompress T11-L1 levels and remove the intraspinal extradural neoplasm at the T12-L1 segment.      7- On 8/4/21, he saw ortho spine surgery (Dr. Delgado) who did not recommend further surgery.      8- palliative radiation to the T-L/S1 spine: 30 Gy in 10 daily fractions, completed 9/27/21.     9- Single agents alimta per NCCN guideliens on January 10, 2022.   Received 2 cycles in JanuaryJanuary 2022.  Progressive disease based on MRI in February 2022     10- Radiation therapy to 30 Gy in 10 fractions, from T4-T6 and L1-L3 completed in mid April 2022 11- Dec 2022 - single agent capecitabine 500 mg tab, 4 tabs BID. Cycle - 21 days (two weeks on with one week holiday).  Patient initially underdosed himself with cycle #1 despite clear instructions.  He took cycle 2 at full dose on January 26, 2023.   Delay in C3 due to insurance issues with plan to resume again in April 2023.  Cycle 3 4/4/23 - 4/17 with one week off ending 4/24.  Cycle 4 starts  4/25/23.   He  started (C4) on 4/25/23.  Stable disease as best response     12- Single agent Afinitor per NCCN guidelines, started 9/28/2023, documented stable disease on imaging end of November 2023.  Progression in spine May 2024.  Stopped therapy May 2024     13- Thoracic decompression T5-T9 with laminectomies and spinal cord decompression and tumor debulking.  Posterior spine fusion T4-T9 with pedicle screw instrumentation by Dr. Kory Dodd on May 25, 2024        CURRENT THERAPY     1- supportive oncology/pain management     2- PET/NET scan to assess for somatostatin receptor status and consideration of octreotide based therapy in the. Test positive. plan to initiate octreotide  July 2024         CURRENT ONCOLOGICAL PROBLEMS     1- Paraparesis from spinal cord involvement with thymoma, s/p surgery most latest May 2024  2- Secondary thrombocytosis and high CRP since his back surgery May 2024        HISTORY OF PRESENT ILLNESS:     This is a 54 gentleman who presented in July 2007 with a massive left-sided hemothorax. He underwent LEFT VATS DRAINAGE OF MASSIVE HEMOTHORAX, PLEURAL BIOPSY, AND CORE NEEDLE  BIOPSY OF LINGULAR MASS AND DECORTICATION on July 18, 2007. Biopsies were all negative. He was also seen on CT scan to have an approximately 7 cm lesion within the mediastinum with a calcified rim. He was followed serially with CT scans of the chest through  2009. He was seen later in follow-up in 2016. MRI of the chest in Feb 2016 showed a new enhancing soft tissue mass immediately superior to the previously described calcified cystic structure. Also a new enhancing left pericardial lymph node and left basilar  pulmonary nodule were seen. CT scan of the chest also showed a right lower lobe nodule. He was taken to the operating room on March 4, 2016 where the necrotic calcified mass was removed showing only necrotic debris. Pathology favored a necrotic tumor  although no viable cancer cells were seen. The  separate lesion within the mediastinum was shown to be a malignant thymoma. Wedge resection of the left sided pulmonary lesion also showed malignant thymoma. Underwent gross resection of the anterior mediastinal  mass at that time, Proton beam radiation to his surgical bed.  He had recurrence in 2017 and received 3 cycles of systemic therapy complicated by nausea and vomiting.He was last seen by medical oncology in early 2018 and since then has not wanted any  systemic therapy and is followed with radiation oncology only. During this time he has had recurrent bronchospasm, left anterior serratus muscle, multiple recurrences of his T12-L1 region requiring 2 separate courses of radiation therapy as well as a  surgical decompression laminectomy and fusion and most recently has had further progression and is agreed to come back to the medical oncology.         PAST MEDICAL HISTORY     1-hypertension  2-spontaneous hemothorax on the left side in 2007  3-GERD  4-Achilles tendon repair  5-chronic back pain from work related back injury        SOCIAL HISTORY      Patient lives in [place]. Has 2 children. 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 He has never smoked. Does not drink alcohol. On disability.  He has been off work since August 27, 2021.        CURRENT MEDS     per list        ALLERGIES     Denies any drug allergies        FAMILY HISTORY     No family history of cancer.    Subjective    Today I am meeting with Altaf and his wife- their big news is that they moved to [place], [place] recently and decided to continue care here- he flew here for this appointment. He is tolerating the octriotide  well with no side effects, and working very hard in physical therapy- he has a contraption to help him learn to stand again.   Pain is still a struggle- he has asked me to help with some of his meds which need refills.  I would  like to assess progress with his treatment, but he is fearful about having to get another MRI  as he knows he cannot lie flat for it.  He started octriotide more than 2  month ago and overall feels better than he did- he is here for next injection today.  No side effects noted.      Cancer  Associated symptoms include fatigue and weakness. Pertinent negatives include no abdominal pain, anorexia, arthralgias, change in bowel habit, chest pain, chills, congestion, coughing, diaphoresis, fever, headaches, joint swelling, myalgias, nausea, neck pain, numbness, rash, sore throat, swollen glands, urinary symptoms, vertigo, visual change or vomiting.       Objective    BSA: There is no height or weight on file to calculate BSA.  /63 (BP Location: Left arm, Patient Position: Sitting, BP Cuff Size: Adult)   Pulse 110   Temp 36.6 °C (97.9 °F) (Temporal)   Resp 22   SpO2 97%      Physical Exam  Constitutional:       General: He is not in acute distress.     Appearance: Normal appearance. He is not ill-appearing, toxic-appearing or diaphoretic.   HENT:      Nose: No congestion or rhinorrhea.      Mouth/Throat:      Pharynx: No oropharyngeal exudate or posterior oropharyngeal erythema.   Eyes:      General: No scleral icterus.     Conjunctiva/sclera: Conjunctivae normal.   Cardiovascular:      Rate and Rhythm: Normal rate and regular rhythm.      Pulses: Normal pulses.      Heart sounds: Normal heart sounds. No murmur heard.     No friction rub. No gallop.   Pulmonary:      Effort: Pulmonary effort is normal. No respiratory distress.      Breath sounds: Normal breath sounds. No stridor. No wheezing, rhonchi or rales.   Chest:      Chest wall: No tenderness.   Abdominal:      General: Abdomen is flat. Bowel sounds are normal. There is no distension.      Palpations: Abdomen is soft. There is no mass.      Tenderness: There is no abdominal tenderness. There is no guarding or rebound.   Musculoskeletal:      Cervical back: No tenderness.      Right lower leg: No edema.      Left lower leg: No edema.    Lymphadenopathy:      Cervical: No cervical adenopathy.   Skin:     General: Skin is warm.      Coloration: Skin is not jaundiced or pale.      Findings: No bruising or erythema.   Neurological:      Mental Status: He is oriented to person, place, and time.      Motor: Weakness present.      Comments: Paraplegia   Psychiatric:         Mood and Affect: Mood normal.         Behavior: Behavior normal.       Performance Status:  Symptomatic; in bed >50% of the day    (Assessment/Plan     This is a very nice 54-year-old gentleman with metastatic malignant thymoma.  He was diagnosed in 2016 now 8-1/2 years out since his diagnosis.  His main site of disease has been his spinal cord and vertebral bodies.  He has now become a paraplegic from cord compression.  He recently had surgery in May 2024 but has yet not had any recovery of his lower extremity function.    He does retain function of urine and bowel movements.  He continues to have physical therapy.  His main problem is a significant back pain.  He may need a repeat MRI of the spine in the near future, however he knows he will not be able to lie flat for it at this time.  His pain now better managed with the help of palliative care.    Given the fact that he was somatostatin receptor positive based on nuclear imaging, we started him on octreotide more than 2 months ago.  He is working with Pt and OT to try to gain function.  Overall he is better today than he was a month ago.  We will continue octreotide and will see him back in a month, and I will discuss strategy with Dr. Rodriguez regarding evaluation of his spinal cord and response to treatment.  Of note, they are plannning a permanent move to [place] and would like to continue his care with us.  I will discuss that with Dr. Rodriguez.  Octreotide  today- we will follow by phone.         Cancer Staging   Neoplasm of thymus  Staging form: Thymus, AJCC 8th Edition  - Clinical stage from 3/4/2016: Stage SHONDA (cT2,  cN0, cM1a) - Signed by Justo Rodriguez MD on 11/22/2023      Oncology History   Neoplasm of thymus   3/4/2016 Cancer Staged    Staging form: Thymus, AJCC 8th Edition, Clinical stage from 3/4/2016: Stage SHONDA (cT2, cN0, cM1a) - Signed by Justo Rodriguez MD on 11/22/2023     10/6/2023 Initial Diagnosis    Neoplasm of thymus     Malignant thymoma (Multi)   9/28/2023 - 1/12/2024 Chemotherapy    Everolimus, 28 Day Cycles      10/6/2023 Initial Diagnosis    Malignant thymoma (CMS/HCC)                   Carla Meza, APRN-CNS

## 2024-10-02 ENCOUNTER — TELEPHONE (OUTPATIENT)
Dept: HEMATOLOGY/ONCOLOGY | Facility: HOSPITAL | Age: 54
End: 2024-10-02
Payer: COMMERCIAL

## 2024-10-02 NOTE — TELEPHONE ENCOUNTER
Alejandra is calling from a [facility] physical therapy in [place].  She is needing diagnostic information to work with  the patient.      He does not currently have a local doctor as he moved there one month ago.    She is faxing a request for orders and precautions to this fax number:  [phone]    Please advise    Message sent

## 2024-10-03 ENCOUNTER — TELEPHONE (OUTPATIENT)
Dept: ADMISSION | Facility: HOSPITAL | Age: 54
End: 2024-10-03
Payer: COMMERCIAL

## 2024-10-03 NOTE — TELEPHONE ENCOUNTER
Pt called regarding these refills- he said the pharmacist at [facility] in [place] is requesting a call from the [facility] care team to discuss before refilling. Message sent to the [facility] care team. Pharmacy # is [phone].

## 2024-10-03 NOTE — TELEPHONE ENCOUNTER
Pt called with concerns that pharmacy would not fill narcotics prescribed on 9/30/   Spoke with Addison GUERRERO at [facility] in [place]- he needed to confirm that both prescriptions were sent purposely as pt has never filled narcotics at this pharmacy. Reviewed pt is moving to area and is currently being managed by both oncology and supportive oncology teams.   He will fill scripts for pt today.   Pt notified on identified VM>

## 2024-10-09 ENCOUNTER — TELEPHONE (OUTPATIENT)
Dept: PRIMARY CARE | Facility: CLINIC | Age: 54
End: 2024-10-09
Payer: COMMERCIAL

## 2024-10-09 NOTE — TELEPHONE ENCOUNTER
----- Message from Darius Meza sent at 10/9/2024  9:25 AM EDT -----  Regarding: R  Results from the last visit here reveals LDL or bad cholesterol slightly elevated 121 it should be below 70 and you are slightly anemic so please eat iron rich  food.  You will find that in eggs be lamb chicken reveal liver if you like liver tuna sardines and in some vegetables like spinach celery broccoli beans and bread also and certain fruits like an raisin day 6 prunes show in any event you could find iron in multiple way or you could take iron supplement over-the-counter.  Watch your fat intake if you eat eggs or smith or fat in general slow it down.  Any question call the office

## 2024-10-16 ENCOUNTER — TELEPHONE (OUTPATIENT)
Dept: ADMISSION | Facility: HOSPITAL | Age: 54
End: 2024-10-16
Payer: COMMERCIAL

## 2024-10-16 ENCOUNTER — APPOINTMENT (OUTPATIENT)
Dept: PALLIATIVE MEDICINE | Facility: CLINIC | Age: 54
End: 2024-10-16
Payer: COMMERCIAL

## 2024-10-16 NOTE — TELEPHONE ENCOUNTER
"Patient calling, had a Virtual appt today with Aurea Marion with Palliative team. He states, \"I did not get a phone call.\" Discussed with patient, he was scheduled for Virtual visit today at 9:30am, should have had a link in [employer] to access for the virtual visit. He would like to be rescheduled, asking is it possible to still have appt later today? If cannot do virtually would like it as a telephone visit  "

## 2024-10-21 ENCOUNTER — PHARMACY VISIT (OUTPATIENT)
Dept: PHARMACY | Facility: CLINIC | Age: 54
End: 2024-10-21
Payer: COMMERCIAL

## 2024-10-21 ENCOUNTER — SPECIALTY PHARMACY (OUTPATIENT)
Dept: PHARMACY | Facility: CLINIC | Age: 54
End: 2024-10-21

## 2024-10-21 DIAGNOSIS — C37 THYMUS CANCER (MULTI): ICD-10-CM

## 2024-10-21 PROCEDURE — RXMED WILLOW AMBULATORY MEDICATION CHARGE

## 2024-10-21 RX ORDER — OCTREOTIDE ACETATE,MI-SPHERES 30 MG
30 VIAL (EA) INTRAMUSCULAR
Qty: 30 MG | Refills: 11 | Status: SHIPPED | OUTPATIENT
Start: 2024-10-21

## 2024-10-23 ENCOUNTER — APPOINTMENT (OUTPATIENT)
Dept: HEMATOLOGY/ONCOLOGY | Facility: HOSPITAL | Age: 54
End: 2024-10-23
Payer: COMMERCIAL

## 2024-10-24 ENCOUNTER — TELEPHONE (OUTPATIENT)
Dept: ADMISSION | Facility: HOSPITAL | Age: 54
End: 2024-10-24
Payer: COMMERCIAL

## 2024-10-24 DIAGNOSIS — C79.49 CANCER WITH LEPTOMENINGEAL SPREAD (MULTI): ICD-10-CM

## 2024-10-24 NOTE — TELEPHONE ENCOUNTER
Per Aurea Marion, we sent this to [place] last month as a one time courtesy. Policy does not allow us to send across state lines. Patient will need to  his refills in [place] going forward. I left this information on patient's voicemail.

## 2024-10-24 NOTE — TELEPHONE ENCOUNTER
Pt called back returning [place]'s call- he said he was unaware that this was a one time thing sending the scripts to [place] and he is out of meds. He said his wife accepted a job there & he has to travel with her. He is asking to speak to the [facility] care team asa. Secure chat sent to [place]- no response yet. Pt is asking if someone from the [facility] care team can call him back today. I told him they have called a few times but his phone keeps going to . I advised him to program our number in so that maybe the call will go through. Message sent to the team.      Called patient and had to leave a message encouraging a return call

## 2024-10-24 NOTE — TELEPHONE ENCOUNTER
The patient called back, does want the medications sent to the [facility] in [place]. Message relayed to palliative care staff. I am happy to call him once complete to make aware of refill   Partial Purse String (Simple) Text: Given the location of the defect and the characteristics of the surrounding skin a simple purse string closure was deemed most appropriate.  Undermining was performed circumfirentially around the surgical defect.  A purse string suture was then placed and tightened. Wound tension only allowed a partial closure of the circular defect.

## 2024-10-24 NOTE — TELEPHONE ENCOUNTER
Pt is requesting refills on Tylenol 500mg and Dilaudid 2mg. He said he is out of the Dilaudid so needs that one asap if possible. He would like a call to discuss. Message sent to the [facility] care team.

## 2024-10-24 NOTE — TELEPHONE ENCOUNTER
I left patient a message asking where he would like refills sent as he spends time in [place] and [place]. Refills will be sent upon return of his call.

## 2024-10-25 RX ORDER — HYDROMORPHONE HYDROCHLORIDE 2 MG/1
2-4 TABLET ORAL EVERY 4 HOURS PRN
Qty: 48 TABLET | Refills: 0 | Status: SHIPPED | OUTPATIENT
Start: 2024-10-25 | End: 2024-10-29

## 2024-10-25 NOTE — TELEPHONE ENCOUNTER
I left Altaf another  advising that he adjust his phone settings to allow our calls to come through. I advised that he will need to attend the FUV with Aurea Marion IN PERSON on 10/28 in order to receive refills. I requested that he call back to confirm he will attend the appointment.

## 2024-10-25 NOTE — TELEPHONE ENCOUNTER
Pt called to see if prescription has been sent in. He was advised that dilaudid was sent to [facility] in [place].

## 2024-10-25 NOTE — TELEPHONE ENCOUNTER
Pt called back states he will keep 10/28 in person FUV but is requesting script for dilaudid be sent to [facility] today.

## 2024-10-25 NOTE — TELEPHONE ENCOUNTER
Pt calling back this morning. Is requesting dilaudid and tylenol scripts be sent to [facility] in [place]- he will have family  script for him.

## 2024-10-25 NOTE — TELEPHONE ENCOUNTER
Covering provider approved sending a 4 day supply to local pharmacy. Patient is aware. Tylenol still has a refill left on it.

## 2024-10-28 ENCOUNTER — APPOINTMENT (OUTPATIENT)
Dept: HEMATOLOGY/ONCOLOGY | Facility: HOSPITAL | Age: 54
End: 2024-10-28
Payer: COMMERCIAL

## 2024-10-28 ENCOUNTER — OFFICE VISIT (OUTPATIENT)
Dept: PALLIATIVE MEDICINE | Facility: HOSPITAL | Age: 54
End: 2024-10-28
Payer: COMMERCIAL

## 2024-10-28 ENCOUNTER — INFUSION (OUTPATIENT)
Dept: HEMATOLOGY/ONCOLOGY | Facility: HOSPITAL | Age: 54
End: 2024-10-28
Payer: COMMERCIAL

## 2024-10-28 ENCOUNTER — PHARMACY VISIT (OUTPATIENT)
Dept: PHARMACY | Facility: CLINIC | Age: 54
End: 2024-10-28
Payer: COMMERCIAL

## 2024-10-28 VITALS
TEMPERATURE: 96.8 F | SYSTOLIC BLOOD PRESSURE: 126 MMHG | DIASTOLIC BLOOD PRESSURE: 80 MMHG | HEART RATE: 97 BPM | RESPIRATION RATE: 16 BRPM | OXYGEN SATURATION: 94 %

## 2024-10-28 DIAGNOSIS — Z98.1 STATUS POST LAMINECTOMY WITH SPINAL FUSION: ICD-10-CM

## 2024-10-28 DIAGNOSIS — G89.3 CANCER ASSOCIATED PAIN: ICD-10-CM

## 2024-10-28 DIAGNOSIS — Z79.891 ENCOUNTER FOR MONITORING OPIOID MAINTENANCE THERAPY: Primary | ICD-10-CM

## 2024-10-28 DIAGNOSIS — C37 MALIGNANT THYMOMA (MULTI): ICD-10-CM

## 2024-10-28 DIAGNOSIS — Z51.81 ENCOUNTER FOR MONITORING OPIOID MAINTENANCE THERAPY: Primary | ICD-10-CM

## 2024-10-28 DIAGNOSIS — K59.03 CONSTIPATION DUE TO PAIN MEDICATION THERAPY: ICD-10-CM

## 2024-10-28 DIAGNOSIS — C79.49 CANCER WITH LEPTOMENINGEAL SPREAD (MULTI): ICD-10-CM

## 2024-10-28 LAB
AMPHETAMINES UR QL SCN: ABNORMAL
BARBITURATES UR QL SCN: ABNORMAL
BENZODIAZ UR QL SCN: ABNORMAL
BZE UR QL SCN: ABNORMAL
CANNABINOIDS UR QL SCN: ABNORMAL
FENTANYL+NORFENTANYL UR QL SCN: ABNORMAL
METHADONE UR QL SCN: ABNORMAL
OPIATES UR QL SCN: ABNORMAL
OXYCODONE+OXYMORPHONE UR QL SCN: ABNORMAL
PCP UR QL SCN: ABNORMAL

## 2024-10-28 PROCEDURE — 3074F SYST BP LT 130 MM HG: CPT | Performed by: NURSE PRACTITIONER

## 2024-10-28 PROCEDURE — 80307 DRUG TEST PRSMV CHEM ANLYZR: CPT | Performed by: NURSE PRACTITIONER

## 2024-10-28 PROCEDURE — RXMED WILLOW AMBULATORY MEDICATION CHARGE

## 2024-10-28 PROCEDURE — 96372 THER/PROPH/DIAG INJ SC/IM: CPT

## 2024-10-28 PROCEDURE — 99214 OFFICE O/P EST MOD 30 MIN: CPT | Mod: 25 | Performed by: NURSE PRACTITIONER

## 2024-10-28 PROCEDURE — 99214 OFFICE O/P EST MOD 30 MIN: CPT | Performed by: NURSE PRACTITIONER

## 2024-10-28 PROCEDURE — 2500000004 HC RX 250 GENERAL PHARMACY W/ HCPCS (ALT 636 FOR OP/ED): Mod: JZ | Performed by: CLINICAL NURSE SPECIALIST

## 2024-10-28 PROCEDURE — 3079F DIAST BP 80-89 MM HG: CPT | Performed by: NURSE PRACTITIONER

## 2024-10-28 RX ORDER — ACETAMINOPHEN 500 MG
1000 TABLET ORAL EVERY 8 HOURS PRN
Qty: 180 TABLET | Refills: 2 | Status: SHIPPED | OUTPATIENT
Start: 2024-10-28

## 2024-10-28 RX ORDER — OCTREOTIDE ACETATE,MI-SPHERES 30 MG
30 VIAL (EA) INTRAMUSCULAR ONCE
OUTPATIENT
Start: 2024-11-25

## 2024-10-28 RX ORDER — DIPHENHYDRAMINE HYDROCHLORIDE 50 MG/ML
50 INJECTION INTRAMUSCULAR; INTRAVENOUS AS NEEDED
OUTPATIENT
Start: 2024-11-25

## 2024-10-28 RX ORDER — EPINEPHRINE 0.3 MG/.3ML
0.3 INJECTION SUBCUTANEOUS EVERY 5 MIN PRN
OUTPATIENT
Start: 2024-11-25

## 2024-10-28 RX ORDER — ALBUTEROL SULFATE 0.83 MG/ML
3 SOLUTION RESPIRATORY (INHALATION) AS NEEDED
OUTPATIENT
Start: 2024-11-25

## 2024-10-28 RX ORDER — OCTREOTIDE ACETATE,MI-SPHERES 30 MG
30 VIAL (EA) INTRAMUSCULAR ONCE
Status: COMPLETED | OUTPATIENT
Start: 2024-10-28 | End: 2024-10-28

## 2024-10-28 RX ORDER — HYDROMORPHONE HYDROCHLORIDE 4 MG/1
2-4 TABLET ORAL EVERY 4 HOURS PRN
Qty: 180 TABLET | Refills: 0 | Status: SHIPPED | OUTPATIENT
Start: 2024-10-28 | End: 2024-11-27

## 2024-10-28 RX ORDER — GABAPENTIN 600 MG/1
600 TABLET ORAL 3 TIMES DAILY
Qty: 270 TABLET | Refills: 1 | Status: SHIPPED | OUTPATIENT
Start: 2024-10-28 | End: 2025-04-26

## 2024-10-28 RX ORDER — METHOCARBAMOL 500 MG/1
500-750 TABLET, FILM COATED ORAL 4 TIMES DAILY PRN
Qty: 180 TABLET | Refills: 2 | Status: SHIPPED | OUTPATIENT
Start: 2024-10-28 | End: 2025-01-26

## 2024-10-28 RX ORDER — MORPHINE SULFATE 15 MG/1
15 TABLET, FILM COATED, EXTENDED RELEASE ORAL 2 TIMES DAILY
Qty: 60 TABLET | Refills: 0 | Status: SHIPPED | OUTPATIENT
Start: 2024-10-28 | End: 2024-11-27

## 2024-10-28 RX ORDER — FAMOTIDINE 10 MG/ML
20 INJECTION INTRAVENOUS ONCE AS NEEDED
OUTPATIENT
Start: 2024-11-25

## 2024-10-28 ASSESSMENT — PAIN SCALES - GENERAL: PAINLEVEL_OUTOF10: 4

## 2024-11-04 ENCOUNTER — TELEMEDICINE (OUTPATIENT)
Dept: HEMATOLOGY/ONCOLOGY | Facility: HOSPITAL | Age: 54
End: 2024-11-04
Payer: COMMERCIAL

## 2024-11-04 ENCOUNTER — TELEPHONE (OUTPATIENT)
Dept: ADMISSION | Facility: HOSPITAL | Age: 54
End: 2024-11-04
Payer: COMMERCIAL

## 2024-11-04 DIAGNOSIS — C37 MALIGNANT THYMOMA (MULTI): ICD-10-CM

## 2024-11-04 PROCEDURE — 99215 OFFICE O/P EST HI 40 MIN: CPT | Performed by: INTERNAL MEDICINE

## 2024-11-04 NOTE — TELEPHONE ENCOUNTER
Received secure chat from Vi Young to have patient log in at 4:30p.m now instead due to Dr. Rodriguez being behind in clinic. Pt agreed to plan. Nothing further needed.

## 2024-11-04 NOTE — TELEPHONE ENCOUNTER
Altaf called and said he has a virtual appt. With Dr. Rodriguez at 2:40 and it is 3:03 and wanted to know how far behind he is running. I secured chat Dr. Rodriguez and Vi Young.  I had Altaf stay on phone to see if received a secure chat back quickly but we got disconnected. I tried calling him back and went to V/M. I left V/M and said if I get a response back from Dr. Rodriguez I will call him back to let him know an approximate time. I told him on V/M to stay logged in for the visit.  Routed message also to Dr. Rodriguez and Vi Young.

## 2024-11-06 LAB
6MAM UR CFM-MCNC: <25 NG/ML
CODEINE UR CFM-MCNC: <50 NG/ML
HYDROCODONE CTO UR CFM-MCNC: <25 NG/ML
HYDROMORPHONE UR CFM-MCNC: >2500 NG/ML
MORPHINE UR CFM-MCNC: >2500 NG/ML
NORHYDROCODONE UR CFM-MCNC: <25 NG/ML
NOROXYCODONE UR CFM-MCNC: <25 NG/ML
OXYCODONE UR CFM-MCNC: <25 NG/ML
OXYMORPHONE UR CFM-MCNC: <25 NG/ML

## 2024-11-07 DIAGNOSIS — I10 PRIMARY HYPERTENSION: ICD-10-CM

## 2024-11-07 RX ORDER — METOPROLOL TARTRATE 25 MG/1
25 TABLET, FILM COATED ORAL 2 TIMES DAILY
Qty: 180 TABLET | Refills: 3 | Status: SHIPPED | OUTPATIENT
Start: 2024-11-07 | End: 2024-11-08 | Stop reason: SDUPTHER

## 2024-11-08 ENCOUNTER — TELEPHONE (OUTPATIENT)
Dept: PRIMARY CARE | Facility: CLINIC | Age: 54
End: 2024-11-08
Payer: COMMERCIAL

## 2024-11-08 DIAGNOSIS — I10 PRIMARY HYPERTENSION: ICD-10-CM

## 2024-11-08 RX ORDER — METOPROLOL TARTRATE 25 MG/1
25 TABLET, FILM COATED ORAL 2 TIMES DAILY
Qty: 180 TABLET | Refills: 3 | Status: SHIPPED | OUTPATIENT
Start: 2024-11-08 | End: 2025-11-08

## 2024-11-15 ENCOUNTER — TELEPHONE (OUTPATIENT)
Dept: ADMISSION | Facility: HOSPITAL | Age: 54
End: 2024-11-15
Payer: COMMERCIAL

## 2024-11-15 NOTE — TELEPHONE ENCOUNTER
[facility] in [place], [place] received an incomplete referral for patient, please resend information to fax [phone]  If any questions or concerns call [phone]

## 2024-11-15 NOTE — TELEPHONE ENCOUNTER
RN called [facility] to investigate what information is needed for patient referral for patient. No answer at this time. The number listed does not go directly to [facility] but to a voicemail.    RN will attempt to find the correct number to get patient referred.

## 2024-11-16 ASSESSMENT — ENCOUNTER SYMPTOMS
VISUAL CHANGE: 0
JOINT SWELLING: 0
VOMITING: 0
VERTIGO: 0
NECK PAIN: 0
SWOLLEN GLANDS: 0
ABDOMINAL PAIN: 0
CHANGE IN BOWEL HABIT: 0
WEAKNESS: 1
NUMBNESS: 1
NAUSEA: 0
DIAPHORESIS: 0
COUGH: 0
MYALGIAS: 0
ARTHRALGIAS: 0
FATIGUE: 0
ANOREXIA: 0
CHILLS: 0
FEVER: 0
SORE THROAT: 0
HEADACHES: 0

## 2024-11-16 NOTE — PROGRESS NOTES
Patient ID: Altaf Parsons is a 54 y.o. male.    DIAGNOSIS     Malignant thymoma. Type B2 (predominant lymphocytic population with scattered epithelial cells). Date of diagnosis is March 4, 2016 from a left lower lobe of the lung wedge resection and mediastinal fat biopsy.A left pleural nodule was biopsied in May  2017 showing thymoma. A left serratus anterior biopsy in November 2019 showed thymoma. A biopsy from a T12-L1 paraspinal mass on August 19, 2020 shows again thymoma. A spine tumor biopsy in January 2021 again shows thymoma.        STAGING     T2 N0 M1 (metastases to the lungs bilaterally)        CURRENT SITES OF DISEASE     Mediastinum, lung, parapinal region T, L, S spine, left pleura, T12-L1 paraspinal region, left lateral aspect of the spinal canal spanning from at least T11-S1. Findings likely  represent epidural tumoral extension as seen on MRI thoracic and lumbar spine 06/26/2021. Interval worsening/development of widespread osseous metastasis noted throughout the thoracic and lumbar spine with ventral epidural tumoral extension from the T6  level through the L4 level. Canal stenosis secondary to epidural tumor appears to be more severe at the T8 level through the T10 level. At the T8-9 level there appears to be mild flattening of the contour of the   cord. Recent MRI T/L spine (11/11/22) demonstrates  increased/new tumor extension across multiple spinal levels, particularly at T9-T11 and L1-L2 with marked stenosis at L1-L2.        MOLECULAR GENOMICS     PD-L1 22C3 FDA (KEYTRUDA) for Gastric/GEA: CPS>/=1 (PD-L1 EXPRESSION) Combined Positive Score: 80   TEST: Solid Focus Tumor DNA Panel SPECIMEN: FFPE, Left Serratus Anterior, Biopsy, [id number] DISEASE DIAGNOSIS: Thyoma Estimated Tumor Content: 30% COLLECTION DATE: 11/13/2019 RECEIVED DATE: 08/11/2020 REPORT DATE: 08/14/2020 MICROSATELLITE STATUS: Microsatellite  Stable (CHERYL) DISEASE ASSOCIATED GENOMIC FINDINGS: None         PRIOR THERAPY     1-left  video-assisted thoracic surgery extensive lysis of adhesions and total pulmonary decortication, lower lobe wedge, exploration of the mediastinum converted to  thoracotomy, resection of anterior mediastinal mass and thymus, upper lobe wedge. Per surgery there is residual disease.  2-external beam radiation therapy August 1, 2016- Sept 17, 2016  5940 CGyE using 180 CGyE daily to surgical site   3- cyclophosphamide, adriamycin, cisplatin on 8/14/17 x 3 cycles      4- 3/19/18-4/17/18: recurrent left paraspinal T12-L1, 50 CGE/20 fx PROTONS Had progression but declined systemic therapy.      5- 12/10/19- 12/31/19: SINDI mass and left serratus anterior mass, 45 CGE/15 fx, PROTONS      6- Stenosis from T11-L1 secondary to large intraspinal extradural mass at the T12-L1 level/neoplasm. OPERATION/PROCEDURE: 1. Posterior spinal fusion T9-L1 with use of bilateral  pedicle screw instrumentation, allograft, and demineralized bone matrix fiber. 2. T12 laminectomy with laminotomy of L1 and T11 to decompress T11-L1 levels and remove the intraspinal extradural neoplasm at the T12-L1 segment.      7- On 8/4/21, he saw ortho spine surgery (Dr. Delgado) who did not recommend further surgery.      8- palliative radiation to the T-L/S1 spine: 30 Gy in 10 daily fractions, completed 9/27/21.     9- Single agents alimta per NCCN guideliens on January 10, 2022.   Received 2 cycles in JanuaryJanuary 2022.  Progressive disease based on MRI in February 2022     10- Radiation therapy to 30 Gy in 10 fractions, from T4-T6 and L1-L3 completed in mid April 2022 11- Dec 2022 - single agent capecitabine 500 mg tab, 4 tabs BID. Cycle - 21 days (two weeks on with one week holiday).  Patient initially underdosed himself with cycle #1 despite clear instructions.  He took cycle 2 at full dose on January 26, 2023.   Delay in C3 due to insurance issues with plan to resume again in April 2023.  Cycle 3 4/4/23 - 4/17 with one week off ending 4/24.  Cycle 4 starts  4/25/23.   He  started (C4) on 4/25/23.  Stable disease as best response     12- Single agent Afinitor per NCCN guidelines, started 9/28/2023, documented stable disease on imaging end of November 2023.  Progression in spine May 2024.  Stopped therapy May 2024     13- Thoracic decompression T5-T9 with laminectomies and spinal cord decompression and tumor debulking.  Posterior spine fusion T4-T9 with pedicle screw instrumentation by Dr. Kory Dodd on May 25, 2024        CURRENT THERAPY     1- supportive oncology/pain management     2- PET/NET scan to assess for somatostatin receptor status and consideration of octreotide. PET/NET positive. 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 He underwent LEFT VATS DRAINAGE OF MASSIVE HEMOTHORAX, PLEURAL BIOPSY, AND CORE NEEDLE  BIOPSY OF LINGULAR MASS AND DECORTICATION on July 18, 2007. Biopsies were all negative. He was also seen on CT scan to have an approximately 7 cm lesion within the mediastinum with a calcified rim. He was followed serially with CT scans of the chest through  2009. He was seen later in follow-up in 2016. MRI of the chest in Feb 2016 showed a new enhancing soft tissue mass immediately superior to the previously described calcified cystic structure. Also a new enhancing left pericardial lymph node and left basilar  pulmonary nodule were seen. CT scan of the chest also showed a right lower lobe nodule. He was taken to the operating room on March 4, 2016 where the necrotic calcified mass was removed showing only necrotic debris. Pathology favored a necrotic tumor  although no viable cancer cells were seen. The separate lesion within the  mediastinum was shown to be a malignant thymoma. Wedge resection of the left sided pulmonary lesion also showed malignant thymoma. Underwent gross resection of the anterior mediastinal  mass at that time, Proton beam radiation to his surgical bed.  He had recurrence in 2017 and received 3 cycles of systemic therapy complicated by nausea and vomiting.He was last seen by medical oncology in early 2018 and since then has not wanted any  systemic therapy and is followed with radiation oncology only. During this time he has had recurrent bronchospasm, left anterior serratus muscle, multiple recurrences of his T12-L1 region requiring 2 separate courses of radiation therapy as well as a  surgical decompression laminectomy and fusion and most recently has had further progression and is agreed to come back to the medical oncology.         PAST MEDICAL HISTORY     1-hypertension  2-spontaneous hemothorax on the left side in 2007  3-GERD  4-Achilles tendon repair  5-chronic back pain from work related back injury        SOCIAL HISTORY      Patient lives in [place]. Has 2 children. 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 He has never smoked. Does not drink alcohol. On disability.  He has been off work since August 27, 2021.        CURRENT MEDS     per list        ALLERGIES     Denies any drug allergies        FAMILY HISTORY     No family history of cancer.       Subjective    Cancer  Associated symptoms include numbness and weakness. Pertinent negatives include no abdominal pain, anorexia, arthralgias, change in bowel habit, chest pain, chills, congestion, coughing, diaphoresis, fatigue, fever, headaches, joint swelling, myalgias, nausea, neck pain, rash, sore throat, swollen glands, urinary symptoms, vertigo, visual change or vomiting.       Patient has received 3 injections of octreotide August September and October 2024.  He states he is feeling good.  He states he is progressing from a physical therapy standpoint able to  stand up with assistance but still has significant paraplegia.  Voiding okay, no fever, urinating well, they have moved to [place], bowel movements regular, urinating well, no new headaches      Objective    BSA: There is no height or weight on file to calculate BSA.  There were no vitals taken for this visit.     Physical Exam  Not performed virtual visit      Performance Status:  Symptomatic; in bed >50% of the day      Assessment/Plan     This is a patient with advanced malignant thymoma was developed paraplegia from spinal cord involvement.  He is now on octreotide therapy and is received 3 monthly doses.  He will come back in a month for his fourth dose but will have MRIs for restaging at that time.  We talked about transferring his care to a physician in [place] and I have provided the name of a thoracic oncologist at the cancer center there.    Cancer Staging   Neoplasm of thymus  Staging form: Thymus, AJCC 8th Edition  - Clinical stage from 3/4/2016: Stage SHONDA (cT2, cN0, cM1a) - Signed by Justo Rodriguez MD on 11/22/2023      Oncology History   Neoplasm of thymus   3/4/2016 Cancer Staged    Staging form: Thymus, AJCC 8th Edition, Clinical stage from 3/4/2016: Stage SHONDA (cT2, cN0, cM1a) - Signed by Justo Rodriguez MD on 11/22/2023     10/6/2023 Initial Diagnosis    Neoplasm of thymus     Malignant thymoma (Multi)   9/28/2023 - 1/12/2024 Chemotherapy    Everolimus, 28 Day Cycles      10/6/2023 Initial Diagnosis    Malignant thymoma (CMS/HCC)                   Justo Rodriguez MD

## 2024-11-18 ENCOUNTER — PHARMACY VISIT (OUTPATIENT)
Dept: PHARMACY | Facility: CLINIC | Age: 54
End: 2024-11-18
Payer: COMMERCIAL

## 2024-11-18 ENCOUNTER — SPECIALTY PHARMACY (OUTPATIENT)
Dept: PHARMACY | Facility: CLINIC | Age: 54
End: 2024-11-18

## 2024-11-18 ENCOUNTER — TELEPHONE (OUTPATIENT)
Dept: ADMISSION | Facility: HOSPITAL | Age: 54
End: 2024-11-18
Payer: COMMERCIAL

## 2024-11-18 PROCEDURE — RXMED WILLOW AMBULATORY MEDICATION CHARGE

## 2024-11-18 NOTE — TELEPHONE ENCOUNTER
Altaf called and said he wanted to talk to Radiology scheduling about his appointments. I transferred him to their scheduling dept.

## 2024-11-21 ENCOUNTER — TELEPHONE (OUTPATIENT)
Dept: ADMISSION | Facility: HOSPITAL | Age: 54
End: 2024-11-21
Payer: COMMERCIAL

## 2024-11-21 NOTE — TELEPHONE ENCOUNTER
[place]    and cannot travel back to [place] d/t wounds on buttocks that is making travel unmanageable.   Pt is scheduled for octreotide injection on 11/25 as well as virtual FUV with sup onc that day.   Pt states he needs to talk with team to establish care in [facility] as he does not feel he will be traveling to [place] anytime soon.

## 2024-11-21 NOTE — TELEPHONE ENCOUNTER
I left Altaf a message advising that he can call back in should he be in [place] between now and mid December. Last saw Aurea Doni 10/28 with RTC in 4-6 weeks. I also recommended that he speaks with his oncology team about moving his care to [place].

## 2024-11-25 ENCOUNTER — TELEMEDICINE (OUTPATIENT)
Dept: PALLIATIVE MEDICINE | Facility: HOSPITAL | Age: 54
End: 2024-11-25
Payer: COMMERCIAL

## 2024-11-25 DIAGNOSIS — K59.03 CONSTIPATION DUE TO PAIN MEDICATION THERAPY: Primary | ICD-10-CM

## 2024-11-25 DIAGNOSIS — G89.3 CANCER ASSOCIATED PAIN: ICD-10-CM

## 2024-11-25 PROCEDURE — 99214 OFFICE O/P EST MOD 30 MIN: CPT | Performed by: NURSE PRACTITIONER

## 2024-11-25 RX ORDER — MORPHINE SULFATE 15 MG/1
15 TABLET, FILM COATED, EXTENDED RELEASE ORAL 2 TIMES DAILY
Qty: 60 TABLET | Refills: 0 | Status: SHIPPED | OUTPATIENT
Start: 2024-11-25 | End: 2024-12-25

## 2024-11-25 NOTE — PROGRESS NOTES
SUPPORTIVE AND PALLIATIVE ONCOLOGY CONSULT - OUTPATIENT FOLLOW UP    Virtual or Telephone Consent     An interactive audio and video telecommunication system which permits real time communications between the patient (at the originating site) and provider (at the distant site) was utilized to provide this telehealth service.   Verbal consent was requested and obtained from Altaf Parsons on this date, 11/25/24 for a telehealth visit.      SERVICE DATE: 11/25/2024    Referred by:  Justo Rodriguez MD  Medical Oncologist: MD Rachelle Sinclair MD Ali W Bseiso, MD   Radiation Oncologist: No care team member to display  Primary Physician: Darius Meza  [phone]    REASON FOR CONSULT/CHIEF CONSULT COMPLAINT: Pain management and Introduction to Supportive and Palliative Oncology Services    Subjective   HISTORY OF PRESENT ILLNESS: Altaf Parsons is a 54 y.o. 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 He is paraplegic from cord compression. S/p thoracic decompression and fusion with extension to a prior fusion mass. He may need MRI in near future. Oncology has consulted both pain management and supportive oncology for back pain. There is discussion about starting octreotide.     Additional PMHx  hypertension  spontaneous hemothorax on the left side in 2007  GERD  Achilles tendon repair  chronic back pain from work related back injury    8/16: Followed up with ortho who recommended physical medicine and rehab specialist with a specialty in spinal cord injury. Pain better controlled. No other symptom complaints today.     9/11/24: Pain well controlled at this time. Regular Bms. Sleeping more throughout the day. This started a few days ago. He is wondering if it is medication related.     10/28/24: Symptoms well controlled. Would like to reduce dose of robaxin due to sleepiness. Splitting time between [place] and [place] due to wife's job.     11/25/24: Symptoms  well controlled.     Symptom Assessment:    Pain:a little    Left lower back  Intermittent   Radiating to left hip  Aching   Spasms at times     Numbness or tingling in hands/feet/other: none  Lack of appetite: none  Nausea/early satiety: none  Vomiting: none  Constipation: none  Diarrhea: none   Lack of energy: a little  Difficulty sleeping: none  Anxiety: none  Depression: none  Shortness of breath: none  Other: none    Information obtained from: interview of patient  ______________________________________________________________________     Oncology History   Neoplasm of thymus   3/4/2016 Cancer Staged    Staging form: Thymus, AJCC 8th Edition, Clinical stage from 3/4/2016: Stage SHONDA (cT2, cN0, cM1a) - Signed by Justo Rodriguez MD on 11/22/2023     10/6/2023 Initial Diagnosis    Neoplasm of thymus     Malignant thymoma (Multi)   9/28/2023 - 1/12/2024 Chemotherapy    Everolimus, 28 Day Cycles      10/6/2023 Initial Diagnosis    Malignant thymoma (CMS/HCC)         Past Medical History:   Diagnosis Date    Abnormal weight gain 12/08/2014    Abnormal weight gain    Hemothorax     Hemothorax    Personal history of other diseases of the digestive system 09/10/2013    History of gastritis    Personal history of other diseases of the digestive system 03/19/2015    History of esophageal reflux    Personal history of other diseases of the nervous system and sense organs 11/24/2020    History of sleep apnea    Personal history of other endocrine, nutritional and metabolic disease     History of hypercholesterolemia    Personal history of other endocrine, nutritional and metabolic disease     History of hypothyroidism    Personal history of other specified conditions 03/14/2014    History of chest pain    Strain of unspecified muscle, fascia and tendon at shoulder and upper arm level, right arm, initial encounter 05/20/2014    Right shoulder strain     Past Surgical History:   Procedure Laterality Date    CT GUIDED  PERCUTANEOUS BIOPSY MUSCLE  11/13/2019    CT GUIDED PERCUTANEOUS BIOPSY MUSCLE 11/13/2019 [facility] A[facility] LEGAC[facility]    CT GUIDED PERCUTANEOUS BIOPSY MUSCLE  8/13/2020    CT GUIDED PERCUTANEOUS BIOPSY MUSCLE 8/13/2020 [facility] LEGAC[facility]    CT GUIDED PLEURA PERCUTANEOUS BIOPSY  5/10/2017    CT GUIDED PLEURA PERCUTANEOUS BIOPSY 5/10/2017 [facility] A[facility] LEGAC[facility]    KNEE SURGERY  09/10/2013    Knee Surgery    MR CHEST ANGIO W IV CONTRAST  2/9/2016    MR CHEST ANGIO W IV CONTRAST 2/9/2016 [facility] ANCILLARY LEGACY    OTHER SURGICAL HISTORY  09/10/2013    History Of Prior Surgery     No family history on file.     SOCIAL HISTORY  . 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   Wife is CRNA at .  Social History:  reports that he has never smoked. He has never used smokeless tobacco. He reports that he does not currently use alcohol. He reports that he does not currently use drugs.    REVIEW OF SYSTEMS  Review of systems negative unless noted in HPI.       Objective     Current Outpatient Medications   Medication Instructions    acetaminophen (TYLENOL) 1,000 mg, oral, Every 8 hours PRN    cholecalciferol, vitamin D3, (VITAMIN D3 ORAL) 1 tablet, oral, Daily,       diphenhydrAMINE (BENADRYL) 50 mg, oral, Nightly PRN    ferrous sulfate, 325 mg ferrous sulfate, tablet 1 tablet, oral, Every other day    furosemide (Lasix) 20 mg tablet 0.5 tablets, oral, Daily    gabapentin (NEURONTIN) 600 mg, oral, 3 times daily    HYDROmorphone (DILAUDID) 2-4 mg, oral, Every 4 hours PRN    lisinopril 10 mg, oral, Daily, Reported taking 10 mg daily at home.    melatonin 5 mg, oral, Daily    methocarbamol (ROBAXIN) 500-750 mg, oral, 4 times daily PRN    metoprolol tartrate (LOPRESSOR) 25 mg, oral, 2 times daily    morphine CR (MS CONTIN) 15 mg, oral, 2 times daily, Do not crush, chew, or split.    naloxone (NARCAN) 4 mg, nasal, As needed, May repeat every 2-3 minutes if needed, alternating nostrils, until medical assistance becomes available.    pantoprazole (PROTONIX) 40 mg,  oral, Daily before breakfast, Do not crush, chew, or split.    For stomach protection while taking steroids.    polyethylene glycol (Glycolax, Miralax) 17 gram/dose powder Take 1 capful (17 g) mixed in 4-8 ounces  by mouth once daily as needed for constipation.    SandoSTATIN LAR [place] 30 mg, intramuscular, Every 28 days       Allergies:   Allergies   Allergen Reactions    Morphine Hallucinations     Pt reports hallucinations previously with morpine                Creatinine   Date Value Ref Range Status   09/30/2024 0.73 0.50 - 1.30 mg/dL Final     AST   Date Value Ref Range Status   09/30/2024 12 9 - 39 U/L Final     ALT   Date Value Ref Range Status   09/30/2024 8 (L) 10 - 52 U/L Final     Comment:     Patients treated with Sulfasalazine may generate falsely decreased results for ALT.     Hemoglobin   Date Value Ref Range Status   09/30/2024 11.1 (L) 13.5 - 17.5 g/dL Final     Platelets   Date Value Ref Range Status   09/30/2024 659 (H) 150 - 450 x10*3/uL Final       PHYSICAL EXAMINATION  Vital Signs:   No VS due to VV      PE   Voice clear   Thoughts coherent       ASSESSMENT/PLAN    Pain  Pain is: cancer related pain  Type: somatic and neuropathic  Pain control: sub-optimally controlled  Reduce Robaxin to 500 QID PRN   Continue hydromorphone 2-4mg q4 PRN  (one dose daily on average)  Continue MsContin 15mg BID  Continue gabapentin  600mg TID  Tylenol 1g TID PRN (max 3g per day discussed)   Pain Medicine referral per oncology - patient declines   Discussed opioids cannot be Rx to [place]. He travels to [facility] monthly and will have opioid Rx sent to local pharmacy. Advised to notify 2-3 days before he is due.     Opioid Use  Medication Management:   - OARRS report reviewed with no aberrant behavior; consistent with  prescriptions/records and patient history  - MED 62 (avg but varies)  Overdose Risk Score 520.   This has been discussed with patient.   - We will continue to closely monitor the patient for signs of  prescription misuse including UDS, OARRS review and subjective reports at each visit.  - No concurrent benzodiazepine use   - I am a provider who either is or has consulted and collaborated with a provider certified in Hospice and Palliative Medicine and have conducted a face-face visit and examination for this patient.  - Routine Urine Drug Screen completed 10/28/24 and appropriate   - Controlled Substance Agreement completed 10/28/24  - Specifically discussed that controlled substance prescriptions will only be provided by our group as outlined in the completed agreement  - Prescribed naloxone 8/2/24  - Red Flags: none    Constipation   At risk for constipation related to opioids  Continue miralax 17g QOD  Continue senna 1 tab BID - may need to increase to 2 tab BID (if no bm in 2 days)       Medical Decision Making/Goals of Care/Advance Care Planning:  Patient's current clinical condition, including diagnosis, prognosis, and management plan, and goals of care were discussed.   Life limiting disease: metastatic malignancy  Goals: symptom control and cancer directed therapy    Advance Directives  Existence of Advance Directives:No - not interested  Decision maker: Surrogate decision maker is wife.     Next Follow-Up Visit:  Return to clinic in 4-6 weeks    Signature and billing  Thank you for allowing us to participate in the care of this patient. Recommendations will be communicated back to the consulting service by way of shared electronic medical record or face-to-face.    Medical complexity was moderate level due to due to complexity of problems, extensive data review, and high risk of management/treatment.  Time was spent on the following: Prep Time, Time Directly with Patient/Family/Caregiver, Documentation Time. Total time spent: 30 min      DATA   Diagnostic tests and information reviewed for today's visit:  Most recent labs and imaging results, Medications       Plan of Care discussed with: Patient and  RN    Some elements copied from Supportive Oncology note on 10/28/24 the elements have been updated and all reflect current decision making from today, 11/25/24.      SIGNATURE: Aurea Marion, APRN-CNP    Contact information:  Supportive and Palliative Oncology  Monday-Friday 8 AM-5 PM  Phone:  [phone], press option #5, then option #1.   Or Epic Secure Chat

## 2024-11-27 ENCOUNTER — TELEPHONE (OUTPATIENT)
Dept: ADMISSION | Facility: HOSPITAL | Age: 54
End: 2024-11-27
Payer: COMMERCIAL

## 2024-11-27 NOTE — TELEPHONE ENCOUNTER
Pt states that he has been in the ED in [facility] since Monday with unmanaged pain.   Pt states that ED is awaiting medical records from Dr. Rodriguez team- advised that I can connect him with medical records and they can have his information transferred. Pt transferred to medical records at end of call.   Pt states Dr. Rodriguez team was to reach out to Dr. Dye, oncology in [facility] to discuss ongoing care needs. Pt states that Dr. Rodriguez's team has all necessary information- he is checking if this has been completed.

## 2024-12-10 NOTE — PROGRESS NOTES
At clinic visit last week, pt and wife requested help with an insurance policy that will cover ambulatory equipment not usually covered by health insurance. It appears to be the type of plan where cancer-related expenses are reimbursed. This SW facilitated and RX has been emailed to the pt; will see if it needs to be mailed also.  
Verbal/written post procedure instructions were given to patient/caregiver/Instructed patient/caregiver to follow-up with primary care physician/Instructed patient/caregiver regarding signs and symptoms of infection/Keep the cast/splint/dressing clean and dry/Care for catheter as per unit/ICU protocols

## 2024-12-16 ENCOUNTER — APPOINTMENT (OUTPATIENT)
Dept: HEMATOLOGY/ONCOLOGY | Facility: HOSPITAL | Age: 54
End: 2024-12-16
Payer: COMMERCIAL

## 2024-12-23 ENCOUNTER — APPOINTMENT (OUTPATIENT)
Dept: HEMATOLOGY/ONCOLOGY | Facility: HOSPITAL | Age: 54
End: 2024-12-23
Payer: COMMERCIAL

## 2024-12-30 ENCOUNTER — TELEMEDICINE (OUTPATIENT)
Dept: PALLIATIVE MEDICINE | Facility: HOSPITAL | Age: 54
End: 2024-12-30
Payer: COMMERCIAL

## 2024-12-30 DIAGNOSIS — K59.03 CONSTIPATION DUE TO PAIN MEDICATION THERAPY: Primary | ICD-10-CM

## 2024-12-30 DIAGNOSIS — G89.3 CANCER RELATED PAIN: ICD-10-CM

## 2024-12-30 PROCEDURE — 99214 OFFICE O/P EST MOD 30 MIN: CPT | Performed by: NURSE PRACTITIONER

## 2024-12-30 NOTE — PROGRESS NOTES
SUPPORTIVE AND PALLIATIVE ONCOLOGY CONSULT - OUTPATIENT FOLLOW UP    Virtual or Telephone Consent     An interactive audio and video telecommunication system which permits real time communications between the patient (at the originating site) and provider (at the distant site) was utilized to provide this telehealth service.   Verbal consent was requested and obtained from Altaf Parsons on this date, 12/30/24 for a telehealth visit.      SERVICE DATE: 12/30/2024    Referred by:  Justo Rodriguez MD  Medical Oncologist: MD Rachelle Sinclair MD Ali W Bseiso, MD   Radiation Oncologist: No care team member to display  Primary Physician: Darius Meza  [phone]    REASON FOR CONSULT/CHIEF CONSULT COMPLAINT: Pain management and Introduction to Supportive and Palliative Oncology Services    Subjective   HISTORY OF PRESENT ILLNESS: Altaf Parsons is a 54 y.o. 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 He is paraplegic from cord compression. S/p thoracic decompression and fusion with extension to a prior fusion mass. He may need MRI in near future. Oncology has consulted both pain management and supportive oncology for back pain. There is discussion about starting octreotide.     Additional PMHx  hypertension  spontaneous hemothorax on the left side in 2007  GERD  Achilles tendon repair  chronic back pain from work related back injury    8/16: Followed up with ortho who recommended physical medicine and rehab specialist with a specialty in spinal cord injury. Pain better controlled. No other symptom complaints today.     9/11/24: Pain well controlled at this time. Regular Bms. Sleeping more throughout the day. This started a few days ago. He is wondering if it is medication related.     10/28/24: Symptoms well controlled. Would like to reduce dose of robaxin due to sleepiness. Splitting time between [place] and [place] due to wife's job.     11/25/24: Symptoms  well controlled.     12/30/24: Hospitalized in [place] for back pain. S/p lumbar 1 decompression with complete laminectomy for tumor resection with NSGY 12/3/24  Discharge from SNF planned for 1/1 and oncology in [place] (Dr. Pallas) on 1/2. See by palliative medicine inpatient and Mscontin increased to 45mg BID and hydromorphone 2-4mg q4 PRN. His is also on a dexamethasone taper. Pain is well controlled. He is in good spirits.     Symptom Assessment:    Pain:a little    Left lower back  Intermittent   Radiating to left hip and knee   Aching   Spasms at times     Numbness or tingling in hands/feet/other: none  Lack of appetite: none  Nausea/early satiety: none  Vomiting: none  Constipation: none  Diarrhea: none   Lack of energy: a little  Difficulty sleeping: none  Anxiety: none  Depression: none  Shortness of breath: none  Other: none    Information obtained from: interview of patient  ______________________________________________________________________     Oncology History   Neoplasm of thymus   3/4/2016 Cancer Staged    Staging form: Thymus, AJCC 8th Edition, Clinical stage from 3/4/2016: Stage SHONDA (cT2, cN0, cM1a) - Signed by Justo Rodriguez MD on 11/22/2023     10/6/2023 Initial Diagnosis    Neoplasm of thymus     Malignant thymoma (Multi)   9/28/2023 - 1/12/2024 Chemotherapy    Everolimus, 28 Day Cycles      10/6/2023 Initial Diagnosis    Malignant thymoma (CMS/HCC)         Past Medical History:   Diagnosis Date    Abnormal weight gain 12/08/2014    Abnormal weight gain    Hemothorax     Hemothorax    Personal history of other diseases of the digestive system 09/10/2013    History of gastritis    Personal history of other diseases of the digestive system 03/19/2015    History of esophageal reflux    Personal history of other diseases of the nervous system and sense organs 11/24/2020    History of sleep apnea    Personal history of other endocrine, nutritional and metabolic disease     History of hypercholesterolemia     Personal history of other endocrine, nutritional and metabolic disease     History of hypothyroidism    Personal history of other specified conditions 03/14/2014    History of chest pain    Strain of unspecified muscle, fascia and tendon at shoulder and upper arm level, right arm, initial encounter 05/20/2014    Right shoulder strain     Past Surgical History:   Procedure Laterality Date    CT GUIDED PERCUTANEOUS BIOPSY MUSCLE  11/13/2019    CT GUIDED PERCUTANEOUS BIOPSY MUSCLE 11/13/2019 [facility]    CT GUIDED PERCUTANEOUS BIOPSY MUSCLE  8/13/2020    CT GUIDED PERCUTANEOUS BIOPSY MUSCLE 8/13/2020 [facility] LE[facility]    CT GUIDED PLEURA PERCUTANEOUS BIOPSY  5/10/2017    CT GUIDED PLEURA PERCUTANEOUS BIOPSY 5/10/2017 [facility]    KNEE SURGERY  09/10/2013    Knee Surgery    MR CHEST ANGIO W IV CONTRAST  2/9/2016    MR CHEST ANGIO W IV CONTRAST 2/9/2016 [facility] ANCILLARY LEGACY    OTHER SURGICAL HISTORY  09/10/2013    History Of Prior Surgery     No family history on file.     SOCIAL HISTORY  . 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   Wife is CRNA at .  Social History:  reports that he has never smoked. He has never used smokeless tobacco. He reports that he does not currently use alcohol. He reports that he does not currently use drugs.    REVIEW OF SYSTEMS  Review of systems negative unless noted in HPI.       Objective     Current Outpatient Medications   Medication Instructions    acetaminophen (TYLENOL) 1,000 mg, oral, Every 8 hours PRN    cholecalciferol, vitamin D3, (VITAMIN D3 ORAL) 1 tablet, oral, Daily,       diphenhydrAMINE (BENADRYL) 50 mg, oral, Nightly PRN    ferrous sulfate, 325 mg ferrous sulfate, tablet 1 tablet, oral, Every other day    furosemide (Lasix) 20 mg tablet 0.5 tablets, oral, Daily    gabapentin (NEURONTIN) 600 mg, oral, 3 times daily    lisinopril 10 mg, oral, Daily, Reported taking 10 mg daily at home.    melatonin 5 mg, oral, Daily    methocarbamol (ROBAXIN) 500-750 mg, oral, 4  times daily PRN    metoprolol tartrate (LOPRESSOR) 25 mg, oral, 2 times daily    naloxone (NARCAN) 4 mg, nasal, As needed, May repeat every 2-3 minutes if needed, alternating nostrils, until medical assistance becomes available.    pantoprazole (PROTONIX) 40 mg, oral, Daily before breakfast, Do not crush, chew, or split.    For stomach protection while taking steroids.    polyethylene glycol (Glycolax, Miralax) 17 gram/dose powder Take 1 capful (17 g) mixed in 4-8 ounces  by mouth once daily as needed for constipation.    SandoSTATIN LAR [place] 30 mg, intramuscular, Every 28 days       Allergies:   Allergies   Allergen Reactions    Morphine Hallucinations     Pt reports hallucinations previously with morpine                Creatinine   Date Value Ref Range Status   09/30/2024 0.73 0.50 - 1.30 mg/dL Final     AST   Date Value Ref Range Status   09/30/2024 12 9 - 39 U/L Final     ALT   Date Value Ref Range Status   09/30/2024 8 (L) 10 - 52 U/L Final     Comment:     Patients treated with Sulfasalazine may generate falsely decreased results for ALT.     Hemoglobin   Date Value Ref Range Status   09/30/2024 11.1 (L) 13.5 - 17.5 g/dL Final     Platelets   Date Value Ref Range Status   09/30/2024 659 (H) 150 - 450 x10*3/uL Final   CBC/CMP 12/13 from OSH reviewed.     PHYSICAL EXAMINATION  Vital Signs:   No VS due to VV      PE   Voice clear   Thoughts coherent       ASSESSMENT/PLAN    Pain  Pain is: cancer related pain  Type: somatic and neuropathic  Pain control: sub-optimally controlled  Reduce Robaxin to 500 QID PRN (patient unsure if in on 750mg dose at [facility])  Continue hydromorphone 2-4mg q4 PRN    Continue MsContin 45mg BID - wean after discharge from [facility]   Continue gabapentin  600mg TID  Tylenol 1g TID PRN (max 3g per day discussed)   Pain Medicine referral per oncology - patient declines   Discussed opioids cannot be Rx to [place]. He travels to [facility] monthly and will have opioid Rx sent to local pharmacy. Advised to notify  2-3 days before he is due.   He will be following up with local oncologist in [place]. I advised he request a palliative referral as he primarily lives in [place] and opioid Rx should ideally be local.     Opioid Use  Medication Management:   - OARRS report reviewed with no aberrant behavior; consistent with  prescriptions/records and patient history  - MED 90  Overdose Risk Score 430.   This has been discussed with patient.   - We will continue to closely monitor the patient for signs of prescription misuse including UDS, OARRS review and subjective reports at each visit.  - No concurrent benzodiazepine use   - I am a provider who either is or has consulted and collaborated with a provider certified in Hospice and Palliative Medicine and have conducted a face-face visit and examination for this patient.  - Routine Urine Drug Screen completed 10/28/24 and appropriate   - Controlled Substance Agreement completed 10/28/24  - Specifically discussed that controlled substance prescriptions will only be provided by our group as outlined in the completed agreement  - Prescribed naloxone 8/2/24  - Red Flags: none    Constipation   At risk for constipation related to opioids  Continue miralax 17g daily PRN  Continue senna 1 tab BID - may need to increase to 2 tab BID (if no bm in 2 days)       Medical Decision Making/Goals of Care/Advance Care Planning:  Patient's current clinical condition, including diagnosis, prognosis, and management plan, and goals of care were discussed.   Life limiting disease: metastatic malignancy  Goals: symptom control and cancer directed therapy    Advance Directives  Existence of Advance Directives:No - not interested  Decision maker: Surrogate decision maker is wife.     Next Follow-Up Visit:  Return to clinic in 4 weeks    Signature and billing  Thank you for allowing us to participate in the care of this patient. Recommendations will be communicated back to the consulting service by way of shared  electronic medical record or face-to-face.    Medical complexity was moderate level due to due to complexity of problems, extensive data review, and high risk of management/treatment.  Time was spent on the following: Prep Time, Time Directly with Patient/Family/Caregiver, Documentation Time. Total time spent: 30 min      DATA   Diagnostic tests and information reviewed for today's visit:  Most recent labs and imaging results, Medications       Plan of Care discussed with: Patient and RN    Some elements copied from Supportive Oncology note on 11/25/24 the elements have been updated and all reflect current decision making from today, 12/30/24.      SIGNATURE: Aurea Marion, APRN-CNP    Contact information:  Supportive and Palliative Oncology  Monday-Friday 8 AM-5 PM  Phone:  [phone], press option #5, then option #1.   Or Epic Secure Chat

## 2025-03-17 ENCOUNTER — APPOINTMENT (OUTPATIENT)
Dept: HEMATOLOGY/ONCOLOGY | Facility: HOSPITAL | Age: 55
End: 2025-03-17
Payer: COMMERCIAL

## 2025-04-14 ENCOUNTER — APPOINTMENT (OUTPATIENT)
Dept: HEMATOLOGY/ONCOLOGY | Facility: HOSPITAL | Age: 55
End: 2025-04-14
Payer: COMMERCIAL

## 2025-05-12 ENCOUNTER — APPOINTMENT (OUTPATIENT)
Dept: HEMATOLOGY/ONCOLOGY | Facility: HOSPITAL | Age: 55
End: 2025-05-12
Payer: COMMERCIAL

## (undated) DEVICE — SUTURE, VICRYL, 1, 27 IN, CT-1, VIOLET

## (undated) DEVICE — PROBE, PEDICLE SCREW, 190CM CABLE, DISPOSABLE

## (undated) DEVICE — ELECTRODE, CORKSCREW NEEDLE 1.5M LENGTH

## (undated) DEVICE — CLIPPER, SURGICAL BLADE ASSEMBLY, GENERAL PURPOSE, SINGLE USE

## (undated) DEVICE — ELECTRODE, GROUND PLATE

## (undated) DEVICE — STAPLER, SKIN PROXIMATE, 35 WIDE

## (undated) DEVICE — NEEDLE, ELECTRODE, SUBDERMAL, PAIRED, 2.0 LEAD, DISP

## (undated) DEVICE — THERAPY UNIT, PREVENA PLUS 125

## (undated) DEVICE — Device

## (undated) DEVICE — MANIFOLD, 4 PORT NEPTUNE STANDARD

## (undated) DEVICE — STRIP, SKIN CLOSURE, STERI STRIP, REINFORCED, 0.5 X 4 IN

## (undated) DEVICE — DRESSING, NON-ADHERENT, OIL EMULSION, CURITY, 3 X 8 IN, STERILE

## (undated) DEVICE — SUTURE, ETHILON, 2-0, FSLX 30, BLACK

## (undated) DEVICE — TIP, SUCTION, FRAZIER, W/CONTROL VENT, 10 FR

## (undated) DEVICE — DRAIN, WOUND, ROUND, W/TROCAR, HOLE PATTERN, 10 IN, MEDIUM/LARGE, 3/16 X 49 IN

## (undated) DEVICE — COVER, TABLE, 44 X 75 IN, DISPOSABLE, LF, STERILE

## (undated) DEVICE — EVACUATOR, WOUND, CLOSED, 3 SPRING, 400 CC, Y CONNECTING TUBE

## (undated) DEVICE — SPHERE, STEALTHSTATION, 5-PK

## (undated) DEVICE — COVER, CART, 45 X 27 X 48 IN, CLEAR

## (undated) DEVICE — SUTURE, VICRYL, 2-0, 27 IN, FSL, UNDYED

## (undated) DEVICE — TIP, SUCTION, FRAZIER, W/CONTROL VENT, 12 FR

## (undated) DEVICE — DRAPE, SHEET, FAN FOLDED, HALF, 44 X 58 IN, DISPOSABLE, LF, STERILE

## (undated) DEVICE — SPONGE, GAUZE, XRAY DECT, 16 PLY, 4 X 4, W/MASTER DMT,STERILE

## (undated) DEVICE — COVER, TABLE, UHC

## (undated) DEVICE — DRESSING, GAUZE, WASHED FLUFF, LARGE, STERILE

## (undated) DEVICE — DRESSING, ASSY, PREVENA CUSTOMIZABLE

## (undated) DEVICE — LEGEND, BALL FLUTED, 9 CM, 3 MM

## (undated) DEVICE — SPONGE, HEMOSTATIC, GELATIN, SURGIFOAM, 8 X 12.5 CM X 10 MM

## (undated) DEVICE — SUTURE, VICRYL, 4-0, 18 IN, UNDYED BR PS-2

## (undated) DEVICE — SEALANT, HEMOSTATIC, FLOSEAL, 10 ML

## (undated) DEVICE — LEGEND, LUB DIFFUSER PACK